# Patient Record
Sex: MALE | Race: WHITE | NOT HISPANIC OR LATINO | URBAN - METROPOLITAN AREA
[De-identification: names, ages, dates, MRNs, and addresses within clinical notes are randomized per-mention and may not be internally consistent; named-entity substitution may affect disease eponyms.]

---

## 2017-11-11 ENCOUNTER — EMERGENCY (EMERGENCY)
Facility: HOSPITAL | Age: 57
LOS: 1 days | Discharge: HOME | End: 2017-11-11
Attending: EMERGENCY MEDICINE | Admitting: EMERGENCY MEDICINE
Payer: MEDICAID

## 2017-11-11 VITALS
OXYGEN SATURATION: 99 % | WEIGHT: 250 LBS | RESPIRATION RATE: 20 BRPM | HEART RATE: 98 BPM | SYSTOLIC BLOOD PRESSURE: 214 MMHG | TEMPERATURE: 99 F | DIASTOLIC BLOOD PRESSURE: 121 MMHG

## 2017-11-11 VITALS
OXYGEN SATURATION: 98 % | SYSTOLIC BLOOD PRESSURE: 168 MMHG | DIASTOLIC BLOOD PRESSURE: 78 MMHG | HEART RATE: 88 BPM | TEMPERATURE: 98 F | RESPIRATION RATE: 16 BRPM

## 2017-11-11 DIAGNOSIS — F10.10 ALCOHOL ABUSE, UNCOMPLICATED: ICD-10-CM

## 2017-11-11 DIAGNOSIS — I11.0 HYPERTENSIVE HEART DISEASE WITH HEART FAILURE: ICD-10-CM

## 2017-11-11 DIAGNOSIS — E11.9 TYPE 2 DIABETES MELLITUS WITHOUT COMPLICATIONS: ICD-10-CM

## 2017-11-11 DIAGNOSIS — R60.9 EDEMA, UNSPECIFIED: ICD-10-CM

## 2017-11-11 DIAGNOSIS — I50.9 HEART FAILURE, UNSPECIFIED: ICD-10-CM

## 2017-11-11 LAB
ALBUMIN SERPL ELPH-MCNC: 3.1 G/DL — LOW (ref 3.3–5)
ALP SERPL-CCNC: 95 U/L — SIGNIFICANT CHANGE UP (ref 40–120)
ALT FLD-CCNC: 20 U/L — SIGNIFICANT CHANGE UP (ref 12–78)
ANION GAP SERPL CALC-SCNC: 9 MMOL/L — SIGNIFICANT CHANGE UP (ref 5–17)
APTT BLD: 40.5 SEC — HIGH (ref 27.5–37.4)
AST SERPL-CCNC: 22 U/L — SIGNIFICANT CHANGE UP (ref 15–37)
BILIRUB SERPL-MCNC: 1 MG/DL — SIGNIFICANT CHANGE UP (ref 0.2–1.2)
BUN SERPL-MCNC: 14 MG/DL — SIGNIFICANT CHANGE UP (ref 7–23)
CALCIUM SERPL-MCNC: 9 MG/DL — SIGNIFICANT CHANGE UP (ref 8.5–10.1)
CHLORIDE SERPL-SCNC: 108 MMOL/L — SIGNIFICANT CHANGE UP (ref 96–108)
CO2 SERPL-SCNC: 28 MMOL/L — SIGNIFICANT CHANGE UP (ref 22–31)
CREAT SERPL-MCNC: 1.3 MG/DL — SIGNIFICANT CHANGE UP (ref 0.5–1.3)
GLUCOSE SERPL-MCNC: 206 MG/DL — HIGH (ref 70–99)
HCT VFR BLD CALC: 44.7 % — SIGNIFICANT CHANGE UP (ref 39–50)
HGB BLD-MCNC: 13.7 G/DL — SIGNIFICANT CHANGE UP (ref 13–17)
INR BLD: 1.31 RATIO — HIGH (ref 0.88–1.16)
MCHC RBC-ENTMCNC: 29.7 PG — SIGNIFICANT CHANGE UP (ref 27–34)
MCHC RBC-ENTMCNC: 30.8 GM/DL — LOW (ref 32–36)
MCV RBC AUTO: 96.7 FL — SIGNIFICANT CHANGE UP (ref 80–100)
NT-PROBNP SERPL-SCNC: HIGH PG/ML (ref 0–125)
PLATELET # BLD AUTO: 272 K/UL — SIGNIFICANT CHANGE UP (ref 150–400)
POTASSIUM SERPL-MCNC: 3.3 MMOL/L — LOW (ref 3.5–5.3)
POTASSIUM SERPL-SCNC: 3.3 MMOL/L — LOW (ref 3.5–5.3)
PROT SERPL-MCNC: 7.5 G/DL — SIGNIFICANT CHANGE UP (ref 6–8.3)
PROTHROM AB SERPL-ACNC: 14.4 SEC — HIGH (ref 9.8–12.7)
RBC # BLD: 4.62 M/UL — SIGNIFICANT CHANGE UP (ref 4.2–5.8)
RBC # FLD: 14.8 % — HIGH (ref 10.3–14.5)
SODIUM SERPL-SCNC: 145 MMOL/L — SIGNIFICANT CHANGE UP (ref 135–145)
WBC # BLD: 7.5 K/UL — SIGNIFICANT CHANGE UP (ref 3.8–10.5)
WBC # FLD AUTO: 7.5 K/UL — SIGNIFICANT CHANGE UP (ref 3.8–10.5)

## 2017-11-11 PROCEDURE — 83880 ASSAY OF NATRIURETIC PEPTIDE: CPT

## 2017-11-11 PROCEDURE — 71010: CPT | Mod: 26

## 2017-11-11 PROCEDURE — 71045 X-RAY EXAM CHEST 1 VIEW: CPT

## 2017-11-11 PROCEDURE — 85730 THROMBOPLASTIN TIME PARTIAL: CPT

## 2017-11-11 PROCEDURE — 99284 EMERGENCY DEPT VISIT MOD MDM: CPT | Mod: 25

## 2017-11-11 PROCEDURE — 82962 GLUCOSE BLOOD TEST: CPT

## 2017-11-11 PROCEDURE — 96374 THER/PROPH/DIAG INJ IV PUSH: CPT

## 2017-11-11 PROCEDURE — 36416 COLLJ CAPILLARY BLOOD SPEC: CPT

## 2017-11-11 PROCEDURE — 99285 EMERGENCY DEPT VISIT HI MDM: CPT

## 2017-11-11 PROCEDURE — 36415 COLL VENOUS BLD VENIPUNCTURE: CPT

## 2017-11-11 PROCEDURE — 85610 PROTHROMBIN TIME: CPT

## 2017-11-11 PROCEDURE — 93005 ELECTROCARDIOGRAM TRACING: CPT

## 2017-11-11 PROCEDURE — 80053 COMPREHEN METABOLIC PANEL: CPT

## 2017-11-11 PROCEDURE — 85027 COMPLETE CBC AUTOMATED: CPT

## 2017-11-11 RX ORDER — POTASSIUM CHLORIDE 20 MEQ
40 PACKET (EA) ORAL ONCE
Qty: 0 | Refills: 0 | Status: COMPLETED | OUTPATIENT
Start: 2017-11-11 | End: 2017-11-11

## 2017-11-11 RX ORDER — POTASSIUM CHLORIDE 20 MEQ
1 PACKET (EA) ORAL
Qty: 30 | Refills: 0 | OUTPATIENT
Start: 2017-11-11 | End: 2017-12-11

## 2017-11-11 RX ORDER — FUROSEMIDE 40 MG
40 TABLET ORAL ONCE
Qty: 0 | Refills: 0 | Status: COMPLETED | OUTPATIENT
Start: 2017-11-11 | End: 2017-11-11

## 2017-11-11 RX ORDER — FUROSEMIDE 40 MG
1 TABLET ORAL
Qty: 30 | Refills: 0 | OUTPATIENT
Start: 2017-11-11 | End: 2017-12-11

## 2017-11-11 RX ADMIN — Medication 40 MILLIGRAM(S): at 19:49

## 2017-11-11 RX ADMIN — Medication 40 MILLIEQUIVALENT(S): at 19:48

## 2017-11-11 NOTE — ED ADULT NURSE NOTE - OBJECTIVE STATEMENT
Patient alert and oriented x 4 ambulatory with steady gait complaining of bilateral lower leg edema and shortness of breath started couple of weeks ago stated hasn't seen a doctor for a long time seen and evaluated by Dr. Mason.

## 2017-11-11 NOTE — ED ADULT TRIAGE NOTE - CHIEF COMPLAINT QUOTE
patient with b/l lower leg edema, dyspnea on exertion, bloating, dizziness, constipation and headache

## 2017-11-11 NOTE — ED PROVIDER NOTE - CONSTITUTIONAL, MLM
normal... Obese while pale, disheveled, well nourished, awake, alert, oriented to person, place, time/situation and in no apparent distress.

## 2017-11-11 NOTE — ED ADULT NURSE REASSESSMENT NOTE - NS ED NURSE REASSESS COMMENT FT1
Patient lying comfortably in bed informed of the plan of care with understanding. Will continue to monitor.
pt reavaluated and medicated with potassium po and  lasix ivp pt with vital signs stable d/c home to follow up

## 2017-11-11 NOTE — ED PROVIDER NOTE - OBJECTIVE STATEMENT
56 yo non compliant white male with HTN, AODM and High Cholesterol presents here for evaluation of increasing SOB, FAIR and lower extremity swelling. No chest pain and no cough, fever or chills.

## 2018-01-01 ENCOUNTER — OUTPATIENT (OUTPATIENT)
Dept: OUTPATIENT SERVICES | Facility: HOSPITAL | Age: 58
LOS: 1 days | End: 2018-01-01

## 2018-01-18 ENCOUNTER — INPATIENT (INPATIENT)
Facility: HOSPITAL | Age: 58
LOS: 14 days | Discharge: HOME | DRG: 248 | End: 2018-02-02
Attending: HOSPITALIST | Admitting: INTERNAL MEDICINE
Payer: MEDICAID

## 2018-01-18 ENCOUNTER — TRANSCRIPTION ENCOUNTER (OUTPATIENT)
Age: 58
End: 2018-01-18

## 2018-01-18 VITALS
HEART RATE: 75 BPM | DIASTOLIC BLOOD PRESSURE: 76 MMHG | OXYGEN SATURATION: 96 % | WEIGHT: 266.98 LBS | RESPIRATION RATE: 18 BRPM | SYSTOLIC BLOOD PRESSURE: 151 MMHG | HEIGHT: 72 IN | TEMPERATURE: 98 F

## 2018-01-18 DIAGNOSIS — I25.10 ATHEROSCLEROTIC HEART DISEASE OF NATIVE CORONARY ARTERY WITHOUT ANGINA PECTORIS: ICD-10-CM

## 2018-01-18 LAB
ALBUMIN SERPL ELPH-MCNC: 3.3 G/DL — SIGNIFICANT CHANGE UP (ref 3.3–5)
ALP SERPL-CCNC: 104 U/L — SIGNIFICANT CHANGE UP (ref 40–120)
ALT FLD-CCNC: 9 U/L RC — LOW (ref 10–45)
ANION GAP SERPL CALC-SCNC: 11 MMOL/L — SIGNIFICANT CHANGE UP (ref 5–17)
APTT BLD: 35 SEC — SIGNIFICANT CHANGE UP (ref 27.5–37.4)
AST SERPL-CCNC: 10 U/L — SIGNIFICANT CHANGE UP (ref 10–40)
BILIRUB SERPL-MCNC: 1.2 MG/DL — SIGNIFICANT CHANGE UP (ref 0.2–1.2)
BUN SERPL-MCNC: 24 MG/DL — HIGH (ref 7–23)
CALCIUM SERPL-MCNC: 8.9 MG/DL — SIGNIFICANT CHANGE UP (ref 8.4–10.5)
CHLORIDE SERPL-SCNC: 104 MMOL/L — SIGNIFICANT CHANGE UP (ref 96–108)
CO2 SERPL-SCNC: 29 MMOL/L — SIGNIFICANT CHANGE UP (ref 22–31)
CREAT SERPL-MCNC: 1.4 MG/DL — HIGH (ref 0.5–1.3)
GLUCOSE SERPL-MCNC: 140 MG/DL — HIGH (ref 70–99)
HBA1C BLD-MCNC: 8 % — HIGH (ref 4–5.6)
HCT VFR BLD CALC: 38.3 % — LOW (ref 39–50)
HGB BLD-MCNC: 12.1 G/DL — LOW (ref 13–17)
INR BLD: 1.36 RATIO — HIGH (ref 0.88–1.16)
MCHC RBC-ENTMCNC: 30.5 PG — SIGNIFICANT CHANGE UP (ref 27–34)
MCHC RBC-ENTMCNC: 31.5 GM/DL — LOW (ref 32–36)
MCV RBC AUTO: 96.5 FL — SIGNIFICANT CHANGE UP (ref 80–100)
PLATELET # BLD AUTO: 282 K/UL — SIGNIFICANT CHANGE UP (ref 150–400)
POTASSIUM SERPL-MCNC: 3.6 MMOL/L — SIGNIFICANT CHANGE UP (ref 3.5–5.3)
POTASSIUM SERPL-SCNC: 3.6 MMOL/L — SIGNIFICANT CHANGE UP (ref 3.5–5.3)
PROT SERPL-MCNC: 7.2 G/DL — SIGNIFICANT CHANGE UP (ref 6–8.3)
PROTHROM AB SERPL-ACNC: 14.9 SEC — HIGH (ref 9.8–12.7)
RBC # BLD: 3.97 M/UL — LOW (ref 4.2–5.8)
RBC # FLD: 12.8 % — SIGNIFICANT CHANGE UP (ref 10.3–14.5)
SODIUM SERPL-SCNC: 144 MMOL/L — SIGNIFICANT CHANGE UP (ref 135–145)
WBC # BLD: 8 K/UL — SIGNIFICANT CHANGE UP (ref 3.8–10.5)
WBC # FLD AUTO: 8 K/UL — SIGNIFICANT CHANGE UP (ref 3.8–10.5)

## 2018-01-18 PROCEDURE — 93010 ELECTROCARDIOGRAM REPORT: CPT | Mod: 76

## 2018-01-18 RX ORDER — HYDRALAZINE HCL 50 MG
100 TABLET ORAL THREE TIMES A DAY
Qty: 0 | Refills: 0 | Status: DISCONTINUED | OUTPATIENT
Start: 2018-01-18 | End: 2018-02-02

## 2018-01-18 RX ORDER — ASPIRIN/CALCIUM CARB/MAGNESIUM 324 MG
1 TABLET ORAL
Qty: 0 | Refills: 0 | COMMUNITY
Start: 2018-01-18

## 2018-01-18 RX ORDER — ATORVASTATIN CALCIUM 80 MG/1
80 TABLET, FILM COATED ORAL AT BEDTIME
Qty: 0 | Refills: 0 | Status: DISCONTINUED | OUTPATIENT
Start: 2018-01-18 | End: 2018-02-02

## 2018-01-18 RX ORDER — SODIUM CHLORIDE 9 MG/ML
1000 INJECTION, SOLUTION INTRAVENOUS
Qty: 0 | Refills: 0 | Status: DISCONTINUED | OUTPATIENT
Start: 2018-01-18 | End: 2018-02-02

## 2018-01-18 RX ORDER — FUROSEMIDE 40 MG
1 TABLET ORAL
Qty: 0 | Refills: 0 | COMMUNITY

## 2018-01-18 RX ORDER — GLUCAGON INJECTION, SOLUTION 0.5 MG/.1ML
1 INJECTION, SOLUTION SUBCUTANEOUS ONCE
Qty: 0 | Refills: 0 | Status: DISCONTINUED | OUTPATIENT
Start: 2018-01-18 | End: 2018-02-02

## 2018-01-18 RX ORDER — SIMETHICONE 80 MG/1
80 TABLET, CHEWABLE ORAL EVERY 6 HOURS
Qty: 0 | Refills: 0 | Status: DISCONTINUED | OUTPATIENT
Start: 2018-01-18 | End: 2018-01-23

## 2018-01-18 RX ORDER — INSULIN LISPRO 100/ML
VIAL (ML) SUBCUTANEOUS
Qty: 0 | Refills: 0 | Status: DISCONTINUED | OUTPATIENT
Start: 2018-01-18 | End: 2018-02-02

## 2018-01-18 RX ORDER — CARVEDILOL PHOSPHATE 80 MG/1
25 CAPSULE, EXTENDED RELEASE ORAL EVERY 12 HOURS
Qty: 0 | Refills: 0 | Status: DISCONTINUED | OUTPATIENT
Start: 2018-01-18 | End: 2018-02-02

## 2018-01-18 RX ORDER — INSULIN LISPRO 100/ML
VIAL (ML) SUBCUTANEOUS AT BEDTIME
Qty: 0 | Refills: 0 | Status: DISCONTINUED | OUTPATIENT
Start: 2018-01-18 | End: 2018-02-02

## 2018-01-18 RX ORDER — ZOLPIDEM TARTRATE 10 MG/1
5 TABLET ORAL AT BEDTIME
Qty: 0 | Refills: 0 | Status: DISCONTINUED | OUTPATIENT
Start: 2018-01-18 | End: 2018-01-25

## 2018-01-18 RX ORDER — ASPIRIN/CALCIUM CARB/MAGNESIUM 324 MG
81 TABLET ORAL DAILY
Qty: 0 | Refills: 0 | Status: DISCONTINUED | OUTPATIENT
Start: 2018-01-19 | End: 2018-02-02

## 2018-01-18 RX ORDER — DEXTROSE 50 % IN WATER 50 %
25 SYRINGE (ML) INTRAVENOUS ONCE
Qty: 0 | Refills: 0 | Status: DISCONTINUED | OUTPATIENT
Start: 2018-01-18 | End: 2018-02-02

## 2018-01-18 RX ORDER — FUROSEMIDE 40 MG
40 TABLET ORAL ONCE
Qty: 0 | Refills: 0 | Status: COMPLETED | OUTPATIENT
Start: 2018-01-18 | End: 2018-01-18

## 2018-01-18 RX ORDER — HYDRALAZINE HCL 50 MG
0 TABLET ORAL
Qty: 0 | Refills: 0 | COMMUNITY

## 2018-01-18 RX ORDER — DEXTROSE 50 % IN WATER 50 %
12.5 SYRINGE (ML) INTRAVENOUS ONCE
Qty: 0 | Refills: 0 | Status: DISCONTINUED | OUTPATIENT
Start: 2018-01-18 | End: 2018-02-02

## 2018-01-18 RX ORDER — FUROSEMIDE 40 MG
60 TABLET ORAL
Qty: 0 | Refills: 0 | Status: DISCONTINUED | OUTPATIENT
Start: 2018-01-19 | End: 2018-01-19

## 2018-01-18 RX ORDER — DEXTROSE 50 % IN WATER 50 %
1 SYRINGE (ML) INTRAVENOUS ONCE
Qty: 0 | Refills: 0 | Status: DISCONTINUED | OUTPATIENT
Start: 2018-01-18 | End: 2018-02-02

## 2018-01-18 RX ORDER — ISOSORBIDE MONONITRATE 60 MG/1
30 TABLET, EXTENDED RELEASE ORAL DAILY
Qty: 0 | Refills: 0 | Status: DISCONTINUED | OUTPATIENT
Start: 2018-01-18 | End: 2018-01-21

## 2018-01-18 RX ORDER — CLOPIDOGREL BISULFATE 75 MG/1
75 TABLET, FILM COATED ORAL DAILY
Qty: 0 | Refills: 0 | Status: DISCONTINUED | OUTPATIENT
Start: 2018-01-19 | End: 2018-02-02

## 2018-01-18 RX ORDER — ONDANSETRON 8 MG/1
4 TABLET, FILM COATED ORAL ONCE
Qty: 0 | Refills: 0 | Status: COMPLETED | OUTPATIENT
Start: 2018-01-18 | End: 2018-01-18

## 2018-01-18 RX ORDER — ATORVASTATIN CALCIUM 80 MG/1
1 TABLET, FILM COATED ORAL
Qty: 0 | Refills: 0 | COMMUNITY

## 2018-01-18 RX ADMIN — ZOLPIDEM TARTRATE 5 MILLIGRAM(S): 10 TABLET ORAL at 23:50

## 2018-01-18 RX ADMIN — ATORVASTATIN CALCIUM 80 MILLIGRAM(S): 80 TABLET, FILM COATED ORAL at 21:19

## 2018-01-18 RX ADMIN — ONDANSETRON 4 MILLIGRAM(S): 8 TABLET, FILM COATED ORAL at 22:22

## 2018-01-18 RX ADMIN — CARVEDILOL PHOSPHATE 25 MILLIGRAM(S): 80 CAPSULE, EXTENDED RELEASE ORAL at 17:02

## 2018-01-18 RX ADMIN — SIMETHICONE 80 MILLIGRAM(S): 80 TABLET, CHEWABLE ORAL at 22:22

## 2018-01-18 RX ADMIN — Medication 100 MILLIGRAM(S): at 17:07

## 2018-01-18 RX ADMIN — Medication 40 MILLIGRAM(S): at 20:23

## 2018-01-18 RX ADMIN — Medication 100 MILLIGRAM(S): at 21:19

## 2018-01-18 NOTE — DISCHARGE NOTE ADULT - PLAN OF CARE
Your blood pressure will be controlled. Continue with your blood pressure medications; eat a heart healthy diet with low salt diet; exercise regularly (consult with your physician or cardiologist first); maintain a heart healthy weight; if you smoke - quit (A resource to help you stop smoking is the [facility] – phone number 576-506-5178.); include healthy ways to manage stress. Continue to follow with your primary care physician or cardiologist. Your hemoglobin A1C will be between 7-8 Continue to follow with your primary care MD or your endocrinologist.  Follow a heart healthy diabetic diet. If you check your fingerstick glucose at home, call your MD if it is greater than 250mg/dL on 2 occasions or less than 100mg/dL on 2 occasions. Know signs of low blood sugar, such as: dizziness, shakiness, sweating, confusion, hunger, nervousness-drink 4 ounces apple juice if occurs and call your doctor. Know early signs of high blood sugar, such as: frequent urination, increased thirst, blurry vision, fatigue, headache - call your doctor if this occurs. Follow with other practitioners to care for your diabetes, such as ophthalmologist and podiatrist. Patient remains chest pain free and understands post cath discharge instructions. Do not stop you Aspirin or Plavix unless instructed to do so by your cardiologist. No heavy lifting or pushing/pulling with procedure arm for 2 weeks. No driving for 2 days. You may shower 24 hours following the procedure but avoid baths/swimming for 1 week. Check your wrist site for bleeding and/or swelling daily following procedure and call your doctor immediately if it occurs or if you experience increased pain at the site. Follow up with your cardiologist in 1-2 weeks. You may call [facility] if you have any questions/concerns regarding your procedure [phone]. Patient remains chest pain free you need to continue aspirin and plavix as dual antiplatelet therapy for at least 1 year for your bare metal stent that was placed this admission follow up with Dr Howell to coordinate radiation therapy Follow up with Dr Howell ([phone]) to coordinate lymphoscintigraphy to complete staging at [place]. follow up with Dr Howell to coordinate radiation therapy #[phone] Weigh yourself daily.  If you gain 3lbs in 3 days, or 5lbs in a week call your Health Care Provider.  Do not eat or drink foods containing more than 2000mg of salt (sodium) in your diet every day.  Call your Health Care Provider if you have any swelling or increased swelling in your feet, ankles, and/or stomach.  Take all of your medication as directed.  If you become dizzy call your Health Care Provider. Avoid taking (NSAIDs) - (ex: Ibuprofen, Advil, Celebrex, Naprosyn)  Avoid taking any nephrotoxic agents (can harm kidneys) - Intravenous contrast for diagnostic testing, combination cold medications.  Have all medications adjusted for your renal function by your Health Care Provider.  Blood pressure control is important.  Take all medication as prescribed. follow up with Dr Howell to coordinate radiation therapy #[phone]  · Malignant melanoma, unspecified site. onc fellow following patient at [facility] Dr Howell ([phone]): had been evaluated for stage 2 melanoma at minimum.  Surgery told patient would need to f/u for lymphoscintigraphy to complete staging at [place].  Skull lesion was biopsied showing meningioma (WHO grade 1) causing mass effect/hearing issues.  Patient opted for radiation, not initiated yet.  Patient needs to follow up for this testing at [place] after rehab.  Oncology had urged patient to first take care of cardiac issues.

## 2018-01-18 NOTE — DISCHARGE NOTE ADULT - CARE PROVIDER_API CALL
Dante,   [phone]  [facility] oncologist  Phone: (   )    -  Fax: (   )    -

## 2018-01-18 NOTE — CHART NOTE - NSCHARTNOTEFT_GEN_A_CORE
Patient underwent a PCI procedure and is being admitted as they are at increased risk for major adverse cardiac and vascular events if discharged due to the following high risk characteristics: unstable angina, acute on chronic systolic HF.

## 2018-01-18 NOTE — DISCHARGE NOTE ADULT - PATIENT PORTAL LINK FT
“You can access the FollowHealth Patient Portal, offered by [facility], by registering with the following website: [email or web address]”

## 2018-01-18 NOTE — H&P CARDIOLOGY - HISTORY OF PRESENT ILLNESS
Patient is 56 yo  male with PMHx HTN, HF, DMT2 currently on no medication and not followed by endocrinology or PMD, KRISTIAN non compliant with CPAP, benign brain tumor and melanoma of the skin who presented to [facility] on 1/15 c/o chest pressure and dyspnea on exertion.  Patient was admitted to telemetry, cardiology Dr Alonso following.  Patient is s/p diagnostic R/LHC via RFA access earlier today showing pLAD 90%, mRCA 70%, ASA 325mg and Plavix 600mg administered.  Patient is now transferred to [facility] for PCI. Patient is 58 yo  male with PMHx HTN, HF, DMT2 currently on no medication and not followed by endocrinology or PMD, KRISTIAN non compliant with CPAP, meningioma, and melanoma of the skin who presented to [facility] on 1/15 c/o chest pressure and dyspnea on exertion.  Patient was admitted to telemetry, cardiology Dr Alonso following.  Patient is s/p diagnostic R/LHC via RFA access earlier today showing pLAD 90%, mRCA 70%, ASA 325mg and Plavix 600mg administered.  Patient is now transferred to [facility] for PCI.  Patient is a poor historian.

## 2018-01-18 NOTE — DISCHARGE NOTE ADULT - SECONDARY DIAGNOSIS.
Hypertension, unspecified type Type 2 diabetes mellitus with complication, without long-term current use of insulin Meningioma Acute on chronic diastolic heart failure CKD (chronic kidney disease) stage 3, GFR 30-59 ml/min Melanoma

## 2018-01-18 NOTE — H&P CARDIOLOGY - PMH
Benign neoplasm of brain, unspecified brain region    Enlarged heart    Heart failure, unspecified heart failure chronicity, unspecified heart failure type    HTN (hypertension)    Malignant melanoma, unspecified site    Sleep apnea    Type 2 diabetes mellitus with complication, without long-term current use of insulin Enlarged heart    Heart failure, unspecified heart failure chronicity, unspecified heart failure type    HTN (hypertension)    Malignant melanoma, unspecified site    Meningioma    Sleep apnea    Type 2 diabetes mellitus with complication, without long-term current use of insulin

## 2018-01-18 NOTE — DISCHARGE NOTE ADULT - MEDICATION SUMMARY - MEDICATIONS TO TAKE
I will START or STAY ON the medications listed below when I get home from the hospital:    aspirin 81 mg oral delayed release tablet  -- 1 tab(s) by mouth once a day  -- Indication: For Coronary artery disease of native artery of native heart with stable angina pectoris    isosorbide mononitrate 60 mg oral tablet, extended release  -- 1 tab(s) by mouth once a day  -- Indication: For Atherosclerosis of native coronary artery without angina pectoris    sertraline 25 mg oral tablet  -- 1 tab(s) by mouth once a day  -- Indication: For depression    traZODone 50 mg oral tablet  -- 1 tab(s) by mouth once a day (at bedtime)  -- Indication: For depression     insulin glargine  -- 10 unit(s) subcutaneous once a day (at bedtime)  -- Indication: For Type 2 diabetes mellitus with complication, without long-term current use of insulin    atorvastatin 80 mg oral tablet  -- 1 tab(s) by mouth once a day (at bedtime)  -- Indication: For High cholestrol    clopidogrel 75 mg oral tablet  -- 1 tab(s) by mouth once a day  -- Indication: For Coronary artery disease of native artery of native heart with stable angina pectoris    carvedilol 25 mg oral tablet  -- 1 tab(s) by mouth every 12 hours  -- Indication: For Coronary artery disease of native artery of native heart with stable angina pectoris    petrolatum topical ointment  -- 1 application on skin 2 times a day  -- Indication: For skin     bacitracin 500 units/g topical ointment  -- 1 application on skin 2 times a day  -- Indication: For skin    furosemide 40 mg oral tablet  -- 1 tab(s) by mouth 2 times a day  -- Indication: For Acute on chronic heart failure, unspecified heart failure type    docusate sodium 100 mg oral capsule  -- 1 cap(s) by mouth 2 times a day  -- Indication: For Constipation     simethicone 80 mg oral tablet, chewable  -- 1 tab(s) by mouth 3 times a day  -- Indication: For gas    melatonin 3 mg oral tablet  -- 1 tab(s) by mouth once a day (at bedtime)  -- Indication: For Insomnia     carbamide peroxide 6.5% otic solution  -- 5 drop(s) to each affected ear 2 times a day  -- Indication: For Eye drop     pantoprazole 40 mg oral delayed release tablet  -- 1 tab(s) by mouth once a day (before a meal)  -- Indication: For GERD    hydrALAZINE 100 mg oral tablet  -- 1 tab(s) by mouth 3 times a day  home and hospital  -- Indication: For Essential hypertension

## 2018-01-18 NOTE — DISCHARGE NOTE ADULT - PROVIDER TOKENS
FREE:[LAST:[Howell],PHONE:[(   )    -],FAX:[(   )    -],ADDRESS:[[phone]  [facility] oncologist]]

## 2018-01-18 NOTE — DISCHARGE NOTE ADULT - CARE PLAN
Principal Discharge DX:	Coronary artery disease of native artery of native heart with stable angina pectoris  Goal:	Patient remains chest pain free and understands post cath discharge instructions.  Assessment and plan of treatment:	Do not stop you Aspirin or Plavix unless instructed to do so by your cardiologist. No heavy lifting or pushing/pulling with procedure arm for 2 weeks. No driving for 2 days. You may shower 24 hours following the procedure but avoid baths/swimming for 1 week. Check your wrist site for bleeding and/or swelling daily following procedure and call your doctor immediately if it occurs or if you experience increased pain at the site. Follow up with your cardiologist in 1-2 weeks. You may call [facility] if you have any questions/concerns regarding your procedure [phone].  Secondary Diagnosis:	Hypertension, unspecified type  Goal:	Your blood pressure will be controlled.  Assessment and plan of treatment:	Continue with your blood pressure medications; eat a heart healthy diet with low salt diet; exercise regularly (consult with your physician or cardiologist first); maintain a heart healthy weight; if you smoke - quit (A resource to help you stop smoking is the [facility] – phone number [phone].); include healthy ways to manage stress. Continue to follow with your primary care physician or cardiologist.  Secondary Diagnosis:	Type 2 diabetes mellitus with complication, without long-term current use of insulin  Goal:	Your hemoglobin A1C will be between 7-8  Assessment and plan of treatment:	Continue to follow with your primary care MD or your endocrinologist.  Follow a heart healthy diabetic diet. If you check your fingerstick glucose at home, call your MD if it is greater than 250mg/dL on 2 occasions or less than 100mg/dL on 2 occasions. Know signs of low blood sugar, such as: dizziness, shakiness, sweating, confusion, hunger, nervousness-drink 4 ounces apple juice if occurs and call your doctor. Know early signs of high blood sugar, such as: frequent urination, increased thirst, blurry vision, fatigue, headache - call your doctor if this occurs. Follow with other practitioners to care for your diabetes, such as ophthalmologist and podiatrist. Principal Discharge DX:	Coronary artery disease of native artery of native heart with stable angina pectoris  Goal:	Patient remains chest pain free  Assessment and plan of treatment:	you need to continue aspirin and plavix as dual antiplatelet therapy for at least 1 year for your bare metal stent that was placed this admission  Secondary Diagnosis:	Hypertension, unspecified type  Goal:	Your blood pressure will be controlled.  Assessment and plan of treatment:	Continue with your blood pressure medications; eat a heart healthy diet with low salt diet; exercise regularly (consult with your physician or cardiologist first); maintain a heart healthy weight; if you smoke - quit (A resource to help you stop smoking is the [facility] – phone number 629-643-7728.); include healthy ways to manage stress. Continue to follow with your primary care physician or cardiologist.  Secondary Diagnosis:	Type 2 diabetes mellitus with complication, without long-term current use of insulin  Goal:	Your hemoglobin A1C will be between 7-8  Assessment and plan of treatment:	Continue to follow with your primary care MD or your endocrinologist.  Follow a heart healthy diabetic diet. If you check your fingerstick glucose at home, call your MD if it is greater than 250mg/dL on 2 occasions or less than 100mg/dL on 2 occasions. Know signs of low blood sugar, such as: dizziness, shakiness, sweating, confusion, hunger, nervousness-drink 4 ounces apple juice if occurs and call your doctor. Know early signs of high blood sugar, such as: frequent urination, increased thirst, blurry vision, fatigue, headache - call your doctor if this occurs. Follow with other practitioners to care for your diabetes, such as ophthalmologist and podiatrist.  Secondary Diagnosis:	Meningioma  Assessment and plan of treatment:	follow up with Dr Howell to coordinate radiation therapy  Secondary Diagnosis:	Acute on chronic diastolic heart failure  Secondary Diagnosis:	CKD (chronic kidney disease) stage 3, GFR 30-59 ml/min  Secondary Diagnosis:	Melanoma  Assessment and plan of treatment:	Follow up with Dr Howell ([phone]) to coordinate lymphoscintigraphy to complete staging at [place]. Principal Discharge DX:	Coronary artery disease of native artery of native heart with stable angina pectoris  Goal:	Patient remains chest pain free  Assessment and plan of treatment:	you need to continue aspirin and plavix as dual antiplatelet therapy for at least 1 year for your bare metal stent that was placed this admission  Secondary Diagnosis:	Hypertension, unspecified type  Goal:	Your blood pressure will be controlled.  Assessment and plan of treatment:	Continue with your blood pressure medications; eat a heart healthy diet with low salt diet; exercise regularly (consult with your physician or cardiologist first); maintain a heart healthy weight; if you smoke - quit (A resource to help you stop smoking is the [facility] Tobacco [facility] – phone number [phone].); include healthy ways to manage stress. Continue to follow with your primary care physician or cardiologist.  Secondary Diagnosis:	Type 2 diabetes mellitus with complication, without long-term current use of insulin  Goal:	Your hemoglobin A1C will be between 7-8  Assessment and plan of treatment:	Continue to follow with your primary care MD or your endocrinologist.  Follow a heart healthy diabetic diet. If you check your fingerstick glucose at home, call your MD if it is greater than 250mg/dL on 2 occasions or less than 100mg/dL on 2 occasions. Know signs of low blood sugar, such as: dizziness, shakiness, sweating, confusion, hunger, nervousness-drink 4 ounces apple juice if occurs and call your doctor. Know early signs of high blood sugar, such as: frequent urination, increased thirst, blurry vision, fatigue, headache - call your doctor if this occurs. Follow with other practitioners to care for your diabetes, such as ophthalmologist and podiatrist.  Secondary Diagnosis:	Meningioma  Assessment and plan of treatment:	follow up with Dr Howell to coordinate radiation therapy #[phone]  Secondary Diagnosis:	Acute on chronic diastolic heart failure  Assessment and plan of treatment:	Weigh yourself daily.  If you gain 3lbs in 3 days, or 5lbs in a week call your Health Care Provider.  Do not eat or drink foods containing more than 2000mg of salt (sodium) in your diet every day.  Call your Health Care Provider if you have any swelling or increased swelling in your feet, ankles, and/or stomach.  Take all of your medication as directed.  If you become dizzy call your Health Care Provider.  Secondary Diagnosis:	CKD (chronic kidney disease) stage 3, GFR 30-59 ml/min  Assessment and plan of treatment:	Avoid taking (NSAIDs) - (ex: Ibuprofen, Advil, Celebrex, Naprosyn)  Avoid taking any nephrotoxic agents (can harm kidneys) - Intravenous contrast for diagnostic testing, combination cold medications.  Have all medications adjusted for your renal function by your Health Care Provider.  Blood pressure control is important.  Take all medication as prescribed.  Secondary Diagnosis:	Melanoma  Assessment and plan of treatment:	Follow up with Dr Howell ([phone]) to coordinate lymphoscintigraphy to complete staging at [place]. Principal Discharge DX:	Coronary artery disease of native artery of native heart with stable angina pectoris  Goal:	Patient remains chest pain free  Assessment and plan of treatment:	you need to continue aspirin and plavix as dual antiplatelet therapy for at least 1 year for your bare metal stent that was placed this admission  Secondary Diagnosis:	Hypertension, unspecified type  Goal:	Your blood pressure will be controlled.  Assessment and plan of treatment:	Continue with your blood pressure medications; eat a heart healthy diet with low salt diet; exercise regularly (consult with your physician or cardiologist first); maintain a heart healthy weight; if you smoke - quit (A resource to help you stop smoking is the [facility] – phone number [phone].); include healthy ways to manage stress. Continue to follow with your primary care physician or cardiologist.  Secondary Diagnosis:	Type 2 diabetes mellitus with complication, without long-term current use of insulin  Goal:	Your hemoglobin A1C will be between 7-8  Assessment and plan of treatment:	Continue to follow with your primary care MD or your endocrinologist.  Follow a heart healthy diabetic diet. If you check your fingerstick glucose at home, call your MD if it is greater than 250mg/dL on 2 occasions or less than 100mg/dL on 2 occasions. Know signs of low blood sugar, such as: dizziness, shakiness, sweating, confusion, hunger, nervousness-drink 4 ounces apple juice if occurs and call your doctor. Know early signs of high blood sugar, such as: frequent urination, increased thirst, blurry vision, fatigue, headache - call your doctor if this occurs. Follow with other practitioners to care for your diabetes, such as ophthalmologist and podiatrist.  Secondary Diagnosis:	Meningioma  Assessment and plan of treatment:	follow up with Dr Howell to coordinate radiation therapy #[phone]  · Malignant melanoma, unspecified site. onc fellow following patient at [facility] Dr Howell ([phone]): had been evaluated for stage 2 melanoma at minimum.  Surgery told patient would need to f/u for lymphoscintigraphy to complete staging at [place].  Skull lesion was biopsied showing meningioma (WHO grade 1) causing mass effect/hearing issues.  Patient opted for radiation, not initiated yet.  Patient needs to follow up for this testing at [place] after rehab.  Oncology had urged patient to first take care of cardiac issues.  Secondary Diagnosis:	Acute on chronic diastolic heart failure  Assessment and plan of treatment:	Weigh yourself daily.  If you gain 3lbs in 3 days, or 5lbs in a week call your Health Care Provider.  Do not eat or drink foods containing more than 2000mg of salt (sodium) in your diet every day.  Call your Health Care Provider if you have any swelling or increased swelling in your feet, ankles, and/or stomach.  Take all of your medication as directed.  If you become dizzy call your Health Care Provider.  Secondary Diagnosis:	CKD (chronic kidney disease) stage 3, GFR 30-59 ml/min  Assessment and plan of treatment:	Avoid taking (NSAIDs) - (ex: Ibuprofen, Advil, Celebrex, Naprosyn)  Avoid taking any nephrotoxic agents (can harm kidneys) - Intravenous contrast for diagnostic testing, combination cold medications.  Have all medications adjusted for your renal function by your Health Care Provider.  Blood pressure control is important.  Take all medication as prescribed.  Secondary Diagnosis:	Melanoma  Assessment and plan of treatment:	Follow up with Dr Howell ([phone]) to coordinate lymphoscintigraphy to complete staging at [place].

## 2018-01-18 NOTE — DISCHARGE NOTE ADULT - MEDICATION SUMMARY - MEDICATIONS TO CHANGE
I will SWITCH the dose or number of times a day I take the medications listed below when I get home from the hospital:    Imdur 30 mg oral tablet, extended release  -- 1 tab(s) by mouth once a day (in the morning)  home and hospital    atorvastatin 40 mg oral tablet  -- 1 tab(s) by mouth once a day  home and hospital    Lasix  -- 60 milligram(s) by mouth 2 times a day  [facility]    HumaLOG 100 units/mL subcutaneous solution  -- subcutaneous 3 times a day  [facility] I will SWITCH the dose or number of times a day I take the medications listed below when I get home from the hospital:    Imdur 30 mg oral tablet, extended release  -- 1 tab(s) by mouth once a day (in the morning)  home and hospital    atorvastatin 40 mg oral tablet  -- 1 tab(s) by mouth once a day  home and hospital    Lasix  -- 60 milligram(s) by mouth 2 times a day  [facility]    HumaLOG 100 units/mL subcutaneous solution  -- subcutaneous 3 times a day  [facility]

## 2018-01-18 NOTE — DISCHARGE NOTE ADULT - HOSPITAL COURSE
HPI:  Patient is 56 yo  male with PMHx HTN, HF, DMT2 currently on no medication and not followed by endocrinology or PMD, KRISTIAN non compliant with CPAP, meningioma, and melanoma of the skin who presented to [facility] on 1/15 c/o chest pressure and dyspnea on exertion.  Patient was admitted to telemetry, cardiology Dr Alonso following.  Patient is s/p diagnostic R/LHC via RFA access earlier today showing pLAD 90%, mRCA 70%, ASA 325mg and Plavix 600mg administered.  Patient is now transferred to [facility] for PCI.  Patient is a poor historian. (18 Jan 2018 12:30)    1/18 cardiac cath with one bare metal stent to the prox LAD. Right radial site without swelling, bleeding. 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 with SOB/cough, chest tightness, and LE edema had cardiac cath there showing pLAD 90%, mRCA 70%, ASA 325mg and Plavix 600mg administered.  Patient transferred to [facility] for PCI, now s/p BMS to LAD on 1/18/2018.  Echocardiogram demonstrate preserved LV systolic function, but severe pulmonary hypertension, signs of RV pressure and volume overload.  Exam is consistent with pulmonary hypertension. Obtained results of right and left heart catheterization from prior hospital and has elevated left heart filling pressures. Implication is pulmonary hypertension secondary to left heart failure. Patient diuresed with IV Lasix.  Patient beginning to ambulate with physical therapy. 57 year old male with recently diagnosed systolic HF (11/2017 - EF 40% -> 55-60%),  HTN, DM2,  melanoma of left upper back s/p resection, recently diagnosed meningioma of left side of head s/p partial resection, and KRISTIAN, presented to [facility] with SOB/cough, chest tightness, and LE edema had cardiac cath there showing pLAD 90%, mRCA 70%, ASA 325mg and Plavix 600mg administered.  Patient transferred to [facility] for PCI, now s/p BMS to LAD on 1/18/2018.  Echocardiogram demonstrate preserved LV systolic function, but severe pulmonary hypertension, signs of RV pressure and volume overload due to acute on chronic diastolic heart failure.  Exam is consistent with pulmonary hypertension. Obtained results of right and left heart catheterization from prior hospital and has elevated left heart filling pressures. Implication is pulmonary hypertension secondary to left heart failure. Patient diuresed with IV Lasix.  Patient beginning to ambulate with physical therapy.     For his type 2 diabetes mellitus c/b CKD3, he was started on lantus, a1c 8.    For hi obstructive sleep apnea syndrome, patient reported that he hasn't used a CPAP machine in several years. Discussed importance of weight loss with patient and outpatient follow up.       For his Malignant melanoma, he follows with [facility] onc fellow Dr Howell ([phone]): had been evaluated for stage 2 melanoma at minimum.  Surgery told him he would need to f/u for lymphoscintigraphy to complete staging at [place].  Skull lesion was biopsied showing meningioma (WHO grade 1) causing mass effect/hearing issues.  Patient opted for radiation, not initiated yet.  Patient needs to follow up for this testing at [place] after rehab.  Oncology had urged patient to first take care of cardiac issues.       His CKD (chronic kidney disease) stage 3, GFR 30-59 ml/min was stable. 57 year old male with recently diagnosed systolic HF (11/2017 - EF 40% -> 55-60%),  HTN, DM2,  melanoma of left upper back s/p resection, recently diagnosed meningioma of left side of head s/p partial resection, and KRISTIAN, presented to [facility] with SOB/cough, chest tightness, and LE edema had cardiac cath there showing pLAD 90%, mRCA 70%, ASA 325mg and Plavix 600mg administered.  Patient transferred to [facility] for PCI, now s/p BMS to LAD on 1/18/2018.  Echocardiogram demonstrate preserved LV systolic function, but severe pulmonary hypertension, signs of RV pressure and volume overload due to acute on chronic diastolic heart failure.  Exam is consistent with pulmonary hypertension. Obtained results of right and left heart catheterization from prior hospital and has elevated left heart filling pressures. Implication is pulmonary hypertension secondary to left heart failure. Patient diuresed with IV Lasix.  Patient beginning to ambulate with physical therapy.     For his type 2 diabetes mellitus c/b CKD3, he was started on lantus, a1c 8.    For hi obstructive sleep apnea syndrome, patient reported that he hasn't used a CPAP machine in several years. Discussed importance of weight loss with patient and outpatient follow up.       For his Malignant melanoma, he follows with [facility] onc fellow Dr Howell ([phone]): had been evaluated for stage 2 melanoma at minimum.  Surgery told him he would need to f/u for lymphoscintigraphy to complete staging at [place].  Skull lesion was biopsied showing meningioma (WHO grade 1) causing mass effect/hearing issues.  Patient opted for radiation, not initiated yet.  Patient needs to follow up for this testing at [place] after rehab.  Oncology had urged patient to first take care of cardiac issues.    Acute decompensated heart failure. NYHA IV, ACC/AHA C. Now euvolemic.  -Decrease PO lasix to 40 mg BID since patient still negative and Cr uptrending  -No ACE-i for now with SILKE. Continue Imdur, hydralazine, BB  -Per C with Dr Alonso, PH likely 2/2 LV overload.

## 2018-01-19 DIAGNOSIS — G47.33 OBSTRUCTIVE SLEEP APNEA (ADULT) (PEDIATRIC): ICD-10-CM

## 2018-01-19 DIAGNOSIS — F51.04 PSYCHOPHYSIOLOGIC INSOMNIA: ICD-10-CM

## 2018-01-19 DIAGNOSIS — N17.9 ACUTE KIDNEY FAILURE, UNSPECIFIED: ICD-10-CM

## 2018-01-19 DIAGNOSIS — I10 ESSENTIAL (PRIMARY) HYPERTENSION: ICD-10-CM

## 2018-01-19 DIAGNOSIS — E78.5 HYPERLIPIDEMIA, UNSPECIFIED: ICD-10-CM

## 2018-01-19 DIAGNOSIS — I50.9 HEART FAILURE, UNSPECIFIED: ICD-10-CM

## 2018-01-19 DIAGNOSIS — I25.118 ATHEROSCLEROTIC HEART DISEASE OF NATIVE CORONARY ARTERY WITH OTHER FORMS OF ANGINA PECTORIS: ICD-10-CM

## 2018-01-19 DIAGNOSIS — E11.8 TYPE 2 DIABETES MELLITUS WITH UNSPECIFIED COMPLICATIONS: ICD-10-CM

## 2018-01-19 DIAGNOSIS — C43.9 MALIGNANT MELANOMA OF SKIN, UNSPECIFIED: ICD-10-CM

## 2018-01-19 DIAGNOSIS — E87.6 HYPOKALEMIA: ICD-10-CM

## 2018-01-19 DIAGNOSIS — R11.2 NAUSEA WITH VOMITING, UNSPECIFIED: ICD-10-CM

## 2018-01-19 LAB
ANION GAP SERPL CALC-SCNC: 13 MMOL/L — SIGNIFICANT CHANGE UP (ref 5–17)
BASOPHILS # BLD AUTO: 0 K/UL — SIGNIFICANT CHANGE UP (ref 0–0.2)
BASOPHILS NFR BLD AUTO: 0.3 % — SIGNIFICANT CHANGE UP (ref 0–2)
BUN SERPL-MCNC: 24 MG/DL — HIGH (ref 7–23)
CALCIUM SERPL-MCNC: 9 MG/DL — SIGNIFICANT CHANGE UP (ref 8.4–10.5)
CHLORIDE SERPL-SCNC: 103 MMOL/L — SIGNIFICANT CHANGE UP (ref 96–108)
CO2 SERPL-SCNC: 28 MMOL/L — SIGNIFICANT CHANGE UP (ref 22–31)
CREAT SERPL-MCNC: 1.38 MG/DL — HIGH (ref 0.5–1.3)
EOSINOPHIL # BLD AUTO: 0.1 K/UL — SIGNIFICANT CHANGE UP (ref 0–0.5)
EOSINOPHIL NFR BLD AUTO: 1.2 % — SIGNIFICANT CHANGE UP (ref 0–6)
GLUCOSE SERPL-MCNC: 143 MG/DL — HIGH (ref 70–99)
HCT VFR BLD CALC: 38 % — LOW (ref 39–50)
HGB BLD-MCNC: 12.3 G/DL — LOW (ref 13–17)
LYMPHOCYTES # BLD AUTO: 0.8 K/UL — LOW (ref 1–3.3)
LYMPHOCYTES # BLD AUTO: 9 % — LOW (ref 13–44)
MCHC RBC-ENTMCNC: 31.1 PG — SIGNIFICANT CHANGE UP (ref 27–34)
MCHC RBC-ENTMCNC: 32.4 GM/DL — SIGNIFICANT CHANGE UP (ref 32–36)
MCV RBC AUTO: 96.2 FL — SIGNIFICANT CHANGE UP (ref 80–100)
MONOCYTES # BLD AUTO: 0.9 K/UL — SIGNIFICANT CHANGE UP (ref 0–0.9)
MONOCYTES NFR BLD AUTO: 10.4 % — SIGNIFICANT CHANGE UP (ref 2–14)
NEUTROPHILS # BLD AUTO: 7 K/UL — SIGNIFICANT CHANGE UP (ref 1.8–7.4)
NEUTROPHILS NFR BLD AUTO: 79.1 % — HIGH (ref 43–77)
PLATELET # BLD AUTO: 302 K/UL — SIGNIFICANT CHANGE UP (ref 150–400)
POTASSIUM SERPL-MCNC: 3.4 MMOL/L — LOW (ref 3.5–5.3)
POTASSIUM SERPL-MCNC: 3.4 MMOL/L — LOW (ref 3.5–5.3)
POTASSIUM SERPL-SCNC: 3.4 MMOL/L — LOW (ref 3.5–5.3)
POTASSIUM SERPL-SCNC: 3.4 MMOL/L — LOW (ref 3.5–5.3)
RBC # BLD: 3.95 M/UL — LOW (ref 4.2–5.8)
RBC # FLD: 12.7 % — SIGNIFICANT CHANGE UP (ref 10.3–14.5)
SODIUM SERPL-SCNC: 144 MMOL/L — SIGNIFICANT CHANGE UP (ref 135–145)
WBC # BLD: 8.8 K/UL — SIGNIFICANT CHANGE UP (ref 3.8–10.5)
WBC # FLD AUTO: 8.8 K/UL — SIGNIFICANT CHANGE UP (ref 3.8–10.5)

## 2018-01-19 PROCEDURE — 93306 TTE W/DOPPLER COMPLETE: CPT | Mod: 26

## 2018-01-19 PROCEDURE — 99223 1ST HOSP IP/OBS HIGH 75: CPT | Mod: GC

## 2018-01-19 PROCEDURE — 99222 1ST HOSP IP/OBS MODERATE 55: CPT

## 2018-01-19 PROCEDURE — 99223 1ST HOSP IP/OBS HIGH 75: CPT

## 2018-01-19 RX ORDER — FUROSEMIDE 40 MG
60 TABLET ORAL
Qty: 0 | Refills: 0 | Status: DISCONTINUED | OUTPATIENT
Start: 2018-01-19 | End: 2018-01-21

## 2018-01-19 RX ORDER — CLOPIDOGREL BISULFATE 75 MG/1
1 TABLET, FILM COATED ORAL
Qty: 90 | Refills: 3 | OUTPATIENT
Start: 2018-01-19 | End: 2019-01-13

## 2018-01-19 RX ORDER — HEPARIN SODIUM 5000 [USP'U]/ML
5000 INJECTION INTRAVENOUS; SUBCUTANEOUS EVERY 12 HOURS
Qty: 0 | Refills: 0 | Status: DISCONTINUED | OUTPATIENT
Start: 2018-01-19 | End: 2018-02-02

## 2018-01-19 RX ORDER — POTASSIUM CHLORIDE 20 MEQ
10 PACKET (EA) ORAL ONCE
Qty: 0 | Refills: 0 | Status: COMPLETED | OUTPATIENT
Start: 2018-01-19 | End: 2018-01-19

## 2018-01-19 RX ORDER — DOCUSATE SODIUM 100 MG
100 CAPSULE ORAL
Qty: 0 | Refills: 0 | Status: DISCONTINUED | OUTPATIENT
Start: 2018-01-19 | End: 2018-02-02

## 2018-01-19 RX ORDER — POTASSIUM BICARBONATE 978 MG/1
25 TABLET, EFFERVESCENT ORAL
Qty: 0 | Refills: 0 | Status: DISCONTINUED | OUTPATIENT
Start: 2018-01-19 | End: 2018-01-19

## 2018-01-19 RX ORDER — PANTOPRAZOLE SODIUM 20 MG/1
40 TABLET, DELAYED RELEASE ORAL DAILY
Qty: 0 | Refills: 0 | Status: DISCONTINUED | OUTPATIENT
Start: 2018-01-19 | End: 2018-01-23

## 2018-01-19 RX ORDER — LANOLIN ALCOHOL/MO/W.PET/CERES
3 CREAM (GRAM) TOPICAL AT BEDTIME
Qty: 0 | Refills: 0 | Status: DISCONTINUED | OUTPATIENT
Start: 2018-01-19 | End: 2018-02-02

## 2018-01-19 RX ORDER — ONDANSETRON 8 MG/1
4 TABLET, FILM COATED ORAL EVERY 6 HOURS
Qty: 0 | Refills: 0 | Status: DISCONTINUED | OUTPATIENT
Start: 2018-01-19 | End: 2018-02-02

## 2018-01-19 RX ADMIN — CARVEDILOL PHOSPHATE 25 MILLIGRAM(S): 80 CAPSULE, EXTENDED RELEASE ORAL at 17:09

## 2018-01-19 RX ADMIN — Medication 100 MILLIGRAM(S): at 17:09

## 2018-01-19 RX ADMIN — ONDANSETRON 4 MILLIGRAM(S): 8 TABLET, FILM COATED ORAL at 02:20

## 2018-01-19 RX ADMIN — CARVEDILOL PHOSPHATE 25 MILLIGRAM(S): 80 CAPSULE, EXTENDED RELEASE ORAL at 05:28

## 2018-01-19 RX ADMIN — Medication 100 MILLIGRAM(S): at 05:27

## 2018-01-19 RX ADMIN — ATORVASTATIN CALCIUM 80 MILLIGRAM(S): 80 TABLET, FILM COATED ORAL at 22:29

## 2018-01-19 RX ADMIN — CLOPIDOGREL BISULFATE 75 MILLIGRAM(S): 75 TABLET, FILM COATED ORAL at 05:28

## 2018-01-19 RX ADMIN — Medication 60 MILLIGRAM(S): at 05:33

## 2018-01-19 RX ADMIN — Medication 3 MILLIGRAM(S): at 23:03

## 2018-01-19 RX ADMIN — Medication 60 MILLIGRAM(S): at 17:14

## 2018-01-19 RX ADMIN — Medication 81 MILLIGRAM(S): at 05:28

## 2018-01-19 RX ADMIN — HEPARIN SODIUM 5000 UNIT(S): 5000 INJECTION INTRAVENOUS; SUBCUTANEOUS at 17:09

## 2018-01-19 RX ADMIN — Medication 100 MILLIEQUIVALENT(S): at 12:00

## 2018-01-19 RX ADMIN — ISOSORBIDE MONONITRATE 30 MILLIGRAM(S): 60 TABLET, EXTENDED RELEASE ORAL at 14:58

## 2018-01-19 RX ADMIN — Medication 100 MILLIGRAM(S): at 14:57

## 2018-01-19 RX ADMIN — SIMETHICONE 80 MILLIGRAM(S): 80 TABLET, CHEWABLE ORAL at 04:48

## 2018-01-19 RX ADMIN — POTASSIUM BICARBONATE 25 MILLIEQUIVALENT(S): 978 TABLET, EFFERVESCENT ORAL at 05:27

## 2018-01-19 RX ADMIN — ONDANSETRON 4 MILLIGRAM(S): 8 TABLET, FILM COATED ORAL at 07:50

## 2018-01-19 RX ADMIN — Medication 100 MILLIGRAM(S): at 22:29

## 2018-01-19 RX ADMIN — PANTOPRAZOLE SODIUM 40 MILLIGRAM(S): 20 TABLET, DELAYED RELEASE ORAL at 05:48

## 2018-01-19 RX ADMIN — Medication 100 MILLIEQUIVALENT(S): at 11:04

## 2018-01-19 NOTE — PROGRESS NOTE ADULT - SUBJECTIVE AND OBJECTIVE BOX
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 Patient had nausea/vomiting overnight and was unable to tolerate PO intake. He reports his FAIR is better now. He reports gas. He denies diarrhea or dysuria. PT evaluated the patient and recommend ADRIANNA. Patient being transferred to medical service for further management.     Review of systems:    REVIEW OF SYSTEMS:    CONSTITUTIONAL: No weakness, fevers or chills  ENMT:  No ear pain, No vertigo or throat pain  EYES: No visual changes  or photophobia  NECK: No pain or stiffness  RESPIRATORY: No cough, wheezing, hemoptysis; No shortness of breath  CARDIOVASCULAR: No chest pain or palpitations  GASTROINTESTINAL: No abdominal or epigastric pain. No nausea, vomiting, or hematemesis; No diarrhea or constipation. No melena or hematochezia.  MUSCULOSKELETAL: No joint swelling  or warmth.  GENITOURINARY: No dysuria, frequency or hematuria  NEUROLOGICAL: No numbness or weakness  PSYCHIATRIC: No depression or anhedonia.  ENDOCRINE: No sx hypoglycemic episodes.  SKIN: No itching, burning, rashes, or lesions       Medications:  MEDICATIONS  (STANDING):  aspirin enteric coated 81 milliGRAM(s) Oral daily  atorvastatin 80 milliGRAM(s) Oral at bedtime  carvedilol 25 milliGRAM(s) Oral every 12 hours  clopidogrel Tablet 75 milliGRAM(s) Oral daily  dextrose 5%. 1000 milliLiter(s) (50 mL/Hr) IV Continuous <Continuous>  dextrose 50% Injectable 12.5 Gram(s) IV Push once  dextrose 50% Injectable 25 Gram(s) IV Push once  dextrose 50% Injectable 25 Gram(s) IV Push once  docusate sodium 100 milliGRAM(s) Oral two times a day  furosemide Solution 60 milliGRAM(s) Oral two times a day  hydrALAZINE 100 milliGRAM(s) Oral three times a day  insulin lispro (HumaLOG) corrective regimen sliding scale   SubCutaneous three times a day before meals  insulin lispro (HumaLOG) corrective regimen sliding scale   SubCutaneous at bedtime  isosorbide   mononitrate ER Tablet (IMDUR) 30 milliGRAM(s) Oral daily  pantoprazole  Injectable 40 milliGRAM(s) IV Push daily    Allergies: None.    PMH:     PSH:    Social History:    Family History:      PHYSICAL EXAM:  Vital Signs Last 24 Hrs  T(C): 36.9 (01-19-18 @ 13:24)  T(F): 98.5 (01-19-18 @ 13:24), Max: 98.6 (01-19-18 @ 04:01)  HR: 68 (01-19-18 @ 13:24) (68 - 80)  BP: 148/73 (01-19-18 @ 13:24)  BP(mean): --  RR: 17 (01-19-18 @ 13:24) (16 - 18)  SpO2: 97% (01-19-18 @ 13:24) (95% - 97%)  Wt(kg): --    01-18 @ 07:01  -  01-19 @ 07:00  --------------------------------------------------------  IN: 660 mL / OUT: 1600 mL / NET: -940 mL    01-19 @ 07:01  -  01-19 @ 14:47  --------------------------------------------------------  IN: 0 mL / OUT: 0 mL / NET: 0 mL      Constitutional: NAD, awake and alert  EYES: EOMI  ENT:  Normal Hearing, no tonsillar exudates   Neck: Soft and supple, No JVD  Respiratory: Breath sounds are clear bilaterally, No wheezing, rales or rhonchi  Cardiovascular: S1 and S2, regular rate and rhythm, no Murmurs, gallops or rubs  Gastrointestinal: Bowel Sounds present, soft, nontender, nondistended, no guarding, no rebound  Extremities: No cyanosis or clubbing; warm to touch  Vascular: 2+ peripheral pulses lower ex  Neurological: A/O x 3, no focal deficits  Musculoskeletal: 5/5 strength b/l upper and lower extremities  Skin: No rashes  Psych: no depression or anhedonia  HEME: no bruises, no nose bleeds      Labs:                        12.3   8.8   )-----------( 302      ( 19 Jan 2018 00:38 )             38.0       01-19    x   |  x   |  x   ----------------------------<  x   3.4<L>   |  x   |  x     Ca    9.0      19 Jan 2018 00:38    TPro  7.2  /  Alb  3.3  /  TBili  1.2  /  DBili  x   /  AST  10  /  ALT  9<L>  /  AlkPhos  104  01-18    POCT Blood Glucose.: 133 mg/dL (19 Jan 2018 11:08)      PT/INR - ( 18 Jan 2018 13:24 )   PT: 14.9 sec;   INR: 1.36 ratio         PTT - ( 18 Jan 2018 13:24 )  PTT:35.0 sec      Cardiology:    < from: Cardiac Cath Lab - Adult (01.18.18 @ 14:30) >  A/P  -asprin 81 mg indefinitely  -plavix 75 mg for at least one month  -atorvastatin 80 mg daily  -continue with beta blocker, and HF management  -aggressive diureses with close monitoring of i/o and daily weights  -continue with work up and therapy for malignanies  -close follow up with HF clinic, oncology and cardiology clinic  -will plan for PCI of RCA if persistence of symptoms after optimal medical  therapy  INTERVENTIONAL RECOMMENDATIONS: Successful PCI of the proximal LAD with BMS  x1 (Cobra 3.0 x30 post dilated to 3.5) with proper apposition confirmed  with IVUS    < end of copied text >      Radiology: MEDICINE ACCEPTANCE NOTE:    HPI: 57 year old male with PMH of CHF, DM-2, HTN, KRISTIAN, melanoma of left upper back s/p resection, and ?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 Patient had nausea/vomiting overnight and was unable to tolerate PO intake. He reports his FAIR is better now. He reports gas. He denies diarrhea or dysuria. PT evaluated the patient and recommend ADRIANNA. Patient being transferred to medical service for further management. Patient says he had trouble sleeping last night due to anxiety.       REVIEW OF SYSTEMS:    CONSTITUTIONAL: Weakness present. No fevers or chills  ENMT:  No ear pain, No vertigo or throat pain  EYES: No visual changes or photophobia  NECK: No pain or stiffness  RESPIRATORY: Mild FAIR present. No cough, wheezing, hemoptysis.  CARDIOVASCULAR: LE edema present. No chest pain or palpitations  GASTROINTESTINAL: Nausea present. No abdominal or epigastric pain. No hematemesis; No diarrhea or constipation. No melena or hematochezia.  MUSCULOSKELETAL: No joint swelling or warmth.  GENITOURINARY: No dysuria, frequency or hematuria  NEUROLOGICAL: No numbness or weakness  PSYCHIATRIC: Insomnia reported. No depression or anhedonia.  ENDOCRINE: No sx hypoglycemic episodes.  SKIN: No itching, burning, rashes, or lesions       Medications:  MEDICATIONS  (STANDING):  aspirin enteric coated 81 milliGRAM(s) Oral daily  atorvastatin 80 milliGRAM(s) Oral at bedtime  carvedilol 25 milliGRAM(s) Oral every 12 hours  clopidogrel Tablet 75 milliGRAM(s) Oral daily  dextrose 5%. 1000 milliLiter(s) (50 mL/Hr) IV Continuous <Continuous>  dextrose 50% Injectable 12.5 Gram(s) IV Push once  dextrose 50% Injectable 25 Gram(s) IV Push once  dextrose 50% Injectable 25 Gram(s) IV Push once  docusate sodium 100 milliGRAM(s) Oral two times a day  furosemide Solution 60 milliGRAM(s) Oral two times a day  hydrALAZINE 100 milliGRAM(s) Oral three times a day  insulin lispro (HumaLOG) corrective regimen sliding scale   SubCutaneous three times a day before meals  insulin lispro (HumaLOG) corrective regimen sliding scale   SubCutaneous at bedtime  isosorbide   mononitrate ER Tablet (IMDUR) 30 milliGRAM(s) Oral daily  pantoprazole  Injectable 40 milliGRAM(s) IV Push daily    Allergies: None.    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    Social History:  -Lives with his niece. Denies smoking history. Rare alcohol consumption. Disabled. Was working as a .     Family History:  -Father  of a myocardial infarction.   -Mother  of cancer.   -Grandmother had breast cancer.     PHYSICAL EXAM:  Vital Signs Last 24 Hrs  T(C): 36.9 (18 @ 13:24)  T(F): 98.5 (18 @ 13:24), Max: 98.6 (18 @ 04:01)  HR: 68 (18 @ :24) (68 - 80)  BP: 148/73 (18 @ 13:24)  BP(mean): --  RR: 17 (18 @ 13:24) (16 - 18)  SpO2: 97% (18 @ 13:24) (95% - 97%)  Wt(kg): --     @ 07:  -   @ 07:00  --------------------------------------------------------  IN: 660 mL / OUT: 1600 mL / NET: -940 mL     @ 07:  -   @ 14:47  --------------------------------------------------------  IN: 0 mL / OUT: 0 mL / NET: 0 mL      Constitutional: NAD, awake and alert  EYES: EOMI  ENT:  Decreased Hearing in left ear, no tonsillar exudates   Neck: Soft and supple, No JVD  Respiratory: Crackles in lung bases (R>L).   Cardiovascular: S1S2 normal, regular rate and rhythm, no Murmurs, gallops or rubs  Gastrointestinal: Bowel Sounds present, soft, overweight, nontender, nondistended, no guarding, no rebound  Extremities: No cyanosis or clubbing; cool to touch; 1-2+ LE edema.   Neurological: AAO x 3, no focal deficits  Musculoskeletal: 5/5 strength b/l upper and lower extremities  Skin: Left head subcutaneous mass with healing surgical incision. Left upper back healing surgical incision.   Psych: Insomnia reported. No depression or anhedonia.  HEME: No bruises, no nose bleeds      Labs:                        12.3   8.8   )-----------( 302      ( 2018 00:38 )             38.0           x   |  x   |  x   ----------------------------<  x   3.4<L>   |  x   |  x     Ca    9.0      2018 00:38    TPro  7.2  /  Alb  3.3  /  TBili  1.2  /  DBili  x   /  AST  10  /  ALT  9<L>  /  AlkPhos  104      POCT Blood Glucose.: 133 mg/dL (2018 11:08)      PT/INR - ( 2018 13:24 )   PT: 14.9 sec;   INR: 1.36 ratio         PTT - ( 2018 13:24 )  PTT:35.0 sec      Cardiology:    < from: Cardiac Cath Lab - Adult (18 @ 14:30) >  A/P  -asprin 81 mg indefinitely  -plavix 75 mg for at least one month  -atorvastatin 80 mg daily  -continue with beta blocker, and HF management  -aggressive diureses with close monitoring of i/o and daily weights  -continue with work up and therapy for malignanies  -close follow up with HF clinic, oncology and cardiology clinic  -will plan for PCI of RCA if persistence of symptoms after optimal medical  therapy  INTERVENTIONAL RECOMMENDATIONS: Successful PCI of the proximal LAD with BMS  x1 (Cobra 3.0 x30 post dilated to 3.5) with proper apposition confirmed  with IVUS    < end of copied text >      Radiology:    N/A. 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 Patient had nausea/vomiting overnight and was unable to tolerate PO intake. He reports his FAIR is better now. He reports gas. He denies diarrhea or dysuria. PT evaluated the patient and recommend ADRIANNA. Patient being transferred to medical service for further management. Patient says he had trouble sleeping last night due to anxiety.       REVIEW OF SYSTEMS:    CONSTITUTIONAL: Weakness present. No fevers or chills  ENMT:  No ear pain, No vertigo or throat pain  EYES: No visual changes or photophobia  NECK: No pain or stiffness  RESPIRATORY: Mild FAIR present. No cough, wheezing, hemoptysis.  CARDIOVASCULAR: LE edema present. No chest pain or palpitations  GASTROINTESTINAL: Nausea present. No abdominal or epigastric pain. No hematemesis; No diarrhea or constipation. No melena or hematochezia.  MUSCULOSKELETAL: No joint swelling or warmth.  GENITOURINARY: No dysuria, frequency or hematuria  NEUROLOGICAL: No numbness or weakness  PSYCHIATRIC: Insomnia reported. No depression or anhedonia.  ENDOCRINE: No sx hypoglycemic episodes.  SKIN: No itching, burning, rashes, or lesions       Medications:  MEDICATIONS  (STANDING):  aspirin enteric coated 81 milliGRAM(s) Oral daily  atorvastatin 80 milliGRAM(s) Oral at bedtime  carvedilol 25 milliGRAM(s) Oral every 12 hours  clopidogrel Tablet 75 milliGRAM(s) Oral daily  dextrose 5%. 1000 milliLiter(s) (50 mL/Hr) IV Continuous <Continuous>  dextrose 50% Injectable 12.5 Gram(s) IV Push once  dextrose 50% Injectable 25 Gram(s) IV Push once  dextrose 50% Injectable 25 Gram(s) IV Push once  docusate sodium 100 milliGRAM(s) Oral two times a day  furosemide Solution 60 milliGRAM(s) Oral two times a day  hydrALAZINE 100 milliGRAM(s) Oral three times a day  insulin lispro (HumaLOG) corrective regimen sliding scale   SubCutaneous three times a day before meals  insulin lispro (HumaLOG) corrective regimen sliding scale   SubCutaneous at bedtime  isosorbide   mononitrate ER Tablet (IMDUR) 30 milliGRAM(s) Oral daily  pantoprazole  Injectable 40 milliGRAM(s) IV Push daily    Allergies: None.    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    Social History:  -Lives with his niece. Denies smoking history. Rare alcohol consumption. Disabled. Was working as a .     Family History:  -Father  of a myocardial infarction.   -Mother  of cancer.   -Grandmother had breast cancer.     PHYSICAL EXAM:  Vital Signs Last 24 Hrs  T(C): 36.9 (18 @ 13:24)  T(F): 98.5 (18 @ 13:24), Max: 98.6 (18 @ 04:01)  HR: 68 (18 @ :24) (68 - 80)  BP: 148/73 (18 @ 13:24)  BP(mean): --  RR: 17 (18 @ 13:24) (16 - 18)  SpO2: 97% (18 @ 13:24) (95% - 97%)  Wt(kg): --     @ 07:  -   @ 07:00  --------------------------------------------------------  IN: 660 mL / OUT: 1600 mL / NET: -940 mL     @ 07:  -   @ 14:47  --------------------------------------------------------  IN: 0 mL / OUT: 0 mL / NET: 0 mL      Constitutional: NAD, awake and alert  EYES: EOMI  ENT:  Decreased Hearing in left ear, no tonsillar exudates   Neck: Soft and supple, No JVD  Respiratory: Crackles in lung bases (R>L).   Cardiovascular: S1S2 normal, regular rate and rhythm, no Murmurs, gallops or rubs  Gastrointestinal: Bowel Sounds present, soft, overweight, nontender, nondistended, no guarding, no rebound  Extremities: No cyanosis or clubbing; cool to touch; 1-2+ LE edema.   Neurological: AAO x 3, no focal deficits  Musculoskeletal: 5/5 strength b/l upper and lower extremities  Skin: Left head subcutaneous mass with healing surgical incision. Left upper back healing surgical incision.   Psych: Insomnia reported. No depression or anhedonia.  HEME: No bruises, no nose bleeds      Labs:                        12.3   8.8   )-----------( 302      ( 2018 00:38 )             38.0           x   |  x   |  x   ----------------------------<  x   3.4<L>   |  x   |  x     Ca    9.0      2018 00:38    TPro  7.2  /  Alb  3.3  /  TBili  1.2  /  DBili  x   /  AST  10  /  ALT  9<L>  /  AlkPhos  104      POCT Blood Glucose.: 133 mg/dL (2018 11:08)      PT/INR - ( 2018 13:24 )   PT: 14.9 sec;   INR: 1.36 ratio         PTT - ( 2018 13:24 )  PTT:35.0 sec      Cardiology:    < from: Cardiac Cath Lab - Adult (18 @ 14:30) >  A/P  -aspirin 81 mg indefinitely  -plavix 75 mg for at least one month  -atorvastatin 80 mg daily  -continue with beta blocker, and HF management  -aggressive diureses with close monitoring of i/o and daily weights  -continue with work up and therapy for malignancies  -close follow up with HF clinic, oncology and cardiology clinic  -will plan for PCI of RCA if persistence of symptoms after optimal medical  therapy  INTERVENTIONAL RECOMMENDATIONS: Successful PCI of the proximal LAD with BMS  x1 (Cobra 3.0 x30 post dilated to 3.5) with proper apposition confirmed  with IVUS    < end of copied text >      Telemetry reviewed by me: HR 70's. NSR. PVC's.     Radiology:    N/A. MEDICINE ACCEPTANCE NOTE:    CC: CHF and CAD for PCI.     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 Patient had nausea/vomiting overnight and was unable to tolerate PO intake. He reports his FAIR is better now. He reports gas. He denies diarrhea or dysuria. PT evaluated the patient and recommend ADRIANNA. Patient being transferred to medical service for further management. Patient says he had trouble sleeping last night due to anxiety.       REVIEW OF SYSTEMS:    CONSTITUTIONAL: Weakness present. No fevers or chills  ENMT:  No ear pain, No vertigo or throat pain  EYES: No visual changes or photophobia  NECK: No pain or stiffness  RESPIRATORY: Mild FAIR present. No cough, wheezing, hemoptysis.  CARDIOVASCULAR: LE edema present. No chest pain or palpitations  GASTROINTESTINAL: Nausea present. No abdominal or epigastric pain. No hematemesis; No diarrhea or constipation. No melena or hematochezia.  MUSCULOSKELETAL: No joint swelling or warmth.  GENITOURINARY: No dysuria, frequency or hematuria  NEUROLOGICAL: No numbness or weakness  PSYCHIATRIC: Insomnia reported. No depression or anhedonia.  ENDOCRINE: No sx hypoglycemic episodes.  SKIN: No itching, burning, rashes, or lesions       Medications:  MEDICATIONS  (STANDING):  aspirin enteric coated 81 milliGRAM(s) Oral daily  atorvastatin 80 milliGRAM(s) Oral at bedtime  carvedilol 25 milliGRAM(s) Oral every 12 hours  clopidogrel Tablet 75 milliGRAM(s) Oral daily  dextrose 5%. 1000 milliLiter(s) (50 mL/Hr) IV Continuous <Continuous>  dextrose 50% Injectable 12.5 Gram(s) IV Push once  dextrose 50% Injectable 25 Gram(s) IV Push once  dextrose 50% Injectable 25 Gram(s) IV Push once  docusate sodium 100 milliGRAM(s) Oral two times a day  furosemide Solution 60 milliGRAM(s) Oral two times a day  hydrALAZINE 100 milliGRAM(s) Oral three times a day  insulin lispro (HumaLOG) corrective regimen sliding scale   SubCutaneous three times a day before meals  insulin lispro (HumaLOG) corrective regimen sliding scale   SubCutaneous at bedtime  isosorbide   mononitrate ER Tablet (IMDUR) 30 milliGRAM(s) Oral daily  pantoprazole  Injectable 40 milliGRAM(s) IV Push daily    Allergies: None.    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    Social History:  -Lives with his niece. Denies smoking history. Rare alcohol consumption. Disabled. Was working as a .     Family History:  -Father  of a myocardial infarction.   -Mother  of cancer.   -Grandmother had breast cancer.     PHYSICAL EXAM:  Vital Signs Last 24 Hrs  T(C): 36.9 (18 @ 13:24)  T(F): 98.5 (18 @ 13:24), Max: 98.6 (18 @ 04:01)  HR: 68 (18 @ 13:24) (68 - 80)  BP: 148/73 (18 @ 13:24)  BP(mean): --  RR: 17 (18 @ 13:24) (16 - 18)  SpO2: 97% (18 @ 13:24) (95% - 97%)  Wt(kg): --     @ 07:01  -   @ 07:00  --------------------------------------------------------  IN: 660 mL / OUT: 1600 mL / NET: -940 mL     @ 07:01  -   @ 14:47  --------------------------------------------------------  IN: 0 mL / OUT: 0 mL / NET: 0 mL      Constitutional: NAD, awake and alert  EYES: EOMI  ENT:  Decreased Hearing in left ear, no tonsillar exudates   Neck: Soft and supple, No JVD  Respiratory: Crackles in lung bases (R>L).   Cardiovascular: S1S2 normal, regular rate and rhythm, no Murmurs, gallops or rubs  Gastrointestinal: Bowel Sounds present, soft, overweight, nontender, nondistended, no guarding, no rebound  Extremities: No cyanosis or clubbing; cool to touch; 1-2+ LE edema.   Neurological: AAO x 3, no focal deficits  Musculoskeletal: 5/5 strength b/l upper and lower extremities  Skin: Left head subcutaneous mass with healing surgical incision. Left upper back healing surgical incision.   Psych: Insomnia reported. No depression or anhedonia.  HEME: No bruises, no nose bleeds      Labs:                        12.3   8.8   )-----------( 302      ( 2018 00:38 )             38.0           x   |  x   |  x   ----------------------------<  x   3.4<L>   |  x   |  x     Ca    9.0      2018 00:38    TPro  7.2  /  Alb  3.3  /  TBili  1.2  /  DBili  x   /  AST  10  /  ALT  9<L>  /  AlkPhos  104      POCT Blood Glucose.: 133 mg/dL (2018 11:08)      PT/INR - ( 2018 13:24 )   PT: 14.9 sec;   INR: 1.36 ratio         PTT - ( 2018 13:24 )  PTT:35.0 sec      Cardiology:    < from: Cardiac Cath Lab - Adult (18 @ 14:30) >  A/P  -aspirin 81 mg indefinitely  -plavix 75 mg for at least one month  -atorvastatin 80 mg daily  -continue with beta blocker, and HF management  -aggressive diureses with close monitoring of i/o and daily weights  -continue with work up and therapy for malignancies  -close follow up with HF clinic, oncology and cardiology clinic  -will plan for PCI of RCA if persistence of symptoms after optimal medical  therapy  INTERVENTIONAL RECOMMENDATIONS: Successful PCI of the proximal LAD with BMS  x1 (Cobra 3.0 x30 post dilated to 3.5) with proper apposition confirmed  with IVUS    < end of copied text >      Telemetry reviewed by me: HR 70's. NSR. PVC's.     Radiology:    N/A.

## 2018-01-19 NOTE — CONSULT NOTE ADULT - SUBJECTIVE AND OBJECTIVE BOX
Patient seen and evaluated at bedside    Chief Complaint: SOB    HPI:  Patient is 56 yo  male with recently dx'ed sHF (11/2017 - EF 40%),  HTN, DM2, recently dx'ed meningioma and melanoma of the skin who presented to [facility] on 1/15 c/o chest pressure and dyspnea on exertion. Patient was treated for HF exacerbation and then underwent LHC under Dr Alonso that showed pLAD 90%, mRCA 70%. Patient was transferred to [facility] for PCI. Cobra stent was placed 1/18 to [facility].     Patient claims that he is doing well and is breathing well. He claims to still have some edema on his legs. He denies CP, palpitations.      PMH:   Meningioma  Malignant melanoma, unspecified site  Benign neoplasm of brain, unspecified brain region  Heart failure, unspecified heart failure chronicity, unspecified heart failure type  Type 2 diabetes mellitus with complication, without long-term current use of insulin  Enlarged heart  HTN (hypertension)  DM (diabetes mellitus)  Sleep apnea      PSH:   No significant past surgical history      Medications:   aspirin enteric coated 81 milliGRAM(s) Oral daily  atorvastatin 80 milliGRAM(s) Oral at bedtime  carvedilol 25 milliGRAM(s) Oral every 12 hours  clopidogrel Tablet 75 milliGRAM(s) Oral daily  dextrose 5%. 1000 milliLiter(s) IV Continuous <Continuous>  dextrose 50% Injectable 12.5 Gram(s) IV Push once  dextrose 50% Injectable 25 Gram(s) IV Push once  dextrose 50% Injectable 25 Gram(s) IV Push once  dextrose Gel 1 Dose(s) Oral once PRN  docusate sodium 100 milliGRAM(s) Oral two times a day  furosemide Solution 60 milliGRAM(s) Oral two times a day  glucagon  Injectable 1 milliGRAM(s) IntraMuscular once PRN  hydrALAZINE 100 milliGRAM(s) Oral three times a day  insulin lispro (HumaLOG) corrective regimen sliding scale   SubCutaneous three times a day before meals  insulin lispro (HumaLOG) corrective regimen sliding scale   SubCutaneous at bedtime  isosorbide   mononitrate ER Tablet (IMDUR) 30 milliGRAM(s) Oral daily  melatonin 3 milliGRAM(s) Oral at bedtime  ondansetron Injectable 4 milliGRAM(s) IV Push every 6 hours PRN  pantoprazole  Injectable 40 milliGRAM(s) IV Push daily  simethicone 80 milliGRAM(s) Chew every 6 hours PRN  zolpidem 5 milliGRAM(s) Oral at bedtime PRN      Allergies:  No Known Allergies      FAMILY HISTORY:  No pertinent family history in first degree relatives      Social History:  Denies smoking    Review of Systems:  REVIEW OF SYSTEMS:    CONSTITUTIONAL: No weakness, fevers or chills  EYES/ENT: No visual changes;  No dysphagia  RESPIRATORY: No cough, wheezing, hemoptysis; No shortness of breath  CARDIOVASCULAR: No chest pain or palpitations; + lower extremity edema  GASTROINTESTINAL: No abdominal or epigastric pain. No nausea, vomiting, or hematemesis; No diarrhea or constipation. No melena or hematochezia.  GENITOURINARY: No dysuria, frequency or hematuria  NEUROLOGICAL: No numbness or weakness  SKIN: No itching, burning, rashes, or lesions   All other review of systems is negative unless indicated above.    Physical Exam:  T(F): 98.5 (01-19), Max: 98.6 (01-19)  HR: 68 (01-19) (68 - 80)  BP: 148/73 (01-19) (142/76 - 173/80)  RR: 17 (01-19)  SpO2: 97% (01-19)  GENERAL: No acute distress, well-developed  HEAD:  Atraumatic, Normocephalic  ENT: EOMI, JVD to base of neck with HJR, moist mucosa  CHEST/LUNG: Clear to auscultation bilaterally; No wheeze, equal breath sounds bilaterally   HEART: Regular rate and rhythm; No murmurs, rubs, or gallops  ABDOMEN: Soft, Nontender, Nondistended; Bowel sounds present  EXTREMITIES:  Trace LE edema; cath site c/d/i  PSYCH: Nl behavior, nl affect  NEUROLOGY: AAOx3, non-focal    Cardiovascular Diagnostic Testing:    ECG: Personally reviewed  SR RBBB    Cath 1/18:  VENTRICLES: No left ventriculogram was performed.  CORONARY VESSELS: The coronary circulation is right dominant.  LAD:   --  Proximal LAD: There was a 90 % stenosis.  COMPLICATIONS: There were no complications.  DIAGNOSTIC RECOMMENDATIONS: Successful PCI of the proximal LAD with BMS x1  (Cobra 3.0 x30 post dilated to 3.5) with proper apposition confirmed with  IVUS    Labs: Personally reviewed                        12.3   8.8   )-----------( 302      ( 19 Jan 2018 00:38 )             38.0     01-19    x   |  x   |  x   ----------------------------<  x   3.4<L>   |  x   |  x     Ca    9.0      19 Jan 2018 00:38    TPro  7.2  /  Alb  3.3  /  TBili  1.2  /  DBili  x   /  AST  10  /  ALT  9<L>  /  AlkPhos  104  01-18    PT/INR - ( 18 Jan 2018 13:24 )   PT: 14.9 sec;   INR: 1.36 ratio      PTT - ( 18 Jan 2018 13:24 )  PTT:35.0 sec    Hemoglobin A1C, Whole Blood: 8.0 % (01-18 @ 16:00)      A/P:  56 yo  male with recently dx'ed sHF (11/2017 - EF 40%),  HTN, DM2, recently dx'ed meningioma and melanoma of the skin who presented with HF exacerbation found to have obstructive CAD.    CAD s/p PCI to LAD. Cobra stent used in light of recent malignancy.  - Cont ASA, plavix, statin    ICM with systolic HF. EF in 11/2017 reportedly 40%.  - Cont to diurese with goal -1L/24 hr  - Cont BB, hydralazine, ISMN  - Start ACEi once Cr SILKE resolves    Jeremy Chen MD  Cardiology Fellow [id number]

## 2018-01-19 NOTE — PROGRESS NOTE ADULT - PROBLEM SELECTOR PLAN 3
-JON QAC/HS.   -Diabetic and DASH diet. -JON QAC/HS.   -Diabetic and DASH diet.  -HbA1c 8.0.   -F/u endo recs.

## 2018-01-19 NOTE — PROGRESS NOTE ADULT - ATTENDING COMMENTS
Alfonso Jones MD  Division of [facility] Medicine  Cell: [phone]  Pager: [phone]  Office: [phone]

## 2018-01-19 NOTE — PROGRESS NOTE ADULT - PROBLEM SELECTOR PLAN 2
-Echo pending.   -C/w lasix 60mg twice daily for now. May need to uptitrate further and/or change to torsemide. Patient reports poor outpatient response to lasix.   -Cardiology f/u. -Echo pending.   -C/w lasix 60mg twice daily for now. May need to uptitrate further and/or change to torsemide. Patient reports poor outpatient response to lasix.   -Cardiology f/u.  -Strict I's/O's and daily weights.

## 2018-01-19 NOTE — CONSULT NOTE ADULT - ATTENDING COMMENTS
57 year old man presented to [facility] with acute systolic heart failure, has undergone coronary intervention. Echocardiogram in progress, preliminarily good overall systolic function. Mild volume overload, continue to diurese.

## 2018-01-19 NOTE — PROGRESS NOTE ADULT - PROBLEM SELECTOR PLAN 1
-S/p BMS to LAD.   -C/w ASA and plavix.   -C/w atorvastatin.   -C/w carvedilol.   -No ACEi in view of SILKE.  -Cardiology f/u. -S/p BMS to LAD.   -C/w ASA and plavix.   -C/w atorvastatin.   -C/w carvedilol.   -No ACEi in view of SILKE.  -Cardiology f/u.  -C/w telemetry for now.

## 2018-01-19 NOTE — PROGRESS NOTE ADULT - PROBLEM SELECTOR PLAN 10
-Patient waiting for results of extent of melanoma.   -Outpatient follow up.    11. Prophylactic measure: -Heparin SQ twice daily. Ambulate as tolerated.     12. Dispo: PT leslie RODAS. Plan to DC to [facility]. -Patient waiting for results of extent of melanoma.   -Outpatient follow up.    11. Prophylactic measure: -Heparin SQ twice daily for DVT PPx. Ambulate as tolerated.     12. Dispo: -PT leslie RODAS. Plan to DC to [facility] once able.

## 2018-01-19 NOTE — PHYSICAL THERAPY INITIAL EVALUATION ADULT - ADDITIONAL COMMENTS
Pt lives w/ Niece in pvt house w/ 12 steps + 12 steps to enter. Pt w/ 1 HR (disrepair) to enter. PTA indep w/ ADLs w/ use of rolling walker for longer excursion

## 2018-01-19 NOTE — CONSULT NOTE ADULT - SUBJECTIVE AND OBJECTIVE BOX
HPI:  56 y/o M w/ hx of Type 2 DM x 30 years +nephropathy. No other known microvascular complications. +macrovascular complications with CAD. A1c 8%. Not on medications at home. Was recommended metformin in the past but self-discontinued years ago. Does not check glucose at home. No reported hypoglycemia or symptoms of hypoglycemia. Eats bread, pasta, rice. Used to drink soda, but limits soda now. Drinks unsweetened iced tea with equal or sweet and low. Minimal desserts. Father with Type 2 DM. +nausea and vomiting yesterday. +CP and SOB. +significant weight loss over the past few years close to 200 lb lost! +polyuria without polydipsia.   Also hx of HTN, HF, KRISTIAN non compliant with CPAP, meningioma, and melanoma of the skin who presented to [facility] on 1/15 c/o chest pressure and dyspnea on exertion.  Patient was admitted to telemetry, cardiology Dr Alonso following.  Patient is s/p diagnostic R/LHC via RFA access earlier today showing pLAD 90%, mRCA 70%, ASA 325mg and Plavix 600mg administered.  Patient is now transferred to [facility] for PCI.      PAST MEDICAL & SURGICAL HISTORY:  Meningioma  Malignant melanoma, unspecified site  Heart failure, unspecified heart failure chronicity, unspecified heart failure type  Type 2 diabetes mellitus with complication, without long-term current use of insulin  Enlarged heart  HTN (hypertension)  Sleep apnea  No significant past surgical history      FAMILY HISTORY:  Father- Type 2 DM    Social History: Never smoked, +social alcohol use    Outpatient Medications:  · 	furosemide 40 mg oral tablet: Last Dose Taken:  , 1 tab(s) orally 2 times a day  	  · 	carvedilol 25 mg oral tablet: Last Dose Taken:  , 1 tab(s) orally 2 times a day  	  · 	hydrALAZINE 100 mg oral tablet: Last Dose Taken:  , 1 tab(s) orally 3 times a day  	  · 	Imdur 30 mg oral tablet, extended release: Last Dose Taken:  , 1 tab(s) orally once a day (in the morning)  	  · 	atorvastatin 40 mg oral tablet: Last Dose Taken:  , 1 tab(s) orally once a day      MEDICATIONS  (STANDING):  aspirin enteric coated 81 milliGRAM(s) Oral daily  atorvastatin 80 milliGRAM(s) Oral at bedtime  carvedilol 25 milliGRAM(s) Oral every 12 hours  clopidogrel Tablet 75 milliGRAM(s) Oral daily  dextrose 5%. 1000 milliLiter(s) (50 mL/Hr) IV Continuous <Continuous>  dextrose 50% Injectable 12.5 Gram(s) IV Push once  dextrose 50% Injectable 25 Gram(s) IV Push once  dextrose 50% Injectable 25 Gram(s) IV Push once  docusate sodium 100 milliGRAM(s) Oral two times a day  furosemide    Tablet 60 milliGRAM(s) Oral two times a day  heparin  Injectable 5000 Unit(s) SubCutaneous every 12 hours  hydrALAZINE 100 milliGRAM(s) Oral three times a day  insulin lispro (HumaLOG) corrective regimen sliding scale   SubCutaneous three times a day before meals  insulin lispro (HumaLOG) corrective regimen sliding scale   SubCutaneous at bedtime  isosorbide   mononitrate ER Tablet (IMDUR) 30 milliGRAM(s) Oral daily  melatonin 3 milliGRAM(s) Oral at bedtime  pantoprazole  Injectable 40 milliGRAM(s) IV Push daily    MEDICATIONS  (PRN):  dextrose Gel 1 Dose(s) Oral once PRN Blood Glucose LESS THAN 70 milliGRAM(s)/deciliter  glucagon  Injectable 1 milliGRAM(s) IntraMuscular once PRN Glucose LESS THAN 70 milligrams/deciliter  ondansetron Injectable 4 milliGRAM(s) IV Push every 6 hours PRN Nausea and/or Vomiting  simethicone 80 milliGRAM(s) Chew every 6 hours PRN Gas  zolpidem 5 milliGRAM(s) Oral at bedtime PRN Insomnia      Allergies    No Known Allergies    Intolerances      Review of Systems:  Constitutional: No fever, +weight loss  Eyes: No blurry vision  Neuro: No headache, No paresthesias  HEENT: No throat pain  Cardiovascular: +chest pain  Respiratory: +SOB  GI: + nausea and vomiting  : + polyuria  Skin: no rash  Psych: no depression  Endocrine: No polydipsia, No heat or cold intolerance, rest as noted in HPI  Hem/lymph: +LE swelling    All other review of systems negative      PHYSICAL EXAM:  VITALS: T(C): 36.8 (01-19-18 @ 17:07)  T(F): 98.3 (01-19-18 @ 17:07), Max: 98.6 (01-19-18 @ 04:01)  HR: 74 (01-19-18 @ 17:07) (68 - 80)  BP: 134/74 (01-19-18 @ 17:07) (134/74 - 167/76)  RR:  (16 - 18)  SpO2:  (95% - 97%)  Wt(kg): --  GENERAL: NAD at this time  EYES: No proptosis, EOMI  HEENT:  Atraumatic, Normocephalic,   THYROID: Normal size, no palpable nodules  RESPIRATORY: Clear to auscultation bilaterally, full excursion, non-labored  CARDIOVASCULAR: Regular rhythm; No murmurs; + peripheral edema  GI: Softly distended, nontender, normal bowel sounds  SKIN: Dry, intact, No rashes or lesions  MUSCULOSKELETAL: normal strength  NEURO: follows commands  PSYCH: Alert and oriented x 3, normal affect, normal mood  CUSHING'S SIGNS: no striae      POCT Blood Glucose.: 133 mg/dL (01-19-18 @ 11:08)  POCT Blood Glucose.: 130 mg/dL (01-19-18 @ 06:58)  POCT Blood Glucose.: 120 mg/dL (01-18-18 @ 21:37)  POCT Blood Glucose.: 128 mg/dL (01-18-18 @ 15:58)  POCT Blood Glucose.: 132 mg/dL (01-18-18 @ 12:40)                              12.3   8.8   )-----------( 302      ( 19 Jan 2018 00:38 )             38.0       01-19    x   |  x   |  x   ----------------------------<  x   3.4<L>   |  x   |  x     EGFR if : x   EGFR if non : x     Ca    9.0      01-19    TPro  7.2  /  Alb  3.3  /  TBili  1.2  /  DBili  x   /  AST  10  /  ALT  9<L>  /  AlkPhos  104  01-18    Thyroid Function Tests:      Hemoglobin A1C, Whole Blood: 8.0 % <H> [4.0 - 5.6] (01-18-18 @ 16:00)        Radiology:

## 2018-01-19 NOTE — PHYSICAL THERAPY INITIAL EVALUATION ADULT - PERTINENT HX OF CURRENT PROBLEM, REHAB EVAL
56 yo male w/ PMHx HTN, HF, DMT2-on no medication/not followed by endocrinology, KRISTIAN non compliant w/ CPAP, meningioma, and melanoma of the skin who presented to [facility] on 1/15 c/o chest pressure and FAIR.  s/p diagnostic R/LHC via RFA showing pLAD 90%, mRCA 70%.  Transferred to [facility], s/p PCI x 1.

## 2018-01-20 DIAGNOSIS — I50.33 ACUTE ON CHRONIC DIASTOLIC (CONGESTIVE) HEART FAILURE: ICD-10-CM

## 2018-01-20 LAB
ANION GAP SERPL CALC-SCNC: 11 MMOL/L — SIGNIFICANT CHANGE UP (ref 5–17)
BUN SERPL-MCNC: 26 MG/DL — HIGH (ref 7–23)
CALCIUM SERPL-MCNC: 8.8 MG/DL — SIGNIFICANT CHANGE UP (ref 8.4–10.5)
CHLORIDE SERPL-SCNC: 103 MMOL/L — SIGNIFICANT CHANGE UP (ref 96–108)
CO2 SERPL-SCNC: 30 MMOL/L — SIGNIFICANT CHANGE UP (ref 22–31)
CREAT SERPL-MCNC: 1.61 MG/DL — HIGH (ref 0.5–1.3)
GLUCOSE SERPL-MCNC: 127 MG/DL — HIGH (ref 70–99)
HCT VFR BLD CALC: 35.1 % — LOW (ref 39–50)
HGB BLD-MCNC: 11.3 G/DL — LOW (ref 13–17)
MAGNESIUM SERPL-MCNC: 2.2 MG/DL — SIGNIFICANT CHANGE UP (ref 1.6–2.6)
MCHC RBC-ENTMCNC: 31.3 PG — SIGNIFICANT CHANGE UP (ref 27–34)
MCHC RBC-ENTMCNC: 32.3 GM/DL — SIGNIFICANT CHANGE UP (ref 32–36)
MCV RBC AUTO: 96.9 FL — SIGNIFICANT CHANGE UP (ref 80–100)
PLATELET # BLD AUTO: 285 K/UL — SIGNIFICANT CHANGE UP (ref 150–400)
POTASSIUM SERPL-MCNC: 3.2 MMOL/L — LOW (ref 3.5–5.3)
POTASSIUM SERPL-SCNC: 3.2 MMOL/L — LOW (ref 3.5–5.3)
RBC # BLD: 3.62 M/UL — LOW (ref 4.2–5.8)
RBC # FLD: 12.7 % — SIGNIFICANT CHANGE UP (ref 10.3–14.5)
SODIUM SERPL-SCNC: 144 MMOL/L — SIGNIFICANT CHANGE UP (ref 135–145)
WBC # BLD: 6.5 K/UL — SIGNIFICANT CHANGE UP (ref 3.8–10.5)
WBC # FLD AUTO: 6.5 K/UL — SIGNIFICANT CHANGE UP (ref 3.8–10.5)

## 2018-01-20 PROCEDURE — 99233 SBSQ HOSP IP/OBS HIGH 50: CPT

## 2018-01-20 PROCEDURE — 93010 ELECTROCARDIOGRAM REPORT: CPT

## 2018-01-20 RX ORDER — POTASSIUM CHLORIDE 20 MEQ
20 PACKET (EA) ORAL ONCE
Qty: 0 | Refills: 0 | Status: COMPLETED | OUTPATIENT
Start: 2018-01-20 | End: 2018-01-20

## 2018-01-20 RX ORDER — ISOSORBIDE MONONITRATE 60 MG/1
60 TABLET, EXTENDED RELEASE ORAL DAILY
Qty: 0 | Refills: 0 | Status: DISCONTINUED | OUTPATIENT
Start: 2018-01-21 | End: 2018-02-02

## 2018-01-20 RX ORDER — ISOSORBIDE MONONITRATE 60 MG/1
30 TABLET, EXTENDED RELEASE ORAL ONCE
Qty: 0 | Refills: 0 | Status: COMPLETED | OUTPATIENT
Start: 2018-01-20 | End: 2018-01-20

## 2018-01-20 RX ADMIN — CARVEDILOL PHOSPHATE 25 MILLIGRAM(S): 80 CAPSULE, EXTENDED RELEASE ORAL at 05:53

## 2018-01-20 RX ADMIN — Medication 81 MILLIGRAM(S): at 10:11

## 2018-01-20 RX ADMIN — Medication 60 MILLIGRAM(S): at 18:35

## 2018-01-20 RX ADMIN — HEPARIN SODIUM 5000 UNIT(S): 5000 INJECTION INTRAVENOUS; SUBCUTANEOUS at 05:52

## 2018-01-20 RX ADMIN — Medication 100 MILLIGRAM(S): at 05:53

## 2018-01-20 RX ADMIN — Medication 60 MILLIGRAM(S): at 05:52

## 2018-01-20 RX ADMIN — Medication 100 MILLIGRAM(S): at 13:59

## 2018-01-20 RX ADMIN — CLOPIDOGREL BISULFATE 75 MILLIGRAM(S): 75 TABLET, FILM COATED ORAL at 10:11

## 2018-01-20 RX ADMIN — ISOSORBIDE MONONITRATE 30 MILLIGRAM(S): 60 TABLET, EXTENDED RELEASE ORAL at 20:36

## 2018-01-20 RX ADMIN — ISOSORBIDE MONONITRATE 30 MILLIGRAM(S): 60 TABLET, EXTENDED RELEASE ORAL at 13:59

## 2018-01-20 RX ADMIN — Medication 1: at 18:34

## 2018-01-20 RX ADMIN — Medication 100 MILLIGRAM(S): at 21:33

## 2018-01-20 RX ADMIN — Medication 100 MILLIGRAM(S): at 05:52

## 2018-01-20 RX ADMIN — CARVEDILOL PHOSPHATE 25 MILLIGRAM(S): 80 CAPSULE, EXTENDED RELEASE ORAL at 18:35

## 2018-01-20 RX ADMIN — ATORVASTATIN CALCIUM 80 MILLIGRAM(S): 80 TABLET, FILM COATED ORAL at 21:33

## 2018-01-20 RX ADMIN — HEPARIN SODIUM 5000 UNIT(S): 5000 INJECTION INTRAVENOUS; SUBCUTANEOUS at 18:35

## 2018-01-20 RX ADMIN — Medication 20 MILLIEQUIVALENT(S): at 13:59

## 2018-01-20 RX ADMIN — Medication 3 MILLIGRAM(S): at 21:33

## 2018-01-20 RX ADMIN — PANTOPRAZOLE SODIUM 40 MILLIGRAM(S): 20 TABLET, DELAYED RELEASE ORAL at 10:13

## 2018-01-20 RX ADMIN — Medication 100 MILLIGRAM(S): at 18:35

## 2018-01-20 RX ADMIN — Medication 1: at 13:55

## 2018-01-20 NOTE — PROGRESS NOTE ADULT - PROBLEM SELECTOR PLAN 2
-Echo showed grade 2 diastolic dysfunction, EF 55%, and severe pulmonary HTN.   -C/w lasix 60mg twice daily for now.  -Cardiology f/u appreciated.   -Strict I's/O's and daily weights.

## 2018-01-20 NOTE — PROGRESS NOTE ADULT - ATTENDING COMMENTS
Alfonso Jones MD  Division of [facility] Medicine  Cell: [phone]  Pager: [phone]  Office: [phone]

## 2018-01-20 NOTE — PROGRESS NOTE ADULT - PROBLEM SELECTOR PLAN 3
-C/w JON QAC/HS. Patient doing ok with sliding scale for now.     -Diabetic and DASH diet.  -HbA1c 8.0.   -Endo recs appreciated.

## 2018-01-20 NOTE — PROGRESS NOTE ADULT - SUBJECTIVE AND OBJECTIVE BOX
Patient is a 57y old  Male who presents with a chief complaint of CAD, acute on chronic heart failure (18 Jan 2018 23:26)        SUBJECTIVE / OVERNIGHT EVENTS: Patient says FAIR is better. He thinks leg swelling is better. He walked around today with PT. He denies CP.        MEDICATIONS  (STANDING):  aspirin enteric coated 81 milliGRAM(s) Oral daily  atorvastatin 80 milliGRAM(s) Oral at bedtime  carvedilol 25 milliGRAM(s) Oral every 12 hours  clopidogrel Tablet 75 milliGRAM(s) Oral daily  dextrose 5%. 1000 milliLiter(s) (50 mL/Hr) IV Continuous <Continuous>  dextrose 50% Injectable 12.5 Gram(s) IV Push once  dextrose 50% Injectable 25 Gram(s) IV Push once  dextrose 50% Injectable 25 Gram(s) IV Push once  docusate sodium 100 milliGRAM(s) Oral two times a day  furosemide    Tablet 60 milliGRAM(s) Oral two times a day  heparin  Injectable 5000 Unit(s) SubCutaneous every 12 hours  hydrALAZINE 100 milliGRAM(s) Oral three times a day  insulin lispro (HumaLOG) corrective regimen sliding scale   SubCutaneous three times a day before meals  insulin lispro (HumaLOG) corrective regimen sliding scale   SubCutaneous at bedtime  isosorbide   mononitrate ER Tablet (IMDUR) 30 milliGRAM(s) Oral daily  isosorbide   mononitrate ER Tablet (IMDUR) 30 milliGRAM(s) Oral once  melatonin 3 milliGRAM(s) Oral at bedtime  pantoprazole  Injectable 40 milliGRAM(s) IV Push daily    MEDICATIONS  (PRN):  dextrose Gel 1 Dose(s) Oral once PRN Blood Glucose LESS THAN 70 milliGRAM(s)/deciliter  glucagon  Injectable 1 milliGRAM(s) IntraMuscular once PRN Glucose LESS THAN 70 milligrams/deciliter  ondansetron Injectable 4 milliGRAM(s) IV Push every 6 hours PRN Nausea and/or Vomiting  simethicone 80 milliGRAM(s) Chew every 6 hours PRN Gas  zolpidem 5 milliGRAM(s) Oral at bedtime PRN Insomnia      Vital Signs Last 24 Hrs  T(C): 36.7 (20 Jan 2018 12:24), Max: 36.7 (20 Jan 2018 04:36)  T(F): 98.1 (20 Jan 2018 12:24), Max: 98.1 (20 Jan 2018 04:36)  HR: 73 (20 Jan 2018 17:17) (66 - 74)  BP: 139/73 (20 Jan 2018 17:17) (130/76 - 139/73)  BP(mean): --  RR: 18 (20 Jan 2018 12:24) (18 - 18)  SpO2: 92% (20 Jan 2018 12:24) (92% - 95%)  CAPILLARY BLOOD GLUCOSE      POCT Blood Glucose.: 155 mg/dL (20 Jan 2018 18:14)  POCT Blood Glucose.: 184 mg/dL (20 Jan 2018 13:21)  POCT Blood Glucose.: 129 mg/dL (20 Jan 2018 09:06)    I&O's Summary    19 Jan 2018 07:01  -  20 Jan 2018 07:00  --------------------------------------------------------  IN: 630 mL / OUT: 700 mL / NET: -70 mL    20 Jan 2018 07:01  -  20 Jan 2018 20:06  --------------------------------------------------------  IN: 340 mL / OUT: 300 mL / NET: 40 mL          PHYSICAL EXAM  GENERAL: NAD, well-developed  HEAD: Left side of head with subcutaneous mass with healing surgical incision.   EYES: EOMI, PERRLA, conjunctiva and sclera clear  NECK: Supple, No JVD  CHEST/LUNG: Clear to auscultation bilaterally; No wheeze  HEART: Regular rate and rhythm; No murmurs, rubs, or gallops  ABDOMEN: Soft, overweight, Nontender, Nondistended; Bowel sounds present  EXTREMITIES:  2+ edema in LE's.   PSYCH/Neuro: AAOx3. Non-focal.   SKIN: Left upper back healing surgical incision.       LABS:                        11.3   6.5   )-----------( 285      ( 20 Jan 2018 06:47 )             35.1     01-20    144  |  103  |  26<H>  ----------------------------<  127<H>  3.2<L>   |  30  |  1.61<H>    Ca    8.8      20 Jan 2018 06:47  Mg     2.2     01-20        < from: Transthoracic Echocardiogram (01.19.18 @ 15:11) >  Conclusions:  1. Normal left atrium.  2. Left ventricular ejection fraction, by visual  estimation, is 55-60%.  Septal motion consistent with right  ventricular overload. No wall motion abnormality.  3. Grade II diastolic dysfunction.  LAP=23mmHG  4. Normal right atrium.  5. Normal right ventricular size and systolic function.  6. Estimated right ventricular systolic pressure equals 61  mm Hg, assuming right atrial pressure equals 8 mm Hg,  consistent with severe pulmonary hypertension.  7. Normal pericardium with no pericardial effusion.  *** No previous Echo exam.    < end of copied text >            RADIOLOGY & ADDITIONAL TESTS:    Imaging Personally Reviewed: Echo report.   Consultant(s) Notes Reviewed:  Cardiology  Care Discussed with Consultants/Other Providers:

## 2018-01-20 NOTE — PROGRESS NOTE ADULT - SUBJECTIVE AND OBJECTIVE BOX
24H hour events: No acute events.    MEDICATIONS:  aspirin enteric coated 81 milliGRAM(s) Oral daily  carvedilol 25 milliGRAM(s) Oral every 12 hours  clopidogrel Tablet 75 milliGRAM(s) Oral daily  furosemide    Tablet 60 milliGRAM(s) Oral two times a day  heparin  Injectable 5000 Unit(s) SubCutaneous every 12 hours  hydrALAZINE 100 milliGRAM(s) Oral three times a day  isosorbide   mononitrate ER Tablet (IMDUR) 30 milliGRAM(s) Oral daily    melatonin 3 milliGRAM(s) Oral at bedtime  ondansetron Injectable 4 milliGRAM(s) IV Push every 6 hours PRN  zolpidem 5 milliGRAM(s) Oral at bedtime PRN    docusate sodium 100 milliGRAM(s) Oral two times a day  pantoprazole  Injectable 40 milliGRAM(s) IV Push daily  simethicone 80 milliGRAM(s) Chew every 6 hours PRN    atorvastatin 80 milliGRAM(s) Oral at bedtime  dextrose 50% Injectable 12.5 Gram(s) IV Push once  dextrose 50% Injectable 25 Gram(s) IV Push once  dextrose 50% Injectable 25 Gram(s) IV Push once  dextrose Gel 1 Dose(s) Oral once PRN  glucagon  Injectable 1 milliGRAM(s) IntraMuscular once PRN  insulin lispro (HumaLOG) corrective regimen sliding scale   SubCutaneous three times a day before meals  insulin lispro (HumaLOG) corrective regimen sliding scale   SubCutaneous at bedtime    dextrose 5%. 1000 milliLiter(s) IV Continuous <Continuous>      REVIEW OF SYSTEMS:  Complete 10point ROS negative. denies cp, sob, palpitations, lh/dizziness. denies r hand pain. Ambulating, tolerating po.    PHYSICAL EXAM:  T(C): 36.7 (01-20-18 @ 04:36), Max: 36.9 (01-19-18 @ 13:24)  HR: 74 (01-20-18 @ 04:36) (66 - 74)  BP: 138/73 (01-20-18 @ 04:36) (130/76 - 148/73)  RR: 18 (01-20-18 @ 04:36) (17 - 18)  SpO2: 94% (01-20-18 @ 04:36) (93% - 97%)  Wt(kg): --  I&O's Summary    19 Jan 2018 07:01  -  20 Jan 2018 07:00  --------------------------------------------------------  IN: 630 mL / OUT: 700 mL / NET: -70 mL        Appearance: Normal	  HEENT:   Normal oral mucosa, PERRL, EOMI	  Lymphatic: No lymphadenopathy  Cardiovascular: Normal S1 S2, No JVD, No murmurs, No edema  RRA pulse intact b/l hand sensation and strength equal  Respiratory: Lungs clear to auscultation	  Psychiatry: A & O x 3, Mood & affect appropriate  Gastrointestinal:  Soft, Non-tender, + BS	  Skin: No rashes, No ecchymoses, No cyanosis	  Neurologic: Non-focal  Extremities: Normal range of motion, No clubbing, cyanosis or edema  Vascular: Peripheral pulses palpable 2+ bilaterally        LABS:	 	    CBC Full  -  ( 20 Jan 2018 06:47 )  WBC Count : 6.5 K/uL  Hemoglobin : 11.3 g/dL  Hematocrit : 35.1 %  Platelet Count - Automated : 285 K/uL  Mean Cell Volume : 96.9 fl  Mean Cell Hemoglobin : 31.3 pg  Mean Cell Hemoglobin Concentration : 32.3 gm/dL  Auto Neutrophil # : x  Auto Lymphocyte # : x  Auto Monocyte # : x  Auto Eosinophil # : x  Auto Basophil # : x  Auto Neutrophil % : x  Auto Lymphocyte % : x  Auto Monocyte % : x  Auto Eosinophil % : x  Auto Basophil % : x    01-20    144  |  103  |  26<H>  ----------------------------<  127<H>  3.2<L>   |  30  |  1.61<H>  01-19    x   |  x   |  x   ----------------------------<  x   3.4<L>   |  x   |  x     Ca    8.8      20 Jan 2018 06:47  Ca    9.0      19 Jan 2018 00:38  Mg     2.2     01-20    TPro  7.2  /  Alb  3.3  /  TBili  1.2  /  DBili  x   /  AST  10  /  ALT  9<L>  /  AlkPhos  104  01-18      TELEMETRY:  AF 70-80    < from: Transthoracic Echocardiogram (01.19.18 @ 15:11) >  without angina pectoris (I25.10)  ------------------------------------------------------------------------  Dimensions:    Normal Values:  LA:     4.3    2.0 - 4.0 cm  Ao:     3.9    2.0 - 3.8 cm  SEPTUM: 0.8    0.6 - 1.2 cm  PWT:    0.8    0.6 - 1.1 cm  LVIDd:  5.1    3.0 - 5.6 cm  LVIDs:  3.0    1.8 - 4.0 cm  Derived variables:  LVMI: 59 g/m2  RWT: 0.31  Fractional short: 41 %  Doppler Peak Velocity (m/sec): AoV=1.5  ------------------------------------------------------------------------    < end of copied text >  < from: Transthoracic Echocardiogram (01.19.18 @ 15:11) >  -----------------------------------------------------------------------  Conclusions:  1. Normal left atrium.  2. Left ventricular ejection fraction, by visual  estimation, is 55-60%.  Septal motion consistent with right  ventricular overload. No wall motion abnormality.  3. Grade II diastolic dysfunction.  LAP=23mmHG  4. Normal right atrium.  5. Normal right ventricular size and systolic function.  6. Estimated right ventricular systolic pressure equals 61  mm Hg, assuming right atrial pressure equals 8 mm Hg,  consistent with severe pulmonary hypertension.  7. Normal pericardium with no pericardial effusion.  *** No previous Echo exam.  ------------------------------------------------------------------------    < end of copied text >    	  ASSESSMENT/PLAN: 	    58 yo  man with recently dx'ed sHF (11/2017 - EF 40%),  HTN, DM2, recently dx'ed meningioma and melanoma of the skin who presented with HF exacerbation found to have obstructive CAD; s/p LHC w/ CECILIA LAD.    -DAPT 12 mo  -cont HF-OMT  -increase imdur to 60mg daily  -trend Cr, hold nephrotoxic meds  -when above normalized, f/u outpatient w Dr Anderson  -severe pulm htn f/u outpatient HF Dr Terrazas    [id number] 24H hour events: No acute events.    MEDICATIONS:  aspirin enteric coated 81 milliGRAM(s) Oral daily  carvedilol 25 milliGRAM(s) Oral every 12 hours  clopidogrel Tablet 75 milliGRAM(s) Oral daily  furosemide    Tablet 60 milliGRAM(s) Oral two times a day  heparin  Injectable 5000 Unit(s) SubCutaneous every 12 hours  hydrALAZINE 100 milliGRAM(s) Oral three times a day  isosorbide   mononitrate ER Tablet (IMDUR) 30 milliGRAM(s) Oral daily    melatonin 3 milliGRAM(s) Oral at bedtime  ondansetron Injectable 4 milliGRAM(s) IV Push every 6 hours PRN  zolpidem 5 milliGRAM(s) Oral at bedtime PRN    docusate sodium 100 milliGRAM(s) Oral two times a day  pantoprazole  Injectable 40 milliGRAM(s) IV Push daily  simethicone 80 milliGRAM(s) Chew every 6 hours PRN    atorvastatin 80 milliGRAM(s) Oral at bedtime  dextrose 50% Injectable 12.5 Gram(s) IV Push once  dextrose 50% Injectable 25 Gram(s) IV Push once  dextrose 50% Injectable 25 Gram(s) IV Push once  dextrose Gel 1 Dose(s) Oral once PRN  glucagon  Injectable 1 milliGRAM(s) IntraMuscular once PRN  insulin lispro (HumaLOG) corrective regimen sliding scale   SubCutaneous three times a day before meals  insulin lispro (HumaLOG) corrective regimen sliding scale   SubCutaneous at bedtime    dextrose 5%. 1000 milliLiter(s) IV Continuous <Continuous>      REVIEW OF SYSTEMS:  Complete 10point ROS negative. denies cp, sob, palpitations, lh/dizziness. denies r hand pain. Ambulating, tolerating po.    PHYSICAL EXAM:  T(C): 36.7 (01-20-18 @ 04:36), Max: 36.9 (01-19-18 @ 13:24)  HR: 74 (01-20-18 @ 04:36) (66 - 74)  BP: 138/73 (01-20-18 @ 04:36) (130/76 - 148/73)  RR: 18 (01-20-18 @ 04:36) (17 - 18)  SpO2: 94% (01-20-18 @ 04:36) (93% - 97%)  Wt(kg): --  I&O's Summary    19 Jan 2018 07:01  -  20 Jan 2018 07:00  --------------------------------------------------------  IN: 630 mL / OUT: 700 mL / NET: -70 mL        Appearance: Normal	  HEENT:   Normal oral mucosa, PERRL, EOMI	  Lymphatic: No lymphadenopathy  Cardiovascular: Normal S1 S2, No JVD, No murmurs, No edema  RRA pulse intact b/l hand sensation and strength equal  Respiratory: Lungs clear to auscultation	  Psychiatry: A & O x 3, Mood & affect appropriate  Gastrointestinal:  Soft, Non-tender, + BS	  Skin: No rashes, No ecchymoses, No cyanosis	  Neurologic: Non-focal  Extremities: Normal range of motion, No clubbing, cyanosis or edema  Vascular: Peripheral pulses palpable 2+ bilaterally        LABS:	 	    CBC Full  -  ( 20 Jan 2018 06:47 )  WBC Count : 6.5 K/uL  Hemoglobin : 11.3 g/dL  Hematocrit : 35.1 %  Platelet Count - Automated : 285 K/uL  Mean Cell Volume : 96.9 fl  Mean Cell Hemoglobin : 31.3 pg  Mean Cell Hemoglobin Concentration : 32.3 gm/dL  Auto Neutrophil # : x  Auto Lymphocyte # : x  Auto Monocyte # : x  Auto Eosinophil # : x  Auto Basophil # : x  Auto Neutrophil % : x  Auto Lymphocyte % : x  Auto Monocyte % : x  Auto Eosinophil % : x  Auto Basophil % : x    01-20    144  |  103  |  26<H>  ----------------------------<  127<H>  3.2<L>   |  30  |  1.61<H>  01-19    x   |  x   |  x   ----------------------------<  x   3.4<L>   |  x   |  x     Ca    8.8      20 Jan 2018 06:47  Ca    9.0      19 Jan 2018 00:38  Mg     2.2     01-20    TPro  7.2  /  Alb  3.3  /  TBili  1.2  /  DBili  x   /  AST  10  /  ALT  9<L>  /  AlkPhos  104  01-18      TELEMETRY:  sinus 70-80    < from: Transthoracic Echocardiogram (01.19.18 @ 15:11) >  without angina pectoris (I25.10)  ------------------------------------------------------------------------  Dimensions:    Normal Values:  LA:     4.3    2.0 - 4.0 cm  Ao:     3.9    2.0 - 3.8 cm  SEPTUM: 0.8    0.6 - 1.2 cm  PWT:    0.8    0.6 - 1.1 cm  LVIDd:  5.1    3.0 - 5.6 cm  LVIDs:  3.0    1.8 - 4.0 cm  Derived variables:  LVMI: 59 g/m2  RWT: 0.31  Fractional short: 41 %  Doppler Peak Velocity (m/sec): AoV=1.5  ------------------------------------------------------------------------    < end of copied text >  < from: Transthoracic Echocardiogram (01.19.18 @ 15:11) >  -----------------------------------------------------------------------  Conclusions:  1. Normal left atrium.  2. Left ventricular ejection fraction, by visual  estimation, is 55-60%.  Septal motion consistent with right  ventricular overload. No wall motion abnormality.  3. Grade II diastolic dysfunction.  LAP=23mmHG  4. Normal right atrium.  5. Normal right ventricular size and systolic function.  6. Estimated right ventricular systolic pressure equals 61  mm Hg, assuming right atrial pressure equals 8 mm Hg,  consistent with severe pulmonary hypertension.  7. Normal pericardium with no pericardial effusion.  *** No previous Echo exam.  ------------------------------------------------------------------------    < end of copied text >    	  ASSESSMENT/PLAN: 	    56 yo  man with recently dx'ed sHF (11/2017 - EF 40%),  HTN, DM2, recently dx'ed meningioma and melanoma of the skin who presented with HF exacerbation found to have obstructive CAD; s/p LHC w/ CECILIA LAD.    -DAPT 12 mo  -cont HF-OMT  -increase imdur to 60mg daily  -trend Cr, hold nephrotoxic meds  -when above normalized, f/u outpatient w Dr Anderson  -severe pulm htn f/u outpatient HF Dr Terrazas    [id number] 24H hour events: No acute events.    MEDICATIONS:  aspirin enteric coated 81 milliGRAM(s) Oral daily  carvedilol 25 milliGRAM(s) Oral every 12 hours  clopidogrel Tablet 75 milliGRAM(s) Oral daily  furosemide    Tablet 60 milliGRAM(s) Oral two times a day  heparin  Injectable 5000 Unit(s) SubCutaneous every 12 hours  hydrALAZINE 100 milliGRAM(s) Oral three times a day  isosorbide   mononitrate ER Tablet (IMDUR) 30 milliGRAM(s) Oral daily    melatonin 3 milliGRAM(s) Oral at bedtime  ondansetron Injectable 4 milliGRAM(s) IV Push every 6 hours PRN  zolpidem 5 milliGRAM(s) Oral at bedtime PRN    docusate sodium 100 milliGRAM(s) Oral two times a day  pantoprazole  Injectable 40 milliGRAM(s) IV Push daily  simethicone 80 milliGRAM(s) Chew every 6 hours PRN    atorvastatin 80 milliGRAM(s) Oral at bedtime  dextrose 50% Injectable 12.5 Gram(s) IV Push once  dextrose 50% Injectable 25 Gram(s) IV Push once  dextrose 50% Injectable 25 Gram(s) IV Push once  dextrose Gel 1 Dose(s) Oral once PRN  glucagon  Injectable 1 milliGRAM(s) IntraMuscular once PRN  insulin lispro (HumaLOG) corrective regimen sliding scale   SubCutaneous three times a day before meals  insulin lispro (HumaLOG) corrective regimen sliding scale   SubCutaneous at bedtime    dextrose 5%. 1000 milliLiter(s) IV Continuous <Continuous>      REVIEW OF SYSTEMS:  Complete 10point ROS negative. denies cp, sob, palpitations, lh/dizziness. denies r hand pain. Ambulating, tolerating po.    PHYSICAL EXAM:  T(C): 36.7 (01-20-18 @ 04:36), Max: 36.9 (01-19-18 @ 13:24)  HR: 74 (01-20-18 @ 04:36) (66 - 74)  BP: 138/73 (01-20-18 @ 04:36) (130/76 - 148/73)  RR: 18 (01-20-18 @ 04:36) (17 - 18)  SpO2: 94% (01-20-18 @ 04:36) (93% - 97%)  Wt(kg): --  I&O's Summary    19 Jan 2018 07:01  -  20 Jan 2018 07:00  --------------------------------------------------------  IN: 630 mL / OUT: 700 mL / NET: -70 mL        Appearance: Normal	  HEENT:   Normal oral mucosa, PERRL, EOMI	  Lymphatic: No lymphadenopathy  Cardiovascular: Normal S1 S2, No JVD, No murmurs, No edema  RRA pulse intact b/l hand sensation and strength equal  Respiratory: Lungs clear to auscultation	  Psychiatry: A & O x 3, Mood & affect appropriate  Gastrointestinal:  Soft, Non-tender, + BS	  Skin: No rashes, No ecchymoses, No cyanosis	  Neurologic: Non-focal  Extremities: Normal range of motion, No clubbing, cyanosis or edema  Vascular: Peripheral pulses palpable 2+ bilaterally        LABS:	 	    CBC Full  -  ( 20 Jan 2018 06:47 )  WBC Count : 6.5 K/uL  Hemoglobin : 11.3 g/dL  Hematocrit : 35.1 %  Platelet Count - Automated : 285 K/uL  Mean Cell Volume : 96.9 fl  Mean Cell Hemoglobin : 31.3 pg  Mean Cell Hemoglobin Concentration : 32.3 gm/dL  Auto Neutrophil # : x  Auto Lymphocyte # : x  Auto Monocyte # : x  Auto Eosinophil # : x  Auto Basophil # : x  Auto Neutrophil % : x  Auto Lymphocyte % : x  Auto Monocyte % : x  Auto Eosinophil % : x  Auto Basophil % : x    01-20    144  |  103  |  26<H>  ----------------------------<  127<H>  3.2<L>   |  30  |  1.61<H>  01-19    x   |  x   |  x   ----------------------------<  x   3.4<L>   |  x   |  x     Ca    8.8      20 Jan 2018 06:47  Ca    9.0      19 Jan 2018 00:38  Mg     2.2     01-20    TPro  7.2  /  Alb  3.3  /  TBili  1.2  /  DBili  x   /  AST  10  /  ALT  9<L>  /  AlkPhos  104  01-18      TELEMETRY:  sinus 70-80    < from: Transthoracic Echocardiogram (01.19.18 @ 15:11) >  without angina pectoris (I25.10)  ------------------------------------------------------------------------  Dimensions:    Normal Values:  LA:     4.3    2.0 - 4.0 cm  Ao:     3.9    2.0 - 3.8 cm  SEPTUM: 0.8    0.6 - 1.2 cm  PWT:    0.8    0.6 - 1.1 cm  LVIDd:  5.1    3.0 - 5.6 cm  LVIDs:  3.0    1.8 - 4.0 cm  Derived variables:  LVMI: 59 g/m2  RWT: 0.31  Fractional short: 41 %  Doppler Peak Velocity (m/sec): AoV=1.5  ------------------------------------------------------------------------    < end of copied text >  < from: Transthoracic Echocardiogram (01.19.18 @ 15:11) >  -----------------------------------------------------------------------  Conclusions:  1. Normal left atrium.  2. Left ventricular ejection fraction, by visual  estimation, is 55-60%.  Septal motion consistent with right  ventricular overload. No wall motion abnormality.  3. Grade II diastolic dysfunction.  LAP=23mmHG  4. Normal right atrium.  5. Normal right ventricular size and systolic function.  6. Estimated right ventricular systolic pressure equals 61  mm Hg, assuming right atrial pressure equals 8 mm Hg,  consistent with severe pulmonary hypertension.  7. Normal pericardium with no pericardial effusion.  *** No previous Echo exam.  ------------------------------------------------------------------------    < end of copied text >    	  ASSESSMENT/PLAN: 	    58 yo  man with recently dx'ed sHF (11/2017 - EF 40% -> 55-60%),  HTN, DM2, recently dx'ed meningioma and melanoma of the skin who presented with HF exacerbation found to have obstructive CAD; s/p LHC w/ CECILIA LAD.    -DAPT 12 mo  -cont HF-OMT  -increase imdur to 60mg daily  -trend Cr, hold nephrotoxic meds  -when above normalized, f/u outpatient w Dr Anderson  -severe pulm htn - obtain RHC from OSH, f/u outpatient w/ Dr Anderson, possibly who II however will need wedge and PA pressures to confirm    [id number] 24H hour events: No acute events.  No CP or SOB.  Feels better.     MEDICATIONS:  aspirin enteric coated 81 milliGRAM(s) Oral daily  carvedilol 25 milliGRAM(s) Oral every 12 hours  clopidogrel Tablet 75 milliGRAM(s) Oral daily  furosemide    Tablet 60 milliGRAM(s) Oral two times a day  heparin  Injectable 5000 Unit(s) SubCutaneous every 12 hours  hydrALAZINE 100 milliGRAM(s) Oral three times a day  isosorbide   mononitrate ER Tablet (IMDUR) 30 milliGRAM(s) Oral daily    melatonin 3 milliGRAM(s) Oral at bedtime  ondansetron Injectable 4 milliGRAM(s) IV Push every 6 hours PRN  zolpidem 5 milliGRAM(s) Oral at bedtime PRN    docusate sodium 100 milliGRAM(s) Oral two times a day  pantoprazole  Injectable 40 milliGRAM(s) IV Push daily  simethicone 80 milliGRAM(s) Chew every 6 hours PRN    atorvastatin 80 milliGRAM(s) Oral at bedtime  dextrose 50% Injectable 12.5 Gram(s) IV Push once  dextrose 50% Injectable 25 Gram(s) IV Push once  dextrose 50% Injectable 25 Gram(s) IV Push once  dextrose Gel 1 Dose(s) Oral once PRN  glucagon  Injectable 1 milliGRAM(s) IntraMuscular once PRN  insulin lispro (HumaLOG) corrective regimen sliding scale   SubCutaneous three times a day before meals  insulin lispro (HumaLOG) corrective regimen sliding scale   SubCutaneous at bedtime    dextrose 5%. 1000 milliLiter(s) IV Continuous <Continuous>      REVIEW OF SYSTEMS:  Complete 10point ROS negative. denies cp, sob, palpitations, lh/dizziness. denies r hand pain. Ambulating, tolerating po.    PHYSICAL EXAM:  T(C): 36.7 (01-20-18 @ 04:36), Max: 36.9 (01-19-18 @ 13:24)  HR: 74 (01-20-18 @ 04:36) (66 - 74)  BP: 138/73 (01-20-18 @ 04:36) (130/76 - 148/73)  RR: 18 (01-20-18 @ 04:36) (17 - 18)  SpO2: 94% (01-20-18 @ 04:36) (93% - 97%)  Wt(kg): --  I&O's Summary    19 Jan 2018 07:01  -  20 Jan 2018 07:00  --------------------------------------------------------  IN: 630 mL / OUT: 700 mL / NET: -70 mL        Appearance: Normal	  HEENT:   Normal oral mucosa, PERRL, EOMI	  Lymphatic: No lymphadenopathy  Cardiovascular: Normal S1 S2, No JVD, No murmurs, No edema  RRA pulse intact b/l hand sensation and strength equal  Respiratory: Lungs clear to auscultation	  Psychiatry: A & O x 3, Mood & affect appropriate  Gastrointestinal:  Soft, Non-tender, + BS	  Skin: No rashes, No ecchymoses, No cyanosis	  Neurologic: Non-focal  Extremities: Normal range of motion, No clubbing, cyanosis or edema  Vascular: Peripheral pulses palpable 2+ bilaterally        LABS:	 	    CBC Full  -  ( 20 Jan 2018 06:47 )  WBC Count : 6.5 K/uL  Hemoglobin : 11.3 g/dL  Hematocrit : 35.1 %  Platelet Count - Automated : 285 K/uL  Mean Cell Volume : 96.9 fl  Mean Cell Hemoglobin : 31.3 pg  Mean Cell Hemoglobin Concentration : 32.3 gm/dL  Auto Neutrophil # : x  Auto Lymphocyte # : x  Auto Monocyte # : x  Auto Eosinophil # : x  Auto Basophil # : x  Auto Neutrophil % : x  Auto Lymphocyte % : x  Auto Monocyte % : x  Auto Eosinophil % : x  Auto Basophil % : x    01-20    144  |  103  |  26<H>  ----------------------------<  127<H>  3.2<L>   |  30  |  1.61<H>  01-19    x   |  x   |  x   ----------------------------<  x   3.4<L>   |  x   |  x     Ca    8.8      20 Jan 2018 06:47  Ca    9.0      19 Jan 2018 00:38  Mg     2.2     01-20    TPro  7.2  /  Alb  3.3  /  TBili  1.2  /  DBili  x   /  AST  10  /  ALT  9<L>  /  AlkPhos  104  01-18      TELEMETRY:  sinus 70-80    < from: Transthoracic Echocardiogram (01.19.18 @ 15:11) >  without angina pectoris (I25.10)  ------------------------------------------------------------------------  Dimensions:    Normal Values:  LA:     4.3    2.0 - 4.0 cm  Ao:     3.9    2.0 - 3.8 cm  SEPTUM: 0.8    0.6 - 1.2 cm  PWT:    0.8    0.6 - 1.1 cm  LVIDd:  5.1    3.0 - 5.6 cm  LVIDs:  3.0    1.8 - 4.0 cm  Derived variables:  LVMI: 59 g/m2  RWT: 0.31  Fractional short: 41 %  Doppler Peak Velocity (m/sec): AoV=1.5  ------------------------------------------------------------------------    < end of copied text >  < from: Transthoracic Echocardiogram (01.19.18 @ 15:11) >  -----------------------------------------------------------------------  Conclusions:  1. Normal left atrium.  2. Left ventricular ejection fraction, by visual  estimation, is 55-60%.  Septal motion consistent with right  ventricular overload. No wall motion abnormality.  3. Grade II diastolic dysfunction.  LAP=23mmHG  4. Normal right atrium.  5. Normal right ventricular size and systolic function.  6. Estimated right ventricular systolic pressure equals 61  mm Hg, assuming right atrial pressure equals 8 mm Hg,  consistent with severe pulmonary hypertension.  7. Normal pericardium with no pericardial effusion.  *** No previous Echo exam.  ------------------------------------------------------------------------    < end of copied text >    	  ASSESSMENT/PLAN: 	    58 yo  man with recently dx'ed sHF (11/2017 - EF 40% -> 55-60%),  HTN, DM2, recently dx'ed meningioma and melanoma of the skin who presented with HF exacerbation found to have obstructive CAD; s/p LHC w/ CECILIA LAD.    -DAPT 12 mo  -cont HF-OMT  -increase imdur to 60mg daily  -trend Cr, hold nephrotoxic meds  -when above normalized, f/u outpatient w Dr Anderson  -severe pulm htn - obtain RHC from OSH, f/u outpatient w/ Dr Anderson, possibly who II however will need wedge and PA pressures to confirm    [id number]

## 2018-01-20 NOTE — PROGRESS NOTE ADULT - PROBLEM SELECTOR PLAN 1
-S/p BMS to LAD.   -C/w ASA and plavix.   -C/w atorvastatin.   -C/w carvedilol.   -No ACEi in view of SILKE.  -Cardiology f/u appreciated.   -C/w telemetry for now.

## 2018-01-20 NOTE — PROGRESS NOTE ADULT - ATTENDING COMMENTS
Continue with Lasix diuresis.   Continue DAPT  Agree with increasing Imdur as above      Thanks    Didier Dillard  [phone]

## 2018-01-20 NOTE — PROGRESS NOTE ADULT - PROBLEM SELECTOR PLAN 10
-Patient waiting for results of extent of melanoma.   -Outpatient follow up.    11. Prophylactic measure: -Heparin SQ twice daily for DVT PPx. Ambulate as tolerated.     12. Dispo: -PT leslie RODAS. Plan to DC to [facility] once able.

## 2018-01-20 NOTE — PROGRESS NOTE ADULT - PROBLEM SELECTOR PLAN 4
-Possibly SILKE partly due to contrast received in cath's and diuresis.   -Creatinine worsened slightly today.   -Monitor BMP daily.

## 2018-01-21 LAB
ANION GAP SERPL CALC-SCNC: 12 MMOL/L — SIGNIFICANT CHANGE UP (ref 5–17)
BUN SERPL-MCNC: 29 MG/DL — HIGH (ref 7–23)
CALCIUM SERPL-MCNC: 8.7 MG/DL — SIGNIFICANT CHANGE UP (ref 8.4–10.5)
CHLORIDE SERPL-SCNC: 101 MMOL/L — SIGNIFICANT CHANGE UP (ref 96–108)
CO2 SERPL-SCNC: 29 MMOL/L — SIGNIFICANT CHANGE UP (ref 22–31)
CREAT SERPL-MCNC: 1.76 MG/DL — HIGH (ref 0.5–1.3)
GLUCOSE SERPL-MCNC: 150 MG/DL — HIGH (ref 70–99)
MAGNESIUM SERPL-MCNC: 2.1 MG/DL — SIGNIFICANT CHANGE UP (ref 1.6–2.6)
POTASSIUM SERPL-MCNC: 3.3 MMOL/L — LOW (ref 3.5–5.3)
POTASSIUM SERPL-SCNC: 3.3 MMOL/L — LOW (ref 3.5–5.3)
SODIUM SERPL-SCNC: 142 MMOL/L — SIGNIFICANT CHANGE UP (ref 135–145)

## 2018-01-21 PROCEDURE — 99232 SBSQ HOSP IP/OBS MODERATE 35: CPT

## 2018-01-21 PROCEDURE — 99233 SBSQ HOSP IP/OBS HIGH 50: CPT

## 2018-01-21 RX ORDER — FUROSEMIDE 40 MG
60 TABLET ORAL EVERY 12 HOURS
Qty: 0 | Refills: 0 | Status: DISCONTINUED | OUTPATIENT
Start: 2018-01-21 | End: 2018-01-31

## 2018-01-21 RX ORDER — POTASSIUM CHLORIDE 20 MEQ
20 PACKET (EA) ORAL ONCE
Qty: 0 | Refills: 0 | Status: DISCONTINUED | OUTPATIENT
Start: 2018-01-21 | End: 2018-01-21

## 2018-01-21 RX ORDER — POTASSIUM CHLORIDE 20 MEQ
20 PACKET (EA) ORAL
Qty: 0 | Refills: 0 | Status: COMPLETED | OUTPATIENT
Start: 2018-01-21 | End: 2018-01-21

## 2018-01-21 RX ADMIN — HEPARIN SODIUM 5000 UNIT(S): 5000 INJECTION INTRAVENOUS; SUBCUTANEOUS at 06:07

## 2018-01-21 RX ADMIN — Medication 100 MILLIGRAM(S): at 06:08

## 2018-01-21 RX ADMIN — PANTOPRAZOLE SODIUM 40 MILLIGRAM(S): 20 TABLET, DELAYED RELEASE ORAL at 13:32

## 2018-01-21 RX ADMIN — HEPARIN SODIUM 5000 UNIT(S): 5000 INJECTION INTRAVENOUS; SUBCUTANEOUS at 17:40

## 2018-01-21 RX ADMIN — Medication 100 MILLIGRAM(S): at 06:06

## 2018-01-21 RX ADMIN — Medication 20 MILLIEQUIVALENT(S): at 13:32

## 2018-01-21 RX ADMIN — ATORVASTATIN CALCIUM 80 MILLIGRAM(S): 80 TABLET, FILM COATED ORAL at 21:58

## 2018-01-21 RX ADMIN — Medication 100 MILLIGRAM(S): at 13:31

## 2018-01-21 RX ADMIN — Medication 3 MILLIGRAM(S): at 21:58

## 2018-01-21 RX ADMIN — CARVEDILOL PHOSPHATE 25 MILLIGRAM(S): 80 CAPSULE, EXTENDED RELEASE ORAL at 17:39

## 2018-01-21 RX ADMIN — Medication 60 MILLIGRAM(S): at 17:39

## 2018-01-21 RX ADMIN — Medication 1: at 17:54

## 2018-01-21 RX ADMIN — Medication 1: at 09:07

## 2018-01-21 RX ADMIN — ISOSORBIDE MONONITRATE 60 MILLIGRAM(S): 60 TABLET, EXTENDED RELEASE ORAL at 13:31

## 2018-01-21 RX ADMIN — Medication 81 MILLIGRAM(S): at 13:31

## 2018-01-21 RX ADMIN — CLOPIDOGREL BISULFATE 75 MILLIGRAM(S): 75 TABLET, FILM COATED ORAL at 13:31

## 2018-01-21 RX ADMIN — Medication 60 MILLIGRAM(S): at 06:06

## 2018-01-21 RX ADMIN — CARVEDILOL PHOSPHATE 25 MILLIGRAM(S): 80 CAPSULE, EXTENDED RELEASE ORAL at 06:07

## 2018-01-21 RX ADMIN — Medication 100 MILLIGRAM(S): at 17:40

## 2018-01-21 RX ADMIN — Medication 1: at 13:33

## 2018-01-21 RX ADMIN — Medication 100 MILLIGRAM(S): at 21:58

## 2018-01-21 RX ADMIN — Medication 20 MILLIEQUIVALENT(S): at 09:31

## 2018-01-21 NOTE — PROGRESS NOTE ADULT - SUBJECTIVE AND OBJECTIVE BOX
Patient is a 57y old  Male who presents with a chief complaint of CAD, acute on chronic heart failure (18 Jan 2018 23:26)        SUBJECTIVE / OVERNIGHT EVENTS: Patient says he walked with PT again today and was a little SOB. He denies CP or vomiting.       MEDICATIONS  (STANDING):  aspirin enteric coated 81 milliGRAM(s) Oral daily  atorvastatin 80 milliGRAM(s) Oral at bedtime  carvedilol 25 milliGRAM(s) Oral every 12 hours  clopidogrel Tablet 75 milliGRAM(s) Oral daily  dextrose 5%. 1000 milliLiter(s) (50 mL/Hr) IV Continuous <Continuous>  dextrose 50% Injectable 12.5 Gram(s) IV Push once  dextrose 50% Injectable 25 Gram(s) IV Push once  dextrose 50% Injectable 25 Gram(s) IV Push once  docusate sodium 100 milliGRAM(s) Oral two times a day  furosemide   Injectable 60 milliGRAM(s) IV Push every 12 hours  heparin  Injectable 5000 Unit(s) SubCutaneous every 12 hours  hydrALAZINE 100 milliGRAM(s) Oral three times a day  insulin lispro (HumaLOG) corrective regimen sliding scale   SubCutaneous three times a day before meals  insulin lispro (HumaLOG) corrective regimen sliding scale   SubCutaneous at bedtime  isosorbide   mononitrate ER Tablet (IMDUR) 60 milliGRAM(s) Oral daily  melatonin 3 milliGRAM(s) Oral at bedtime  pantoprazole  Injectable 40 milliGRAM(s) IV Push daily    MEDICATIONS  (PRN):  dextrose Gel 1 Dose(s) Oral once PRN Blood Glucose LESS THAN 70 milliGRAM(s)/deciliter  glucagon  Injectable 1 milliGRAM(s) IntraMuscular once PRN Glucose LESS THAN 70 milligrams/deciliter  ondansetron Injectable 4 milliGRAM(s) IV Push every 6 hours PRN Nausea and/or Vomiting  simethicone 80 milliGRAM(s) Chew every 6 hours PRN Gas  zolpidem 5 milliGRAM(s) Oral at bedtime PRN Insomnia      Vital Signs Last 24 Hrs  T(C): 36.7 (22 Jan 2018 04:44), Max: 37.2 (21 Jan 2018 12:07)  T(F): 98 (22 Jan 2018 04:44), Max: 98.9 (21 Jan 2018 12:07)  HR: 66 (22 Jan 2018 04:44) (65 - 68)  BP: 149/78 (22 Jan 2018 04:44) (149/78 - 154/84)  BP(mean): --  RR: 18 (22 Jan 2018 04:44) (18 - 18)  SpO2: 93% (22 Jan 2018 04:44) (93% - 95%)  CAPILLARY BLOOD GLUCOSE      POCT Blood Glucose.: 167 mg/dL (21 Jan 2018 21:47)  POCT Blood Glucose.: 154 mg/dL (21 Jan 2018 17:53)  POCT Blood Glucose.: 174 mg/dL (21 Jan 2018 12:35)  POCT Blood Glucose.: 156 mg/dL (21 Jan 2018 08:34)    I&O's Summary    20 Jan 2018 07:01  -  21 Jan 2018 07:00  --------------------------------------------------------  IN: 700 mL / OUT: 300 mL / NET: 400 mL    21 Jan 2018 07:01  -  22 Jan 2018 06:28  --------------------------------------------------------  IN: 1320 mL / OUT: 700 mL / NET: 620 mL          PHYSICAL EXAM  GENERAL: NAD, well-developed  HEAD:  Left head SQ mass with healing surgical incision.   EYES: EOMI, PERRLA, conjunctiva and sclera clear  NECK: Supple, No JVD  CHEST/LUNG: Clear to auscultation bilaterally; No wheeze  HEART: Regular rate and rhythm; No murmurs, rubs, or gallops  ABDOMEN: Soft, overweight, Nontender, Nondistended; Bowel sounds present  EXTREMITIES: 2-3+ edema in LE's.   PSYCH/Neuro: AAOx3. Non-focal.   SKIN: Left upper back healing surgical incision.       LABS:                        11.3   6.5   )-----------( 285      ( 20 Jan 2018 06:47 )             35.1     01-21    142  |  101  |  29<H>  ----------------------------<  150<H>  3.3<L>   |  29  |  1.76<H>    Ca    8.7      21 Jan 2018 06:40  Mg     2.1     01-21          RADIOLOGY & ADDITIONAL TESTS:    Imaging Personally Reviewed:  Consultant(s) Notes Reviewed:  Cardiology notes.   Care Discussed with Consultants/Other Providers:

## 2018-01-21 NOTE — PROGRESS NOTE ADULT - ATTENDING COMMENTS
Alfonso Jones MD  Division of [facility] Medicine  Cell: [phone]  Pager: [phone]  Office: [phone] Alfonso Jones MD  Division of Hospital Medicine  Cell: [phone]  Pager: [phone]  Office: [phone]    -Patient seen and examined on 1/21/18.

## 2018-01-21 NOTE — PROGRESS NOTE ADULT - PROBLEM SELECTOR PLAN 2
-Echo showed grade 2 diastolic dysfunction, EF 55%, and severe pulmonary HTN.   -Will change lasix to 60mg IV twice daily.   -Cardiology f/u appreciated.   -Strict I's/O's and daily weights.

## 2018-01-21 NOTE — PROGRESS NOTE ADULT - PROBLEM SELECTOR PLAN 10
-Patient waiting for results of extent of melanoma on left upper back.   -Outpatient follow up.  -Patient was supposed to get a radiation oncologist for the tumor on the left side of his head. He would like to be seen by them here if possible. Can call for eval.     11. Prophylactic measure: -Heparin SQ twice daily for DVT PPx. Ambulate as tolerated.     12. Dispo: -PT eval recs ADRIANNA. Plan to DC to [facility] once able.

## 2018-01-21 NOTE — PROGRESS NOTE ADULT - PROBLEM SELECTOR PLAN 4
-Possibly SILKE partly due to contrast received in cath's and diuresis.   -Creatinine worsened today. May need to consult renal if worsens further/persists.   -Monitor BMP daily.

## 2018-01-21 NOTE — PROGRESS NOTE ADULT - SUBJECTIVE AND OBJECTIVE BOX
24H hour events:   States that edema has improved but continues to complain of orthopnea/PND and dyspnea with mild to moderate exertion. No chest pain  MEDICATIONS:  aspirin enteric coated 81 milliGRAM(s) Oral daily  carvedilol 25 milliGRAM(s) Oral every 12 hours  clopidogrel Tablet 75 milliGRAM(s) Oral daily  furosemide    Tablet 60 milliGRAM(s) Oral two times a day  heparin  Injectable 5000 Unit(s) SubCutaneous every 12 hours  hydrALAZINE 100 milliGRAM(s) Oral three times a day  isosorbide   mononitrate ER Tablet (IMDUR) 30 milliGRAM(s) Oral daily  isosorbide   mononitrate ER Tablet (IMDUR) 60 milliGRAM(s) Oral daily        melatonin 3 milliGRAM(s) Oral at bedtime  ondansetron Injectable 4 milliGRAM(s) IV Push every 6 hours PRN  zolpidem 5 milliGRAM(s) Oral at bedtime PRN    docusate sodium 100 milliGRAM(s) Oral two times a day  pantoprazole  Injectable 40 milliGRAM(s) IV Push daily  simethicone 80 milliGRAM(s) Chew every 6 hours PRN    atorvastatin 80 milliGRAM(s) Oral at bedtime  dextrose 50% Injectable 12.5 Gram(s) IV Push once  dextrose 50% Injectable 25 Gram(s) IV Push once  dextrose 50% Injectable 25 Gram(s) IV Push once  dextrose Gel 1 Dose(s) Oral once PRN  glucagon  Injectable 1 milliGRAM(s) IntraMuscular once PRN  insulin lispro (HumaLOG) corrective regimen sliding scale   SubCutaneous three times a day before meals  insulin lispro (HumaLOG) corrective regimen sliding scale   SubCutaneous at bedtime    dextrose 5%. 1000 milliLiter(s) IV Continuous <Continuous>      PHYSICAL EXAM:  T(C): 37.1 (01-21-18 @ 04:32), Max: 37.1 (01-21-18 @ 04:32)  HR: 67 (01-21-18 @ 04:32) (67 - 74)  BP: 135/72 (01-21-18 @ 04:32) (135/68 - 156/86)  RR: 18 (01-21-18 @ 04:32) (18 - 18)  SpO2: 93% (01-21-18 @ 04:32) (92% - 93%)  Wt(kg): --  I&O's Summary    20 Jan 2018 07:01  -  21 Jan 2018 07:00  --------------------------------------------------------  IN: 700 mL / OUT: 300 mL / NET: 400 mL        Appearance: Normal	  HEENT:   Normal oral mucosa, PERRL, EOMI	  Lymphatic: No lymphadenopathy  Cardiovascular: Normal S1 S2, + JVD (JVP = 12cm H2O), No murmurs, 1+ b/l le edema  Respiratory: Lungs clear to auscultation	  Psychiatry: A & O x 3, Mood & affect appropriate  Gastrointestinal:  Soft, Non-tender, + BS	  Skin: No rashes, No ecchymoses, No cyanosis	  Neurologic: Non-focal  Extremities: Normal range of motion, No clubbing, cyanosis  Vascular: Peripheral pulses palpable 2+ bilaterally        LABS:	 	    CBC Full  -  ( 20 Jan 2018 06:47 )  WBC Count : 6.5 K/uL  Hemoglobin : 11.3 g/dL  Hematocrit : 35.1 %  Platelet Count - Automated : 285 K/uL  Mean Cell Volume : 96.9 fl  Mean Cell Hemoglobin : 31.3 pg  Mean Cell Hemoglobin Concentration : 32.3 gm/dL  Auto Neutrophil # : x  Auto Lymphocyte # : x  Auto Monocyte # : x  Auto Eosinophil # : x  Auto Basophil # : x  Auto Neutrophil % : x  Auto Lymphocyte % : x  Auto Monocyte % : x  Auto Eosinophil % : x  Auto Basophil % : x    01-20    144  |  103  |  26<H>  ----------------------------<  127<H>  3.2<L>   |  30  |  1.61<H>  01-19    x   |  x   |  x   ----------------------------<  x   3.4<L>   |  x   |  x     Ca    8.8      20 Jan 2018 06:47  Mg     2.2     01-20    TELEMETRY: 	  NSR 50-70  	    PREVIOUS DIAGNOSTIC TESTING:    [ ] Echocardiogram:  < from: Transthoracic Echocardiogram (01.19.18 @ 15:11) >  Conclusions:  1. Normal left atrium.  2. Left ventricular ejection fraction, by visual  estimation, is 55-60%.  Septal motion consistent with right  ventricular overload. No wall motion abnormality.  3. Grade II diastolic dysfunction.  LAP=23mmHG  4. Normal right atrium.  5. Normal right ventricular size and systolic function.  6. Estimated right ventricular systolic pressure equals 61  mm Hg, assuming right atrial pressure equals 8 mm Hg,  consistent with severe pulmonary hypertension.  7. Normal pericardium with no pericardial effusion.  *** No previous Echo exam.    < end of copied text >    [ ]  Catheterization:  < from: Cardiac Cath Lab - Adult (01.18.18 @ 14:30) >  VENTRICLES: No left ventriculogram was performed.  CORONARY VESSELS: The coronary circulation is right dominant.  LAD:   --  Proximal LAD: There was a 90 % stenosis.  COMPLICATIONS: There were no complications.  DIAGNOSTIC RECOMMENDATIONS: Successful PCI of the proximal LAD with BMS x1  (Cobra 3.0 x30 post dilated to 3.5) with proper apposition confirmed with  IVUS    < end of copied text >      ASSESSMENT/PLAN: 	  58 yo  man with recently dx'ed sHF (11/2017 - EF 40% -> 55-60%),  HTN, DM2, recently dx'ed meningioma and melanoma of the skin who presented with HF exacerbation found to have obstructive CAD; s/p LHC w/ CECILIA LAD.    1. CAD  - cont asa, plavix, lipitor, coreg    2. Acute decompensated heart failure  -NYHA IV, ACC/AHA C- overloaded  -Not diuresing with current regiment. Would transition to Lasix 60mg IV BID for goal of -500-1L. Of note, daily weights are inaccurate.  -No aceI 2/2 SILKE. cont imdur, hydralazine  -trend Cr, hold nephrotoxic meds  -when above normalized, f/u outpatient w Dr Anderson  -severe pulm htn - obtain RHC from OSH, f/u outpatient w/ Dr Anderson, possibly who II however will need wedge and PA pressures to confirm    30877 24H hour events:   States that edema has improved but continues to complain of orthopnea/PND and dyspnea with mild to moderate exertion. No chest pain  MEDICATIONS:  aspirin enteric coated 81 milliGRAM(s) Oral daily  carvedilol 25 milliGRAM(s) Oral every 12 hours  clopidogrel Tablet 75 milliGRAM(s) Oral daily  furosemide    Tablet 60 milliGRAM(s) Oral two times a day  heparin  Injectable 5000 Unit(s) SubCutaneous every 12 hours  hydrALAZINE 100 milliGRAM(s) Oral three times a day  isosorbide   mononitrate ER Tablet (IMDUR) 30 milliGRAM(s) Oral daily  isosorbide   mononitrate ER Tablet (IMDUR) 60 milliGRAM(s) Oral daily        melatonin 3 milliGRAM(s) Oral at bedtime  ondansetron Injectable 4 milliGRAM(s) IV Push every 6 hours PRN  zolpidem 5 milliGRAM(s) Oral at bedtime PRN    docusate sodium 100 milliGRAM(s) Oral two times a day  pantoprazole  Injectable 40 milliGRAM(s) IV Push daily  simethicone 80 milliGRAM(s) Chew every 6 hours PRN    atorvastatin 80 milliGRAM(s) Oral at bedtime  dextrose 50% Injectable 12.5 Gram(s) IV Push once  dextrose 50% Injectable 25 Gram(s) IV Push once  dextrose 50% Injectable 25 Gram(s) IV Push once  dextrose Gel 1 Dose(s) Oral once PRN  glucagon  Injectable 1 milliGRAM(s) IntraMuscular once PRN  insulin lispro (HumaLOG) corrective regimen sliding scale   SubCutaneous three times a day before meals  insulin lispro (HumaLOG) corrective regimen sliding scale   SubCutaneous at bedtime    dextrose 5%. 1000 milliLiter(s) IV Continuous <Continuous>      PHYSICAL EXAM:  T(C): 37.1 (01-21-18 @ 04:32), Max: 37.1 (01-21-18 @ 04:32)  HR: 67 (01-21-18 @ 04:32) (67 - 74)  BP: 135/72 (01-21-18 @ 04:32) (135/68 - 156/86)  RR: 18 (01-21-18 @ 04:32) (18 - 18)  SpO2: 93% (01-21-18 @ 04:32) (92% - 93%)  Wt(kg): --  I&O's Summary    20 Jan 2018 07:01  -  21 Jan 2018 07:00  --------------------------------------------------------  IN: 700 mL / OUT: 300 mL / NET: 400 mL        Appearance: Normal	  HEENT:   Normal oral mucosa, PERRL, EOMI	  Lymphatic: No lymphadenopathy  Cardiovascular: Normal S1 S2, + JVD (JVP = 12cm H2O), No murmurs, 1+ b/l le edema  Respiratory: Lungs clear to auscultation	  Psychiatry: A & O x 3, Mood & affect appropriate  Gastrointestinal:  Soft, Non-tender, + BS	  Skin: No rashes, No ecchymoses, No cyanosis	  Neurologic: Non-focal  Extremities: 1+ b/l le edema  Vascular: Peripheral pulses palpable 2+ bilaterally        LABS:	 	    CBC Full  -  ( 20 Jan 2018 06:47 )  WBC Count : 6.5 K/uL  Hemoglobin : 11.3 g/dL  Hematocrit : 35.1 %  Platelet Count - Automated : 285 K/uL  Mean Cell Volume : 96.9 fl  Mean Cell Hemoglobin : 31.3 pg  Mean Cell Hemoglobin Concentration : 32.3 gm/dL  Auto Neutrophil # : x  Auto Lymphocyte # : x  Auto Monocyte # : x  Auto Eosinophil # : x  Auto Basophil # : x  Auto Neutrophil % : x  Auto Lymphocyte % : x  Auto Monocyte % : x  Auto Eosinophil % : x  Auto Basophil % : x    01-20    144  |  103  |  26<H>  ----------------------------<  127<H>  3.2<L>   |  30  |  1.61<H>  01-19    x   |  x   |  x   ----------------------------<  x   3.4<L>   |  x   |  x     Ca    8.8      20 Jan 2018 06:47  Mg     2.2     01-20    TELEMETRY: 	  NSR 50-70  	    PREVIOUS DIAGNOSTIC TESTING:    [ ] Echocardiogram:  < from: Transthoracic Echocardiogram (01.19.18 @ 15:11) >  Conclusions:  1. Normal left atrium.  2. Left ventricular ejection fraction, by visual  estimation, is 55-60%.  Septal motion consistent with right  ventricular overload. No wall motion abnormality.  3. Grade II diastolic dysfunction.  LAP=23mmHG  4. Normal right atrium.  5. Normal right ventricular size and systolic function.  6. Estimated right ventricular systolic pressure equals 61  mm Hg, assuming right atrial pressure equals 8 mm Hg,  consistent with severe pulmonary hypertension.  7. Normal pericardium with no pericardial effusion.  *** No previous Echo exam.    < end of copied text >    [ ]  Catheterization:  < from: Cardiac Cath Lab - Adult (01.18.18 @ 14:30) >  VENTRICLES: No left ventriculogram was performed.  CORONARY VESSELS: The coronary circulation is right dominant.  LAD:   --  Proximal LAD: There was a 90 % stenosis.  COMPLICATIONS: There were no complications.  DIAGNOSTIC RECOMMENDATIONS: Successful PCI of the proximal LAD with BMS x1  (Cobra 3.0 x30 post dilated to 3.5) with proper apposition confirmed with  IVUS    < end of copied text >      ASSESSMENT/PLAN: 	  58 yo  man with recently dx'ed sHF (11/2017 - EF 40% -> 55-60%),  HTN, DM2, recently dx'ed meningioma and melanoma of the skin who presented with HF exacerbation found to have obstructive CAD; s/p LHC w/ CECILIA LAD.    1. CAD  - cont asa, plavix, lipitor, coreg    2. Acute decompensated heart failure  -NYHA IV, ACC/AHA C- overloaded  -Not diuresing with current regiment. Would transition to Lasix 60mg IV BID for goal of -500-1L. Of note, daily weights are inaccurate.  -No aceI 2/2 SILKE. cont imdur, hydralazine  -trend Cr, hold nephrotoxic meds  -when above normalized, f/u outpatient w Dr Anderson  -severe pulm htn - obtain RHC from OSH, f/u outpatient w/ Dr Anderson, possibly who II however will need wedge and PA pressures to confirm    [id number]

## 2018-01-22 LAB
ANION GAP SERPL CALC-SCNC: 14 MMOL/L — SIGNIFICANT CHANGE UP (ref 5–17)
BUN SERPL-MCNC: 29 MG/DL — HIGH (ref 7–23)
CALCIUM SERPL-MCNC: 8.4 MG/DL — SIGNIFICANT CHANGE UP (ref 8.4–10.5)
CHLORIDE SERPL-SCNC: 100 MMOL/L — SIGNIFICANT CHANGE UP (ref 96–108)
CO2 SERPL-SCNC: 24 MMOL/L — SIGNIFICANT CHANGE UP (ref 22–31)
CREAT SERPL-MCNC: 1.67 MG/DL — HIGH (ref 0.5–1.3)
GLUCOSE SERPL-MCNC: 154 MG/DL — HIGH (ref 70–99)
HCT VFR BLD CALC: 30.6 % — LOW (ref 39–50)
HGB BLD-MCNC: 10.4 G/DL — LOW (ref 13–17)
MCHC RBC-ENTMCNC: 32.3 PG — SIGNIFICANT CHANGE UP (ref 27–34)
MCHC RBC-ENTMCNC: 34 GM/DL — SIGNIFICANT CHANGE UP (ref 32–36)
MCV RBC AUTO: 95 FL — SIGNIFICANT CHANGE UP (ref 80–100)
PLATELET # BLD AUTO: 270 K/UL — SIGNIFICANT CHANGE UP (ref 150–400)
POTASSIUM SERPL-MCNC: 3.4 MMOL/L — LOW (ref 3.5–5.3)
POTASSIUM SERPL-SCNC: 3.4 MMOL/L — LOW (ref 3.5–5.3)
RBC # BLD: 3.22 M/UL — LOW (ref 4.2–5.8)
RBC # FLD: 12.7 % — SIGNIFICANT CHANGE UP (ref 10.3–14.5)
SODIUM SERPL-SCNC: 138 MMOL/L — SIGNIFICANT CHANGE UP (ref 135–145)
WBC # BLD: 6.9 K/UL — SIGNIFICANT CHANGE UP (ref 3.8–10.5)
WBC # FLD AUTO: 6.9 K/UL — SIGNIFICANT CHANGE UP (ref 3.8–10.5)

## 2018-01-22 PROCEDURE — 99233 SBSQ HOSP IP/OBS HIGH 50: CPT | Mod: GC

## 2018-01-22 PROCEDURE — 99232 SBSQ HOSP IP/OBS MODERATE 35: CPT

## 2018-01-22 RX ORDER — POTASSIUM CHLORIDE 20 MEQ
40 PACKET (EA) ORAL ONCE
Qty: 0 | Refills: 0 | Status: COMPLETED | OUTPATIENT
Start: 2018-01-22 | End: 2018-01-22

## 2018-01-22 RX ADMIN — Medication 40 MILLIEQUIVALENT(S): at 14:45

## 2018-01-22 RX ADMIN — Medication: at 18:15

## 2018-01-22 RX ADMIN — Medication 3 MILLIGRAM(S): at 21:39

## 2018-01-22 RX ADMIN — Medication 60 MILLIGRAM(S): at 05:22

## 2018-01-22 RX ADMIN — SIMETHICONE 80 MILLIGRAM(S): 80 TABLET, CHEWABLE ORAL at 18:03

## 2018-01-22 RX ADMIN — PANTOPRAZOLE SODIUM 40 MILLIGRAM(S): 20 TABLET, DELAYED RELEASE ORAL at 09:33

## 2018-01-22 RX ADMIN — Medication: at 13:20

## 2018-01-22 RX ADMIN — ISOSORBIDE MONONITRATE 60 MILLIGRAM(S): 60 TABLET, EXTENDED RELEASE ORAL at 09:33

## 2018-01-22 RX ADMIN — Medication 100 MILLIGRAM(S): at 05:22

## 2018-01-22 RX ADMIN — ATORVASTATIN CALCIUM 80 MILLIGRAM(S): 80 TABLET, FILM COATED ORAL at 21:40

## 2018-01-22 RX ADMIN — CARVEDILOL PHOSPHATE 25 MILLIGRAM(S): 80 CAPSULE, EXTENDED RELEASE ORAL at 05:22

## 2018-01-22 RX ADMIN — Medication 100 MILLIGRAM(S): at 21:40

## 2018-01-22 RX ADMIN — CARVEDILOL PHOSPHATE 25 MILLIGRAM(S): 80 CAPSULE, EXTENDED RELEASE ORAL at 18:03

## 2018-01-22 RX ADMIN — HEPARIN SODIUM 5000 UNIT(S): 5000 INJECTION INTRAVENOUS; SUBCUTANEOUS at 18:15

## 2018-01-22 RX ADMIN — Medication 81 MILLIGRAM(S): at 09:34

## 2018-01-22 RX ADMIN — CLOPIDOGREL BISULFATE 75 MILLIGRAM(S): 75 TABLET, FILM COATED ORAL at 09:33

## 2018-01-22 RX ADMIN — Medication 60 MILLIGRAM(S): at 18:04

## 2018-01-22 RX ADMIN — Medication 100 MILLIGRAM(S): at 14:44

## 2018-01-22 RX ADMIN — Medication 1: at 09:34

## 2018-01-22 RX ADMIN — Medication 100 MILLIGRAM(S): at 18:03

## 2018-01-22 RX ADMIN — HEPARIN SODIUM 5000 UNIT(S): 5000 INJECTION INTRAVENOUS; SUBCUTANEOUS at 05:23

## 2018-01-22 NOTE — PROGRESS NOTE ADULT - PROBLEM SELECTOR PLAN 1
- continue small correctional scale insulin, but can do it as well as bs bid as his bs are always at goal.  -would defer metfromin in the setting of diuresis, but it can be considered once medically stable if CrCl is 45 or better.  -will sign off at this time. Call with questions.  Caitlin Baltazar [phone]

## 2018-01-22 NOTE — PROGRESS NOTE ADULT - SUBJECTIVE AND OBJECTIVE BOX
HPI:  Denies chest pain, dyspnea or palpitations. Breathing is greatly improved. Slept poorly, but a chronic issue and seems not due to orthopnea.     Medications:  aspirin enteric coated 81 milliGRAM(s) Oral daily  atorvastatin 80 milliGRAM(s) Oral at bedtime  carvedilol 25 milliGRAM(s) Oral every 12 hours  clopidogrel Tablet 75 milliGRAM(s) Oral daily  dextrose 5%. 1000 milliLiter(s) IV Continuous <Continuous>  dextrose 50% Injectable 12.5 Gram(s) IV Push once  dextrose 50% Injectable 25 Gram(s) IV Push once  dextrose 50% Injectable 25 Gram(s) IV Push once  dextrose Gel 1 Dose(s) Oral once PRN  docusate sodium 100 milliGRAM(s) Oral two times a day  furosemide   Injectable 60 milliGRAM(s) IV Push every 12 hours  glucagon  Injectable 1 milliGRAM(s) IntraMuscular once PRN  heparin  Injectable 5000 Unit(s) SubCutaneous every 12 hours  hydrALAZINE 100 milliGRAM(s) Oral three times a day  insulin lispro (HumaLOG) corrective regimen sliding scale   SubCutaneous three times a day before meals  insulin lispro (HumaLOG) corrective regimen sliding scale   SubCutaneous at bedtime  isosorbide   mononitrate ER Tablet (IMDUR) 60 milliGRAM(s) Oral daily  melatonin 3 milliGRAM(s) Oral at bedtime  ondansetron Injectable 4 milliGRAM(s) IV Push every 6 hours PRN  pantoprazole  Injectable 40 milliGRAM(s) IV Push daily  potassium chloride    Tablet ER 40 milliEquivalent(s) Oral once  simethicone 80 milliGRAM(s) Chew every 6 hours PRN  zolpidem 5 milliGRAM(s) Oral at bedtime PRN    PMH/PSH/FH/SH:  Unchanged    ROS:  CONSTITUTIONAL: No weakness, fevers or chills  EYES/ENT: No visual changes;  No dysphagia  RESPIRATORY: No cough, wheezing, hemoptysis;   CARDIOVASCULAR: No chest pain or palpitations; + lower extremity edema  GASTROINTESTINAL: No abdominal or epigastric pain. No nausea, vomiting, or hematemesis; No diarrhea or constipation. No melena or hematochezia.  GENITOURINARY: No dysuria, frequency or hematuria  NEUROLOGICAL: No numbness or weakness  SKIN: No itching, burning, rashes, or lesions   All other review of systems is negative unless indicated above.    Vitals:  T(C): 36.7 (18 @ 12:17), Max: 37.2 (18 @ 21:02)  HR: 67 (18 @ 12:17) (66 - 68)  BP: 156/79 (18 @ 12:17) (149/78 - 156/79)  BP(mean): --  RR: 19 (18 @ 12:17) (18 - 19)  SpO2: 94% (18 @ 12:17) (93% - 95%)  Wt(kg): --  Daily     Daily Weight in k.8 (2018 04:44)    Physical exam:  GENERAL: No acute distress, well-developed  HEAD:  Atraumatic, Normocephalic  ENT: EOMI, JVD to base of neck with HJR, moist mucosa  CHEST/LUNG: Clear to auscultation bilaterally; No wheeze, equal breath sounds bilaterally   HEART: Regular rate and rhythm; No murmurs, rubs, or gallops  ABDOMEN: Soft, Nontender, Nondistended; Bowel sounds present  EXTREMITIES:  Trace LE edema; cardiac cath vascular access site clear  PSYCH: normal behavior and affect  NEUROLOGY: AAOx3, non-focal    I&O's Summary    2018 07:  -  2018 07:00  --------------------------------------------------------  IN: 1440 mL / OUT: 700 mL / NET: 740 mL    2018 07:  -  2018 14:37  --------------------------------------------------------  IN: 540 mL / OUT: 1290 mL / NET: -750 mL                              10.4   6.9   )-----------( 270      ( 2018 07:06 )             30.6     -    138  |  100  |  29<H>  ----------------------------<  154<H>  3.4<L>   |  24  |  1.67<H>    Ca    8.4      2018 07:06  Mg     2.1           Echo: < from: Transthoracic Echocardiogram (18 @ 15:11) >  Conclusions:  1. Normal left atrium.  2. Left ventricular ejection fraction, by visual  estimation, is 55-60%.  Septal motion consistent with right  ventricular overload. No wall motion abnormality.  3. Grade II diastolic dysfunction.  LAP=23mmHG  4. Normal right atrium.  5. Normal right ventricular size and systolic function.  6. Estimated right ventricular systolic pressure equals 61  mm Hg, assuming right atrial pressure equals 8 mm Hg,  consistent with severe pulmonary hypertension.  7. Normal pericardium with no pericardial effusion.    < end of copied text >    Cath:      Interpretation of Telemetry: sinus rhythm

## 2018-01-22 NOTE — PROGRESS NOTE ADULT - PROBLEM SELECTOR PLAN 1
-S/p BMS to LAD.   -C/w ASA and plavix.   -C/w atorvastatin.   -C/w carvedilol.   -No ACEi in view of SILKE.  -Cardiology f/u appreciated.   -C/w telemetry for now. -S/p BMS to LAD.   -C/w ASA and plavix.   -C/w atorvastatin.   -C/w carvedilol.   -No ACEi in view of SILKE.  -C/w telemetry for now

## 2018-01-22 NOTE — PROGRESS NOTE ADULT - ATTENDING COMMENTS
57 year old man presented to [facility] with acute systolic heart failure, has undergone coronary intervention. Echocardiogram demonstrate preserved LV systolic pressure, but pulmonary hypertension, signs of RV pressure and volume overload. Continuing efforts to diurese volume overload.

## 2018-01-22 NOTE — PROGRESS NOTE ADULT - SUBJECTIVE AND OBJECTIVE BOX
Patient is a 57y old  Male who presents with a chief complaint of CAD, acute on chronic heart failure (18 Jan 2018 23:26)      SUBJECTIVE / OVERNIGHT EVENTS: patient had chronic back pain overnight, has insomnia which has also been a chronic issue at home. Currently denies any pain. Breathing is greatly improved.     MEDICATIONS  (STANDING):  aspirin enteric coated 81 milliGRAM(s) Oral daily  atorvastatin 80 milliGRAM(s) Oral at bedtime  carvedilol 25 milliGRAM(s) Oral every 12 hours  clopidogrel Tablet 75 milliGRAM(s) Oral daily  dextrose 5%. 1000 milliLiter(s) (50 mL/Hr) IV Continuous <Continuous>  dextrose 50% Injectable 12.5 Gram(s) IV Push once  dextrose 50% Injectable 25 Gram(s) IV Push once  dextrose 50% Injectable 25 Gram(s) IV Push once  docusate sodium 100 milliGRAM(s) Oral two times a day  furosemide   Injectable 60 milliGRAM(s) IV Push every 12 hours  heparin  Injectable 5000 Unit(s) SubCutaneous every 12 hours  hydrALAZINE 100 milliGRAM(s) Oral three times a day  insulin lispro (HumaLOG) corrective regimen sliding scale   SubCutaneous three times a day before meals  insulin lispro (HumaLOG) corrective regimen sliding scale   SubCutaneous at bedtime  isosorbide   mononitrate ER Tablet (IMDUR) 60 milliGRAM(s) Oral daily  melatonin 3 milliGRAM(s) Oral at bedtime  pantoprazole  Injectable 40 milliGRAM(s) IV Push daily    MEDICATIONS  (PRN):  dextrose Gel 1 Dose(s) Oral once PRN Blood Glucose LESS THAN 70 milliGRAM(s)/deciliter  glucagon  Injectable 1 milliGRAM(s) IntraMuscular once PRN Glucose LESS THAN 70 milligrams/deciliter  ondansetron Injectable 4 milliGRAM(s) IV Push every 6 hours PRN Nausea and/or Vomiting  simethicone 80 milliGRAM(s) Chew every 6 hours PRN Gas  zolpidem 5 milliGRAM(s) Oral at bedtime PRN Insomnia      Vital Signs Last 24 Hrs  T(C): 36.7 (22 Jan 2018 04:44), Max: 37.2 (21 Jan 2018 12:07)  T(F): 98 (22 Jan 2018 04:44), Max: 98.9 (21 Jan 2018 12:07)  HR: 66 (22 Jan 2018 04:44) (65 - 68)  BP: 149/78 (22 Jan 2018 04:44) (149/78 - 154/84)  BP(mean): --  RR: 18 (22 Jan 2018 04:44) (18 - 18)  SpO2: 93% (22 Jan 2018 04:44) (93% - 95%)  CAPILLARY BLOOD GLUCOSE      POCT Blood Glucose.: 168 mg/dL (22 Jan 2018 09:00)  POCT Blood Glucose.: 167 mg/dL (21 Jan 2018 21:47)  POCT Blood Glucose.: 154 mg/dL (21 Jan 2018 17:53)  POCT Blood Glucose.: 174 mg/dL (21 Jan 2018 12:35)    I&O's Summary    21 Jan 2018 07:01  -  22 Jan 2018 07:00  --------------------------------------------------------  IN: 1440 mL / OUT: 700 mL / NET: 740 mL    22 Jan 2018 07:01  -  22 Jan 2018 11:27  --------------------------------------------------------  IN: 0 mL / OUT: 740 mL / NET: -740 mL        PHYSICAL EXAM:  GENERAL: NAD, well-developed  HEAD:  left head SQ mass with healing surgical incision, appears intact   EYES: EOMI, PERRLA, conjunctiva and sclera clear  NECK: Supple, No JVD  CHEST/LUNG: Clear to auscultation bilaterally; No wheeze  HEART: Regular rate and rhythm; No murmurs, rubs, or gallops  ABDOMEN: Soft, Nontender, Nondistended; Bowel sounds present  EXTREMITIES:  1+ edema in the LE b/l   PSYCH: AAOx3  NEUROLOGY: non-focal  SKIN: surgical lesion noted in the left upper back     LABS:                        10.4   6.9   )-----------( 270      ( 22 Jan 2018 07:06 )             30.6     01-22    138  |  100  |  29<H>  ----------------------------<  154<H>  3.4<L>   |  24  |  1.67<H>    Ca    8.4      22 Jan 2018 07:06  Mg     2.1     01-21

## 2018-01-22 NOTE — PROGRESS NOTE ADULT - SUBJECTIVE AND OBJECTIVE BOX
Chief Complaint/Follow-up on:     Subjective:    MEDICATIONS  (STANDING):  aspirin enteric coated 81 milliGRAM(s) Oral daily  atorvastatin 80 milliGRAM(s) Oral at bedtime  carvedilol 25 milliGRAM(s) Oral every 12 hours  clopidogrel Tablet 75 milliGRAM(s) Oral daily  dextrose 5%. 1000 milliLiter(s) (50 mL/Hr) IV Continuous <Continuous>  dextrose 50% Injectable 12.5 Gram(s) IV Push once  dextrose 50% Injectable 25 Gram(s) IV Push once  dextrose 50% Injectable 25 Gram(s) IV Push once  docusate sodium 100 milliGRAM(s) Oral two times a day  furosemide   Injectable 60 milliGRAM(s) IV Push every 12 hours  heparin  Injectable 5000 Unit(s) SubCutaneous every 12 hours  hydrALAZINE 100 milliGRAM(s) Oral three times a day  insulin lispro (HumaLOG) corrective regimen sliding scale   SubCutaneous three times a day before meals  insulin lispro (HumaLOG) corrective regimen sliding scale   SubCutaneous at bedtime  isosorbide   mononitrate ER Tablet (IMDUR) 60 milliGRAM(s) Oral daily  melatonin 3 milliGRAM(s) Oral at bedtime  pantoprazole  Injectable 40 milliGRAM(s) IV Push daily    MEDICATIONS  (PRN):  dextrose Gel 1 Dose(s) Oral once PRN Blood Glucose LESS THAN 70 milliGRAM(s)/deciliter  glucagon  Injectable 1 milliGRAM(s) IntraMuscular once PRN Glucose LESS THAN 70 milligrams/deciliter  ondansetron Injectable 4 milliGRAM(s) IV Push every 6 hours PRN Nausea and/or Vomiting  simethicone 80 milliGRAM(s) Chew every 6 hours PRN Gas  zolpidem 5 milliGRAM(s) Oral at bedtime PRN Insomnia      PHYSICAL EXAM:  VITALS: T(C): 36.7 (01-22-18 @ 12:17)  T(F): 98.1 (01-22-18 @ 12:17), Max: 98.9 (01-21-18 @ 21:02)  HR: 67 (01-22-18 @ 12:17) (66 - 68)  BP: 156/79 (01-22-18 @ 12:17) (149/78 - 156/79)  RR:  (18 - 19)  SpO2:  (93% - 95%)  Wt(kg): --  GENERAL: NAD, well-groomed, well-developed  EYES: No proptosis, no injection  HEENT:  Atraumatic, Normocephalic, moist mucous membranes  THYROID: Normal size, no palpable nodules  RESPIRATORY: Clear to auscultation bilaterally; No rales, rhonchi, wheezing, or rubs  CARDIOVASCULAR: Regular rate and rhythm; No murmurs; no peripheral edema  GI: Soft, nontender, non distended, normal bowel sounds  CUSHING'S SIGNS: no striae    POCT Blood Glucose.: 167 mg/dL (01-22-18 @ 17:56)  POCT Blood Glucose.: 199 mg/dL (01-22-18 @ 13:13)  POCT Blood Glucose.: 168 mg/dL (01-22-18 @ 09:00)  POCT Blood Glucose.: 167 mg/dL (01-21-18 @ 21:47)  POCT Blood Glucose.: 154 mg/dL (01-21-18 @ 17:53)  POCT Blood Glucose.: 174 mg/dL (01-21-18 @ 12:35)  POCT Blood Glucose.: 156 mg/dL (01-21-18 @ 08:34)  POCT Blood Glucose.: 150 mg/dL (01-20-18 @ 21:19)  POCT Blood Glucose.: 155 mg/dL (01-20-18 @ 18:14)  POCT Blood Glucose.: 184 mg/dL (01-20-18 @ 13:21)  POCT Blood Glucose.: 129 mg/dL (01-20-18 @ 09:06)    01-22    138  |  100  |  29<H>  ----------------------------<  154<H>  3.4<L>   |  24  |  1.67<H>    EGFR if : 53<L>  EGFR if non : 45<L>    Ca    8.4      01-22  Mg     2.1     01-21    Hemoglobin A1C, Whole Blood: 8.0 % <H> [4.0 - 5.6] (01-18-18 @ 16:00) Chief Complaint/Follow-up on: T2DM    Subjective: Pt is overwhelmed as he has many medical conditions to deal with at once - melanoma and CHF. He also notes that he has neglected his DM over the years. He notes constipation issues.     MEDICATIONS  (STANDING):  aspirin enteric coated 81 milliGRAM(s) Oral daily  atorvastatin 80 milliGRAM(s) Oral at bedtime  carvedilol 25 milliGRAM(s) Oral every 12 hours  clopidogrel Tablet 75 milliGRAM(s) Oral daily  dextrose 5%. 1000 milliLiter(s) (50 mL/Hr) IV Continuous <Continuous>  dextrose 50% Injectable 12.5 Gram(s) IV Push once  dextrose 50% Injectable 25 Gram(s) IV Push once  dextrose 50% Injectable 25 Gram(s) IV Push once  docusate sodium 100 milliGRAM(s) Oral two times a day  furosemide   Injectable 60 milliGRAM(s) IV Push every 12 hours  heparin  Injectable 5000 Unit(s) SubCutaneous every 12 hours  hydrALAZINE 100 milliGRAM(s) Oral three times a day  insulin lispro (HumaLOG) corrective regimen sliding scale   SubCutaneous three times a day before meals  insulin lispro (HumaLOG) corrective regimen sliding scale   SubCutaneous at bedtime  isosorbide   mononitrate ER Tablet (IMDUR) 60 milliGRAM(s) Oral daily  melatonin 3 milliGRAM(s) Oral at bedtime  pantoprazole  Injectable 40 milliGRAM(s) IV Push daily    MEDICATIONS  (PRN):  dextrose Gel 1 Dose(s) Oral once PRN Blood Glucose LESS THAN 70 milliGRAM(s)/deciliter  glucagon  Injectable 1 milliGRAM(s) IntraMuscular once PRN Glucose LESS THAN 70 milligrams/deciliter  ondansetron Injectable 4 milliGRAM(s) IV Push every 6 hours PRN Nausea and/or Vomiting  simethicone 80 milliGRAM(s) Chew every 6 hours PRN Gas  zolpidem 5 milliGRAM(s) Oral at bedtime PRN Insomnia      PHYSICAL EXAM:  VITALS: T(C): 36.7 (01-22-18 @ 12:17)  T(F): 98.1 (01-22-18 @ 12:17), Max: 98.9 (01-21-18 @ 21:02)  HR: 67 (01-22-18 @ 12:17) (66 - 68)  BP: 156/79 (01-22-18 @ 12:17) (149/78 - 156/79)  RR:  (18 - 19)  SpO2:  (93% - 95%)  Wt(kg): --  GENERAL: NAD, well-groomed, well-developed  EYES: No proptosis, no injection  HEENT:  Atraumatic, Normocephalic, moist mucous membranes  RESPIRATORY: Clear to auscultation bilaterally; No rales, rhonchi, wheezing, or rubs  CARDIOVASCULAR: Regular rate and rhythm; No murmurs  GI: Soft, nontender, non distended, normal bowel sounds      POCT Blood Glucose.: 167 mg/dL (01-22-18 @ 17:56)  POCT Blood Glucose.: 199 mg/dL (01-22-18 @ 13:13)  POCT Blood Glucose.: 168 mg/dL (01-22-18 @ 09:00)  POCT Blood Glucose.: 167 mg/dL (01-21-18 @ 21:47)  POCT Blood Glucose.: 154 mg/dL (01-21-18 @ 17:53)  POCT Blood Glucose.: 174 mg/dL (01-21-18 @ 12:35)  POCT Blood Glucose.: 156 mg/dL (01-21-18 @ 08:34)  POCT Blood Glucose.: 150 mg/dL (01-20-18 @ 21:19)  POCT Blood Glucose.: 155 mg/dL (01-20-18 @ 18:14)  POCT Blood Glucose.: 184 mg/dL (01-20-18 @ 13:21)  POCT Blood Glucose.: 129 mg/dL (01-20-18 @ 09:06)    01-22    138  |  100  |  29<H>  ----------------------------<  154<H>  3.4<L>   |  24  |  1.67<H>    EGFR if : 53<L>  EGFR if non : 45<L>    Ca    8.4      01-22  Mg     2.1     01-21    Hemoglobin A1C, Whole Blood: 8.0 % <H> [4.0 - 5.6] (01-18-18 @ 16:00)

## 2018-01-22 NOTE — PROGRESS NOTE ADULT - PROBLEM SELECTOR PLAN 4
-Possibly SILKE partly due to contrast received in cath's and diuresis.   -Creatinine worsened today. May need to consult renal if worsens further/persists.   -Monitor BMP daily. -Creatinine improving  -will continue with diuresis  -avoid nephrotoxic meds  -monitor Cr

## 2018-01-22 NOTE — PROGRESS NOTE ADULT - PROBLEM SELECTOR PLAN 10
-Patient waiting for results of extent of melanoma on left upper back.   -Outpatient follow up.  -Patient was supposed to get a radiation oncologist for the tumor on the left side of his head. He would like to be seen by them here if possible. Can call for eval.     11. Prophylactic measure: -Heparin SQ twice daily for DVT PPx. Ambulate as tolerated.     12. Dispo: -PT eval recs ADRIANNA. Plan to DC to [facility] once able. -Patient waiting for results of extent of melanoma on left upper back.   -Outpatient follow up.  -Outpatient f/u with rad onc for head tumor     11. Prophylactic measure: -Heparin SQ twice daily for DVT PPx. Ambulate as tolerated.     12. Dispo: -PT leslie RODAS. Plan to DC to [facility] once able.

## 2018-01-22 NOTE — PROGRESS NOTE ADULT - PROBLEM SELECTOR PLAN 2
-Echo showed grade 2 diastolic dysfunction, EF 55%, and severe pulmonary HTN.   -Will change lasix to 60mg IV twice daily.   -Cardiology f/u appreciated.   -Strict I's/O's and daily weights. -Continue with diuresis, lasix 60mg IV BID   -Strict I's/O's and daily weights

## 2018-01-23 LAB
ANION GAP SERPL CALC-SCNC: 11 MMOL/L — SIGNIFICANT CHANGE UP (ref 5–17)
BUN SERPL-MCNC: 30 MG/DL — HIGH (ref 7–23)
CALCIUM SERPL-MCNC: 8.7 MG/DL — SIGNIFICANT CHANGE UP (ref 8.4–10.5)
CHLORIDE SERPL-SCNC: 100 MMOL/L — SIGNIFICANT CHANGE UP (ref 96–108)
CO2 SERPL-SCNC: 28 MMOL/L — SIGNIFICANT CHANGE UP (ref 22–31)
CREAT SERPL-MCNC: 1.59 MG/DL — HIGH (ref 0.5–1.3)
GLUCOSE SERPL-MCNC: 164 MG/DL — HIGH (ref 70–99)
HCT VFR BLD CALC: 31.6 % — LOW (ref 39–50)
HGB BLD-MCNC: 10.6 G/DL — LOW (ref 13–17)
MCHC RBC-ENTMCNC: 31.9 PG — SIGNIFICANT CHANGE UP (ref 27–34)
MCHC RBC-ENTMCNC: 33.6 GM/DL — SIGNIFICANT CHANGE UP (ref 32–36)
MCV RBC AUTO: 94.9 FL — SIGNIFICANT CHANGE UP (ref 80–100)
PLATELET # BLD AUTO: 292 K/UL — SIGNIFICANT CHANGE UP (ref 150–400)
POTASSIUM SERPL-MCNC: 3.6 MMOL/L — SIGNIFICANT CHANGE UP (ref 3.5–5.3)
POTASSIUM SERPL-SCNC: 3.6 MMOL/L — SIGNIFICANT CHANGE UP (ref 3.5–5.3)
RBC # BLD: 3.33 M/UL — LOW (ref 4.2–5.8)
RBC # FLD: 12.8 % — SIGNIFICANT CHANGE UP (ref 10.3–14.5)
SODIUM SERPL-SCNC: 139 MMOL/L — SIGNIFICANT CHANGE UP (ref 135–145)
WBC # BLD: 7.3 K/UL — SIGNIFICANT CHANGE UP (ref 3.8–10.5)
WBC # FLD AUTO: 7.3 K/UL — SIGNIFICANT CHANGE UP (ref 3.8–10.5)

## 2018-01-23 PROCEDURE — 99233 SBSQ HOSP IP/OBS HIGH 50: CPT | Mod: GC

## 2018-01-23 PROCEDURE — 99233 SBSQ HOSP IP/OBS HIGH 50: CPT

## 2018-01-23 RX ORDER — SIMETHICONE 80 MG/1
80 TABLET, CHEWABLE ORAL THREE TIMES A DAY
Qty: 0 | Refills: 0 | Status: DISCONTINUED | OUTPATIENT
Start: 2018-01-23 | End: 2018-02-02

## 2018-01-23 RX ORDER — PANTOPRAZOLE SODIUM 20 MG/1
40 TABLET, DELAYED RELEASE ORAL
Qty: 0 | Refills: 0 | Status: DISCONTINUED | OUTPATIENT
Start: 2018-01-23 | End: 2018-02-02

## 2018-01-23 RX ADMIN — Medication: at 18:56

## 2018-01-23 RX ADMIN — HEPARIN SODIUM 5000 UNIT(S): 5000 INJECTION INTRAVENOUS; SUBCUTANEOUS at 17:11

## 2018-01-23 RX ADMIN — SIMETHICONE 80 MILLIGRAM(S): 80 TABLET, CHEWABLE ORAL at 21:11

## 2018-01-23 RX ADMIN — Medication 100 MILLIGRAM(S): at 15:24

## 2018-01-23 RX ADMIN — Medication: at 09:51

## 2018-01-23 RX ADMIN — Medication 100 MILLIGRAM(S): at 21:11

## 2018-01-23 RX ADMIN — Medication 100 MILLIGRAM(S): at 05:49

## 2018-01-23 RX ADMIN — ATORVASTATIN CALCIUM 80 MILLIGRAM(S): 80 TABLET, FILM COATED ORAL at 21:11

## 2018-01-23 RX ADMIN — Medication 60 MILLIGRAM(S): at 17:11

## 2018-01-23 RX ADMIN — CARVEDILOL PHOSPHATE 25 MILLIGRAM(S): 80 CAPSULE, EXTENDED RELEASE ORAL at 05:49

## 2018-01-23 RX ADMIN — Medication: at 13:30

## 2018-01-23 RX ADMIN — Medication 81 MILLIGRAM(S): at 09:51

## 2018-01-23 RX ADMIN — Medication 100 MILLIGRAM(S): at 17:12

## 2018-01-23 RX ADMIN — CARVEDILOL PHOSPHATE 25 MILLIGRAM(S): 80 CAPSULE, EXTENDED RELEASE ORAL at 17:11

## 2018-01-23 RX ADMIN — Medication 60 MILLIGRAM(S): at 05:49

## 2018-01-23 RX ADMIN — SIMETHICONE 80 MILLIGRAM(S): 80 TABLET, CHEWABLE ORAL at 15:24

## 2018-01-23 RX ADMIN — ISOSORBIDE MONONITRATE 60 MILLIGRAM(S): 60 TABLET, EXTENDED RELEASE ORAL at 09:51

## 2018-01-23 RX ADMIN — Medication 3 MILLIGRAM(S): at 21:11

## 2018-01-23 RX ADMIN — CLOPIDOGREL BISULFATE 75 MILLIGRAM(S): 75 TABLET, FILM COATED ORAL at 09:50

## 2018-01-23 RX ADMIN — PANTOPRAZOLE SODIUM 40 MILLIGRAM(S): 20 TABLET, DELAYED RELEASE ORAL at 09:24

## 2018-01-23 RX ADMIN — HEPARIN SODIUM 5000 UNIT(S): 5000 INJECTION INTRAVENOUS; SUBCUTANEOUS at 05:49

## 2018-01-23 NOTE — PROGRESS NOTE ADULT - PROBLEM SELECTOR PLAN 1
-S/p BMS to LAD.   -C/w ASA and plavix.   -C/w atorvastatin.   -C/w carvedilol.   -No ACEi in view of SILKE.  -C/w telemetry for now

## 2018-01-23 NOTE — PROGRESS NOTE ADULT - PROBLEM SELECTOR PLAN 2
-improving with diuresis, will continue lasix 60mg IV BID   -121kg on admission, down to 118 kg   -strict ins and outs, measure daily weights

## 2018-01-23 NOTE — PROGRESS NOTE ADULT - SUBJECTIVE AND OBJECTIVE BOX
Patient is a 57y old  Male who presents with a chief complaint of CAD, acute on chronic heart failure (18 Jan 2018 23:26)      SUBJECTIVE / OVERNIGHT EVENTS: no acute events overnight. Patient continues to have increased urine output. Breathing has improved.     MEDICATIONS  (STANDING):  aspirin enteric coated 81 milliGRAM(s) Oral daily  atorvastatin 80 milliGRAM(s) Oral at bedtime  carvedilol 25 milliGRAM(s) Oral every 12 hours  clopidogrel Tablet 75 milliGRAM(s) Oral daily  dextrose 5%. 1000 milliLiter(s) (50 mL/Hr) IV Continuous <Continuous>  dextrose 50% Injectable 12.5 Gram(s) IV Push once  dextrose 50% Injectable 25 Gram(s) IV Push once  dextrose 50% Injectable 25 Gram(s) IV Push once  docusate sodium 100 milliGRAM(s) Oral two times a day  furosemide   Injectable 60 milliGRAM(s) IV Push every 12 hours  heparin  Injectable 5000 Unit(s) SubCutaneous every 12 hours  hydrALAZINE 100 milliGRAM(s) Oral three times a day  insulin lispro (HumaLOG) corrective regimen sliding scale   SubCutaneous three times a day before meals  insulin lispro (HumaLOG) corrective regimen sliding scale   SubCutaneous at bedtime  isosorbide   mononitrate ER Tablet (IMDUR) 60 milliGRAM(s) Oral daily  melatonin 3 milliGRAM(s) Oral at bedtime  pantoprazole    Tablet 40 milliGRAM(s) Oral before breakfast    MEDICATIONS  (PRN):  dextrose Gel 1 Dose(s) Oral once PRN Blood Glucose LESS THAN 70 milliGRAM(s)/deciliter  glucagon  Injectable 1 milliGRAM(s) IntraMuscular once PRN Glucose LESS THAN 70 milligrams/deciliter  ondansetron Injectable 4 milliGRAM(s) IV Push every 6 hours PRN Nausea and/or Vomiting  simethicone 80 milliGRAM(s) Chew every 6 hours PRN Gas  zolpidem 5 milliGRAM(s) Oral at bedtime PRN Insomnia      Vital Signs Last 24 Hrs  T(C): 36.8 (23 Jan 2018 13:22), Max: 36.8 (23 Jan 2018 13:22)  T(F): 98.3 (23 Jan 2018 13:22), Max: 98.3 (23 Jan 2018 13:22)  HR: 61 (23 Jan 2018 13:22) (61 - 69)  BP: 133/67 (23 Jan 2018 13:22) (131/69 - 147/72)  BP(mean): --  RR: 18 (23 Jan 2018 13:22) (18 - 19)  SpO2: 93% (23 Jan 2018 13:22) (93% - 94%)  CAPILLARY BLOOD GLUCOSE      POCT Blood Glucose.: 182 mg/dL (23 Jan 2018 13:03)  POCT Blood Glucose.: 161 mg/dL (23 Jan 2018 08:53)  POCT Blood Glucose.: 183 mg/dL (22 Jan 2018 21:05)  POCT Blood Glucose.: 167 mg/dL (22 Jan 2018 17:56)    I&O's Summary    22 Jan 2018 07:01  -  23 Jan 2018 07:00  --------------------------------------------------------  IN: 1220 mL / OUT: 2640 mL / NET: -1420 mL    23 Jan 2018 07:01  -  23 Jan 2018 14:10  --------------------------------------------------------  IN: 540 mL / OUT: 900 mL / NET: -360 mL        PHYSICAL EXAM:  GENERAL: NAD,resting comfortably   HEAD:  left head SQ mass with healing surgical incision, appears intact   EYES: EOMI, PERRLA, conjunctiva and sclera clear  NECK: Supple, No JVD  CHEST/LUNG: Clear to auscultation bilaterally; No wheeze  HEART: Regular rate and rhythm; No murmurs, rubs, or gallops  ABDOMEN: Soft, Nontender, Nondistended; Bowel sounds present  EXTREMITIES:  1+ edema in the LE b/l   PSYCH: AAOx3  NEUROLOGY: non-focal  SKIN: surgical lesion noted in the left upper back         LABS:                        10.6   7.3   )-----------( 292      ( 23 Jan 2018 06:26 )             31.6     01-23    139  |  100  |  30<H>  ----------------------------<  164<H>  3.6   |  28  |  1.59<H>    Ca    8.7      23 Jan 2018 06:26        Consultant(s) Notes Reviewed: Cardiology

## 2018-01-23 NOTE — PROGRESS NOTE ADULT - ATTENDING COMMENTS
57 year old man presented to [facility] with acute systolic heart failure. Transferred for percutaneous coronary intervention on LAD which was accomplished. Echocardiogram demonstrate preserved LV systolic pressure, but severe pulmonary hypertension, signs of RV pressure and volume overload. Continuing efforts to diurese volume overload. Exam is consistent with pulmonary hypertension. Thus, unclear that coronary intervention resolves all issue and need information regarding prior evaluation including right and left heart catheterization and any pulmonary evaluation.

## 2018-01-23 NOTE — PROGRESS NOTE ADULT - PROBLEM SELECTOR PLAN 4
-Creatinine improving with diuresis, component of cardio-renal   -will continue with diuresis  -avoid nephrotoxic meds  -monitor Cr

## 2018-01-23 NOTE — PROGRESS NOTE ADULT - SUBJECTIVE AND OBJECTIVE BOX
Patient seen and examined at bedside.    Overnight Events: KHRIS. Patient feels like LE edema improving. -1.6L overnight.    Review Of Systems:   CONSTITUTIONAL: No weakness, fevers or chills  EYES/ENT: No visual changes;  No dysphagia  RESPIRATORY: No cough, wheezing, hemoptysis;   CARDIOVASCULAR: No chest pain or palpitations; + lower extremity edema  GASTROINTESTINAL: No abdominal or epigastric pain. No nausea, vomiting, or hematemesis; No diarrhea or constipation. No melena or hematochezia.  GENITOURINARY: No dysuria, frequency or hematuria  NEUROLOGICAL: No numbness or weakness  SKIN: No itching, burning, rashes, or lesions           Medications:  aspirin enteric coated 81 milliGRAM(s) Oral daily  atorvastatin 80 milliGRAM(s) Oral at bedtime  carvedilol 25 milliGRAM(s) Oral every 12 hours  clopidogrel Tablet 75 milliGRAM(s) Oral daily  dextrose 5%. 1000 milliLiter(s) IV Continuous <Continuous>  dextrose 50% Injectable 12.5 Gram(s) IV Push once  dextrose 50% Injectable 25 Gram(s) IV Push once  dextrose 50% Injectable 25 Gram(s) IV Push once  dextrose Gel 1 Dose(s) Oral once PRN  docusate sodium 100 milliGRAM(s) Oral two times a day  furosemide   Injectable 60 milliGRAM(s) IV Push every 12 hours  glucagon  Injectable 1 milliGRAM(s) IntraMuscular once PRN  heparin  Injectable 5000 Unit(s) SubCutaneous every 12 hours  hydrALAZINE 100 milliGRAM(s) Oral three times a day  insulin lispro (HumaLOG) corrective regimen sliding scale   SubCutaneous three times a day before meals  insulin lispro (HumaLOG) corrective regimen sliding scale   SubCutaneous at bedtime  isosorbide   mononitrate ER Tablet (IMDUR) 60 milliGRAM(s) Oral daily  melatonin 3 milliGRAM(s) Oral at bedtime  ondansetron Injectable 4 milliGRAM(s) IV Push every 6 hours PRN  pantoprazole  Injectable 40 milliGRAM(s) IV Push daily  simethicone 80 milliGRAM(s) Chew every 6 hours PRN  zolpidem 5 milliGRAM(s) Oral at bedtime PRN      PAST MEDICAL & SURGICAL HISTORY:  Meningioma  Malignant melanoma, unspecified site  Heart failure, unspecified heart failure chronicity, unspecified heart failure type  Type 2 diabetes mellitus with complication, without long-term current use of insulin  Enlarged heart  HTN (hypertension)  Sleep apnea  No significant past surgical history      Vitals:  T(F): 98 (01-23), Max: 98.1 (01-22)  HR: 69 (01-23) (65 - 69)  BP: 147/72 (01-23) (131/69 - 156/79)  RR: 18 (01-23)  SpO2: 94% (01-23)  I&O's Summary    22 Jan 2018 07:01  -  23 Jan 2018 07:00  --------------------------------------------------------  IN: 1220 mL / OUT: 2640 mL / NET: -1420 mL        Physical Exam:  GENERAL: No acute distress, well-developed  HEAD:  Atraumatic, Normocephalic  ENT: EOMI, JVD to base of neck with HJR, moist mucosa  CHEST/LUNG: Clear to auscultation bilaterally; No wheeze, equal breath sounds bilaterally   HEART: Regular rate and rhythm; No murmurs, rubs, or gallops  ABDOMEN: Soft, Nontender, Nondistended; Bowel sounds present  EXTREMITIES:  Trace LE edema  PSYCH: normal behavior and affect  NEUROLOGY: AAOx3, non-focal                          10.6   7.3   )-----------( 292      ( 23 Jan 2018 06:26 )             31.6     01-23    139  |  100  |  30<H>  ----------------------------<  164<H>  3.6   |  28  |  1.59<H>    Ca    8.7      23 Jan 2018 06:26    Interpretation of Telemetry:  SR 60-70s      A/P:  57 year old  man with recently diagnosed systolic HF (11/2017 - EF 40% -> 55-60%),  HTN, DM2. Also recently diagnosed meningioma as well as melanoma of the skin. Presented with HF exacerbation found to have obstructive CAD; s/p LHC CECILIA LAD.    1. CAD  -Cont asa, plavix, lipitor,     2. Acute decompensated heart failure. NYHA IV, ACC/AHA C. Continues to be overloaded.  -Lasix 60mg IV BID for goal of -1L.  -No ACE-i for now with SILKE. Continue Imdur, hydralazine  -Ultimately outpatient follow up with Dr Justin Chen MD  Cardiology Fellow [id number] Patient seen and examined at bedside.    Overnight Events: KHRIS. Patient feels like LE edema improving. -1.6L overnight.    Review Of Systems:   CONSTITUTIONAL: No weakness, fevers or chills  EYES/ENT: No visual changes;  No dysphagia  RESPIRATORY: No cough, wheezing, hemoptysis;   CARDIOVASCULAR: No chest pain or palpitations; + lower extremity edema  GASTROINTESTINAL: No abdominal or epigastric pain. No nausea, vomiting, or hematemesis; No diarrhea or constipation. No melena or hematochezia.  GENITOURINARY: No dysuria, frequency or hematuria  NEUROLOGICAL: No numbness or weakness  SKIN: No itching, burning, rashes, or lesions           Medications:  aspirin enteric coated 81 milliGRAM(s) Oral daily  atorvastatin 80 milliGRAM(s) Oral at bedtime  carvedilol 25 milliGRAM(s) Oral every 12 hours  clopidogrel Tablet 75 milliGRAM(s) Oral daily  dextrose 5%. 1000 milliLiter(s) IV Continuous <Continuous>  dextrose 50% Injectable 12.5 Gram(s) IV Push once  dextrose 50% Injectable 25 Gram(s) IV Push once  dextrose 50% Injectable 25 Gram(s) IV Push once  dextrose Gel 1 Dose(s) Oral once PRN  docusate sodium 100 milliGRAM(s) Oral two times a day  furosemide   Injectable 60 milliGRAM(s) IV Push every 12 hours  glucagon  Injectable 1 milliGRAM(s) IntraMuscular once PRN  heparin  Injectable 5000 Unit(s) SubCutaneous every 12 hours  hydrALAZINE 100 milliGRAM(s) Oral three times a day  insulin lispro (HumaLOG) corrective regimen sliding scale   SubCutaneous three times a day before meals  insulin lispro (HumaLOG) corrective regimen sliding scale   SubCutaneous at bedtime  isosorbide   mononitrate ER Tablet (IMDUR) 60 milliGRAM(s) Oral daily  melatonin 3 milliGRAM(s) Oral at bedtime  ondansetron Injectable 4 milliGRAM(s) IV Push every 6 hours PRN  pantoprazole  Injectable 40 milliGRAM(s) IV Push daily  simethicone 80 milliGRAM(s) Chew every 6 hours PRN  zolpidem 5 milliGRAM(s) Oral at bedtime PRN      PAST MEDICAL & SURGICAL HISTORY:  Meningioma  Malignant melanoma, unspecified site  Heart failure, unspecified heart failure chronicity, unspecified heart failure type  Type 2 diabetes mellitus with complication, without long-term current use of insulin  Enlarged heart  HTN (hypertension)  Sleep apnea  No significant past surgical history      Vitals:  T(F): 98 (01-23), Max: 98.1 (01-22)  HR: 69 (01-23) (65 - 69)  BP: 147/72 (01-23) (131/69 - 156/79)  RR: 18 (01-23)  SpO2: 94% (01-23)  I&O's Summary    22 Jan 2018 07:01  -  23 Jan 2018 07:00  --------------------------------------------------------  IN: 1220 mL / OUT: 2640 mL / NET: -1420 mL        Physical Exam:  GENERAL: No acute distress, well-developed  HEAD:  Atraumatic, Normocephalic  ENT: EOMI, JVD to base of neck with HJR, moist mucosa  CHEST/LUNG: Clear to auscultation bilaterally; No wheeze, equal breath sounds bilaterally   HEART: Regular rate and rhythm; S1 normal S2 increased split with accentuated P2, no murmurs, rubs, or gallops  ABDOMEN: Soft, Nontender, Nondistended; Bowel sounds present  EXTREMITIES:  Trace LE edema  PSYCH: normal behavior and affect  NEUROLOGY: AAOx3, non-focal                          10.6   7.3   )-----------( 292      ( 23 Jan 2018 06:26 )             31.6     01-23    139  |  100  |  30<H>  ----------------------------<  164<H>  3.6   |  28  |  1.59<H>    Ca    8.7      23 Jan 2018 06:26    Interpretation of Telemetry:  SR 60-70s      A/P:  57 year old  man with recently diagnosed systolic HF (11/2017 - EF 40% -> 55-60%),  HTN, DM2. Also recently diagnosed meningioma as well as melanoma of the skin. Presented with HF exacerbation found to have obstructive CAD; s/p LHC CECILIA LAD.    1. CAD  -Cont asa, plavix, lipitor,     2. Acute decompensated heart failure. NYHA IV, ACC/AHA C. Continues to be overloaded.  -Lasix 60mg IV BID for goal of -1L.  -No ACE-i for now with SILKE. Continue Imdur, hydralazine  -Ultimately outpatient follow up with Dr Justin Chen MD  Cardiology Fellow [id number]

## 2018-01-24 DIAGNOSIS — R69 ILLNESS, UNSPECIFIED: ICD-10-CM

## 2018-01-24 LAB
ANION GAP SERPL CALC-SCNC: 11 MMOL/L — SIGNIFICANT CHANGE UP (ref 5–17)
BUN SERPL-MCNC: 31 MG/DL — HIGH (ref 7–23)
CALCIUM SERPL-MCNC: 8.6 MG/DL — SIGNIFICANT CHANGE UP (ref 8.4–10.5)
CHLORIDE SERPL-SCNC: 99 MMOL/L — SIGNIFICANT CHANGE UP (ref 96–108)
CO2 SERPL-SCNC: 29 MMOL/L — SIGNIFICANT CHANGE UP (ref 22–31)
CREAT SERPL-MCNC: 1.6 MG/DL — HIGH (ref 0.5–1.3)
GLUCOSE SERPL-MCNC: 185 MG/DL — HIGH (ref 70–99)
HCT VFR BLD CALC: 30 % — LOW (ref 39–50)
HGB BLD-MCNC: 10.4 G/DL — LOW (ref 13–17)
MCHC RBC-ENTMCNC: 32.9 PG — SIGNIFICANT CHANGE UP (ref 27–34)
MCHC RBC-ENTMCNC: 34.8 GM/DL — SIGNIFICANT CHANGE UP (ref 32–36)
MCV RBC AUTO: 94.5 FL — SIGNIFICANT CHANGE UP (ref 80–100)
PLATELET # BLD AUTO: 295 K/UL — SIGNIFICANT CHANGE UP (ref 150–400)
POTASSIUM SERPL-MCNC: 3.6 MMOL/L — SIGNIFICANT CHANGE UP (ref 3.5–5.3)
POTASSIUM SERPL-SCNC: 3.6 MMOL/L — SIGNIFICANT CHANGE UP (ref 3.5–5.3)
RBC # BLD: 3.18 M/UL — LOW (ref 4.2–5.8)
RBC # FLD: 12.6 % — SIGNIFICANT CHANGE UP (ref 10.3–14.5)
SODIUM SERPL-SCNC: 139 MMOL/L — SIGNIFICANT CHANGE UP (ref 135–145)
WBC # BLD: 7.7 K/UL — SIGNIFICANT CHANGE UP (ref 3.8–10.5)
WBC # FLD AUTO: 7.7 K/UL — SIGNIFICANT CHANGE UP (ref 3.8–10.5)

## 2018-01-24 PROCEDURE — 99233 SBSQ HOSP IP/OBS HIGH 50: CPT

## 2018-01-24 PROCEDURE — 99233 SBSQ HOSP IP/OBS HIGH 50: CPT | Mod: GC

## 2018-01-24 RX ORDER — ACETAMINOPHEN 500 MG
650 TABLET ORAL ONCE
Qty: 0 | Refills: 0 | Status: COMPLETED | OUTPATIENT
Start: 2018-01-24 | End: 2018-01-24

## 2018-01-24 RX ADMIN — Medication 100 MILLIGRAM(S): at 05:21

## 2018-01-24 RX ADMIN — Medication 100 MILLIGRAM(S): at 21:25

## 2018-01-24 RX ADMIN — PANTOPRAZOLE SODIUM 40 MILLIGRAM(S): 20 TABLET, DELAYED RELEASE ORAL at 05:21

## 2018-01-24 RX ADMIN — Medication 100 MILLIGRAM(S): at 17:10

## 2018-01-24 RX ADMIN — Medication 650 MILLIGRAM(S): at 06:54

## 2018-01-24 RX ADMIN — CARVEDILOL PHOSPHATE 25 MILLIGRAM(S): 80 CAPSULE, EXTENDED RELEASE ORAL at 05:22

## 2018-01-24 RX ADMIN — Medication 100 MILLIGRAM(S): at 13:24

## 2018-01-24 RX ADMIN — HEPARIN SODIUM 5000 UNIT(S): 5000 INJECTION INTRAVENOUS; SUBCUTANEOUS at 05:22

## 2018-01-24 RX ADMIN — Medication 60 MILLIGRAM(S): at 05:22

## 2018-01-24 RX ADMIN — Medication 650 MILLIGRAM(S): at 07:42

## 2018-01-24 RX ADMIN — Medication 3 MILLIGRAM(S): at 22:37

## 2018-01-24 RX ADMIN — ISOSORBIDE MONONITRATE 60 MILLIGRAM(S): 60 TABLET, EXTENDED RELEASE ORAL at 11:18

## 2018-01-24 RX ADMIN — HEPARIN SODIUM 5000 UNIT(S): 5000 INJECTION INTRAVENOUS; SUBCUTANEOUS at 17:10

## 2018-01-24 RX ADMIN — SIMETHICONE 80 MILLIGRAM(S): 80 TABLET, CHEWABLE ORAL at 21:26

## 2018-01-24 RX ADMIN — SIMETHICONE 80 MILLIGRAM(S): 80 TABLET, CHEWABLE ORAL at 13:25

## 2018-01-24 RX ADMIN — Medication 3: at 09:02

## 2018-01-24 RX ADMIN — Medication 81 MILLIGRAM(S): at 11:18

## 2018-01-24 RX ADMIN — SIMETHICONE 80 MILLIGRAM(S): 80 TABLET, CHEWABLE ORAL at 05:21

## 2018-01-24 RX ADMIN — ATORVASTATIN CALCIUM 80 MILLIGRAM(S): 80 TABLET, FILM COATED ORAL at 21:26

## 2018-01-24 RX ADMIN — CARVEDILOL PHOSPHATE 25 MILLIGRAM(S): 80 CAPSULE, EXTENDED RELEASE ORAL at 17:08

## 2018-01-24 RX ADMIN — Medication 100 MILLIGRAM(S): at 05:20

## 2018-01-24 RX ADMIN — CLOPIDOGREL BISULFATE 75 MILLIGRAM(S): 75 TABLET, FILM COATED ORAL at 11:18

## 2018-01-24 RX ADMIN — Medication 1: at 13:24

## 2018-01-24 RX ADMIN — Medication 1: at 18:22

## 2018-01-24 RX ADMIN — Medication 60 MILLIGRAM(S): at 17:08

## 2018-01-24 NOTE — PROGRESS NOTE ADULT - SUBJECTIVE AND OBJECTIVE BOX
Patient is a 57y old  Male who presents with a chief complaint of CAD, acute on chronic heart failure (18 Jan 2018 23:26)      SUBJECTIVE / OVERNIGHT EVENTS: no acute events overnight. Patient would like to walk around the unit today. Was up in chair for some time this morning.     MEDICATIONS  (STANDING):  aspirin enteric coated 81 milliGRAM(s) Oral daily  atorvastatin 80 milliGRAM(s) Oral at bedtime  carvedilol 25 milliGRAM(s) Oral every 12 hours  clopidogrel Tablet 75 milliGRAM(s) Oral daily  dextrose 5%. 1000 milliLiter(s) (50 mL/Hr) IV Continuous <Continuous>  dextrose 50% Injectable 12.5 Gram(s) IV Push once  dextrose 50% Injectable 25 Gram(s) IV Push once  dextrose 50% Injectable 25 Gram(s) IV Push once  docusate sodium 100 milliGRAM(s) Oral two times a day  furosemide   Injectable 60 milliGRAM(s) IV Push every 12 hours  heparin  Injectable 5000 Unit(s) SubCutaneous every 12 hours  hydrALAZINE 100 milliGRAM(s) Oral three times a day  insulin lispro (HumaLOG) corrective regimen sliding scale   SubCutaneous three times a day before meals  insulin lispro (HumaLOG) corrective regimen sliding scale   SubCutaneous at bedtime  isosorbide   mononitrate ER Tablet (IMDUR) 60 milliGRAM(s) Oral daily  melatonin 3 milliGRAM(s) Oral at bedtime  pantoprazole    Tablet 40 milliGRAM(s) Oral before breakfast  simethicone 80 milliGRAM(s) Chew three times a day    MEDICATIONS  (PRN):  dextrose Gel 1 Dose(s) Oral once PRN Blood Glucose LESS THAN 70 milliGRAM(s)/deciliter  glucagon  Injectable 1 milliGRAM(s) IntraMuscular once PRN Glucose LESS THAN 70 milligrams/deciliter  ondansetron Injectable 4 milliGRAM(s) IV Push every 6 hours PRN Nausea and/or Vomiting  zolpidem 5 milliGRAM(s) Oral at bedtime PRN Insomnia      Vital Signs Last 24 Hrs  T(C): 37.4 (24 Jan 2018 04:29), Max: 37.4 (24 Jan 2018 04:29)  T(F): 99.4 (24 Jan 2018 04:29), Max: 99.4 (24 Jan 2018 04:29)  HR: 69 (24 Jan 2018 04:29) (61 - 70)  BP: 122/66 (24 Jan 2018 04:29) (122/66 - 135/61)  BP(mean): --  RR: 18 (24 Jan 2018 04:29) (18 - 18)  SpO2: 93% (24 Jan 2018 04:29) (93% - 93%)  CAPILLARY BLOOD GLUCOSE      POCT Blood Glucose.: 255 mg/dL (24 Jan 2018 08:39)  POCT Blood Glucose.: 166 mg/dL (23 Jan 2018 21:23)  POCT Blood Glucose.: 186 mg/dL (23 Jan 2018 17:50)  POCT Blood Glucose.: 182 mg/dL (23 Jan 2018 13:03)    I&O's Summary    23 Jan 2018 07:01  -  24 Jan 2018 07:00  --------------------------------------------------------  IN: 960 mL / OUT: 3175 mL / NET: -2215 mL        PHYSICAL EXAM:  GENERAL: NAD, resting in bed   EYES: EOMI, PERRLA, conjunctiva and sclera clear  NECK: Supple, No JVD  CHEST/LUNG: Clear to auscultation bilaterally; No wheeze  HEART: Regular rate and rhythm; No murmurs, rubs, or gallops  ABDOMEN: Soft, Nontender, Nondistended; Bowel sounds present  EXTREMITIES:  1+ edema in the LE b/l   PSYCH: AAOx3  NEUROLOGY: non-focal  SKIN: surgical lesion noted in the left upper back         LABS:                        10.4   7.7   )-----------( 295      ( 24 Jan 2018 06:48 )             30.0     01-24    139  |  99  |  31<H>  ----------------------------<  185<H>  3.6   |  29  |  1.60<H>    Ca    8.6      24 Jan 2018 06:48              Consultant(s) Notes Reviewed:  Cardiology     Care Discussed with Consultants/Other Providers: Cardio fellow regarding diuresis

## 2018-01-24 NOTE — PROGRESS NOTE ADULT - PROBLEM SELECTOR PLAN 4
-Creatinine improving with diuresis, component of cardio-renal   -will continue with diuresis  -avoid nephrotoxic meds  -monitor Cr, has plateaued today

## 2018-01-24 NOTE — PROGRESS NOTE ADULT - PROBLEM SELECTOR PLAN 2
-continue IV diuresis, lasix 60mg IV BID   -121kg on admission, down to 117 kg today, continues to downtrend   -strict ins and outs, measure daily weights  -spoke with fellow regarding RHC results, was done at [facility]. Per fellow, findings consistent with LV failure and not pulmonary HTN.

## 2018-01-24 NOTE — PROGRESS NOTE ADULT - SUBJECTIVE AND OBJECTIVE BOX
Patient seen and examined at bedside.    Overnight Events: KHRIS. SOB continues to improve.    Review Of Systems:   CONSTITUTIONAL: No weakness, fevers or chills  EYES/ENT: No visual changes;  No dysphagia  RESPIRATORY: No cough, wheezing, hemoptysis  CARDIOVASCULAR: No chest pain or palpitations; + lower extremity edema  GASTROINTESTINAL: No abdominal or epigastric pain. No nausea, vomiting, or hematemesis; No diarrhea or constipation. No melena or hematochezia.  GENITOURINARY: No dysuria, frequency or hematuria  NEUROLOGICAL: No numbness or weakness  SKIN: No itching, burning, rashes, or lesions                 Medications:  aspirin enteric coated 81 milliGRAM(s) Oral daily  atorvastatin 80 milliGRAM(s) Oral at bedtime  carvedilol 25 milliGRAM(s) Oral every 12 hours  clopidogrel Tablet 75 milliGRAM(s) Oral daily  dextrose 5%. 1000 milliLiter(s) IV Continuous <Continuous>  dextrose 50% Injectable 12.5 Gram(s) IV Push once  dextrose 50% Injectable 25 Gram(s) IV Push once  dextrose 50% Injectable 25 Gram(s) IV Push once  dextrose Gel 1 Dose(s) Oral once PRN  docusate sodium 100 milliGRAM(s) Oral two times a day  furosemide   Injectable 60 milliGRAM(s) IV Push every 12 hours  glucagon  Injectable 1 milliGRAM(s) IntraMuscular once PRN  heparin  Injectable 5000 Unit(s) SubCutaneous every 12 hours  hydrALAZINE 100 milliGRAM(s) Oral three times a day  insulin lispro (HumaLOG) corrective regimen sliding scale   SubCutaneous three times a day before meals  insulin lispro (HumaLOG) corrective regimen sliding scale   SubCutaneous at bedtime  isosorbide   mononitrate ER Tablet (IMDUR) 60 milliGRAM(s) Oral daily  melatonin 3 milliGRAM(s) Oral at bedtime  ondansetron Injectable 4 milliGRAM(s) IV Push every 6 hours PRN  pantoprazole    Tablet 40 milliGRAM(s) Oral before breakfast  simethicone 80 milliGRAM(s) Chew three times a day  zolpidem 5 milliGRAM(s) Oral at bedtime PRN      PAST MEDICAL & SURGICAL HISTORY:  Meningioma  Malignant melanoma, unspecified site  Heart failure, unspecified heart failure chronicity, unspecified heart failure type  Type 2 diabetes mellitus with complication, without long-term current use of insulin  Enlarged heart  HTN (hypertension)  Sleep apnea  No significant past surgical history      Vitals:  T(F): 99.4 (01-24), Max: 99.4 (01-24)  HR: 69 (01-24) (61 - 70)  BP: 122/66 (01-24) (122/66 - 135/61)  RR: 18 (01-24)  SpO2: 93% (01-24)  I&O's Summary    23 Jan 2018 07:01  -  24 Jan 2018 07:00  --------------------------------------------------------  IN: 960 mL / OUT: 3175 mL / NET: -2215 mL        Physical Exam:  GENERAL: No acute distress, well-developed  HEAD:  Atraumatic, Normocephalic  ENT: EOMI, JVD to base of neck with HJR, moist mucosa  CHEST/LUNG: Clear to auscultation bilaterally; No wheeze, equal breath sounds bilaterally   HEART: Regular rate and rhythm; S1 normal S2 increased split with accentuated P2, no murmurs, rubs, or gallops  ABDOMEN: Soft, Nontender, Nondistended; Bowel sounds present  EXTREMITIES:  Trace LE edema  PSYCH: normal behavior and affect  NEUROLOGY: AAOx3, non-focal                          10.4   7.7   )-----------( 295      ( 24 Jan 2018 06:48 )             30.0     01-24    139  |  99  |  31<H>  ----------------------------<  185<H>  3.6   |  29  |  1.60<H>    Ca    8.6      24 Jan 2018 06:48    Interpretation of Telemetry:  SR 60-70s      A/P:  57 year old  man with recently diagnosed systolic HF (11/2017 - EF 40% -> 55-60%),  HTN, DM2. Also recently diagnosed meningioma as well as melanoma of the skin. Presented with HF exacerbation found to have obstructive CAD; s/p LHC CECILIA LAD.    1. CAD  -Cont asa, plavix, lipitor,     2. Acute decompensated heart failure. NYHA IV, ACC/AHA C. Continues to be overloaded.  -Cont lasix 60mg IV BID for goal of -1L.  -No ACE-i for now with SILKE. Continue Imdur, hydralazine  -Will get in touch with Dr Gavin motta prior work up    Jeremy Chen MD  Cardiology Fellow [id number]

## 2018-01-24 NOTE — PROGRESS NOTE ADULT - ATTENDING COMMENTS
57 year old man presented to [facility] with acute systolic heart failure. Transferred for percutaneous coronary intervention on LAD which was accomplished. Echocardiogram demonstrate preserved LV systolic function, but severe pulmonary hypertension, signs of RV pressure and volume overload. Continuing efforts to diurese volume overload. Exam is consistent with pulmonary hypertension. Thus, unclear that coronary intervention resolves all issues and need information regarding prior evaluation including right and left heart catheterization and any pulmonary evaluation.

## 2018-01-24 NOTE — PROGRESS NOTE ADULT - PROBLEM SELECTOR PLAN 1
-S/p BMS to LAD.   -C/w ASA and plavix.   -C/w atorvastatin.   -C/w carvedilol.   -No ACEi in view of SILKE.  -continue tele monitoring

## 2018-01-24 NOTE — PROGRESS NOTE ADULT - PROBLEM SELECTOR PLAN 10
-Patient waiting for results of extent of melanoma on left upper back.   -Outpatient follow up.  -Outpatient f/u with rad onc for head tumor     11. Prophylactic measure: -Heparin SQ twice daily for DVT PPx. Ambulate as tolerated.     12. Dispo: -PT leslie RODAS. Plan to DC to [facility] once able.

## 2018-01-25 LAB
ANION GAP SERPL CALC-SCNC: 12 MMOL/L — SIGNIFICANT CHANGE UP (ref 5–17)
BUN SERPL-MCNC: 32 MG/DL — HIGH (ref 7–23)
CALCIUM SERPL-MCNC: 8.9 MG/DL — SIGNIFICANT CHANGE UP (ref 8.4–10.5)
CHLORIDE SERPL-SCNC: 98 MMOL/L — SIGNIFICANT CHANGE UP (ref 96–108)
CO2 SERPL-SCNC: 27 MMOL/L — SIGNIFICANT CHANGE UP (ref 22–31)
CREAT SERPL-MCNC: 1.53 MG/DL — HIGH (ref 0.5–1.3)
GLUCOSE SERPL-MCNC: 156 MG/DL — HIGH (ref 70–99)
HCT VFR BLD CALC: 32.6 % — LOW (ref 39–50)
HGB BLD-MCNC: 11.2 G/DL — LOW (ref 13–17)
MCHC RBC-ENTMCNC: 32.8 PG — SIGNIFICANT CHANGE UP (ref 27–34)
MCHC RBC-ENTMCNC: 34.5 GM/DL — SIGNIFICANT CHANGE UP (ref 32–36)
MCV RBC AUTO: 95.1 FL — SIGNIFICANT CHANGE UP (ref 80–100)
PLATELET # BLD AUTO: 324 K/UL — SIGNIFICANT CHANGE UP (ref 150–400)
POTASSIUM SERPL-MCNC: 3.4 MMOL/L — LOW (ref 3.5–5.3)
POTASSIUM SERPL-SCNC: 3.4 MMOL/L — LOW (ref 3.5–5.3)
RBC # BLD: 3.42 M/UL — LOW (ref 4.2–5.8)
RBC # FLD: 12.7 % — SIGNIFICANT CHANGE UP (ref 10.3–14.5)
SODIUM SERPL-SCNC: 137 MMOL/L — SIGNIFICANT CHANGE UP (ref 135–145)
WBC # BLD: 7.6 K/UL — SIGNIFICANT CHANGE UP (ref 3.8–10.5)
WBC # FLD AUTO: 7.6 K/UL — SIGNIFICANT CHANGE UP (ref 3.8–10.5)

## 2018-01-25 PROCEDURE — 99233 SBSQ HOSP IP/OBS HIGH 50: CPT | Mod: GC

## 2018-01-25 PROCEDURE — 99233 SBSQ HOSP IP/OBS HIGH 50: CPT

## 2018-01-25 RX ADMIN — Medication 3 MILLIGRAM(S): at 21:46

## 2018-01-25 RX ADMIN — HEPARIN SODIUM 5000 UNIT(S): 5000 INJECTION INTRAVENOUS; SUBCUTANEOUS at 06:13

## 2018-01-25 RX ADMIN — CARVEDILOL PHOSPHATE 25 MILLIGRAM(S): 80 CAPSULE, EXTENDED RELEASE ORAL at 06:12

## 2018-01-25 RX ADMIN — SIMETHICONE 80 MILLIGRAM(S): 80 TABLET, CHEWABLE ORAL at 13:07

## 2018-01-25 RX ADMIN — HEPARIN SODIUM 5000 UNIT(S): 5000 INJECTION INTRAVENOUS; SUBCUTANEOUS at 17:01

## 2018-01-25 RX ADMIN — Medication 1: at 18:23

## 2018-01-25 RX ADMIN — Medication 100 MILLIGRAM(S): at 06:12

## 2018-01-25 RX ADMIN — CLOPIDOGREL BISULFATE 75 MILLIGRAM(S): 75 TABLET, FILM COATED ORAL at 11:41

## 2018-01-25 RX ADMIN — Medication 100 MILLIGRAM(S): at 21:46

## 2018-01-25 RX ADMIN — ATORVASTATIN CALCIUM 80 MILLIGRAM(S): 80 TABLET, FILM COATED ORAL at 21:46

## 2018-01-25 RX ADMIN — SIMETHICONE 80 MILLIGRAM(S): 80 TABLET, CHEWABLE ORAL at 06:12

## 2018-01-25 RX ADMIN — CARVEDILOL PHOSPHATE 25 MILLIGRAM(S): 80 CAPSULE, EXTENDED RELEASE ORAL at 17:01

## 2018-01-25 RX ADMIN — Medication 100 MILLIGRAM(S): at 13:07

## 2018-01-25 RX ADMIN — Medication 2: at 13:08

## 2018-01-25 RX ADMIN — Medication 81 MILLIGRAM(S): at 11:41

## 2018-01-25 RX ADMIN — PANTOPRAZOLE SODIUM 40 MILLIGRAM(S): 20 TABLET, DELAYED RELEASE ORAL at 06:12

## 2018-01-25 RX ADMIN — Medication 1: at 09:31

## 2018-01-25 RX ADMIN — Medication 60 MILLIGRAM(S): at 06:13

## 2018-01-25 RX ADMIN — Medication 100 MILLIGRAM(S): at 17:01

## 2018-01-25 RX ADMIN — SIMETHICONE 80 MILLIGRAM(S): 80 TABLET, CHEWABLE ORAL at 21:46

## 2018-01-25 RX ADMIN — Medication 60 MILLIGRAM(S): at 17:01

## 2018-01-25 RX ADMIN — ISOSORBIDE MONONITRATE 60 MILLIGRAM(S): 60 TABLET, EXTENDED RELEASE ORAL at 11:41

## 2018-01-25 NOTE — PROGRESS NOTE ADULT - ATTENDING COMMENTS
Attempted to remove sutures at biopsy site, sutures appear deep and able to remove only one. Will need to follow up with surgeon once d/srini for complete removal.

## 2018-01-25 NOTE — PROGRESS NOTE ADULT - PROBLEM SELECTOR PLAN 4
-Appears resolved.   -Zofran as needed.   -Simethicone BID for gas  -encouraged ambulation as tolerated   -C/w PPI daily while on DAPT.

## 2018-01-25 NOTE — PROGRESS NOTE ADULT - ATTENDING COMMENTS
57 year old man presented to [facility] with acute systolic heart failure. Transferred for percutaneous coronary intervention on LAD which was accomplished. Echocardiogram demonstrate preserved LV systolic function, but severe pulmonary hypertension, signs of RV pressure and volume overload. Continuing efforts to diurese volume overload. Exam is consistent with pulmonary hypertension. Obtained results of right and left heart catheterization from prior hospital and has elevated left heart filling pressures. Implication is pulmonary hypertension secondary to left heart failure. Thus optimize volume status and management of left ventricular dysfunction. Patient beginning to ambulate with physical therapy. Discharge planning may require subacute Rehab.

## 2018-01-25 NOTE — PROGRESS NOTE ADULT - SUBJECTIVE AND OBJECTIVE BOX
Patient is a 57y old  Male who presents with a chief complaint of CAD, acute on chronic heart failure (18 Jan 2018 23:26)      SUBJECTIVE / OVERNIGHT EVENTS: no acute events overnight. Patient reports itching on his right upper back where his sutures are from the melanoma removal.     MEDICATIONS  (STANDING):  aspirin enteric coated 81 milliGRAM(s) Oral daily  atorvastatin 80 milliGRAM(s) Oral at bedtime  carvedilol 25 milliGRAM(s) Oral every 12 hours  clopidogrel Tablet 75 milliGRAM(s) Oral daily  dextrose 5%. 1000 milliLiter(s) (50 mL/Hr) IV Continuous <Continuous>  dextrose 50% Injectable 12.5 Gram(s) IV Push once  dextrose 50% Injectable 25 Gram(s) IV Push once  dextrose 50% Injectable 25 Gram(s) IV Push once  docusate sodium 100 milliGRAM(s) Oral two times a day  furosemide   Injectable 60 milliGRAM(s) IV Push every 12 hours  heparin  Injectable 5000 Unit(s) SubCutaneous every 12 hours  hydrALAZINE 100 milliGRAM(s) Oral three times a day  insulin lispro (HumaLOG) corrective regimen sliding scale   SubCutaneous three times a day before meals  insulin lispro (HumaLOG) corrective regimen sliding scale   SubCutaneous at bedtime  isosorbide   mononitrate ER Tablet (IMDUR) 60 milliGRAM(s) Oral daily  melatonin 3 milliGRAM(s) Oral at bedtime  pantoprazole    Tablet 40 milliGRAM(s) Oral before breakfast  simethicone 80 milliGRAM(s) Chew three times a day    MEDICATIONS  (PRN):  dextrose Gel 1 Dose(s) Oral once PRN Blood Glucose LESS THAN 70 milliGRAM(s)/deciliter  glucagon  Injectable 1 milliGRAM(s) IntraMuscular once PRN Glucose LESS THAN 70 milligrams/deciliter  ondansetron Injectable 4 milliGRAM(s) IV Push every 6 hours PRN Nausea and/or Vomiting  zolpidem 5 milliGRAM(s) Oral at bedtime PRN Insomnia      Vital Signs Last 24 Hrs  T(C): 36.9 (25 Jan 2018 04:20), Max: 37.4 (24 Jan 2018 20:58)  T(F): 98.4 (25 Jan 2018 04:20), Max: 99.3 (24 Jan 2018 20:58)  HR: 67 (25 Jan 2018 11:39) (66 - 75)  BP: 130/72 (25 Jan 2018 11:39) (127/61 - 158/79)  BP(mean): --  RR: 18 (25 Jan 2018 04:20) (17 - 18)  SpO2: 93% (25 Jan 2018 04:20) (93% - 95%)  CAPILLARY BLOOD GLUCOSE      POCT Blood Glucose.: 183 mg/dL (25 Jan 2018 08:43)  POCT Blood Glucose.: 160 mg/dL (24 Jan 2018 21:13)  POCT Blood Glucose.: 152 mg/dL (24 Jan 2018 17:54)  POCT Blood Glucose.: 191 mg/dL (24 Jan 2018 12:58)    I&O's Summary    24 Jan 2018 07:01  -  25 Jan 2018 07:00  --------------------------------------------------------  IN: 1230 mL / OUT: 2100 mL / NET: -870 mL    25 Jan 2018 07:01  -  25 Jan 2018 12:49  --------------------------------------------------------  IN: 360 mL / OUT: 900 mL / NET: -540 mL        PHYSICAL EXAM:  GENERAL: NAD, resting comfortably   HEAD:  Atraumatic, Normocephalic  EYES: EOMI, PERRLA, conjunctiva and sclera clear  NECK: Supple, No JVD  CHEST/LUNG: Clear to auscultation bilaterally; No wheeze  HEART: Regular rate and rhythm; No murmurs  ABDOMEN: Soft, Nontender, Nondistended; Bowel sounds present  EXTREMITIES:  +1 edema in LE b/l  PSYCH: AAOx3  NEUROLOGY: non-focal  SKIN: sutures in place in left upper back    LABS:                        11.2   7.6   )-----------( 324      ( 25 Jan 2018 07:05 )             32.6     01-25    137  |  98  |  32<H>  ----------------------------<  156<H>  3.4<L>   |  27  |  1.53<H>    Ca    8.9      25 Jan 2018 07:05            Consultant(s) Notes Reviewed:  Cardiology

## 2018-01-25 NOTE — PROGRESS NOTE ADULT - SUBJECTIVE AND OBJECTIVE BOX
Patient seen and examined at bedside.    Overnight Events: Patient breathing well on RA. No CP.    Review Of Systems:   CONSTITUTIONAL: No weakness, fevers or chills  EYES/ENT: No visual changes;  No dysphagia  RESPIRATORY: No cough, wheezing, hemoptysis  CARDIOVASCULAR: No chest pain or palpitations; + lower extremity edema  GASTROINTESTINAL: No abdominal or epigastric pain. No nausea, vomiting, or hematemesis; No diarrhea or constipation. No melena or hematochezia.  GENITOURINARY: No dysuria, frequency or hematuria  NEUROLOGICAL: No numbness or weakness  SKIN: No itching, burning, rashes, or lesions                Medications:  aspirin enteric coated 81 milliGRAM(s) Oral daily  atorvastatin 80 milliGRAM(s) Oral at bedtime  carvedilol 25 milliGRAM(s) Oral every 12 hours  clopidogrel Tablet 75 milliGRAM(s) Oral daily  dextrose 5%. 1000 milliLiter(s) IV Continuous <Continuous>  dextrose 50% Injectable 12.5 Gram(s) IV Push once  dextrose 50% Injectable 25 Gram(s) IV Push once  dextrose 50% Injectable 25 Gram(s) IV Push once  dextrose Gel 1 Dose(s) Oral once PRN  docusate sodium 100 milliGRAM(s) Oral two times a day  furosemide   Injectable 60 milliGRAM(s) IV Push every 12 hours  glucagon  Injectable 1 milliGRAM(s) IntraMuscular once PRN  heparin  Injectable 5000 Unit(s) SubCutaneous every 12 hours  hydrALAZINE 100 milliGRAM(s) Oral three times a day  insulin lispro (HumaLOG) corrective regimen sliding scale   SubCutaneous three times a day before meals  insulin lispro (HumaLOG) corrective regimen sliding scale   SubCutaneous at bedtime  isosorbide   mononitrate ER Tablet (IMDUR) 60 milliGRAM(s) Oral daily  melatonin 3 milliGRAM(s) Oral at bedtime  ondansetron Injectable 4 milliGRAM(s) IV Push every 6 hours PRN  pantoprazole    Tablet 40 milliGRAM(s) Oral before breakfast  simethicone 80 milliGRAM(s) Chew three times a day  zolpidem 5 milliGRAM(s) Oral at bedtime PRN      PAST MEDICAL & SURGICAL HISTORY:  Meningioma  Malignant melanoma, unspecified site  Heart failure, unspecified heart failure chronicity, unspecified heart failure type  Type 2 diabetes mellitus with complication, without long-term current use of insulin  Enlarged heart  HTN (hypertension)  Sleep apnea  No significant past surgical history      Vitals:  T(F): 98.4 (01-25), Max: 99.3 (01-24)  HR: 71 (01-25) (66 - 75)  BP: 142/77 (01-25) (127/61 - 158/79)  RR: 18 (01-25)  SpO2: 93% (01-25)  I&O's Summary    24 Jan 2018 07:01  -  25 Jan 2018 07:00  --------------------------------------------------------  IN: 1230 mL / OUT: 2100 mL / NET: -870 mL        Physical Exam:  GENERAL: No acute distress, well-developed  HEAD:  Atraumatic, Normocephalic  ENT: EOMI, no JVD, moist mucosa  CHEST/LUNG: Clear to auscultation bilaterally; No wheeze, equal breath sounds bilaterally   HEART: Regular rate and rhythm; S1 normal S2 increased split with accentuated P2, no murmurs, rubs, or gallops  ABDOMEN: Soft, Nontender, Nondistended; Bowel sounds present  EXTREMITIES:  No LE edema  PSYCH: normal behavior and affect  NEUROLOGY: AAOx3, non-focal                          10.4   7.7   )-----------( 295      ( 24 Jan 2018 06:48 )             30.0     01-24    139  |  99  |  31<H>  ----------------------------<  185<H>  3.6   |  29  |  1.60<H>    Ca    8.6      24 Jan 2018 06:48    Interpretation of Telemetry:  SR 70-80s      A/P:  57 year old  man with recently diagnosed systolic HF (11/2017 - EF 40% -> 55-60%),  HTN, DM2. Also recently diagnosed meningioma as well as melanoma of the skin. Presented with HF exacerbation found to have obstructive CAD; s/p LHC CECILIA LAD.    1. CAD  -Cont asa, plavix, lipitor,     2. Acute decompensated heart failure. NYHA IV, ACC/AHA C. Continues to be overloaded.  -Transition to PO lasix 80 mg BID for goal net even.  -No ACE-i for now with SILKE. Continue Imdur, hydralazine, BB  -Per RHC with Dr Alonso, PH likely 2/2 LV overload.    Jeremy Chen MD  Cardiology Fellow [id number] Patient seen and examined at bedside.    Overnight Events: Patient breathing well on RA. No CP.    Review Of Systems:   CONSTITUTIONAL: No weakness, fevers or chills  EYES/ENT: No visual changes;  No dysphagia  RESPIRATORY: No cough, wheezing, hemoptysis  CARDIOVASCULAR: No chest pain or palpitations; + lower extremity edema  GASTROINTESTINAL: No abdominal or epigastric pain. No nausea, vomiting, or hematemesis; No diarrhea or constipation. No melena or hematochezia.  GENITOURINARY: No dysuria, frequency or hematuria  NEUROLOGICAL: No numbness or weakness  SKIN: No itching, burning, rashes, or lesions                Medications:  aspirin enteric coated 81 milliGRAM(s) Oral daily  atorvastatin 80 milliGRAM(s) Oral at bedtime  carvedilol 25 milliGRAM(s) Oral every 12 hours  clopidogrel Tablet 75 milliGRAM(s) Oral daily  dextrose 5%. 1000 milliLiter(s) IV Continuous <Continuous>  dextrose 50% Injectable 12.5 Gram(s) IV Push once  dextrose 50% Injectable 25 Gram(s) IV Push once  dextrose 50% Injectable 25 Gram(s) IV Push once  dextrose Gel 1 Dose(s) Oral once PRN  docusate sodium 100 milliGRAM(s) Oral two times a day  furosemide   Injectable 60 milliGRAM(s) IV Push every 12 hours  glucagon  Injectable 1 milliGRAM(s) IntraMuscular once PRN  heparin  Injectable 5000 Unit(s) SubCutaneous every 12 hours  hydrALAZINE 100 milliGRAM(s) Oral three times a day  insulin lispro (HumaLOG) corrective regimen sliding scale   SubCutaneous three times a day before meals  insulin lispro (HumaLOG) corrective regimen sliding scale   SubCutaneous at bedtime  isosorbide   mononitrate ER Tablet (IMDUR) 60 milliGRAM(s) Oral daily  melatonin 3 milliGRAM(s) Oral at bedtime  ondansetron Injectable 4 milliGRAM(s) IV Push every 6 hours PRN  pantoprazole    Tablet 40 milliGRAM(s) Oral before breakfast  simethicone 80 milliGRAM(s) Chew three times a day  zolpidem 5 milliGRAM(s) Oral at bedtime PRN      PAST MEDICAL & SURGICAL HISTORY:  Meningioma  Malignant melanoma, unspecified site  Heart failure, unspecified heart failure chronicity, unspecified heart failure type  Type 2 diabetes mellitus with complication, without long-term current use of insulin  Enlarged heart  HTN (hypertension)  Sleep apnea  No significant past surgical history      Vitals:  T(F): 98.4 (01-25), Max: 99.3 (01-24)  HR: 71 (01-25) (66 - 75)  BP: 142/77 (01-25) (127/61 - 158/79)  RR: 18 (01-25)  SpO2: 93% (01-25)  I&O's Summary    24 Jan 2018 07:01  -  25 Jan 2018 07:00  --------------------------------------------------------  IN: 1230 mL / OUT: 2100 mL / NET: -870 mL        Physical Exam:  GENERAL: No acute distress, well-developed  HEAD:  Atraumatic, Normocephalic  ENT: EOMI, JVD to mid neck, moist mucosa  CHEST/LUNG: Clear to auscultation bilaterally; No wheeze, equal breath sounds bilaterally   HEART: Regular rate and rhythm; S1 normal S2 increased split with accentuated P2, no murmurs, rubs, or gallops  ABDOMEN: Soft, Distended, Nontender; Bowel sounds present  EXTREMITIES:  Trace LE edema  PSYCH: normal behavior and affect  NEUROLOGY: AAOx3, non-focal                          10.4   7.7   )-----------( 295      ( 24 Jan 2018 06:48 )             30.0     01-24    139  |  99  |  31<H>  ----------------------------<  185<H>  3.6   |  29  |  1.60<H>    Ca    8.6      24 Jan 2018 06:48    Interpretation of Telemetry:  SR 70-80s      A/P:  57 year old  man with recently diagnosed systolic HF (11/2017 - EF 40% -> 55-60%),  HTN, DM2. Also recently diagnosed meningioma as well as melanoma of the skin. Presented with HF exacerbation found to have obstructive CAD; s/p LHC CECILIA LAD.    1. CAD  -Cont asa, plavix, lipitor,     2. Acute decompensated heart failure. NYHA IV, ACC/AHA C. Continues to be overloaded.  -Cont IV lasix 60 mg BID for goal net neg 1-2L/24 hours as patient is still volume overloaded.  -No ACE-i for now with SILKE. Continue Imdur, hydralazine, BB  -Per RHC with Dr Alonso, PH likely 2/2 LV overload.    Jeremy Chen MD  Cardiology Fellow [id number]

## 2018-01-25 NOTE — PROGRESS NOTE ADULT - PROBLEM SELECTOR PLAN 1
-patient is diuresing well, will continue with lasix 60mg IV BID  -hold ACEI due to elevated creatinine  -monitor ins and outs and daily weights

## 2018-01-26 DIAGNOSIS — H61.22 IMPACTED CERUMEN, LEFT EAR: ICD-10-CM

## 2018-01-26 LAB
ANION GAP SERPL CALC-SCNC: 12 MMOL/L — SIGNIFICANT CHANGE UP (ref 5–17)
BUN SERPL-MCNC: 32 MG/DL — HIGH (ref 7–23)
CALCIUM SERPL-MCNC: 8.8 MG/DL — SIGNIFICANT CHANGE UP (ref 8.4–10.5)
CHLORIDE SERPL-SCNC: 100 MMOL/L — SIGNIFICANT CHANGE UP (ref 96–108)
CO2 SERPL-SCNC: 28 MMOL/L — SIGNIFICANT CHANGE UP (ref 22–31)
CREAT SERPL-MCNC: 1.48 MG/DL — HIGH (ref 0.5–1.3)
GLUCOSE SERPL-MCNC: 156 MG/DL — HIGH (ref 70–99)
HCT VFR BLD CALC: 32.8 % — LOW (ref 39–50)
HGB BLD-MCNC: 11.2 G/DL — LOW (ref 13–17)
MCHC RBC-ENTMCNC: 32.4 PG — SIGNIFICANT CHANGE UP (ref 27–34)
MCHC RBC-ENTMCNC: 34.1 GM/DL — SIGNIFICANT CHANGE UP (ref 32–36)
MCV RBC AUTO: 94.9 FL — SIGNIFICANT CHANGE UP (ref 80–100)
PLATELET # BLD AUTO: 326 K/UL — SIGNIFICANT CHANGE UP (ref 150–400)
POTASSIUM SERPL-MCNC: 3.4 MMOL/L — LOW (ref 3.5–5.3)
POTASSIUM SERPL-SCNC: 3.4 MMOL/L — LOW (ref 3.5–5.3)
RBC # BLD: 3.46 M/UL — LOW (ref 4.2–5.8)
RBC # FLD: 12.7 % — SIGNIFICANT CHANGE UP (ref 10.3–14.5)
SODIUM SERPL-SCNC: 140 MMOL/L — SIGNIFICANT CHANGE UP (ref 135–145)
WBC # BLD: 8.1 K/UL — SIGNIFICANT CHANGE UP (ref 3.8–10.5)
WBC # FLD AUTO: 8.1 K/UL — SIGNIFICANT CHANGE UP (ref 3.8–10.5)

## 2018-01-26 PROCEDURE — 99222 1ST HOSP IP/OBS MODERATE 55: CPT | Mod: 25

## 2018-01-26 PROCEDURE — 99233 SBSQ HOSP IP/OBS HIGH 50: CPT

## 2018-01-26 PROCEDURE — 69210 REMOVE IMPACTED EAR WAX UNI: CPT

## 2018-01-26 PROCEDURE — 99233 SBSQ HOSP IP/OBS HIGH 50: CPT | Mod: GC

## 2018-01-26 RX ORDER — POTASSIUM CHLORIDE 20 MEQ
20 PACKET (EA) ORAL ONCE
Qty: 0 | Refills: 0 | Status: COMPLETED | OUTPATIENT
Start: 2018-01-26 | End: 2018-01-26

## 2018-01-26 RX ORDER — ACETAMINOPHEN 500 MG
650 TABLET ORAL EVERY 6 HOURS
Qty: 0 | Refills: 0 | Status: DISCONTINUED | OUTPATIENT
Start: 2018-01-26 | End: 2018-02-02

## 2018-01-26 RX ORDER — CARBAMIDE PEROXIDE 81.86 MG/ML
5 SOLUTION/ DROPS AURICULAR (OTIC)
Qty: 0 | Refills: 0 | Status: DISCONTINUED | OUTPATIENT
Start: 2018-01-26 | End: 2018-02-02

## 2018-01-26 RX ORDER — POTASSIUM CHLORIDE 20 MEQ
10 PACKET (EA) ORAL ONCE
Qty: 0 | Refills: 0 | Status: DISCONTINUED | OUTPATIENT
Start: 2018-01-26 | End: 2018-01-26

## 2018-01-26 RX ADMIN — PANTOPRAZOLE SODIUM 40 MILLIGRAM(S): 20 TABLET, DELAYED RELEASE ORAL at 06:09

## 2018-01-26 RX ADMIN — Medication 100 MILLIGRAM(S): at 21:34

## 2018-01-26 RX ADMIN — CARVEDILOL PHOSPHATE 25 MILLIGRAM(S): 80 CAPSULE, EXTENDED RELEASE ORAL at 17:44

## 2018-01-26 RX ADMIN — HEPARIN SODIUM 5000 UNIT(S): 5000 INJECTION INTRAVENOUS; SUBCUTANEOUS at 05:44

## 2018-01-26 RX ADMIN — HEPARIN SODIUM 5000 UNIT(S): 5000 INJECTION INTRAVENOUS; SUBCUTANEOUS at 17:44

## 2018-01-26 RX ADMIN — SIMETHICONE 80 MILLIGRAM(S): 80 TABLET, CHEWABLE ORAL at 05:44

## 2018-01-26 RX ADMIN — ATORVASTATIN CALCIUM 80 MILLIGRAM(S): 80 TABLET, FILM COATED ORAL at 21:34

## 2018-01-26 RX ADMIN — ZOLPIDEM TARTRATE 5 MILLIGRAM(S): 10 TABLET ORAL at 00:07

## 2018-01-26 RX ADMIN — Medication 100 MILLIGRAM(S): at 05:44

## 2018-01-26 RX ADMIN — ISOSORBIDE MONONITRATE 60 MILLIGRAM(S): 60 TABLET, EXTENDED RELEASE ORAL at 09:41

## 2018-01-26 RX ADMIN — Medication 20 MILLIEQUIVALENT(S): at 17:44

## 2018-01-26 RX ADMIN — Medication 3 MILLIGRAM(S): at 21:49

## 2018-01-26 RX ADMIN — Medication 100 MILLIGRAM(S): at 17:44

## 2018-01-26 RX ADMIN — SIMETHICONE 80 MILLIGRAM(S): 80 TABLET, CHEWABLE ORAL at 21:34

## 2018-01-26 RX ADMIN — SIMETHICONE 80 MILLIGRAM(S): 80 TABLET, CHEWABLE ORAL at 13:38

## 2018-01-26 RX ADMIN — Medication 60 MILLIGRAM(S): at 05:44

## 2018-01-26 RX ADMIN — CLOPIDOGREL BISULFATE 75 MILLIGRAM(S): 75 TABLET, FILM COATED ORAL at 09:41

## 2018-01-26 RX ADMIN — Medication 81 MILLIGRAM(S): at 09:41

## 2018-01-26 RX ADMIN — CARBAMIDE PEROXIDE 5 DROP(S): 81.86 SOLUTION/ DROPS AURICULAR (OTIC) at 21:35

## 2018-01-26 RX ADMIN — Medication 650 MILLIGRAM(S): at 06:30

## 2018-01-26 RX ADMIN — Medication 2: at 13:37

## 2018-01-26 RX ADMIN — Medication 650 MILLIGRAM(S): at 06:08

## 2018-01-26 RX ADMIN — Medication 100 MILLIGRAM(S): at 13:38

## 2018-01-26 RX ADMIN — Medication 1: at 09:41

## 2018-01-26 RX ADMIN — Medication 60 MILLIGRAM(S): at 17:44

## 2018-01-26 RX ADMIN — Medication 1: at 18:25

## 2018-01-26 RX ADMIN — CARVEDILOL PHOSPHATE 25 MILLIGRAM(S): 80 CAPSULE, EXTENDED RELEASE ORAL at 05:44

## 2018-01-26 NOTE — PROGRESS NOTE ADULT - ATTENDING COMMENTS
#Left ear fullness: same side as ?sarcoma. Reports fullness and decreased hearing. Will consult ENT. No drainage or signs of otitis.

## 2018-01-26 NOTE — CONSULT NOTE ADULT - ASSESSMENT
57y with PMHx of left sided brain tumor with collapsed left ear canal and cerumen impaction, 70% removed. 57y with PMHx of left sided hearing loss and brain tumor with collapsed left ear canal and cerumen impaction, 70% removed. pt felt some mild improvement, will see if can get back all of hearing with compete removal

## 2018-01-26 NOTE — PROGRESS NOTE ADULT - PROBLEM SELECTOR PLAN 4
-Zofran as needed.   -Simethicone BID for gas  -encouraged ambulation as tolerated   -C/w PPI daily while on DAPT.

## 2018-01-26 NOTE — PROGRESS NOTE ADULT - SUBJECTIVE AND OBJECTIVE BOX
Patient is a 57y old  Male who presents with a chief complaint of CAD, acute on chronic heart failure (18 Jan 2018 23:26)      SUBJECTIVE / OVERNIGHT EVENTS: no acute events overnight. Patient complains today of fullness in his left ear. Denies any pain, drainage, tenderness. Otherwise feeling well today.     MEDICATIONS  (STANDING):  aspirin enteric coated 81 milliGRAM(s) Oral daily  atorvastatin 80 milliGRAM(s) Oral at bedtime  carvedilol 25 milliGRAM(s) Oral every 12 hours  clopidogrel Tablet 75 milliGRAM(s) Oral daily  dextrose 5%. 1000 milliLiter(s) (50 mL/Hr) IV Continuous <Continuous>  dextrose 50% Injectable 12.5 Gram(s) IV Push once  dextrose 50% Injectable 25 Gram(s) IV Push once  dextrose 50% Injectable 25 Gram(s) IV Push once  docusate sodium 100 milliGRAM(s) Oral two times a day  furosemide   Injectable 60 milliGRAM(s) IV Push every 12 hours  heparin  Injectable 5000 Unit(s) SubCutaneous every 12 hours  hydrALAZINE 100 milliGRAM(s) Oral three times a day  insulin lispro (HumaLOG) corrective regimen sliding scale   SubCutaneous three times a day before meals  insulin lispro (HumaLOG) corrective regimen sliding scale   SubCutaneous at bedtime  isosorbide   mononitrate ER Tablet (IMDUR) 60 milliGRAM(s) Oral daily  melatonin 3 milliGRAM(s) Oral at bedtime  pantoprazole    Tablet 40 milliGRAM(s) Oral before breakfast  potassium chloride    Tablet ER 10 milliEquivalent(s) Oral once  simethicone 80 milliGRAM(s) Chew three times a day    MEDICATIONS  (PRN):  acetaminophen   Tablet. 650 milliGRAM(s) Oral every 6 hours PRN Moderate Pain (4 - 6)  dextrose Gel 1 Dose(s) Oral once PRN Blood Glucose LESS THAN 70 milliGRAM(s)/deciliter  glucagon  Injectable 1 milliGRAM(s) IntraMuscular once PRN Glucose LESS THAN 70 milligrams/deciliter  ondansetron Injectable 4 milliGRAM(s) IV Push every 6 hours PRN Nausea and/or Vomiting      Vital Signs Last 24 Hrs  T(C): 37.1 (26 Jan 2018 12:13), Max: 37.3 (25 Jan 2018 20:52)  T(F): 98.8 (26 Jan 2018 12:13), Max: 99.1 (25 Jan 2018 20:52)  HR: 70 (26 Jan 2018 12:13) (63 - 70)  BP: 137/68 (26 Jan 2018 12:13) (119/61 - 144/69)  BP(mean): --  RR: 16 (26 Jan 2018 12:13) (16 - 18)  SpO2: 96% (26 Jan 2018 12:13) (93% - 96%)  CAPILLARY BLOOD GLUCOSE      POCT Blood Glucose.: 167 mg/dL (26 Jan 2018 08:52)  POCT Blood Glucose.: 168 mg/dL (25 Jan 2018 21:17)  POCT Blood Glucose.: 176 mg/dL (25 Jan 2018 18:02)    I&O's Summary    25 Jan 2018 07:01  -  26 Jan 2018 07:00  --------------------------------------------------------  IN: 1430 mL / OUT: 2100 mL / NET: -670 mL    26 Jan 2018 07:01  -  26 Jan 2018 13:08  --------------------------------------------------------  IN: 300 mL / OUT: 900 mL / NET: -600 mL        PHYSICAL EXAM:  GENERAL: NAD, resting comfortably   HEAD:  Atraumatic, Normocephalic, soft tissue mass on left scalp  EYES: EOMI, PERRLA, conjunctiva and sclera clear  NECK: Supple, No JVD  CHEST/LUNG: Clear to auscultation bilaterally; No wheeze  HEART: Regular rate and rhythm; No murmurs, rubs, or gallops  ABDOMEN: Soft, Nontender, Nondistended; Bowel sounds present  EXTREMITIES:  2+ Peripheral Pulses, No clubbing, cyanosis, or edema  PSYCH: AAOx3  NEUROLOGY: non-focal  SKIN: No rashes or lesions    LABS:                        11.2   8.1   )-----------( 326      ( 26 Jan 2018 06:52 )             32.8     01-26    140  |  100  |  32<H>  ----------------------------<  156<H>  3.4<L>   |  28  |  1.48<H>    Ca    8.8      26 Jan 2018 06:52              Consultant(s) Notes Reviewed:  Cardiology

## 2018-01-26 NOTE — PROGRESS NOTE ADULT - ATTENDING COMMENTS
57 year old man presented to [facility] with acute systolic heart failure. Transferred for percutaneous coronary intervention on LAD which was accomplished. Echocardiogram demonstrate preserved LV systolic function, but severe pulmonary hypertension, signs of RV pressure and volume overload. Continuing efforts to diurese volume overload with gradual improvement. Exam is consistent with pulmonary hypertension. Renal function parameters gradually improving as continue aggressive diuresis. Obtained results of right and left heart catheterization from prior hospital and has elevated left heart filling pressures. Implication is pulmonary hypertension secondary to left heart failure. Thus optimize volume status and management of left ventricular dysfunction. Patient beginning to ambulate with physical therapy. Discharge planning may require subacute Rehab.

## 2018-01-26 NOTE — CHART NOTE - NSCHARTNOTEFT_GEN_A_CORE
Called by RN for patient with 9 beats of WCT on tele monitor.  Patient asymptotic - denies CP and palpitations.  Serum potassium 3.4 (Creatinine 1.48).  Will give 20mEq oral potassium, repeat lytes in the morning, and continue to monitor on tele.

## 2018-01-26 NOTE — CONSULT NOTE ADULT - PROBLEM SELECTOR RECOMMENDATION 9
Diabetes Education and Nutrition Eval  Goal glucose 100-180 in-patient  Currently at goal only on correction scale  Continue with correction scale for now  Add basal bolus if glucose persistently > 180  Outpt. endo follow-up  Outpt. optho, podiatry, nephrology  Plan to d/c on metformin depending on creatinine and EF on Echo. Can do extended release metformin for improved adherence.
- debrox 5 drops in left ear bid   - will follow up to remove.

## 2018-01-26 NOTE — PROGRESS NOTE ADULT - SUBJECTIVE AND OBJECTIVE BOX
Patient seen and examined at bedside.    Overnight Events: KHRIS. Continues to endorse abdominal fullness.    Review Of Systems:   CONSTITUTIONAL: No weakness, fevers or chills  EYES/ENT: No visual changes;  No dysphagia  RESPIRATORY: No cough, wheezing, hemoptysis  CARDIOVASCULAR: No chest pain or palpitations; + lower extremity edema  GASTROINTESTINAL: + abdominal fullness. No nausea, vomiting, or hematemesis; No diarrhea or constipation. No melena or hematochezia.  GENITOURINARY: No dysuria, frequency or hematuria  NEUROLOGICAL: No numbness or weakness  SKIN: No itching, burning, rashes, or lesions                    Medications:  acetaminophen   Tablet. 650 milliGRAM(s) Oral every 6 hours PRN  aspirin enteric coated 81 milliGRAM(s) Oral daily  atorvastatin 80 milliGRAM(s) Oral at bedtime  carvedilol 25 milliGRAM(s) Oral every 12 hours  clopidogrel Tablet 75 milliGRAM(s) Oral daily  dextrose 5%. 1000 milliLiter(s) IV Continuous <Continuous>  dextrose 50% Injectable 12.5 Gram(s) IV Push once  dextrose 50% Injectable 25 Gram(s) IV Push once  dextrose 50% Injectable 25 Gram(s) IV Push once  dextrose Gel 1 Dose(s) Oral once PRN  docusate sodium 100 milliGRAM(s) Oral two times a day  furosemide   Injectable 60 milliGRAM(s) IV Push every 12 hours  glucagon  Injectable 1 milliGRAM(s) IntraMuscular once PRN  heparin  Injectable 5000 Unit(s) SubCutaneous every 12 hours  hydrALAZINE 100 milliGRAM(s) Oral three times a day  insulin lispro (HumaLOG) corrective regimen sliding scale   SubCutaneous three times a day before meals  insulin lispro (HumaLOG) corrective regimen sliding scale   SubCutaneous at bedtime  isosorbide   mononitrate ER Tablet (IMDUR) 60 milliGRAM(s) Oral daily  melatonin 3 milliGRAM(s) Oral at bedtime  ondansetron Injectable 4 milliGRAM(s) IV Push every 6 hours PRN  pantoprazole    Tablet 40 milliGRAM(s) Oral before breakfast  simethicone 80 milliGRAM(s) Chew three times a day      PAST MEDICAL & SURGICAL HISTORY:  Meningioma  Malignant melanoma, unspecified site  Heart failure, unspecified heart failure chronicity, unspecified heart failure type  Type 2 diabetes mellitus with complication, without long-term current use of insulin  Enlarged heart  HTN (hypertension)  Sleep apnea  No significant past surgical history      Vitals:  T(F): 99.1 (01-26), Max: 99.1 (01-25)  HR: 67 (01-26) (67 - 70)  BP: 144/69 (01-26) (119/61 - 144/69)  RR: 16 (01-26)  SpO2: 94% (01-26)  I&O's Summary    24 Jan 2018 07:01  -  25 Jan 2018 07:00  --------------------------------------------------------  IN: 1230 mL / OUT: 2100 mL / NET: -870 mL    25 Jan 2018 07:01  -  26 Jan 2018 06:30  --------------------------------------------------------  IN: 1310 mL / OUT: 2100 mL / NET: -790 mL        Physical Exam:  GENERAL: No acute distress, well-developed  HEAD:  Atraumatic, Normocephalic  ENT: EOMI, JVD to mid neck, moist mucosa  CHEST/LUNG: Clear to auscultation bilaterally; No wheeze, equal breath sounds bilaterally   HEART: Regular rate and rhythm; S1 normal S2 increased split with accentuated P2, no murmurs, rubs, or gallops  ABDOMEN: Soft, Distended, Nontender; Bowel sounds present  EXTREMITIES:  Trace LE edema  PSYCH: normal behavior and affect  NEUROLOGY: AAOx3, non-focal                          11.2   7.6   )-----------( 324      ( 25 Jan 2018 07:05 )             32.6     01-25    137  |  98  |  32<H>  ----------------------------<  156<H>  3.4<L>   |  27  |  1.53<H>    Ca    8.9      25 Jan 2018 07:05    Interpretation of Telemetry:  SR 60-80s      A/P:  57 year old  man with recently diagnosed systolic HF (11/2017 - EF 40% -> 55-60%),  HTN, DM2. Also recently diagnosed meningioma as well as melanoma of the skin. Presented with HF exacerbation found to have obstructive CAD; s/p LHC CECILIA LAD.    1. CAD  -Cont asa, plavix, lipitor,     2. Acute decompensated heart failure. NYHA IV, ACC/AHA C. Continues to be overloaded.  -Cont IV lasix 60 mg BID for goal net neg 1-2L/24 hours as patient is still volume overloaded.  -No ACE-i for now with SILKE. Continue Imdur, hydralazine, BB  -Per [facility] with Dr Alonso, PH likely 2/2 LV overload.    Jeremy Chen MD  Cardiology Fellow [id number] Patient seen and examined at bedside.    Overnight Events: KHRIS. Continues to endorse abdominal fullness. Ambulated short distance in room without dyspnea. Slept well overnight without orthopnea or PND.    Review Of Systems:   CONSTITUTIONAL: No weakness, fevers or chills  EYES/ENT: No visual changes;  No dysphagia  RESPIRATORY: No cough, wheezing, hemoptysis  CARDIOVASCULAR: No chest pain or palpitations; + lower extremity edema  GASTROINTESTINAL: + abdominal fullness. No nausea, vomiting, or hematemesis; No diarrhea or constipation. No melena or hematochezia.  GENITOURINARY: No dysuria, frequency or hematuria  NEUROLOGICAL: No numbness or weakness  SKIN: No itching, burning, rashes, or lesions                    Medications:  acetaminophen   Tablet. 650 milliGRAM(s) Oral every 6 hours PRN  aspirin enteric coated 81 milliGRAM(s) Oral daily  atorvastatin 80 milliGRAM(s) Oral at bedtime  carvedilol 25 milliGRAM(s) Oral every 12 hours  clopidogrel Tablet 75 milliGRAM(s) Oral daily  dextrose 5%. 1000 milliLiter(s) IV Continuous <Continuous>  dextrose 50% Injectable 12.5 Gram(s) IV Push once  dextrose 50% Injectable 25 Gram(s) IV Push once  dextrose 50% Injectable 25 Gram(s) IV Push once  dextrose Gel 1 Dose(s) Oral once PRN  docusate sodium 100 milliGRAM(s) Oral two times a day  furosemide   Injectable 60 milliGRAM(s) IV Push every 12 hours  glucagon  Injectable 1 milliGRAM(s) IntraMuscular once PRN  heparin  Injectable 5000 Unit(s) SubCutaneous every 12 hours  hydrALAZINE 100 milliGRAM(s) Oral three times a day  insulin lispro (HumaLOG) corrective regimen sliding scale   SubCutaneous three times a day before meals  insulin lispro (HumaLOG) corrective regimen sliding scale   SubCutaneous at bedtime  isosorbide   mononitrate ER Tablet (IMDUR) 60 milliGRAM(s) Oral daily  melatonin 3 milliGRAM(s) Oral at bedtime  ondansetron Injectable 4 milliGRAM(s) IV Push every 6 hours PRN  pantoprazole    Tablet 40 milliGRAM(s) Oral before breakfast  simethicone 80 milliGRAM(s) Chew three times a day      PAST MEDICAL & SURGICAL HISTORY:  Meningioma  Malignant melanoma, unspecified site  Heart failure, unspecified heart failure chronicity, unspecified heart failure type  Type 2 diabetes mellitus with complication, without long-term current use of insulin  Enlarged heart  HTN (hypertension)  Sleep apnea  No significant past surgical history      Vitals:  T(F): 99.1 (01-26), Max: 99.1 (01-25)  HR: 67 (01-26) (67 - 70)  BP: 144/69 (01-26) (119/61 - 144/69)  RR: 16 (01-26)  SpO2: 94% (01-26)  I&O's Summary    24 Jan 2018 07:01  -  25 Jan 2018 07:00  --------------------------------------------------------  IN: 1230 mL / OUT: 2100 mL / NET: -870 mL    25 Jan 2018 07:01  -  26 Jan 2018 06:30  --------------------------------------------------------  IN: 1310 mL / OUT: 2100 mL / NET: -790 mL        Physical Exam:  GENERAL: No acute distress, well-developed  HEAD:  Atraumatic, Normocephalic  ENT: EOMI, JVD to mid neck, moist mucosa  CHEST/LUNG: Clear to auscultation bilaterally; No wheeze, equal breath sounds bilaterally   HEART: Regular rate and rhythm; S1 normal S2 increased split with accentuated P2, no murmurs, rubs, or gallops  ABDOMEN: Soft, Distended, Nontender; Bowel sounds present  EXTREMITIES:  Trace LE edema  PSYCH: normal behavior and affect  NEUROLOGY: AAOx3, non-focal                          11.2   7.6   )-----------( 324      ( 25 Jan 2018 07:05 )             32.6     01-25    137  |  98  |  32<H>  ----------------------------<  156<H>  3.4<L>   |  27  |  1.53<H>    Ca    8.9      25 Jan 2018 07:05    Interpretation of Telemetry:  SR 60-80s      A/P:  57 year old  man with recently diagnosed systolic HF (11/2017 - EF 40% -> 55-60%),  HTN, DM2. Also recently diagnosed meningioma as well as melanoma of the skin. Presented with HF exacerbation found to have obstructive CAD; s/p LHC CECILIA LAD.    1. CAD  -Cont asa, plavix, lipitor,     2. Acute decompensated heart failure. NYHA IV, ACC/AHA C. Continues to be overloaded.  -Cont IV lasix 60 mg BID for goal net neg 1-2L/24 hours as patient is still volume overloaded.  -No ACE-i for now with SILKE. Continue Imdur, hydralazine, BB  -Per [facility] with Dr Alonso, PH likely 2/2 LV overload.    Jeremy Chen MD  Cardiology Fellow [id number]

## 2018-01-26 NOTE — PROGRESS NOTE ADULT - PROBLEM SELECTOR PLAN 1
-creatinine continues to improve, continue with lasix IV 60mg BID   -continue with diuresis   -weight continues to downtrend   -monitor ins and outs and daily weights

## 2018-01-26 NOTE — CONSULT NOTE ADULT - PROBLEM SELECTOR PROBLEM 1
Type 2 diabetes mellitus with complication, without long-term current use of insulin
Impacted cerumen of left ear

## 2018-01-27 LAB
ALBUMIN SERPL ELPH-MCNC: 2.9 G/DL — LOW (ref 3.3–5)
ALP SERPL-CCNC: 96 U/L — SIGNIFICANT CHANGE UP (ref 40–120)
ALT FLD-CCNC: 9 U/L RC — LOW (ref 10–45)
ANION GAP SERPL CALC-SCNC: 12 MMOL/L — SIGNIFICANT CHANGE UP (ref 5–17)
AST SERPL-CCNC: 12 U/L — SIGNIFICANT CHANGE UP (ref 10–40)
BILIRUB SERPL-MCNC: 0.7 MG/DL — SIGNIFICANT CHANGE UP (ref 0.2–1.2)
BUN SERPL-MCNC: 32 MG/DL — HIGH (ref 7–23)
CALCIUM SERPL-MCNC: 8.8 MG/DL — SIGNIFICANT CHANGE UP (ref 8.4–10.5)
CHLORIDE SERPL-SCNC: 98 MMOL/L — SIGNIFICANT CHANGE UP (ref 96–108)
CO2 SERPL-SCNC: 28 MMOL/L — SIGNIFICANT CHANGE UP (ref 22–31)
CREAT SERPL-MCNC: 1.47 MG/DL — HIGH (ref 0.5–1.3)
GLUCOSE SERPL-MCNC: 153 MG/DL — HIGH (ref 70–99)
HCT VFR BLD CALC: 32.2 % — LOW (ref 39–50)
HGB BLD-MCNC: 10.8 G/DL — LOW (ref 13–17)
MCHC RBC-ENTMCNC: 31.7 PG — SIGNIFICANT CHANGE UP (ref 27–34)
MCHC RBC-ENTMCNC: 33.6 GM/DL — SIGNIFICANT CHANGE UP (ref 32–36)
MCV RBC AUTO: 94.4 FL — SIGNIFICANT CHANGE UP (ref 80–100)
PLATELET # BLD AUTO: 316 K/UL — SIGNIFICANT CHANGE UP (ref 150–400)
POTASSIUM SERPL-MCNC: 3.3 MMOL/L — LOW (ref 3.5–5.3)
POTASSIUM SERPL-SCNC: 3.3 MMOL/L — LOW (ref 3.5–5.3)
PROT SERPL-MCNC: 6.8 G/DL — SIGNIFICANT CHANGE UP (ref 6–8.3)
RBC # BLD: 3.41 M/UL — LOW (ref 4.2–5.8)
RBC # FLD: 12.6 % — SIGNIFICANT CHANGE UP (ref 10.3–14.5)
SODIUM SERPL-SCNC: 138 MMOL/L — SIGNIFICANT CHANGE UP (ref 135–145)
WBC # BLD: 7.3 K/UL — SIGNIFICANT CHANGE UP (ref 3.8–10.5)
WBC # FLD AUTO: 7.3 K/UL — SIGNIFICANT CHANGE UP (ref 3.8–10.5)

## 2018-01-27 PROCEDURE — 99233 SBSQ HOSP IP/OBS HIGH 50: CPT

## 2018-01-27 RX ORDER — POTASSIUM CHLORIDE 20 MEQ
40 PACKET (EA) ORAL ONCE
Qty: 0 | Refills: 0 | Status: COMPLETED | OUTPATIENT
Start: 2018-01-27 | End: 2018-01-27

## 2018-01-27 RX ADMIN — Medication 100 MILLIGRAM(S): at 05:19

## 2018-01-27 RX ADMIN — CARVEDILOL PHOSPHATE 25 MILLIGRAM(S): 80 CAPSULE, EXTENDED RELEASE ORAL at 05:19

## 2018-01-27 RX ADMIN — Medication 60 MILLIGRAM(S): at 05:19

## 2018-01-27 RX ADMIN — Medication 81 MILLIGRAM(S): at 09:39

## 2018-01-27 RX ADMIN — SIMETHICONE 80 MILLIGRAM(S): 80 TABLET, CHEWABLE ORAL at 13:29

## 2018-01-27 RX ADMIN — Medication 100 MILLIGRAM(S): at 13:29

## 2018-01-27 RX ADMIN — HEPARIN SODIUM 5000 UNIT(S): 5000 INJECTION INTRAVENOUS; SUBCUTANEOUS at 17:04

## 2018-01-27 RX ADMIN — Medication 100 MILLIGRAM(S): at 17:06

## 2018-01-27 RX ADMIN — ATORVASTATIN CALCIUM 80 MILLIGRAM(S): 80 TABLET, FILM COATED ORAL at 21:38

## 2018-01-27 RX ADMIN — Medication 650 MILLIGRAM(S): at 21:38

## 2018-01-27 RX ADMIN — Medication 2: at 18:27

## 2018-01-27 RX ADMIN — CARBAMIDE PEROXIDE 5 DROP(S): 81.86 SOLUTION/ DROPS AURICULAR (OTIC) at 05:20

## 2018-01-27 RX ADMIN — HEPARIN SODIUM 5000 UNIT(S): 5000 INJECTION INTRAVENOUS; SUBCUTANEOUS at 05:19

## 2018-01-27 RX ADMIN — Medication 60 MILLIGRAM(S): at 17:01

## 2018-01-27 RX ADMIN — Medication 650 MILLIGRAM(S): at 22:15

## 2018-01-27 RX ADMIN — Medication 100 MILLIGRAM(S): at 21:38

## 2018-01-27 RX ADMIN — Medication 3 MILLIGRAM(S): at 21:37

## 2018-01-27 RX ADMIN — CARBAMIDE PEROXIDE 5 DROP(S): 81.86 SOLUTION/ DROPS AURICULAR (OTIC) at 17:01

## 2018-01-27 RX ADMIN — ISOSORBIDE MONONITRATE 60 MILLIGRAM(S): 60 TABLET, EXTENDED RELEASE ORAL at 13:29

## 2018-01-27 RX ADMIN — PANTOPRAZOLE SODIUM 40 MILLIGRAM(S): 20 TABLET, DELAYED RELEASE ORAL at 06:22

## 2018-01-27 RX ADMIN — Medication 2: at 13:28

## 2018-01-27 RX ADMIN — CLOPIDOGREL BISULFATE 75 MILLIGRAM(S): 75 TABLET, FILM COATED ORAL at 09:39

## 2018-01-27 RX ADMIN — Medication 40 MILLIEQUIVALENT(S): at 13:50

## 2018-01-27 RX ADMIN — CARVEDILOL PHOSPHATE 25 MILLIGRAM(S): 80 CAPSULE, EXTENDED RELEASE ORAL at 17:06

## 2018-01-27 RX ADMIN — SIMETHICONE 80 MILLIGRAM(S): 80 TABLET, CHEWABLE ORAL at 21:38

## 2018-01-27 RX ADMIN — SIMETHICONE 80 MILLIGRAM(S): 80 TABLET, CHEWABLE ORAL at 05:19

## 2018-01-27 NOTE — PROGRESS NOTE ADULT - PROBLEM SELECTOR PLAN 1
-continue diuresis with lasix 60mg IV BID   -monitor ins and outs and daily weights  -creatinine improving

## 2018-01-27 NOTE — PROGRESS NOTE ADULT - SUBJECTIVE AND OBJECTIVE BOX
Patient is a 57y old  Male who presents with a chief complaint of CAD, acute on chronic heart failure (18 Jan 2018 23:26)    SUBJECTIVE / OVERNIGHT EVENTS: no acute events overnight. Patient feels well today. Ear feels better after cerumen was removed.     MEDICATIONS  (STANDING):  aspirin enteric coated 81 milliGRAM(s) Oral daily  atorvastatin 80 milliGRAM(s) Oral at bedtime  carbamide peroxide Otic Solution 5 Drop(s) Left Ear two times a day  carvedilol 25 milliGRAM(s) Oral every 12 hours  clopidogrel Tablet 75 milliGRAM(s) Oral daily  dextrose 5%. 1000 milliLiter(s) (50 mL/Hr) IV Continuous <Continuous>  dextrose 50% Injectable 12.5 Gram(s) IV Push once  dextrose 50% Injectable 25 Gram(s) IV Push once  dextrose 50% Injectable 25 Gram(s) IV Push once  docusate sodium 100 milliGRAM(s) Oral two times a day  furosemide   Injectable 60 milliGRAM(s) IV Push every 12 hours  heparin  Injectable 5000 Unit(s) SubCutaneous every 12 hours  hydrALAZINE 100 milliGRAM(s) Oral three times a day  insulin lispro (HumaLOG) corrective regimen sliding scale   SubCutaneous three times a day before meals  insulin lispro (HumaLOG) corrective regimen sliding scale   SubCutaneous at bedtime  isosorbide   mononitrate ER Tablet (IMDUR) 60 milliGRAM(s) Oral daily  melatonin 3 milliGRAM(s) Oral at bedtime  pantoprazole    Tablet 40 milliGRAM(s) Oral before breakfast  simethicone 80 milliGRAM(s) Chew three times a day    MEDICATIONS  (PRN):  acetaminophen   Tablet. 650 milliGRAM(s) Oral every 6 hours PRN Moderate Pain (4 - 6)  dextrose Gel 1 Dose(s) Oral once PRN Blood Glucose LESS THAN 70 milliGRAM(s)/deciliter  glucagon  Injectable 1 milliGRAM(s) IntraMuscular once PRN Glucose LESS THAN 70 milligrams/deciliter  ondansetron Injectable 4 milliGRAM(s) IV Push every 6 hours PRN Nausea and/or Vomiting      Vital Signs Last 24 Hrs  T(C): 36.7 (27 Jan 2018 13:11), Max: 36.8 (26 Jan 2018 20:55)  T(F): 98 (27 Jan 2018 13:11), Max: 98.3 (26 Jan 2018 20:55)  HR: 69 (27 Jan 2018 13:11) (67 - 76)  BP: 153/77 (27 Jan 2018 13:11) (147/77 - 153/77)  BP(mean): --  RR: 18 (27 Jan 2018 13:11) (18 - 18)  SpO2: 97% (27 Jan 2018 13:11) (95% - 97%)  CAPILLARY BLOOD GLUCOSE      POCT Blood Glucose.: 202 mg/dL (27 Jan 2018 13:21)  POCT Blood Glucose.: 147 mg/dL (27 Jan 2018 09:15)  POCT Blood Glucose.: 168 mg/dL (26 Jan 2018 21:53)  POCT Blood Glucose.: 194 mg/dL (26 Jan 2018 17:58)    I&O's Summary    26 Jan 2018 07:01  -  27 Jan 2018 07:00  --------------------------------------------------------  IN: 1010 mL / OUT: 2725 mL / NET: -1715 mL    27 Jan 2018 07:01  -  27 Jan 2018 13:26  --------------------------------------------------------  IN: 540 mL / OUT: 900 mL / NET: -360 mL        PHYSICAL EXAM:  GENERAL: NAD  HEAD:  Atraumatic, Normocephalic, soft tissue mass on left scalp  EYES: EOMI, PERRLA, conjunctiva and sclera clear  NECK: Supple, No JVD  CHEST/LUNG: Clear to auscultation bilaterally; No wheeze  HEART: Regular rate and rhythm; No murmurs, rubs, or gallops  ABDOMEN: Soft, Nontender, Nondistended; Bowel sounds present  EXTREMITIES:  2+ Peripheral Pulses, Trace edema in the LE b/l   PSYCH: AAOx3  NEUROLOGY: non-focal  SKIN: No rashes or lesions        LABS:                        10.8   7.3   )-----------( 316      ( 27 Jan 2018 06:40 )             32.2     01-27    138  |  98  |  32<H>  ----------------------------<  153<H>  3.3<L>   |  28  |  1.47<H>    Ca    8.8      27 Jan 2018 06:40    TPro  6.8  /  Alb  2.9<L>  /  TBili  0.7  /  DBili  x   /  AST  12  /  ALT  9<L>  /  AlkPhos  96  01-27

## 2018-01-28 LAB
ANION GAP SERPL CALC-SCNC: 10 MMOL/L — SIGNIFICANT CHANGE UP (ref 5–17)
BUN SERPL-MCNC: 34 MG/DL — HIGH (ref 7–23)
CALCIUM SERPL-MCNC: 8.9 MG/DL — SIGNIFICANT CHANGE UP (ref 8.4–10.5)
CHLORIDE SERPL-SCNC: 101 MMOL/L — SIGNIFICANT CHANGE UP (ref 96–108)
CO2 SERPL-SCNC: 29 MMOL/L — SIGNIFICANT CHANGE UP (ref 22–31)
CREAT SERPL-MCNC: 1.49 MG/DL — HIGH (ref 0.5–1.3)
GLUCOSE SERPL-MCNC: 172 MG/DL — HIGH (ref 70–99)
MAGNESIUM SERPL-MCNC: 2.1 MG/DL — SIGNIFICANT CHANGE UP (ref 1.6–2.6)
POTASSIUM SERPL-MCNC: 3.5 MMOL/L — SIGNIFICANT CHANGE UP (ref 3.5–5.3)
POTASSIUM SERPL-SCNC: 3.5 MMOL/L — SIGNIFICANT CHANGE UP (ref 3.5–5.3)
SODIUM SERPL-SCNC: 140 MMOL/L — SIGNIFICANT CHANGE UP (ref 135–145)

## 2018-01-28 PROCEDURE — 99233 SBSQ HOSP IP/OBS HIGH 50: CPT

## 2018-01-28 RX ADMIN — HEPARIN SODIUM 5000 UNIT(S): 5000 INJECTION INTRAVENOUS; SUBCUTANEOUS at 05:26

## 2018-01-28 RX ADMIN — Medication 100 MILLIGRAM(S): at 12:07

## 2018-01-28 RX ADMIN — Medication 100 MILLIGRAM(S): at 05:26

## 2018-01-28 RX ADMIN — Medication 100 MILLIGRAM(S): at 05:27

## 2018-01-28 RX ADMIN — Medication 650 MILLIGRAM(S): at 22:05

## 2018-01-28 RX ADMIN — Medication 650 MILLIGRAM(S): at 22:36

## 2018-01-28 RX ADMIN — CARVEDILOL PHOSPHATE 25 MILLIGRAM(S): 80 CAPSULE, EXTENDED RELEASE ORAL at 05:26

## 2018-01-28 RX ADMIN — Medication 60 MILLIGRAM(S): at 17:02

## 2018-01-28 RX ADMIN — Medication 60 MILLIGRAM(S): at 05:25

## 2018-01-28 RX ADMIN — HEPARIN SODIUM 5000 UNIT(S): 5000 INJECTION INTRAVENOUS; SUBCUTANEOUS at 17:02

## 2018-01-28 RX ADMIN — Medication 1: at 09:20

## 2018-01-28 RX ADMIN — Medication 3 MILLIGRAM(S): at 22:04

## 2018-01-28 RX ADMIN — ATORVASTATIN CALCIUM 80 MILLIGRAM(S): 80 TABLET, FILM COATED ORAL at 22:04

## 2018-01-28 RX ADMIN — Medication 2: at 18:29

## 2018-01-28 RX ADMIN — SIMETHICONE 80 MILLIGRAM(S): 80 TABLET, CHEWABLE ORAL at 05:27

## 2018-01-28 RX ADMIN — Medication 100 MILLIGRAM(S): at 22:04

## 2018-01-28 RX ADMIN — PANTOPRAZOLE SODIUM 40 MILLIGRAM(S): 20 TABLET, DELAYED RELEASE ORAL at 05:26

## 2018-01-28 RX ADMIN — SIMETHICONE 80 MILLIGRAM(S): 80 TABLET, CHEWABLE ORAL at 22:04

## 2018-01-28 RX ADMIN — SIMETHICONE 80 MILLIGRAM(S): 80 TABLET, CHEWABLE ORAL at 12:07

## 2018-01-28 RX ADMIN — Medication 2: at 13:14

## 2018-01-28 RX ADMIN — Medication 100 MILLIGRAM(S): at 17:02

## 2018-01-28 RX ADMIN — CARVEDILOL PHOSPHATE 25 MILLIGRAM(S): 80 CAPSULE, EXTENDED RELEASE ORAL at 17:02

## 2018-01-28 RX ADMIN — Medication 650 MILLIGRAM(S): at 05:27

## 2018-01-28 RX ADMIN — CLOPIDOGREL BISULFATE 75 MILLIGRAM(S): 75 TABLET, FILM COATED ORAL at 12:07

## 2018-01-28 RX ADMIN — ISOSORBIDE MONONITRATE 60 MILLIGRAM(S): 60 TABLET, EXTENDED RELEASE ORAL at 12:07

## 2018-01-28 RX ADMIN — Medication 81 MILLIGRAM(S): at 12:07

## 2018-01-28 RX ADMIN — Medication 650 MILLIGRAM(S): at 06:15

## 2018-01-28 RX ADMIN — CARBAMIDE PEROXIDE 5 DROP(S): 81.86 SOLUTION/ DROPS AURICULAR (OTIC) at 17:03

## 2018-01-28 RX ADMIN — CARBAMIDE PEROXIDE 5 DROP(S): 81.86 SOLUTION/ DROPS AURICULAR (OTIC) at 05:28

## 2018-01-28 NOTE — PROGRESS NOTE ADULT - ATTENDING COMMENTS
Patient seen and examined; agree with Cardiology Fellow assessment and plan.  --Patient remains volume overloaded; agree with IV lasix to keep O>I.  --Strict Is and Os and daily weights.  --Holding ACE-I until Cr stabilizes; on isosorbide and hydralazine for now for afterload reduction.  --Will follow.

## 2018-01-28 NOTE — PROGRESS NOTE ADULT - SUBJECTIVE AND OBJECTIVE BOX
Patient is a 57y old  Male who presents with a chief complaint of CAD, acute on chronic heart failure (18 Jan 2018 23:26)      SUBJECTIVE / OVERNIGHT EVENTS: no acute events overnight. Patient feels well this morning.     MEDICATIONS  (STANDING):  aspirin enteric coated 81 milliGRAM(s) Oral daily  atorvastatin 80 milliGRAM(s) Oral at bedtime  carbamide peroxide Otic Solution 5 Drop(s) Left Ear two times a day  carvedilol 25 milliGRAM(s) Oral every 12 hours  clopidogrel Tablet 75 milliGRAM(s) Oral daily  dextrose 5%. 1000 milliLiter(s) (50 mL/Hr) IV Continuous <Continuous>  dextrose 50% Injectable 12.5 Gram(s) IV Push once  dextrose 50% Injectable 25 Gram(s) IV Push once  dextrose 50% Injectable 25 Gram(s) IV Push once  docusate sodium 100 milliGRAM(s) Oral two times a day  furosemide   Injectable 60 milliGRAM(s) IV Push every 12 hours  heparin  Injectable 5000 Unit(s) SubCutaneous every 12 hours  hydrALAZINE 100 milliGRAM(s) Oral three times a day  insulin lispro (HumaLOG) corrective regimen sliding scale   SubCutaneous three times a day before meals  insulin lispro (HumaLOG) corrective regimen sliding scale   SubCutaneous at bedtime  isosorbide   mononitrate ER Tablet (IMDUR) 60 milliGRAM(s) Oral daily  melatonin 3 milliGRAM(s) Oral at bedtime  pantoprazole    Tablet 40 milliGRAM(s) Oral before breakfast  simethicone 80 milliGRAM(s) Chew three times a day    MEDICATIONS  (PRN):  acetaminophen   Tablet. 650 milliGRAM(s) Oral every 6 hours PRN Moderate Pain (4 - 6)  dextrose Gel 1 Dose(s) Oral once PRN Blood Glucose LESS THAN 70 milliGRAM(s)/deciliter  glucagon  Injectable 1 milliGRAM(s) IntraMuscular once PRN Glucose LESS THAN 70 milligrams/deciliter  ondansetron Injectable 4 milliGRAM(s) IV Push every 6 hours PRN Nausea and/or Vomiting      Vital Signs Last 24 Hrs  T(C): 36.7 (28 Jan 2018 12:04), Max: 37 (28 Jan 2018 05:10)  T(F): 98.1 (28 Jan 2018 12:04), Max: 98.6 (28 Jan 2018 05:10)  HR: 68 (28 Jan 2018 12:04) (64 - 72)  BP: 145/73 (28 Jan 2018 12:04) (129/67 - 150/72)  BP(mean): --  RR: 18 (28 Jan 2018 12:04) (16 - 18)  SpO2: 96% (28 Jan 2018 12:04) (94% - 96%)  CAPILLARY BLOOD GLUCOSE      POCT Blood Glucose.: 225 mg/dL (28 Jan 2018 13:11)  POCT Blood Glucose.: 158 mg/dL (28 Jan 2018 08:52)  POCT Blood Glucose.: 179 mg/dL (27 Jan 2018 21:09)  POCT Blood Glucose.: 208 mg/dL (27 Jan 2018 18:09)    I&O's Summary    27 Jan 2018 07:01  -  28 Jan 2018 07:00  --------------------------------------------------------  IN: 1620 mL / OUT: 2200 mL / NET: -580 mL    28 Jan 2018 07:01  -  28 Jan 2018 13:49  --------------------------------------------------------  IN: 480 mL / OUT: 1375 mL / NET: -895 mL        PHYSICAL EXAM:  GENERAL: NAD  HEAD:  Atraumatic, Normocephalic  EYES: EOMI, PERRLA, conjunctiva and sclera clear  NECK: Supple, No JVD  CHEST/LUNG: Clear to auscultation bilaterally; No wheeze  HEART: Regular rate and rhythm; No murmurs  ABDOMEN: Soft, Nontender, Nondistended; Bowel sounds present  EXTREMITIES:  2+ Peripheral Pulses, +1 pitting edema in the LE b/l   PSYCH: AAOx3  NEUROLOGY: non-focal      LABS:                        10.8   7.3   )-----------( 316      ( 27 Jan 2018 06:40 )             32.2     01-28    140  |  101  |  34<H>  ----------------------------<  172<H>  3.5   |  29  |  1.49<H>    Ca    8.9      28 Jan 2018 06:48  Mg     2.1     01-28    TPro  6.8  /  Alb  2.9<L>  /  TBili  0.7  /  DBili  x   /  AST  12  /  ALT  9<L>  /  AlkPhos  96  01-27          Consultant(s) Notes Reviewed:  Cardiology

## 2018-01-28 NOTE — PROGRESS NOTE ADULT - SUBJECTIVE AND OBJECTIVE BOX
Interval Events:    Feels a little better, but still feels distended and thinks he still has volume on board.    ROS: Denies HA/dizziness/CP/SOB/PND/orthopnea/LE edema/abd pain    MEDICATIONS:  aspirin enteric coated 81 milliGRAM(s) Oral daily  carvedilol 25 milliGRAM(s) Oral every 12 hours  clopidogrel Tablet 75 milliGRAM(s) Oral daily  furosemide   Injectable 60 milliGRAM(s) IV Push every 12 hours  heparin  Injectable 5000 Unit(s) SubCutaneous every 12 hours  hydrALAZINE 100 milliGRAM(s) Oral three times a day  isosorbide   mononitrate ER Tablet (IMDUR) 60 milliGRAM(s) Oral daily  acetaminophen   Tablet. 650 milliGRAM(s) Oral every 6 hours PRN  melatonin 3 milliGRAM(s) Oral at bedtime  ondansetron Injectable 4 milliGRAM(s) IV Push every 6 hours PRN  docusate sodium 100 milliGRAM(s) Oral two times a day  pantoprazole    Tablet 40 milliGRAM(s) Oral before breakfast  simethicone 80 milliGRAM(s) Chew three times a day    atorvastatin 80 milliGRAM(s) Oral at bedtime  dextrose 50% Injectable 12.5 Gram(s) IV Push once  dextrose 50% Injectable 25 Gram(s) IV Push once  dextrose 50% Injectable 25 Gram(s) IV Push once  dextrose Gel 1 Dose(s) Oral once PRN  glucagon  Injectable 1 milliGRAM(s) IntraMuscular once PRN  insulin lispro (HumaLOG) corrective regimen sliding scale   SubCutaneous three times a day before meals  insulin lispro (HumaLOG) corrective regimen sliding scale   SubCutaneous at bedtime    carbamide peroxide Otic Solution 5 Drop(s) Left Ear two times a day  dextrose 5%. 1000 milliLiter(s) IV Continuous <Continuous>      PHYSICAL EXAM:  T(C): 37 (01-28-18 @ 05:10), Max: 37 (01-28-18 @ 05:10)  HR: 64 (01-28-18 @ 05:10) (64 - 72)  BP: 129/67 (01-28-18 @ 05:10) (129/67 - 153/77)  RR: 18 (01-28-18 @ 05:10) (16 - 18)  SpO2: 94% (01-28-18 @ 05:10) (94% - 97%)  Wt(kg): --  I&O's Summary    27 Jan 2018 07:01  -  28 Jan 2018 07:00  --------------------------------------------------------  IN: 1620 mL / OUT: 2200 mL / NET: -580 mL    28 Jan 2018 07:01  -  28 Jan 2018 10:32  --------------------------------------------------------  IN: 0 mL / OUT: 550 mL / NET: -550 mL    Appearance: No Acute Distress  HEENT: Elevated JVP  Cardiovascular: Normal S1 S2, RRR  Respiratory: Clear to auscultation anteriorly  Gastrointestinal: Soft, Non-tender, morbidly obese	  Skin: No cyanosis	  Neurologic: Non-focal  Extremities: No clubbing, cyanosis  Psychiatry: A & O x 3, Mood & affect appropriate    LABS:	 	    CBC Full  -  ( 27 Jan 2018 06:40 )  WBC Count : 7.3 K/uL  Hemoglobin : 10.8 g/dL  Hematocrit : 32.2 %  Platelet Count - Automated : 316 K/uL  Mean Cell Volume : 94.4 fl  Mean Cell Hemoglobin : 31.7 pg  Mean Cell Hemoglobin Concentration : 33.6 gm/dL      01-28    140  |  101  |  34<H>  ----------------------------<  172<H>  3.5   |  29  |  1.49<H>  01-27    138  |  98  |  32<H>  ----------------------------<  153<H>  3.3<L>   |  28  |  1.47<H>    Ca    8.9      28 Jan 2018 06:48  Ca    8.8      27 Jan 2018 06:40  Mg     2.1     01-28    TPro  6.8  /  Alb  2.9<L>  /  TBili  0.7  /  DBili  x   /  AST  12  /  ALT  9<L>  /  AlkPhos  96  01-27    TELEMETRY: NSR  ECG:      DIAGNOSTIC TESTING:    [ ] Echocardiogram:  [ ] Catheterization:

## 2018-01-28 NOTE — PROGRESS NOTE ADULT - PROBLEM SELECTOR PLAN 1
-continue with diuresis per cardiology   -monitor ins and outs and daily weights  -weight has decreased to 116.4 kgs from 121 kg

## 2018-01-29 DIAGNOSIS — N18.3 CHRONIC KIDNEY DISEASE, STAGE 3 (MODERATE): ICD-10-CM

## 2018-01-29 LAB
ANION GAP SERPL CALC-SCNC: 11 MMOL/L — SIGNIFICANT CHANGE UP (ref 5–17)
BUN SERPL-MCNC: 36 MG/DL — HIGH (ref 7–23)
CALCIUM SERPL-MCNC: 8.7 MG/DL — SIGNIFICANT CHANGE UP (ref 8.4–10.5)
CHLORIDE SERPL-SCNC: 100 MMOL/L — SIGNIFICANT CHANGE UP (ref 96–108)
CO2 SERPL-SCNC: 28 MMOL/L — SIGNIFICANT CHANGE UP (ref 22–31)
CREAT SERPL-MCNC: 1.44 MG/DL — HIGH (ref 0.5–1.3)
GLUCOSE BLDC GLUCOMTR-MCNC: 192 MG/DL — HIGH (ref 70–99)
GLUCOSE BLDC GLUCOMTR-MCNC: 210 MG/DL — HIGH (ref 70–99)
GLUCOSE BLDC GLUCOMTR-MCNC: 230 MG/DL — HIGH (ref 70–99)
GLUCOSE SERPL-MCNC: 160 MG/DL — HIGH (ref 70–99)
HCT VFR BLD CALC: 30.1 % — LOW (ref 39–50)
HGB BLD-MCNC: 10.3 G/DL — LOW (ref 13–17)
MCHC RBC-ENTMCNC: 32.2 PG — SIGNIFICANT CHANGE UP (ref 27–34)
MCHC RBC-ENTMCNC: 34.1 GM/DL — SIGNIFICANT CHANGE UP (ref 32–36)
MCV RBC AUTO: 94.5 FL — SIGNIFICANT CHANGE UP (ref 80–100)
PLATELET # BLD AUTO: 320 K/UL — SIGNIFICANT CHANGE UP (ref 150–400)
POTASSIUM SERPL-MCNC: 3.5 MMOL/L — SIGNIFICANT CHANGE UP (ref 3.5–5.3)
POTASSIUM SERPL-SCNC: 3.5 MMOL/L — SIGNIFICANT CHANGE UP (ref 3.5–5.3)
RBC # BLD: 3.19 M/UL — LOW (ref 4.2–5.8)
RBC # FLD: 12.7 % — SIGNIFICANT CHANGE UP (ref 10.3–14.5)
SODIUM SERPL-SCNC: 139 MMOL/L — SIGNIFICANT CHANGE UP (ref 135–145)
WBC # BLD: 7 K/UL — SIGNIFICANT CHANGE UP (ref 3.8–10.5)
WBC # FLD AUTO: 7 K/UL — SIGNIFICANT CHANGE UP (ref 3.8–10.5)

## 2018-01-29 PROCEDURE — 99232 SBSQ HOSP IP/OBS MODERATE 35: CPT | Mod: 25

## 2018-01-29 PROCEDURE — 99233 SBSQ HOSP IP/OBS HIGH 50: CPT | Mod: GC

## 2018-01-29 PROCEDURE — 99233 SBSQ HOSP IP/OBS HIGH 50: CPT

## 2018-01-29 PROCEDURE — 69210 REMOVE IMPACTED EAR WAX UNI: CPT

## 2018-01-29 RX ORDER — BACITRACIN ZINC 500 UNIT/G
1 OINTMENT IN PACKET (EA) TOPICAL
Qty: 0 | Refills: 0 | Status: DISCONTINUED | OUTPATIENT
Start: 2018-01-29 | End: 2018-02-02

## 2018-01-29 RX ORDER — INSULIN GLARGINE 100 [IU]/ML
6 INJECTION, SOLUTION SUBCUTANEOUS AT BEDTIME
Qty: 0 | Refills: 0 | Status: DISCONTINUED | OUTPATIENT
Start: 2018-01-29 | End: 2018-01-31

## 2018-01-29 RX ADMIN — Medication 2: at 13:18

## 2018-01-29 RX ADMIN — HEPARIN SODIUM 5000 UNIT(S): 5000 INJECTION INTRAVENOUS; SUBCUTANEOUS at 17:04

## 2018-01-29 RX ADMIN — Medication 100 MILLIGRAM(S): at 21:54

## 2018-01-29 RX ADMIN — Medication 81 MILLIGRAM(S): at 09:15

## 2018-01-29 RX ADMIN — Medication 100 MILLIGRAM(S): at 05:47

## 2018-01-29 RX ADMIN — ISOSORBIDE MONONITRATE 60 MILLIGRAM(S): 60 TABLET, EXTENDED RELEASE ORAL at 13:18

## 2018-01-29 RX ADMIN — Medication 3 MILLIGRAM(S): at 21:54

## 2018-01-29 RX ADMIN — INSULIN GLARGINE 6 UNIT(S): 100 INJECTION, SOLUTION SUBCUTANEOUS at 21:54

## 2018-01-29 RX ADMIN — CARVEDILOL PHOSPHATE 25 MILLIGRAM(S): 80 CAPSULE, EXTENDED RELEASE ORAL at 05:47

## 2018-01-29 RX ADMIN — Medication 2: at 19:02

## 2018-01-29 RX ADMIN — Medication 100 MILLIGRAM(S): at 13:18

## 2018-01-29 RX ADMIN — SIMETHICONE 80 MILLIGRAM(S): 80 TABLET, CHEWABLE ORAL at 13:18

## 2018-01-29 RX ADMIN — SIMETHICONE 80 MILLIGRAM(S): 80 TABLET, CHEWABLE ORAL at 21:54

## 2018-01-29 RX ADMIN — Medication 100 MILLIGRAM(S): at 17:05

## 2018-01-29 RX ADMIN — Medication 1 APPLICATION(S): at 21:54

## 2018-01-29 RX ADMIN — Medication 60 MILLIGRAM(S): at 17:02

## 2018-01-29 RX ADMIN — CARBAMIDE PEROXIDE 5 DROP(S): 81.86 SOLUTION/ DROPS AURICULAR (OTIC) at 05:49

## 2018-01-29 RX ADMIN — CLOPIDOGREL BISULFATE 75 MILLIGRAM(S): 75 TABLET, FILM COATED ORAL at 09:15

## 2018-01-29 RX ADMIN — HEPARIN SODIUM 5000 UNIT(S): 5000 INJECTION INTRAVENOUS; SUBCUTANEOUS at 05:47

## 2018-01-29 RX ADMIN — CARBAMIDE PEROXIDE 5 DROP(S): 81.86 SOLUTION/ DROPS AURICULAR (OTIC) at 17:06

## 2018-01-29 RX ADMIN — PANTOPRAZOLE SODIUM 40 MILLIGRAM(S): 20 TABLET, DELAYED RELEASE ORAL at 05:47

## 2018-01-29 RX ADMIN — Medication 60 MILLIGRAM(S): at 05:47

## 2018-01-29 RX ADMIN — Medication 100 MILLIGRAM(S): at 05:48

## 2018-01-29 RX ADMIN — CARVEDILOL PHOSPHATE 25 MILLIGRAM(S): 80 CAPSULE, EXTENDED RELEASE ORAL at 17:06

## 2018-01-29 RX ADMIN — SIMETHICONE 80 MILLIGRAM(S): 80 TABLET, CHEWABLE ORAL at 05:47

## 2018-01-29 RX ADMIN — Medication 1: at 09:15

## 2018-01-29 RX ADMIN — ATORVASTATIN CALCIUM 80 MILLIGRAM(S): 80 TABLET, FILM COATED ORAL at 21:53

## 2018-01-29 NOTE — PROGRESS NOTE ADULT - SUBJECTIVE AND OBJECTIVE BOX
Patient seen and examined at bedside.    Overnight Events: Patient says that his breathing has improved. Still feels abdominal distention.    Review Of Systems: No chest pain, shortness of breath, or palpitations            Medications:  acetaminophen   Tablet. 650 milliGRAM(s) Oral every 6 hours PRN  aspirin enteric coated 81 milliGRAM(s) Oral daily  atorvastatin 80 milliGRAM(s) Oral at bedtime  carbamide peroxide Otic Solution 5 Drop(s) Left Ear two times a day  carvedilol 25 milliGRAM(s) Oral every 12 hours  clopidogrel Tablet 75 milliGRAM(s) Oral daily  dextrose 5%. 1000 milliLiter(s) IV Continuous <Continuous>  dextrose 50% Injectable 12.5 Gram(s) IV Push once  dextrose 50% Injectable 25 Gram(s) IV Push once  dextrose 50% Injectable 25 Gram(s) IV Push once  dextrose Gel 1 Dose(s) Oral once PRN  docusate sodium 100 milliGRAM(s) Oral two times a day  furosemide   Injectable 60 milliGRAM(s) IV Push every 12 hours  glucagon  Injectable 1 milliGRAM(s) IntraMuscular once PRN  heparin  Injectable 5000 Unit(s) SubCutaneous every 12 hours  hydrALAZINE 100 milliGRAM(s) Oral three times a day  insulin lispro (HumaLOG) corrective regimen sliding scale   SubCutaneous three times a day before meals  insulin lispro (HumaLOG) corrective regimen sliding scale   SubCutaneous at bedtime  isosorbide   mononitrate ER Tablet (IMDUR) 60 milliGRAM(s) Oral daily  melatonin 3 milliGRAM(s) Oral at bedtime  ondansetron Injectable 4 milliGRAM(s) IV Push every 6 hours PRN  pantoprazole    Tablet 40 milliGRAM(s) Oral before breakfast  simethicone 80 milliGRAM(s) Chew three times a day      PAST MEDICAL & SURGICAL HISTORY:  Meningioma  Malignant melanoma, unspecified site  Heart failure, unspecified heart failure chronicity, unspecified heart failure type  Type 2 diabetes mellitus with complication, without long-term current use of insulin  Enlarged heart  HTN (hypertension)  Sleep apnea  No significant past surgical history      Vitals:  T(F): 98 (01-29), Max: 98.4 (01-28)  HR: 68 (01-29) (68 - 73)  BP: 146/72 (01-29) (116/65 - 146/72)  RR: 18 (01-29)  SpO2: 95% (01-29)  I&O's Summary    28 Jan 2018 07:01  -  29 Jan 2018 07:00  --------------------------------------------------------  IN: 1320 mL / OUT: 3675 mL / NET: -2355 mL    29 Jan 2018 07:01  -  29 Jan 2018 09:20  --------------------------------------------------------  IN: 0 mL / OUT: 500 mL / NET: -500 mL        Physical Exam:  Appearance: No Acute Distress  HEENT: Elevated JVP  Cardiovascular: Normal S1 S2, RRR  Respiratory: Clear to auscultation b/l  Gastrointestinal: Soft, Non-tender, morbidly obese	  Skin: No cyanosis	  Neurologic: Non-focal  Extremities: No LE edema, cyanosis  Psychiatry: A & O x 3, Mood & affect appropriate                          10.3   7.0   )-----------( 320      ( 29 Jan 2018 06:28 )             30.1     01-29    139  |  100  |  36<H>  ----------------------------<  160<H>  3.5   |  28  |  1.44<H>    Ca    8.7      29 Jan 2018 06:28  Mg     2.1     01-28    Interpretation of Telemetry:  SR 60-70s    A/P:  57 year old  man with recently diagnosed systolic HF (11/2017 - EF 40% -> 55-60%),  HTN, DM2. Also recently diagnosed meningioma as well as melanoma of the skin. Presented with HF exacerbation found to have obstructive CAD; s/p C CECILIA LAD.    1. CAD  -Cont asa, plavix, lipitor,     2. Acute decompensated heart failure. NYHA IV, ACC/AHA C. Continues to be overloaded.  -Cont IV lasix 60 mg BID for goal net neg 1-2L/24 hours as patient is still volume overloaded.  -No ACE-i for now with SILKE. Continue Imdur, hydralazine, BB  -Per [facility] with Dr Alonso, PH likely 2/2 LV overload.    Jeremy Chen MD  Cardiology Fellow [id number] Patient seen and examined at bedside.    Overnight Events: Patient says that his breathing has improved. Still feels abdominal distention.    Review Of Systems: No chest pain, shortness of breath, or palpitations            Medications:  acetaminophen   Tablet. 650 milliGRAM(s) Oral every 6 hours PRN  aspirin enteric coated 81 milliGRAM(s) Oral daily  atorvastatin 80 milliGRAM(s) Oral at bedtime  carbamide peroxide Otic Solution 5 Drop(s) Left Ear two times a day  carvedilol 25 milliGRAM(s) Oral every 12 hours  clopidogrel Tablet 75 milliGRAM(s) Oral daily  dextrose 5%. 1000 milliLiter(s) IV Continuous <Continuous>  dextrose 50% Injectable 12.5 Gram(s) IV Push once  dextrose 50% Injectable 25 Gram(s) IV Push once  dextrose 50% Injectable 25 Gram(s) IV Push once  dextrose Gel 1 Dose(s) Oral once PRN  docusate sodium 100 milliGRAM(s) Oral two times a day  furosemide   Injectable 60 milliGRAM(s) IV Push every 12 hours  glucagon  Injectable 1 milliGRAM(s) IntraMuscular once PRN  heparin  Injectable 5000 Unit(s) SubCutaneous every 12 hours  hydrALAZINE 100 milliGRAM(s) Oral three times a day  insulin lispro (HumaLOG) corrective regimen sliding scale   SubCutaneous three times a day before meals  insulin lispro (HumaLOG) corrective regimen sliding scale   SubCutaneous at bedtime  isosorbide   mononitrate ER Tablet (IMDUR) 60 milliGRAM(s) Oral daily  melatonin 3 milliGRAM(s) Oral at bedtime  ondansetron Injectable 4 milliGRAM(s) IV Push every 6 hours PRN  pantoprazole    Tablet 40 milliGRAM(s) Oral before breakfast  simethicone 80 milliGRAM(s) Chew three times a day    PAST MEDICAL & SURGICAL HISTORY:  Meningioma  Malignant melanoma, unspecified site  Heart failure, unspecified heart failure chronicity, unspecified heart failure type  Type 2 diabetes mellitus with complication, without long-term current use of insulin  Enlarged heart  HTN (hypertension)  Sleep apnea  No significant past surgical history    Vitals:  T(F): 98 (01-29), Max: 98.4 (01-28)  HR: 68 (01-29) (68 - 73)  BP: 146/72 (01-29) (116/65 - 146/72)  RR: 18 (01-29)  SpO2: 95% (01-29)    Physical Exam:  Appearance: No Acute Distress  HEENT: Elevated JVP  Cardiovascular: Normal S1 S2, RRR  Respiratory: Clear to auscultation b/l  Gastrointestinal: Soft, Non-tender, morbidly obese	  Skin: No cyanosis	  Neurologic: Non-focal  Extremities: No LE edema, cyanosis  Psychiatry: A & O x 3, Mood & affect appropriate    Interpretation of Telemetry:  SR 60-70s    I&O's Summary    28 Jan 2018 07:01  -  29 Jan 2018 07:00  --------------------------------------------------------  IN: 1320 mL / OUT: 3675 mL / NET: -2355 mL    29 Jan 2018 07:01  -  29 Jan 2018 09:20  --------------------------------------------------------  IN: 0 mL / OUT: 500 mL / NET: -500 mL    LABS:                        10.3   7.0   )-----------( 320      ( 29 Jan 2018 06:28 )             30.1     01-29    139  |  100  |  36<H>  ----------------------------<  160<H>  3.5   |  28  |  1.44<H>    Ca    8.7      29 Jan 2018 06:28  Mg     2.1     01-28      A/P:  57 year old  man with recently diagnosed systolic HF (11/2017 - EF 40% -> 55-60%),  HTN, DM2. Also, recently diagnosed meningioma as well as melanoma of the skin.   Presented with HF exacerbation found to have obstructive CAD; s/p LHC with CECILIA to LAD.    1. CAD, s/p PCI/CECILIA  -Cont asa, Plavix, Lipitor,     2. Acute decompensated heart failure. NYHA IV, ACC/AHA C. Continues to be overloaded.  -Cont IV Lasix 60 mg BID for goal net neg 1-2L/24 hours, as patient is still volume overloaded.  -No ACE-i for now with SILKE. Continue Imdur, hydralazine, BB  -Per [facility] with Dr Alonso, PH likely 2/2 LV overload.    3.  HTN - reasonable control on current meds.    Jeremy Chen MD  Cardiology Fellow [id number]

## 2018-01-29 NOTE — PROGRESS NOTE ADULT - SUBJECTIVE AND OBJECTIVE BOX
CC: left ear fullness and decrease in hearing    HPI: Patient is a 57y old  Male who we are asked to evaluate the patient for left ear fullness and decrease in hearing. PT has history of brain tumor that was bx in mid december (awaiting results), and noticed a progressive hearing loss for a month. Pt states decrease in hearing loss could be due to brain tumor compression on ear canal. Pt denies any tinnitus, vertigo, d/c, pain, or congestion.  70% of the wax removed and pt has been using debrox drops as directed.       Vital Signs Last 24 Hrs  T(C): 36.5 (29 Jan 2018 12:03), Max: 36.9 (28 Jan 2018 21:40)  T(F): 97.7 (29 Jan 2018 12:03), Max: 98.4 (28 Jan 2018 21:40)  HR: 67 (29 Jan 2018 12:03) (67 - 73)  BP: 142/82 (29 Jan 2018 12:03) (116/65 - 146/72)  BP(mean): --  RR: 18 (29 Jan 2018 12:03) (18 - 18)  SpO2: 96% (29 Jan 2018 12:03) (95% - 96%)    LABS:                        10.3   7.0   )-----------( 320      ( 29 Jan 2018 06:28 )             30.1       PHYSICAL EXAM:  Gen: NAD, well-developed  Head: Normocephalic, Atraumatic  Face: no edema/erythema/fluctuance  Eyes: no scleral injection  Ears: Right - ear canal clear, TM intact without effusion / erythema.  No evidence of any fluid drainage.  Mastoid tenderness / erythema/ ear bulging            Left - ear canal very narrow likely due to canal compression with cerumen impaction, removed another 10% with curette will continue with debrox to help remove the rest without pain. No Mastoid tenderness / erythema/ ear bulging  Nose: Nares bilaterally patent, no discharge  Mouth: No Stridor / Drooling / Trismus.  Mucosa moist, tongue/uvula midline, oropharynx clear  Neck: Flat, supple, no lymphadenopathy, trachea midline, no masses  Resp: breathing easily, no stridor  CV: no peripheral edema/cyanosis

## 2018-01-29 NOTE — CHART NOTE - NSCHARTNOTEFT_GEN_A_CORE
Successfully removed two sutures from the area of L scapula.  One suture partially remove - knot cut but unable to retrieve.  Two sutures already appear imbedded with knots below the skin.  Will need follow-up tomorrow.  Bacitracin ordered. Successfully removed two sutures from the area of L scapula - tolerated well.  Two sutures already appear imbedded with knots below the skin, presumably from prior suture removal attempt.  Will need follow-up tomorrow.  Bacitracin ordered.

## 2018-01-29 NOTE — PROGRESS NOTE ADULT - SUBJECTIVE AND OBJECTIVE BOX
Patient is a 57y old  Male who presents with a chief complaint of CAD, acute on chronic heart failure (18 Jan 2018 23:26)      SUBJECTIVE / OVERNIGHT EVENTS:  feels ok. No cp/sob/n/v.  Some low back pain.    MEDICATIONS  (STANDING):  aspirin enteric coated 81 milliGRAM(s) Oral daily  atorvastatin 80 milliGRAM(s) Oral at bedtime  carbamide peroxide Otic Solution 5 Drop(s) Left Ear two times a day  carvedilol 25 milliGRAM(s) Oral every 12 hours  clopidogrel Tablet 75 milliGRAM(s) Oral daily  dextrose 5%. 1000 milliLiter(s) (50 mL/Hr) IV Continuous <Continuous>  dextrose 50% Injectable 12.5 Gram(s) IV Push once  dextrose 50% Injectable 25 Gram(s) IV Push once  dextrose 50% Injectable 25 Gram(s) IV Push once  docusate sodium 100 milliGRAM(s) Oral two times a day  furosemide   Injectable 60 milliGRAM(s) IV Push every 12 hours  heparin  Injectable 5000 Unit(s) SubCutaneous every 12 hours  hydrALAZINE 100 milliGRAM(s) Oral three times a day  insulin lispro (HumaLOG) corrective regimen sliding scale   SubCutaneous three times a day before meals  insulin lispro (HumaLOG) corrective regimen sliding scale   SubCutaneous at bedtime  isosorbide   mononitrate ER Tablet (IMDUR) 60 milliGRAM(s) Oral daily  melatonin 3 milliGRAM(s) Oral at bedtime  pantoprazole    Tablet 40 milliGRAM(s) Oral before breakfast  simethicone 80 milliGRAM(s) Chew three times a day    MEDICATIONS  (PRN):  acetaminophen   Tablet. 650 milliGRAM(s) Oral every 6 hours PRN Moderate Pain (4 - 6)  dextrose Gel 1 Dose(s) Oral once PRN Blood Glucose LESS THAN 70 milliGRAM(s)/deciliter  glucagon  Injectable 1 milliGRAM(s) IntraMuscular once PRN Glucose LESS THAN 70 milligrams/deciliter  ondansetron Injectable 4 milliGRAM(s) IV Push every 6 hours PRN Nausea and/or Vomiting      Vital Signs Last 24 Hrs  T(C): 36.5 (29 Jan 2018 12:03), Max: 36.9 (28 Jan 2018 21:40)  T(F): 97.7 (29 Jan 2018 12:03), Max: 98.4 (28 Jan 2018 21:40)  HR: 67 (29 Jan 2018 12:03) (67 - 73)  BP: 142/82 (29 Jan 2018 12:03) (116/65 - 146/72)  BP(mean): --  RR: 18 (29 Jan 2018 12:03) (18 - 18)  SpO2: 96% (29 Jan 2018 12:03) (95% - 96%)  CAPILLARY BLOOD GLUCOSE      POCT Blood Glucose.: 230 mg/dL (29 Jan 2018 13:01)  POCT Blood Glucose.: 163 mg/dL (29 Jan 2018 08:37)  POCT Blood Glucose.: 219 mg/dL (28 Jan 2018 22:16)  POCT Blood Glucose.: 236 mg/dL (28 Jan 2018 17:54)  POCT Blood Glucose.: 225 mg/dL (28 Jan 2018 13:11)    I&O's Summary    28 Jan 2018 07:01  -  29 Jan 2018 07:00  --------------------------------------------------------  IN: 1320 mL / OUT: 3675 mL / NET: -2355 mL    29 Jan 2018 07:01  -  29 Jan 2018 13:08  --------------------------------------------------------  IN: 240 mL / OUT: 500 mL / NET: -260 mL        PHYSICAL EXAM:  GENERAL: NAD  HEAD:  Atraumatic, Normocephalic  EYES: EOMI, PERRLA, conjunctiva and sclera clear  NECK: Supple, No JVD  CHEST/LUNG: Clear to auscultation bilaterally; No wheeze  HEART: Regular rate and rhythm; No murmurs  ABDOMEN: Soft, Nontender, Nondistended; Bowel sounds present  EXTREMITIES:  2+ Peripheral Pulses, +1 pitting edema in the LE b/l . Left leg>right leg  PSYCH: AAOx3  NEUROLOGY: non-focal    LABS:                        10.3   7.0   )-----------( 320      ( 29 Jan 2018 06:28 )             30.1     01-29    139  |  100  |  36<H>  ----------------------------<  160<H>  3.5   |  28  |  1.44<H>    Ca    8.7      29 Jan 2018 06:28  Mg     2.1     01-28                RADIOLOGY & ADDITIONAL TESTS:    Imaging Personally Reviewed:    Consultant(s) Notes Reviewed:      Care Discussed with Consultants/Other Providers:

## 2018-01-29 NOTE — PROGRESS NOTE ADULT - PROBLEM SELECTOR PLAN 2
-S/p BMS to LAD.   -C/w ASA and plavix.   -C/w atorvastatin.   -C/w carvedilol.   -No ACEi in view of SILKE.  -continue tele monitoring: sinus 60's

## 2018-01-30 LAB
ANION GAP SERPL CALC-SCNC: 9 MMOL/L — SIGNIFICANT CHANGE UP (ref 5–17)
BUN SERPL-MCNC: 34 MG/DL — HIGH (ref 7–23)
CALCIUM SERPL-MCNC: 8.7 MG/DL — SIGNIFICANT CHANGE UP (ref 8.4–10.5)
CHLORIDE SERPL-SCNC: 100 MMOL/L — SIGNIFICANT CHANGE UP (ref 96–108)
CO2 SERPL-SCNC: 30 MMOL/L — SIGNIFICANT CHANGE UP (ref 22–31)
CREAT SERPL-MCNC: 1.51 MG/DL — HIGH (ref 0.5–1.3)
GLUCOSE BLDC GLUCOMTR-MCNC: 143 MG/DL — HIGH (ref 70–99)
GLUCOSE BLDC GLUCOMTR-MCNC: 206 MG/DL — HIGH (ref 70–99)
GLUCOSE BLDC GLUCOMTR-MCNC: 208 MG/DL — HIGH (ref 70–99)
GLUCOSE BLDC GLUCOMTR-MCNC: 215 MG/DL — HIGH (ref 70–99)
GLUCOSE SERPL-MCNC: 144 MG/DL — HIGH (ref 70–99)
POTASSIUM SERPL-MCNC: 3.7 MMOL/L — SIGNIFICANT CHANGE UP (ref 3.5–5.3)
POTASSIUM SERPL-SCNC: 3.7 MMOL/L — SIGNIFICANT CHANGE UP (ref 3.5–5.3)
SODIUM SERPL-SCNC: 139 MMOL/L — SIGNIFICANT CHANGE UP (ref 135–145)

## 2018-01-30 PROCEDURE — 99232 SBSQ HOSP IP/OBS MODERATE 35: CPT | Mod: GC

## 2018-01-30 PROCEDURE — 99233 SBSQ HOSP IP/OBS HIGH 50: CPT

## 2018-01-30 RX ORDER — POTASSIUM CHLORIDE 20 MEQ
40 PACKET (EA) ORAL
Qty: 0 | Refills: 0 | Status: COMPLETED | OUTPATIENT
Start: 2018-01-30 | End: 2018-01-30

## 2018-01-30 RX ORDER — PETROLATUM,WHITE
1 JELLY (GRAM) TOPICAL
Qty: 0 | Refills: 0 | Status: DISCONTINUED | OUTPATIENT
Start: 2018-01-30 | End: 2018-02-02

## 2018-01-30 RX ADMIN — SIMETHICONE 80 MILLIGRAM(S): 80 TABLET, CHEWABLE ORAL at 21:03

## 2018-01-30 RX ADMIN — CLOPIDOGREL BISULFATE 75 MILLIGRAM(S): 75 TABLET, FILM COATED ORAL at 13:16

## 2018-01-30 RX ADMIN — Medication 40 MILLIEQUIVALENT(S): at 13:16

## 2018-01-30 RX ADMIN — Medication 100 MILLIGRAM(S): at 18:28

## 2018-01-30 RX ADMIN — Medication 100 MILLIGRAM(S): at 21:03

## 2018-01-30 RX ADMIN — PANTOPRAZOLE SODIUM 40 MILLIGRAM(S): 20 TABLET, DELAYED RELEASE ORAL at 05:46

## 2018-01-30 RX ADMIN — Medication 81 MILLIGRAM(S): at 13:16

## 2018-01-30 RX ADMIN — HEPARIN SODIUM 5000 UNIT(S): 5000 INJECTION INTRAVENOUS; SUBCUTANEOUS at 18:28

## 2018-01-30 RX ADMIN — Medication 650 MILLIGRAM(S): at 03:03

## 2018-01-30 RX ADMIN — SIMETHICONE 80 MILLIGRAM(S): 80 TABLET, CHEWABLE ORAL at 13:16

## 2018-01-30 RX ADMIN — Medication 40 MILLIEQUIVALENT(S): at 10:54

## 2018-01-30 RX ADMIN — Medication 100 MILLIGRAM(S): at 05:46

## 2018-01-30 RX ADMIN — Medication 1 APPLICATION(S): at 22:15

## 2018-01-30 RX ADMIN — CARBAMIDE PEROXIDE 5 DROP(S): 81.86 SOLUTION/ DROPS AURICULAR (OTIC) at 18:32

## 2018-01-30 RX ADMIN — INSULIN GLARGINE 6 UNIT(S): 100 INJECTION, SOLUTION SUBCUTANEOUS at 22:14

## 2018-01-30 RX ADMIN — CARVEDILOL PHOSPHATE 25 MILLIGRAM(S): 80 CAPSULE, EXTENDED RELEASE ORAL at 05:46

## 2018-01-30 RX ADMIN — Medication 60 MILLIGRAM(S): at 18:28

## 2018-01-30 RX ADMIN — Medication 3 MILLIGRAM(S): at 21:03

## 2018-01-30 RX ADMIN — CARVEDILOL PHOSPHATE 25 MILLIGRAM(S): 80 CAPSULE, EXTENDED RELEASE ORAL at 18:28

## 2018-01-30 RX ADMIN — ISOSORBIDE MONONITRATE 60 MILLIGRAM(S): 60 TABLET, EXTENDED RELEASE ORAL at 13:16

## 2018-01-30 RX ADMIN — Medication 2: at 18:28

## 2018-01-30 RX ADMIN — Medication 650 MILLIGRAM(S): at 03:40

## 2018-01-30 RX ADMIN — Medication 60 MILLIGRAM(S): at 05:46

## 2018-01-30 RX ADMIN — HEPARIN SODIUM 5000 UNIT(S): 5000 INJECTION INTRAVENOUS; SUBCUTANEOUS at 05:46

## 2018-01-30 RX ADMIN — Medication 2: at 13:16

## 2018-01-30 RX ADMIN — SIMETHICONE 80 MILLIGRAM(S): 80 TABLET, CHEWABLE ORAL at 05:46

## 2018-01-30 RX ADMIN — Medication 1 APPLICATION(S): at 18:28

## 2018-01-30 RX ADMIN — ATORVASTATIN CALCIUM 80 MILLIGRAM(S): 80 TABLET, FILM COATED ORAL at 21:03

## 2018-01-30 RX ADMIN — Medication 1 APPLICATION(S): at 05:45

## 2018-01-30 RX ADMIN — Medication 100 MILLIGRAM(S): at 13:16

## 2018-01-30 RX ADMIN — CARBAMIDE PEROXIDE 5 DROP(S): 81.86 SOLUTION/ DROPS AURICULAR (OTIC) at 06:01

## 2018-01-30 NOTE — PROGRESS NOTE ADULT - SUBJECTIVE AND OBJECTIVE BOX
Patient is a 57y old  Male who presents with a chief complaint of CAD, acute on chronic heart failure (18 Jan 2018 23:26)      SUBJECTIVE / OVERNIGHT EVENTS:  feels tired  no cp/sob/n/v    tele reviewed: sinus 60-80's with pat    MEDICATIONS  (STANDING):  aspirin enteric coated 81 milliGRAM(s) Oral daily  atorvastatin 80 milliGRAM(s) Oral at bedtime  BACItracin   Ointment 1 Application(s) Topical two times a day  carbamide peroxide Otic Solution 5 Drop(s) Left Ear two times a day  carvedilol 25 milliGRAM(s) Oral every 12 hours  clopidogrel Tablet 75 milliGRAM(s) Oral daily  dextrose 5%. 1000 milliLiter(s) (50 mL/Hr) IV Continuous <Continuous>  dextrose 50% Injectable 12.5 Gram(s) IV Push once  dextrose 50% Injectable 25 Gram(s) IV Push once  dextrose 50% Injectable 25 Gram(s) IV Push once  docusate sodium 100 milliGRAM(s) Oral two times a day  furosemide   Injectable 60 milliGRAM(s) IV Push every 12 hours  heparin  Injectable 5000 Unit(s) SubCutaneous every 12 hours  hydrALAZINE 100 milliGRAM(s) Oral three times a day  insulin glargine Injectable (LANTUS) 6 Unit(s) SubCutaneous at bedtime  insulin lispro (HumaLOG) corrective regimen sliding scale   SubCutaneous three times a day before meals  insulin lispro (HumaLOG) corrective regimen sliding scale   SubCutaneous at bedtime  isosorbide   mononitrate ER Tablet (IMDUR) 60 milliGRAM(s) Oral daily  melatonin 3 milliGRAM(s) Oral at bedtime  pantoprazole    Tablet 40 milliGRAM(s) Oral before breakfast  potassium chloride    Tablet ER 40 milliEquivalent(s) Oral every 2 hours  simethicone 80 milliGRAM(s) Chew three times a day    MEDICATIONS  (PRN):  acetaminophen   Tablet. 650 milliGRAM(s) Oral every 6 hours PRN Moderate Pain (4 - 6)  dextrose Gel 1 Dose(s) Oral once PRN Blood Glucose LESS THAN 70 milliGRAM(s)/deciliter  glucagon  Injectable 1 milliGRAM(s) IntraMuscular once PRN Glucose LESS THAN 70 milligrams/deciliter  ondansetron Injectable 4 milliGRAM(s) IV Push every 6 hours PRN Nausea and/or Vomiting      Vital Signs Last 24 Hrs  T(C): 36.6 (30 Jan 2018 12:04), Max: 36.9 (29 Jan 2018 21:00)  T(F): 97.8 (30 Jan 2018 12:04), Max: 98.4 (29 Jan 2018 21:00)  HR: 69 (30 Jan 2018 12:04) (63 - 73)  BP: 152/79 (30 Jan 2018 12:04) (117/60 - 156/76)  BP(mean): --  RR: 18 (30 Jan 2018 12:04) (18 - 18)  SpO2: 96% (30 Jan 2018 12:04) (93% - 96%)  CAPILLARY BLOOD GLUCOSE      POCT Blood Glucose.: 143 mg/dL (30 Jan 2018 09:11)  POCT Blood Glucose.: 192 mg/dL (29 Jan 2018 21:21)  POCT Blood Glucose.: 210 mg/dL (29 Jan 2018 18:06)    I&O's Summary    29 Jan 2018 07:01  -  30 Jan 2018 07:00  --------------------------------------------------------  IN: 480 mL / OUT: 2050 mL / NET: -1570 mL    30 Jan 2018 07:01  -  30 Jan 2018 13:09  --------------------------------------------------------  IN: 300 mL / OUT: 825 mL / NET: -525 mL        PHYSICAL EXAM:  GENERAL: NAD  HEAD:  Atraumatic, Normocephalic  EYES: EOMI, PERRLA, conjunctiva and sclera clear  NECK: Supple, No JVD  CHEST/LUNG: Clear to auscultation bilaterally; No wheeze  HEART: Regular rate and rhythm; No murmurs  ABDOMEN: Soft, Nontender, Nondistended; Bowel sounds present  EXTREMITIES:  2+ Peripheral Pulses, +1 pitting edema in the LE b/l . Left leg>right leg  PSYCH: AAOx3  NEUROLOGY: non-focal    LABS:                        10.3   7.0   )-----------( 320      ( 29 Jan 2018 06:28 )             30.1     01-30    139  |  100  |  34<H>  ----------------------------<  144<H>  3.7   |  30  |  1.51<H>    Ca    8.7      30 Jan 2018 07:23                RADIOLOGY & ADDITIONAL TESTS:    Imaging Personally Reviewed:    Consultant(s) Notes Reviewed:      Care Discussed with Consultants/Other Providers:

## 2018-01-30 NOTE — PROGRESS NOTE ADULT - PROBLEM SELECTOR PLAN 2
-S/p BMS to LAD.   -C/w ASA and plavix.   -C/w atorvastatin.   -C/w carvedilol.   -No ACEi in view of SILKE on ckd3.  -continue tele monitoring

## 2018-01-30 NOTE — PROGRESS NOTE ADULT - PROBLEM SELECTOR PLAN 3
-C/w JON QAC/HS.     -Diabetic and DASH diet.  -initiated on lantus 6u qhs 1/29.  No changes today  -a1c 8

## 2018-01-30 NOTE — PROGRESS NOTE ADULT - SUBJECTIVE AND OBJECTIVE BOX
Patient seen and examined at bedside.    Overnight Events: KHRIS. Continues to feel abdominal distention.    Review Of Systems: No chest pain, shortness of breath, or palpitations            Medications:  acetaminophen   Tablet. 650 milliGRAM(s) Oral every 6 hours PRN  aspirin enteric coated 81 milliGRAM(s) Oral daily  atorvastatin 80 milliGRAM(s) Oral at bedtime  BACItracin   Ointment 1 Application(s) Topical two times a day  carbamide peroxide Otic Solution 5 Drop(s) Left Ear two times a day  carvedilol 25 milliGRAM(s) Oral every 12 hours  clopidogrel Tablet 75 milliGRAM(s) Oral daily  dextrose 5%. 1000 milliLiter(s) IV Continuous <Continuous>  dextrose 50% Injectable 12.5 Gram(s) IV Push once  dextrose 50% Injectable 25 Gram(s) IV Push once  dextrose 50% Injectable 25 Gram(s) IV Push once  dextrose Gel 1 Dose(s) Oral once PRN  docusate sodium 100 milliGRAM(s) Oral two times a day  furosemide   Injectable 60 milliGRAM(s) IV Push every 12 hours  glucagon  Injectable 1 milliGRAM(s) IntraMuscular once PRN  heparin  Injectable 5000 Unit(s) SubCutaneous every 12 hours  hydrALAZINE 100 milliGRAM(s) Oral three times a day  insulin glargine Injectable (LANTUS) 6 Unit(s) SubCutaneous at bedtime  insulin lispro (HumaLOG) corrective regimen sliding scale   SubCutaneous three times a day before meals  insulin lispro (HumaLOG) corrective regimen sliding scale   SubCutaneous at bedtime  isosorbide   mononitrate ER Tablet (IMDUR) 60 milliGRAM(s) Oral daily  melatonin 3 milliGRAM(s) Oral at bedtime  ondansetron Injectable 4 milliGRAM(s) IV Push every 6 hours PRN  pantoprazole    Tablet 40 milliGRAM(s) Oral before breakfast  simethicone 80 milliGRAM(s) Chew three times a day      PAST MEDICAL & SURGICAL HISTORY:  Meningioma  Malignant melanoma, unspecified site  Heart failure, unspecified heart failure chronicity, unspecified heart failure type  Type 2 diabetes mellitus with complication, without long-term current use of insulin  Enlarged heart  HTN (hypertension)  Sleep apnea  No significant past surgical history      Vitals:  T(F): 98 (01-30), Max: 98.4 (01-29)  HR: 72 (01-30) (63 - 73)  BP: 148/72 (01-30) (117/60 - 156/76)  RR: 18 (01-30)  SpO2: 93% (01-30)  I&O's Summary    29 Jan 2018 07:01  -  30 Jan 2018 07:00  --------------------------------------------------------  IN: 480 mL / OUT: 2050 mL / NET: -1570 mL        Physical Exam:  Appearance: No Acute Distress  HEENT: Elevated JVP  Cardiovascular: Normal S1 S2, RRR  Respiratory: Clear to auscultation b/l  Gastrointestinal: Soft, Non-tender, morbidly obese	  Skin: No cyanosis	  Neurologic: Non-focal  Extremities: No LE edema, cyanosis  Psychiatry: A & O x 3, Mood & affect appropriate                          10.3   7.0   )-----------( 320      ( 29 Jan 2018 06:28 )             30.1     01-30    139  |  100  |  34<H>  ----------------------------<  144<H>  3.7   |  30  |  1.51<H>    Ca    8.7      30 Jan 2018 07:23    Interpretation of Telemetry:  SR 60-80      A/P:  57 year old  man with recently diagnosed systolic HF (11/2017 - EF 40% -> 55-60%),  HTN, DM2. Also, recently diagnosed meningioma as well as melanoma of the skin.   Presented with HF exacerbation found to have obstructive CAD; s/p LHC with CECILIA to LAD.    1. CAD, s/p PCI/CECILIA  -Cont asa, Plavix, Lipitor,     2. Acute decompensated heart failure. NYHA IV, ACC/AHA C. Continues to be overloaded.  -Cont IV Lasix 60 mg BID for goal net neg 1-2L/24 hours, as patient is still volume overloaded.  -No ACE-i for now with SILKE. Continue Imdur, hydralazine, BB  -Per R[facility] with Dr Alonso, PH likely 2/2 LV overload.    3.  HTN - reasonable control on current meds.    Jeremy Chen MD  Cardiology Fellow [id number] Patient seen and examined at bedside.    Overnight Events: KHRIS. Continues to feel abdominal distention.    Review Of Systems: No chest pain, shortness of breath, or palpitations            Medications:  acetaminophen   Tablet. 650 milliGRAM(s) Oral every 6 hours PRN  aspirin enteric coated 81 milliGRAM(s) Oral daily  atorvastatin 80 milliGRAM(s) Oral at bedtime  BACItracin   Ointment 1 Application(s) Topical two times a day  carbamide peroxide Otic Solution 5 Drop(s) Left Ear two times a day  carvedilol 25 milliGRAM(s) Oral every 12 hours  clopidogrel Tablet 75 milliGRAM(s) Oral daily  dextrose 5%. 1000 milliLiter(s) IV Continuous <Continuous>  dextrose 50% Injectable 12.5 Gram(s) IV Push once  dextrose 50% Injectable 25 Gram(s) IV Push once  dextrose 50% Injectable 25 Gram(s) IV Push once  dextrose Gel 1 Dose(s) Oral once PRN  docusate sodium 100 milliGRAM(s) Oral two times a day  furosemide   Injectable 60 milliGRAM(s) IV Push every 12 hours  glucagon  Injectable 1 milliGRAM(s) IntraMuscular once PRN  heparin  Injectable 5000 Unit(s) SubCutaneous every 12 hours  hydrALAZINE 100 milliGRAM(s) Oral three times a day  insulin glargine Injectable (LANTUS) 6 Unit(s) SubCutaneous at bedtime  insulin lispro (HumaLOG) corrective regimen sliding scale   SubCutaneous three times a day before meals  insulin lispro (HumaLOG) corrective regimen sliding scale   SubCutaneous at bedtime  isosorbide   mononitrate ER Tablet (IMDUR) 60 milliGRAM(s) Oral daily  melatonin 3 milliGRAM(s) Oral at bedtime  ondansetron Injectable 4 milliGRAM(s) IV Push every 6 hours PRN  pantoprazole    Tablet 40 milliGRAM(s) Oral before breakfast  simethicone 80 milliGRAM(s) Chew three times a day      PAST MEDICAL & SURGICAL HISTORY:  Meningioma  Malignant melanoma, unspecified site  Heart failure, unspecified heart failure chronicity, unspecified heart failure type  Type 2 diabetes mellitus with complication, without long-term current use of insulin  Enlarged heart  HTN (hypertension)  Sleep apnea  No significant past surgical history      Vitals:  T(F): 98 (01-30), Max: 98.4 (01-29)  HR: 72 (01-30) (63 - 73)  BP: 148/72 (01-30) (117/60 - 156/76)  RR: 18 (01-30)  SpO2: 93% (01-30)  I&O's Summary    29 Jan 2018 07:01  -  30 Jan 2018 07:00  --------------------------------------------------------  IN: 480 mL / OUT: 2050 mL / NET: -1570 mL        Physical Exam:  Appearance: No Acute Distress  HEENT: Elevated JVP  Cardiovascular: Normal S1 S2, RRR  Respiratory: Clear to auscultation b/l  Gastrointestinal: Soft, Non-tender, morbidly obese	  Skin: No cyanosis	  Neurologic: Non-focal  Extremities: No LE edema, cyanosis  Psychiatry: A & O x 3, Mood & affect appropriate                          10.3   7.0   )-----------( 320      ( 29 Jan 2018 06:28 )             30.1     01-30    139  |  100  |  34<H>  ----------------------------<  144<H>  3.7   |  30  |  1.51<H>    Ca    8.7      30 Jan 2018 07:23    Interpretation of Telemetry:  SR 60-80      A/P:  57 year old  man with recently diagnosed systolic HF (11/2017 - EF 40% -> 55-60%),  HTN, DM2. Also, recently diagnosed meningioma as well as melanoma of the skin.   Presented with HF exacerbation found to have obstructive CAD; s/p LHC with CECILIA to LAD.    1. CAD, s/p PCI/CECILIA  -Cont asa, Plavix, Lipitor    2. Acute decompensated heart failure. NYHA IV, ACC/AHA C. Continues to be overloaded.  -Cont IV Lasix 60 mg BID for goal net neg 1-2L/24 hours, as patient is still volume overloaded.  -No ACE-i for now with SILKE. Continue Imdur, hydralazine, BB  -Per [facility] with Dr Alonso, PH likely 2/2 LV overload.    3.  HTN - reasonable control on current meds.    Jeremy Chen MD  Cardiology Fellow [id number] Patient seen and examined at bedside.    Overnight Events: KHRIS. Continues to feel abdominal distention.    Review Of Systems: No chest pain, shortness of breath, or palpitations            Medications:  acetaminophen   Tablet. 650 milliGRAM(s) Oral every 6 hours PRN  aspirin enteric coated 81 milliGRAM(s) Oral daily  atorvastatin 80 milliGRAM(s) Oral at bedtime  BACItracin   Ointment 1 Application(s) Topical two times a day  carbamide peroxide Otic Solution 5 Drop(s) Left Ear two times a day  carvedilol 25 milliGRAM(s) Oral every 12 hours  clopidogrel Tablet 75 milliGRAM(s) Oral daily  dextrose 5%. 1000 milliLiter(s) IV Continuous <Continuous>  dextrose 50% Injectable 12.5 Gram(s) IV Push once  dextrose 50% Injectable 25 Gram(s) IV Push once  dextrose 50% Injectable 25 Gram(s) IV Push once  dextrose Gel 1 Dose(s) Oral once PRN  docusate sodium 100 milliGRAM(s) Oral two times a day  furosemide   Injectable 60 milliGRAM(s) IV Push every 12 hours  glucagon  Injectable 1 milliGRAM(s) IntraMuscular once PRN  heparin  Injectable 5000 Unit(s) SubCutaneous every 12 hours  hydrALAZINE 100 milliGRAM(s) Oral three times a day  insulin glargine Injectable (LANTUS) 6 Unit(s) SubCutaneous at bedtime  insulin lispro (HumaLOG) corrective regimen sliding scale   SubCutaneous three times a day before meals  insulin lispro (HumaLOG) corrective regimen sliding scale   SubCutaneous at bedtime  isosorbide   mononitrate ER Tablet (IMDUR) 60 milliGRAM(s) Oral daily  melatonin 3 milliGRAM(s) Oral at bedtime  ondansetron Injectable 4 milliGRAM(s) IV Push every 6 hours PRN  pantoprazole    Tablet 40 milliGRAM(s) Oral before breakfast  simethicone 80 milliGRAM(s) Chew three times a day    PAST MEDICAL & SURGICAL HISTORY:  Meningioma  Malignant melanoma, unspecified site  Heart failure, unspecified heart failure chronicity, unspecified heart failure type  Type 2 diabetes mellitus with complication, without long-term current use of insulin  Enlarged heart  HTN (hypertension)  Sleep apnea  No significant past surgical history    Vitals:  T(F): 98 (01-30), Max: 98.4 (01-29)  HR: 72 (01-30) (63 - 73)  BP: 148/72 (01-30) (117/60 - 156/76)  RR: 18 (01-30)  SpO2: 93% (01-30)    Physical Exam:  Appearance: No Acute Distress  HEENT: Elevated JVP  Cardiovascular: Normal S1 S2, RRR  Respiratory: Clear to auscultation b/l  Gastrointestinal: Soft, Non-tender, morbidly obese	  Skin: No cyanosis	  Neurologic: Non-focal  Extremities: No LE edema, cyanosis  Psychiatry: A & O x 3, Mood & affect appropriate    Interpretation of Telemetry:  SR 60-80    I&O's Summary    29 Jan 2018 07:01  -  30 Jan 2018 07:00  --------------------------------------------------------  IN: 480 mL / OUT: 2050 mL / NET: -1570 mL    LABS:                      10.3   7.0   )-----------( 320      ( 29 Jan 2018 06:28 )             30.1     01-30    139  |  100  |  34<H>  ----------------------------<  144<H>  3.7   |  30  |  1.51<H>    Ca    8.7      30 Jan 2018 07:23      A/P:  57 year old  man with recently diagnosed systolic HF (11/2017 - EF 40% -> 55-60%),  HTN, DM2. Also, recently diagnosed meningioma as well as melanoma of the skin.   Presented with HF exacerbation found to have obstructive CAD; s/p LHC with CECILIA to LAD.    1. CAD, s/p PCI/CECILIA  -Cont asa, Plavix, Lipitor    2. Acute decompensated heart failure. NYHA IV, ACC/AHA C. Continues to be overloaded.  -Cont IV Lasix 60 mg BID for goal net neg 1-2L/24 hours, as patient is still volume overloaded.  -No ACE-i for now with SILKE. Continue Imdur, hydralazine, BB  -Per [facility] with Dr Alonso, PH likely 2/2 LV overload.    3.  HTN - reasonable control on current meds.    Jeremy Chen MD  Cardiology Fellow [id number]

## 2018-01-31 LAB
BUN SERPL-MCNC: 37 MG/DL — HIGH (ref 7–23)
CALCIUM SERPL-MCNC: 9 MG/DL — SIGNIFICANT CHANGE UP (ref 8.4–10.5)
CHLORIDE SERPL-SCNC: 100 MMOL/L — SIGNIFICANT CHANGE UP (ref 96–108)
CO2 SERPL-SCNC: 28 MMOL/L — SIGNIFICANT CHANGE UP (ref 22–31)
CREAT SERPL-MCNC: 1.59 MG/DL — HIGH (ref 0.5–1.3)
GLUCOSE BLDC GLUCOMTR-MCNC: 143 MG/DL — HIGH (ref 70–99)
GLUCOSE BLDC GLUCOMTR-MCNC: 161 MG/DL — HIGH (ref 70–99)
GLUCOSE BLDC GLUCOMTR-MCNC: 186 MG/DL — HIGH (ref 70–99)
GLUCOSE BLDC GLUCOMTR-MCNC: 206 MG/DL — HIGH (ref 70–99)
GLUCOSE SERPL-MCNC: 160 MG/DL — HIGH (ref 70–99)
HCT VFR BLD CALC: 31.1 % — LOW (ref 39–50)
HGB BLD-MCNC: 10.6 G/DL — LOW (ref 13–17)
MAGNESIUM SERPL-MCNC: 2 MG/DL — SIGNIFICANT CHANGE UP (ref 1.6–2.6)
MCHC RBC-ENTMCNC: 32.4 PG — SIGNIFICANT CHANGE UP (ref 27–34)
MCHC RBC-ENTMCNC: 34.1 GM/DL — SIGNIFICANT CHANGE UP (ref 32–36)
MCV RBC AUTO: 95.1 FL — SIGNIFICANT CHANGE UP (ref 80–100)
PHOSPHATE SERPL-MCNC: 3.2 MG/DL — SIGNIFICANT CHANGE UP (ref 2.5–4.5)
PLATELET # BLD AUTO: 347 K/UL — SIGNIFICANT CHANGE UP (ref 150–400)
POTASSIUM SERPL-MCNC: 3.8 MMOL/L — SIGNIFICANT CHANGE UP (ref 3.5–5.3)
POTASSIUM SERPL-SCNC: 3.8 MMOL/L — SIGNIFICANT CHANGE UP (ref 3.5–5.3)
RBC # BLD: 3.27 M/UL — LOW (ref 4.2–5.8)
RBC # FLD: 12.8 % — SIGNIFICANT CHANGE UP (ref 10.3–14.5)
SODIUM SERPL-SCNC: 138 MMOL/L — SIGNIFICANT CHANGE UP (ref 135–145)
WBC # BLD: 7.2 K/UL — SIGNIFICANT CHANGE UP (ref 3.8–10.5)
WBC # FLD AUTO: 7.2 K/UL — SIGNIFICANT CHANGE UP (ref 3.8–10.5)

## 2018-01-31 PROCEDURE — 99233 SBSQ HOSP IP/OBS HIGH 50: CPT

## 2018-01-31 PROCEDURE — 93970 EXTREMITY STUDY: CPT | Mod: 26

## 2018-01-31 PROCEDURE — 99232 SBSQ HOSP IP/OBS MODERATE 35: CPT | Mod: GC

## 2018-01-31 RX ORDER — INSULIN GLARGINE 100 [IU]/ML
8 INJECTION, SOLUTION SUBCUTANEOUS AT BEDTIME
Qty: 0 | Refills: 0 | Status: DISCONTINUED | OUTPATIENT
Start: 2018-01-31 | End: 2018-02-01

## 2018-01-31 RX ORDER — FUROSEMIDE 40 MG
80 TABLET ORAL
Qty: 0 | Refills: 0 | Status: DISCONTINUED | OUTPATIENT
Start: 2018-01-31 | End: 2018-02-02

## 2018-01-31 RX ADMIN — PANTOPRAZOLE SODIUM 40 MILLIGRAM(S): 20 TABLET, DELAYED RELEASE ORAL at 05:57

## 2018-01-31 RX ADMIN — Medication 1 APPLICATION(S): at 17:36

## 2018-01-31 RX ADMIN — Medication 100 MILLIGRAM(S): at 05:54

## 2018-01-31 RX ADMIN — Medication 650 MILLIGRAM(S): at 21:55

## 2018-01-31 RX ADMIN — ATORVASTATIN CALCIUM 80 MILLIGRAM(S): 80 TABLET, FILM COATED ORAL at 21:56

## 2018-01-31 RX ADMIN — Medication 100 MILLIGRAM(S): at 21:56

## 2018-01-31 RX ADMIN — CARVEDILOL PHOSPHATE 25 MILLIGRAM(S): 80 CAPSULE, EXTENDED RELEASE ORAL at 17:36

## 2018-01-31 RX ADMIN — Medication 60 MILLIGRAM(S): at 05:56

## 2018-01-31 RX ADMIN — HEPARIN SODIUM 5000 UNIT(S): 5000 INJECTION INTRAVENOUS; SUBCUTANEOUS at 05:56

## 2018-01-31 RX ADMIN — CARVEDILOL PHOSPHATE 25 MILLIGRAM(S): 80 CAPSULE, EXTENDED RELEASE ORAL at 05:54

## 2018-01-31 RX ADMIN — Medication 3 MILLIGRAM(S): at 21:56

## 2018-01-31 RX ADMIN — Medication 1 APPLICATION(S): at 06:03

## 2018-01-31 RX ADMIN — Medication 100 MILLIGRAM(S): at 17:36

## 2018-01-31 RX ADMIN — Medication 81 MILLIGRAM(S): at 13:19

## 2018-01-31 RX ADMIN — Medication 1 APPLICATION(S): at 17:37

## 2018-01-31 RX ADMIN — SIMETHICONE 80 MILLIGRAM(S): 80 TABLET, CHEWABLE ORAL at 05:54

## 2018-01-31 RX ADMIN — CARBAMIDE PEROXIDE 5 DROP(S): 81.86 SOLUTION/ DROPS AURICULAR (OTIC) at 06:02

## 2018-01-31 RX ADMIN — Medication 650 MILLIGRAM(S): at 22:30

## 2018-01-31 RX ADMIN — INSULIN GLARGINE 8 UNIT(S): 100 INJECTION, SOLUTION SUBCUTANEOUS at 21:55

## 2018-01-31 RX ADMIN — Medication 100 MILLIGRAM(S): at 13:19

## 2018-01-31 RX ADMIN — SIMETHICONE 80 MILLIGRAM(S): 80 TABLET, CHEWABLE ORAL at 21:56

## 2018-01-31 RX ADMIN — HEPARIN SODIUM 5000 UNIT(S): 5000 INJECTION INTRAVENOUS; SUBCUTANEOUS at 17:36

## 2018-01-31 RX ADMIN — CLOPIDOGREL BISULFATE 75 MILLIGRAM(S): 75 TABLET, FILM COATED ORAL at 13:19

## 2018-01-31 RX ADMIN — Medication 80 MILLIGRAM(S): at 17:36

## 2018-01-31 RX ADMIN — Medication 1: at 13:29

## 2018-01-31 RX ADMIN — SIMETHICONE 80 MILLIGRAM(S): 80 TABLET, CHEWABLE ORAL at 13:19

## 2018-01-31 RX ADMIN — ISOSORBIDE MONONITRATE 60 MILLIGRAM(S): 60 TABLET, EXTENDED RELEASE ORAL at 13:19

## 2018-01-31 RX ADMIN — Medication 2: at 18:26

## 2018-01-31 NOTE — PROGRESS NOTE ADULT - SUBJECTIVE AND OBJECTIVE BOX
Patient seen and examined at bedside.    Overnight Events: KHRIS. Feels better today.    Review Of Systems: No chest pain, shortness of breath, or palpitations            Medications:  acetaminophen   Tablet. 650 milliGRAM(s) Oral every 6 hours PRN  AQUAPHOR (petrolatum Ointment) 1 Application(s) Topical two times a day  aspirin enteric coated 81 milliGRAM(s) Oral daily  atorvastatin 80 milliGRAM(s) Oral at bedtime  BACItracin   Ointment 1 Application(s) Topical two times a day  carbamide peroxide Otic Solution 5 Drop(s) Left Ear two times a day  carvedilol 25 milliGRAM(s) Oral every 12 hours  clopidogrel Tablet 75 milliGRAM(s) Oral daily  dextrose 5%. 1000 milliLiter(s) IV Continuous <Continuous>  dextrose 50% Injectable 12.5 Gram(s) IV Push once  dextrose 50% Injectable 25 Gram(s) IV Push once  dextrose 50% Injectable 25 Gram(s) IV Push once  dextrose Gel 1 Dose(s) Oral once PRN  docusate sodium 100 milliGRAM(s) Oral two times a day  furosemide   Injectable 60 milliGRAM(s) IV Push every 12 hours  glucagon  Injectable 1 milliGRAM(s) IntraMuscular once PRN  heparin  Injectable 5000 Unit(s) SubCutaneous every 12 hours  hydrALAZINE 100 milliGRAM(s) Oral three times a day  insulin glargine Injectable (LANTUS) 6 Unit(s) SubCutaneous at bedtime  insulin lispro (HumaLOG) corrective regimen sliding scale   SubCutaneous three times a day before meals  insulin lispro (HumaLOG) corrective regimen sliding scale   SubCutaneous at bedtime  isosorbide   mononitrate ER Tablet (IMDUR) 60 milliGRAM(s) Oral daily  melatonin 3 milliGRAM(s) Oral at bedtime  ondansetron Injectable 4 milliGRAM(s) IV Push every 6 hours PRN  pantoprazole    Tablet 40 milliGRAM(s) Oral before breakfast  simethicone 80 milliGRAM(s) Chew three times a day      PAST MEDICAL & SURGICAL HISTORY:  Meningioma  Malignant melanoma, unspecified site  Heart failure, unspecified heart failure chronicity, unspecified heart failure type  Type 2 diabetes mellitus with complication, without long-term current use of insulin  Enlarged heart  HTN (hypertension)  Sleep apnea  No significant past surgical history      Vitals:  T(F): 98.6 (01-31), Max: 99.4 (01-30)  HR: 67 (01-31) (67 - 71)  BP: 129/72 (01-31) (129/72 - 152/79)  RR: 18 (01-31)  SpO2: 93% (01-31)  I&O's Summary    30 Jan 2018 07:01  -  31 Jan 2018 07:00  --------------------------------------------------------  IN: 1080 mL / OUT: 2450 mL / NET: -1370 mL        Physical Exam:  Appearance: No Acute Distress  HEENT: MMM, No JVD  Cardiovascular: Normal S1 S2, RRR  Respiratory: Clear to auscultation b/l  Gastrointestinal: Soft, Non-tender, morbidly obese	  Skin: No cyanosis	  Neurologic: Non-focal  Extremities: No LE edema, cyanosis  Psychiatry: A & O x 3, Mood & affect appropriate                          10.6   7.2   )-----------( 347      ( 31 Jan 2018 07:03 )             31.1     01-31    138  |  100  |  37<H>  ----------------------------<  160<H>  3.8   |  28  |  1.59<H>    Ca    9.0      31 Jan 2018 07:03  Phos  3.2     01-31  Mg     2.0     01-31    Interpretation of Telemetry:  SR 60-80      A/P:  57 year old  man with recently diagnosed systolic HF (11/2017 - EF 40% -> 55-60%),  HTN, DM2. Also, recently diagnosed meningioma as well as melanoma of the skin.   Presented with HF exacerbation found to have obstructive CAD; s/p LHC with CECILIA to LAD.    1. CAD, s/p PCI/CECILIA  -Cont ASA, Plavix, Lipitor    2. Acute decompensated heart failure. NYHA IV, ACC/AHA C. Now euvloemic.  -Switch IV lasix to PO 80 mg BID for goal net even.  -No ACE-i for now with SILKE. Continue Imdur, hydralazine, BB  -Per [facility] with Dr Alonso, PH likely 2/2 LV overload.    3.  HTN - reasonable control on current meds.    Jeremy Chen MD  Cardiology Fellow [id number] Patient seen and examined at bedside.    Overnight Events: KHRIS. Feels better today.    Review Of Systems: No chest pain, shortness of breath, or palpitations            Medications:  acetaminophen   Tablet. 650 milliGRAM(s) Oral every 6 hours PRN  AQUAPHOR (petrolatum Ointment) 1 Application(s) Topical two times a day  aspirin enteric coated 81 milliGRAM(s) Oral daily  atorvastatin 80 milliGRAM(s) Oral at bedtime  BACItracin   Ointment 1 Application(s) Topical two times a day  carbamide peroxide Otic Solution 5 Drop(s) Left Ear two times a day  carvedilol 25 milliGRAM(s) Oral every 12 hours  clopidogrel Tablet 75 milliGRAM(s) Oral daily  dextrose 5%. 1000 milliLiter(s) IV Continuous <Continuous>  dextrose 50% Injectable 12.5 Gram(s) IV Push once  dextrose 50% Injectable 25 Gram(s) IV Push once  dextrose 50% Injectable 25 Gram(s) IV Push once  dextrose Gel 1 Dose(s) Oral once PRN  docusate sodium 100 milliGRAM(s) Oral two times a day  furosemide   Injectable 60 milliGRAM(s) IV Push every 12 hours  glucagon  Injectable 1 milliGRAM(s) IntraMuscular once PRN  heparin  Injectable 5000 Unit(s) SubCutaneous every 12 hours  hydrALAZINE 100 milliGRAM(s) Oral three times a day  insulin glargine Injectable (LANTUS) 6 Unit(s) SubCutaneous at bedtime  insulin lispro (HumaLOG) corrective regimen sliding scale   SubCutaneous three times a day before meals  insulin lispro (HumaLOG) corrective regimen sliding scale   SubCutaneous at bedtime  isosorbide   mononitrate ER Tablet (IMDUR) 60 milliGRAM(s) Oral daily  melatonin 3 milliGRAM(s) Oral at bedtime  ondansetron Injectable 4 milliGRAM(s) IV Push every 6 hours PRN  pantoprazole    Tablet 40 milliGRAM(s) Oral before breakfast  simethicone 80 milliGRAM(s) Chew three times a day      PAST MEDICAL & SURGICAL HISTORY:  Meningioma  Malignant melanoma, unspecified site  Heart failure, unspecified heart failure chronicity, unspecified heart failure type  Type 2 diabetes mellitus with complication, without long-term current use of insulin  Enlarged heart  HTN (hypertension)  Sleep apnea  No significant past surgical history      Vitals:  T(F): 98.6 (01-31), Max: 99.4 (01-30)  HR: 67 (01-31) (67 - 71)  BP: 129/72 (01-31) (129/72 - 152/79)  RR: 18 (01-31)  SpO2: 93% (01-31)  I&O's Summary    30 Jan 2018 07:01  -  31 Jan 2018 07:00  --------------------------------------------------------  IN: 1080 mL / OUT: 2450 mL / NET: -1370 mL        Physical Exam:  Appearance: No Acute Distress  HEENT: MMM, No JVD  Cardiovascular: Normal S1 S2, RRR  Respiratory: Clear to auscultation b/l  Gastrointestinal: Soft, Non-tender, morbidly obese	  Skin: No cyanosis	  Neurologic: Non-focal  Extremities: No LE edema, cyanosis  Psychiatry: A & O x 3, Mood & affect appropriate                          10.6   7.2   )-----------( 347      ( 31 Jan 2018 07:03 )             31.1     01-31    138  |  100  |  37<H>  ----------------------------<  160<H>  3.8   |  28  |  1.59<H>    Ca    9.0      31 Jan 2018 07:03  Phos  3.2     01-31  Mg     2.0     01-31    Interpretation of Telemetry:  SR 60-80      A/P:  57 year old  man with recently diagnosed systolic HF (11/2017 - EF 40% -> 55-60%),  HTN, DM2. Also, recently diagnosed meningioma as well as melanoma of the skin.   Presented with HF exacerbation found to have obstructive CAD; s/p LHC with CECILIA to LAD.    1. CAD, s/p PCI/CECILIA  -Cont ASA, Plavix, Lipitor    2. Acute decompensated heart failure. NYHA IV, ACC/AHA C. Now euvolemic.  -Switch IV lasix to PO 80 mg BID for goal net even.  -No ACE-i for now with SILKE. Continue Imdur, hydralazine, BB  -Per [facility] with Dr Alonso, PH likely 2/2 LV overload.    3.  HTN - reasonable control on current meds.    Jeremy Chen MD  Cardiology Fellow [id number] Patient seen and examined at bedside.    Overnight Events: KHRIS. Feels better today.    Review Of Systems: No chest pain, shortness of breath, or palpitations            Medications:  acetaminophen   Tablet. 650 milliGRAM(s) Oral every 6 hours PRN  AQUAPHOR (petrolatum Ointment) 1 Application(s) Topical two times a day  aspirin enteric coated 81 milliGRAM(s) Oral daily  atorvastatin 80 milliGRAM(s) Oral at bedtime  BACItracin   Ointment 1 Application(s) Topical two times a day  carbamide peroxide Otic Solution 5 Drop(s) Left Ear two times a day  carvedilol 25 milliGRAM(s) Oral every 12 hours  clopidogrel Tablet 75 milliGRAM(s) Oral daily  dextrose 5%. 1000 milliLiter(s) IV Continuous <Continuous>  dextrose 50% Injectable 12.5 Gram(s) IV Push once  dextrose 50% Injectable 25 Gram(s) IV Push once  dextrose 50% Injectable 25 Gram(s) IV Push once  dextrose Gel 1 Dose(s) Oral once PRN  docusate sodium 100 milliGRAM(s) Oral two times a day  furosemide   Injectable 60 milliGRAM(s) IV Push every 12 hours  glucagon  Injectable 1 milliGRAM(s) IntraMuscular once PRN  heparin  Injectable 5000 Unit(s) SubCutaneous every 12 hours  hydrALAZINE 100 milliGRAM(s) Oral three times a day  insulin glargine Injectable (LANTUS) 6 Unit(s) SubCutaneous at bedtime  insulin lispro (HumaLOG) corrective regimen sliding scale   SubCutaneous three times a day before meals  insulin lispro (HumaLOG) corrective regimen sliding scale   SubCutaneous at bedtime  isosorbide   mononitrate ER Tablet (IMDUR) 60 milliGRAM(s) Oral daily  melatonin 3 milliGRAM(s) Oral at bedtime  ondansetron Injectable 4 milliGRAM(s) IV Push every 6 hours PRN  pantoprazole    Tablet 40 milliGRAM(s) Oral before breakfast  simethicone 80 milliGRAM(s) Chew three times a day    PAST MEDICAL & SURGICAL HISTORY:  Meningioma  Malignant melanoma, unspecified site  Heart failure, unspecified heart failure chronicity, unspecified heart failure type  Type 2 diabetes mellitus with complication, without long-term current use of insulin  Enlarged heart  HTN (hypertension)  Sleep apnea  No significant past surgical history    Vitals:  T(F): 98.6 (01-31), Max: 99.4 (01-30)  HR: 67 (01-31) (67 - 71)  BP: 129/72 (01-31) (129/72 - 152/79)  RR: 18 (01-31)  SpO2: 93% (01-31)    Physical Exam:  Appearance: No Acute Distress  HEENT: MMM, No JVD  Cardiovascular: Normal S1 S2, RRR  Respiratory: Clear to auscultation b/l  Gastrointestinal: Soft, Non-tender, morbidly obese	  Skin: No cyanosis	  Neurologic: Non-focal  Extremities: No LE edema, cyanosis  Psychiatry: A & O x 3, Mood & affect appropriate    Interpretation of Telemetry:  SR 60-80    I&O's Summary    30 Jan 2018 07:01  -  31 Jan 2018 07:00  --------------------------------------------------------  IN: 1080 mL / OUT: 2450 mL / NET: -1370 mL    LABS:                        10.6   7.2   )-----------( 347      ( 31 Jan 2018 07:03 )             31.1     01-31    138  |  100  |  37<H>  ----------------------------<  160<H>  3.8   |  28  |  1.59<H>    Ca    9.0      31 Jan 2018 07:03  Phos  3.2     01-31  Mg     2.0     01-31      A/P:  57 year old  man with recently diagnosed systolic HF (11/2017 - EF 40% -> 55-60%),  HTN, DM2. Also, recently diagnosed meningioma as well as melanoma of the skin.   Presented with HF exacerbation found to have obstructive CAD; s/p LHC with CECILIA to LAD.    1. CAD, s/p PCI/CECILIA  -Cont ASA, Plavix, Lipitor    2. Acute decompensated heart failure. NYHA IV, ACC/AHA C. Now euvolemic.  -Switch IV lasix to PO 80 mg BID for goal net even.  -No ACE-i for now with SILKE. Continue Imdur, hydralazine, BB  -Per [facility] with Dr Alonso, PH likely 2/2 LV overload.    3.  HTN - reasonable control on current meds.    Jeremy Chen MD  Cardiology Fellow [id number]

## 2018-01-31 NOTE — PROGRESS NOTE ADULT - SUBJECTIVE AND OBJECTIVE BOX
Patient is a 57y old  Male who presents with a chief complaint of CAD, acute on chronic heart failure (18 Jan 2018 23:26)      SUBJECTIVE / OVERNIGHT EVENTS:  c/o poor sleep  no cp/n/v/sob    MEDICATIONS  (STANDING):  AQUAPHOR (petrolatum Ointment) 1 Application(s) Topical two times a day  aspirin enteric coated 81 milliGRAM(s) Oral daily  atorvastatin 80 milliGRAM(s) Oral at bedtime  BACItracin   Ointment 1 Application(s) Topical two times a day  carbamide peroxide Otic Solution 5 Drop(s) Left Ear two times a day  carvedilol 25 milliGRAM(s) Oral every 12 hours  clopidogrel Tablet 75 milliGRAM(s) Oral daily  dextrose 5%. 1000 milliLiter(s) (50 mL/Hr) IV Continuous <Continuous>  dextrose 50% Injectable 12.5 Gram(s) IV Push once  dextrose 50% Injectable 25 Gram(s) IV Push once  dextrose 50% Injectable 25 Gram(s) IV Push once  docusate sodium 100 milliGRAM(s) Oral two times a day  furosemide    Tablet 80 milliGRAM(s) Oral two times a day  heparin  Injectable 5000 Unit(s) SubCutaneous every 12 hours  hydrALAZINE 100 milliGRAM(s) Oral three times a day  insulin glargine Injectable (LANTUS) 6 Unit(s) SubCutaneous at bedtime  insulin lispro (HumaLOG) corrective regimen sliding scale   SubCutaneous three times a day before meals  insulin lispro (HumaLOG) corrective regimen sliding scale   SubCutaneous at bedtime  isosorbide   mononitrate ER Tablet (IMDUR) 60 milliGRAM(s) Oral daily  melatonin 3 milliGRAM(s) Oral at bedtime  pantoprazole    Tablet 40 milliGRAM(s) Oral before breakfast  simethicone 80 milliGRAM(s) Chew three times a day    MEDICATIONS  (PRN):  acetaminophen   Tablet. 650 milliGRAM(s) Oral every 6 hours PRN Moderate Pain (4 - 6)  dextrose Gel 1 Dose(s) Oral once PRN Blood Glucose LESS THAN 70 milliGRAM(s)/deciliter  glucagon  Injectable 1 milliGRAM(s) IntraMuscular once PRN Glucose LESS THAN 70 milligrams/deciliter  ondansetron Injectable 4 milliGRAM(s) IV Push every 6 hours PRN Nausea and/or Vomiting      Vital Signs Last 24 Hrs  T(C): 36.6 (31 Jan 2018 12:04), Max: 37.4 (30 Jan 2018 20:55)  T(F): 97.8 (31 Jan 2018 12:04), Max: 99.4 (30 Jan 2018 20:55)  HR: 105 (31 Jan 2018 12:04) (67 - 105)  BP: 110/61 (31 Jan 2018 12:04) (110/61 - 144/71)  BP(mean): --  RR: 18 (31 Jan 2018 12:04) (18 - 18)  SpO2: 94% (31 Jan 2018 12:04) (93% - 94%)  CAPILLARY BLOOD GLUCOSE      POCT Blood Glucose.: 143 mg/dL (31 Jan 2018 08:53)  POCT Blood Glucose.: 206 mg/dL (30 Jan 2018 21:57)  POCT Blood Glucose.: 215 mg/dL (30 Jan 2018 18:07)  POCT Blood Glucose.: 208 mg/dL (30 Jan 2018 13:09)    I&O's Summary    30 Jan 2018 07:01  -  31 Jan 2018 07:00  --------------------------------------------------------  IN: 1080 mL / OUT: 2450 mL / NET: -1370 mL    31 Jan 2018 07:01  -  31 Jan 2018 12:40  --------------------------------------------------------  IN: 400 mL / OUT: 0 mL / NET: 400 mL        PHYSICAL EXAM:  HEAD:  Atraumatic, Normocephalic  EYES: EOMI, PERRLA, conjunctiva and sclera clear  NECK: Supple, No JVD  CHEST/LUNG: Clear to auscultation bilaterally; No wheeze  HEART: Regular rate and rhythm; No murmurs  ABDOMEN: Soft, Nontender, Nondistended; Bowel sounds present  EXTREMITIES:  2+ Peripheral Pulses, +1 pitting edema in the LE b/l . Left leg>right leg  PSYCH: AAOx3  NEUROLOGY: non-focal      LABS:                        10.6   7.2   )-----------( 347      ( 31 Jan 2018 07:03 )             31.1     01-31    138  |  100  |  37<H>  ----------------------------<  160<H>  3.8   |  28  |  1.59<H>    Ca    9.0      31 Jan 2018 07:03  Phos  3.2     01-31  Mg     2.0     01-31                RADIOLOGY & ADDITIONAL TESTS:    Imaging Personally Reviewed: oncology records faxed    Consultant(s) Notes Reviewed:  cardiology    Care Discussed with Consultants/Other Providers:

## 2018-02-01 LAB
BUN SERPL-MCNC: 37 MG/DL — HIGH (ref 7–23)
CALCIUM SERPL-MCNC: 8.7 MG/DL — SIGNIFICANT CHANGE UP (ref 8.4–10.5)
CHLORIDE SERPL-SCNC: 102 MMOL/L — SIGNIFICANT CHANGE UP (ref 96–108)
CO2 SERPL-SCNC: 27 MMOL/L — SIGNIFICANT CHANGE UP (ref 22–31)
CREAT SERPL-MCNC: 1.53 MG/DL — HIGH (ref 0.5–1.3)
GLUCOSE BLDC GLUCOMTR-MCNC: 151 MG/DL — HIGH (ref 70–99)
GLUCOSE BLDC GLUCOMTR-MCNC: 158 MG/DL — HIGH (ref 70–99)
GLUCOSE BLDC GLUCOMTR-MCNC: 168 MG/DL — HIGH (ref 70–99)
GLUCOSE BLDC GLUCOMTR-MCNC: 219 MG/DL — HIGH (ref 70–99)
GLUCOSE SERPL-MCNC: 155 MG/DL — HIGH (ref 70–99)
MAGNESIUM SERPL-MCNC: 2 MG/DL — SIGNIFICANT CHANGE UP (ref 1.6–2.6)
POTASSIUM SERPL-MCNC: 3.6 MMOL/L — SIGNIFICANT CHANGE UP (ref 3.5–5.3)
POTASSIUM SERPL-SCNC: 3.6 MMOL/L — SIGNIFICANT CHANGE UP (ref 3.5–5.3)
SODIUM SERPL-SCNC: 140 MMOL/L — SIGNIFICANT CHANGE UP (ref 135–145)

## 2018-02-01 PROCEDURE — 99232 SBSQ HOSP IP/OBS MODERATE 35: CPT | Mod: GC

## 2018-02-01 PROCEDURE — 99233 SBSQ HOSP IP/OBS HIGH 50: CPT

## 2018-02-01 PROCEDURE — 71045 X-RAY EXAM CHEST 1 VIEW: CPT | Mod: 26

## 2018-02-01 RX ORDER — INSULIN GLARGINE 100 [IU]/ML
10 INJECTION, SOLUTION SUBCUTANEOUS AT BEDTIME
Qty: 0 | Refills: 0 | Status: DISCONTINUED | OUTPATIENT
Start: 2018-02-01 | End: 2018-02-02

## 2018-02-01 RX ORDER — INFLUENZA VIRUS VACCINE 15; 15; 15; 15 UG/.5ML; UG/.5ML; UG/.5ML; UG/.5ML
0.5 SUSPENSION INTRAMUSCULAR ONCE
Qty: 0 | Refills: 0 | Status: DISCONTINUED | OUTPATIENT
Start: 2018-02-01 | End: 2018-02-02

## 2018-02-01 RX ORDER — POTASSIUM CHLORIDE 20 MEQ
20 PACKET (EA) ORAL ONCE
Qty: 0 | Refills: 0 | Status: COMPLETED | OUTPATIENT
Start: 2018-02-01 | End: 2018-02-01

## 2018-02-01 RX ADMIN — CARBAMIDE PEROXIDE 5 DROP(S): 81.86 SOLUTION/ DROPS AURICULAR (OTIC) at 05:46

## 2018-02-01 RX ADMIN — HEPARIN SODIUM 5000 UNIT(S): 5000 INJECTION INTRAVENOUS; SUBCUTANEOUS at 17:25

## 2018-02-01 RX ADMIN — SIMETHICONE 80 MILLIGRAM(S): 80 TABLET, CHEWABLE ORAL at 05:44

## 2018-02-01 RX ADMIN — Medication 1 APPLICATION(S): at 05:44

## 2018-02-01 RX ADMIN — CARVEDILOL PHOSPHATE 25 MILLIGRAM(S): 80 CAPSULE, EXTENDED RELEASE ORAL at 17:25

## 2018-02-01 RX ADMIN — Medication 100 MILLIGRAM(S): at 05:44

## 2018-02-01 RX ADMIN — Medication 81 MILLIGRAM(S): at 13:17

## 2018-02-01 RX ADMIN — INSULIN GLARGINE 10 UNIT(S): 100 INJECTION, SOLUTION SUBCUTANEOUS at 21:56

## 2018-02-01 RX ADMIN — ISOSORBIDE MONONITRATE 60 MILLIGRAM(S): 60 TABLET, EXTENDED RELEASE ORAL at 13:17

## 2018-02-01 RX ADMIN — Medication 2: at 13:17

## 2018-02-01 RX ADMIN — ATORVASTATIN CALCIUM 80 MILLIGRAM(S): 80 TABLET, FILM COATED ORAL at 21:58

## 2018-02-01 RX ADMIN — Medication 650 MILLIGRAM(S): at 21:55

## 2018-02-01 RX ADMIN — Medication 80 MILLIGRAM(S): at 05:52

## 2018-02-01 RX ADMIN — CARBAMIDE PEROXIDE 5 DROP(S): 81.86 SOLUTION/ DROPS AURICULAR (OTIC) at 18:13

## 2018-02-01 RX ADMIN — Medication 1 APPLICATION(S): at 17:25

## 2018-02-01 RX ADMIN — SIMETHICONE 80 MILLIGRAM(S): 80 TABLET, CHEWABLE ORAL at 13:17

## 2018-02-01 RX ADMIN — Medication 650 MILLIGRAM(S): at 22:30

## 2018-02-01 RX ADMIN — Medication 100 MILLIGRAM(S): at 21:55

## 2018-02-01 RX ADMIN — Medication 1 APPLICATION(S): at 17:24

## 2018-02-01 RX ADMIN — Medication 100 MILLIGRAM(S): at 17:25

## 2018-02-01 RX ADMIN — Medication 20 MILLIEQUIVALENT(S): at 18:28

## 2018-02-01 RX ADMIN — PANTOPRAZOLE SODIUM 40 MILLIGRAM(S): 20 TABLET, DELAYED RELEASE ORAL at 05:44

## 2018-02-01 RX ADMIN — Medication 1: at 18:28

## 2018-02-01 RX ADMIN — Medication 1: at 09:31

## 2018-02-01 RX ADMIN — Medication 1 APPLICATION(S): at 05:46

## 2018-02-01 RX ADMIN — Medication 80 MILLIGRAM(S): at 17:25

## 2018-02-01 RX ADMIN — CARVEDILOL PHOSPHATE 25 MILLIGRAM(S): 80 CAPSULE, EXTENDED RELEASE ORAL at 05:44

## 2018-02-01 RX ADMIN — HEPARIN SODIUM 5000 UNIT(S): 5000 INJECTION INTRAVENOUS; SUBCUTANEOUS at 05:44

## 2018-02-01 RX ADMIN — CLOPIDOGREL BISULFATE 75 MILLIGRAM(S): 75 TABLET, FILM COATED ORAL at 13:17

## 2018-02-01 RX ADMIN — Medication 3 MILLIGRAM(S): at 21:56

## 2018-02-01 RX ADMIN — SIMETHICONE 80 MILLIGRAM(S): 80 TABLET, CHEWABLE ORAL at 21:56

## 2018-02-01 RX ADMIN — Medication 100 MILLIGRAM(S): at 13:17

## 2018-02-01 NOTE — DIETITIAN INITIAL EVALUATION ADULT. - OTHER INFO
Nutrition consult received for assessment and education. Reports fair PO intake during this admission. Per chart, % PO intake x2 meals noted. Reports NKFA. Denied micronutrient supplementation PTA. Denies chewing/swallowing difficulty. Denies nausea/emesis. Last BM 1/31 per chart.

## 2018-02-01 NOTE — PROVIDER CONTACT NOTE (OTHER) - ASSESSMENT
Patient is A&OX3. Patient is asymptomatic. VS T-98.3 P-75 R-17 Bp-160/22N1-84% on room air Patient is A&OX3. Patient is asymptomatic. VS T-98.3 P-75 R-17 Bp-160/75 O2-95% on room air

## 2018-02-01 NOTE — DIETITIAN INITIAL EVALUATION ADULT. - NS AS NUTRI INTERV ED CONTENT
Provided brief diet education regarding T2DM including sources of CHOs, importance of pairing protein/fiber with CHOs to assist with BG control, trying to include a snack/meal during the day, and making better choices when eating out. Additionally reviewed heart healthy eating diet education including importance of limited sodium/fat in diet and how to apply guidelines to current lifestyle. Patient accepted handouts but reported that he is unlikely to follow guidelines after discharge

## 2018-02-01 NOTE — PROGRESS NOTE ADULT - SUBJECTIVE AND OBJECTIVE BOX
Patient seen and examined at bedside.    Overnight Events: Breathing has improved. Minimal FAIR. Still has some abdominal fullness.    Review Of Systems: No chest pain, shortness of breath, or palpitations            Medications:  acetaminophen   Tablet. 650 milliGRAM(s) Oral every 6 hours PRN  AQUAPHOR (petrolatum Ointment) 1 Application(s) Topical two times a day  aspirin enteric coated 81 milliGRAM(s) Oral daily  atorvastatin 80 milliGRAM(s) Oral at bedtime  BACItracin   Ointment 1 Application(s) Topical two times a day  carbamide peroxide Otic Solution 5 Drop(s) Left Ear two times a day  carvedilol 25 milliGRAM(s) Oral every 12 hours  clopidogrel Tablet 75 milliGRAM(s) Oral daily  dextrose 5%. 1000 milliLiter(s) IV Continuous <Continuous>  dextrose 50% Injectable 12.5 Gram(s) IV Push once  dextrose 50% Injectable 25 Gram(s) IV Push once  dextrose 50% Injectable 25 Gram(s) IV Push once  dextrose Gel 1 Dose(s) Oral once PRN  docusate sodium 100 milliGRAM(s) Oral two times a day  furosemide    Tablet 80 milliGRAM(s) Oral two times a day  glucagon  Injectable 1 milliGRAM(s) IntraMuscular once PRN  heparin  Injectable 5000 Unit(s) SubCutaneous every 12 hours  hydrALAZINE 100 milliGRAM(s) Oral three times a day  influenza   Vaccine 0.5 milliLiter(s) IntraMuscular once  insulin glargine Injectable (LANTUS) 8 Unit(s) SubCutaneous at bedtime  insulin lispro (HumaLOG) corrective regimen sliding scale   SubCutaneous three times a day before meals  insulin lispro (HumaLOG) corrective regimen sliding scale   SubCutaneous at bedtime  isosorbide   mononitrate ER Tablet (IMDUR) 60 milliGRAM(s) Oral daily  melatonin 3 milliGRAM(s) Oral at bedtime  ondansetron Injectable 4 milliGRAM(s) IV Push every 6 hours PRN  pantoprazole    Tablet 40 milliGRAM(s) Oral before breakfast  simethicone 80 milliGRAM(s) Chew three times a day      PAST MEDICAL & SURGICAL HISTORY:  Meningioma  Malignant melanoma, unspecified site  Heart failure, unspecified heart failure chronicity, unspecified heart failure type  Type 2 diabetes mellitus with complication, without long-term current use of insulin  Enlarged heart  HTN (hypertension)  Sleep apnea  No significant past surgical history      Vitals:  T(F): 98.1 (02-01), Max: 98.9 (01-31)  HR: 66 (02-01) (66 - 71)  BP: 152/79 (02-01) (135/70 - 159/77)  RR: 18 (02-01)  SpO2: 99% (02-01)  I&O's Summary    31 Jan 2018 07:01  -  01 Feb 2018 07:00  --------------------------------------------------------  IN: 1180 mL / OUT: 1900 mL / NET: -720 mL        Physical Exam:  Appearance: No Acute Distress  HEENT: MMM, No JVD  Cardiovascular: Normal S1 S2, RRR  Respiratory: Clear to auscultation b/l  Gastrointestinal: Soft, Non-tender, morbidly obese	  Skin: No cyanosis	  Neurologic: Non-focal  Extremities: No LE edema, cyanosis  Psychiatry: A & O x 3, Mood & affect appropriate    Interpretation of Telemetry:  SR 60-80                        10.6   7.2   )-----------( 347      ( 31 Jan 2018 07:03 )             31.1     02-01    140  |  102  |  37<H>  ----------------------------<  155<H>  3.6   |  27  |  1.53<H>    Ca    8.7      01 Feb 2018 06:19  Phos  3.2     01-31  Mg     2.0     02-01      A/P:  57 year old  man with recently diagnosed systolic HF (11/2017 - EF 40% -> 55-60%),  HTN, DM2. Also, recently diagnosed meningioma as well as melanoma of the skin.   Presented with HF exacerbation found to have obstructive CAD; s/p LHC with CECILIA to LAD.    1. CAD, s/p PCI/CECILIA  -Cont ASA, Plavix, Lipitor    2. Acute decompensated heart failure. NYHA IV, ACC/AHA C. Now euvolemic.  -Would get CXR and if no pulmonary edema, can send home on PO lasix 80 mg BID  -No ACE-i for now with SILKE. Continue Imdur, hydralazine, BB  -Per [facility] with Dr Alonso, PH likely 2/2 LV overload.    3.  HTN - reasonable control on current meds.    Jeremy Chen MD  Cardiology Fellow [id number] Patient seen and examined at bedside.    Overnight Events: Breathing has improved. Minimal FAIR. Still has some abdominal fullness.    Review Of Systems: No chest pain, shortness of breath, or palpitations            Medications:  acetaminophen   Tablet. 650 milliGRAM(s) Oral every 6 hours PRN  AQUAPHOR (petrolatum Ointment) 1 Application(s) Topical two times a day  aspirin enteric coated 81 milliGRAM(s) Oral daily  atorvastatin 80 milliGRAM(s) Oral at bedtime  BACItracin   Ointment 1 Application(s) Topical two times a day  carbamide peroxide Otic Solution 5 Drop(s) Left Ear two times a day  carvedilol 25 milliGRAM(s) Oral every 12 hours  clopidogrel Tablet 75 milliGRAM(s) Oral daily  dextrose 5%. 1000 milliLiter(s) IV Continuous <Continuous>  dextrose 50% Injectable 12.5 Gram(s) IV Push once  dextrose 50% Injectable 25 Gram(s) IV Push once  dextrose 50% Injectable 25 Gram(s) IV Push once  dextrose Gel 1 Dose(s) Oral once PRN  docusate sodium 100 milliGRAM(s) Oral two times a day  furosemide    Tablet 80 milliGRAM(s) Oral two times a day  glucagon  Injectable 1 milliGRAM(s) IntraMuscular once PRN  heparin  Injectable 5000 Unit(s) SubCutaneous every 12 hours  hydrALAZINE 100 milliGRAM(s) Oral three times a day  influenza   Vaccine 0.5 milliLiter(s) IntraMuscular once  insulin glargine Injectable (LANTUS) 8 Unit(s) SubCutaneous at bedtime  insulin lispro (HumaLOG) corrective regimen sliding scale   SubCutaneous three times a day before meals  insulin lispro (HumaLOG) corrective regimen sliding scale   SubCutaneous at bedtime  isosorbide   mononitrate ER Tablet (IMDUR) 60 milliGRAM(s) Oral daily  melatonin 3 milliGRAM(s) Oral at bedtime  ondansetron Injectable 4 milliGRAM(s) IV Push every 6 hours PRN  pantoprazole    Tablet 40 milliGRAM(s) Oral before breakfast  simethicone 80 milliGRAM(s) Chew three times a day      PAST MEDICAL & SURGICAL HISTORY:  Meningioma  Malignant melanoma, unspecified site  Heart failure, unspecified heart failure chronicity, unspecified heart failure type  Type 2 diabetes mellitus with complication, without long-term current use of insulin  Enlarged heart  HTN (hypertension)  Sleep apnea  No significant past surgical history      Vitals:  T(F): 98.1 (02-01), Max: 98.9 (01-31)  HR: 66 (02-01) (66 - 71)  BP: 152/79 (02-01) (135/70 - 159/77)  RR: 18 (02-01)    Physical Exam:  Appearance: No Acute Distress  HEENT: MMM, No JVD  Cardiovascular: Normal S1 S2, RRR  Respiratory: Clear to auscultation b/l  Gastrointestinal: Soft, Non-tender, morbidly obese	  Skin: No cyanosis	  Neurologic: Non-focal  Extremities: No LE edema, cyanosis  Psychiatry: A & O x 3, Mood & affect appropriate    Interpretation of Telemetry:  SR 60-80    SpO2: 99% (02-01)    I&O's Summary    31 Jan 2018 07:01  -  01 Feb 2018 07:00  --------------------------------------------------------  IN: 1180 mL / OUT: 1900 mL / NET: -720 mL               LABS:             10.6   7.2   )-----------( 347      ( 31 Jan 2018 07:03 )             31.1     02-01    140  |  102  |  37<H>  ----------------------------<  155<H>  3.6   |  27  |  1.53<H>    Ca    8.7      01 Feb 2018 06:19  Phos  3.2     01-31  Mg     2.0     02-01      A/P:  57 year old  man with recently diagnosed systolic HF (11/2017 - EF 40% -> 55-60%),  HTN, DM2. Also, recently diagnosed meningioma as well as melanoma of the skin.   Presented with HF exacerbation found to have obstructive CAD; s/p LHC with CECILIA to LAD.    1. CAD, s/p PCI/CECILIA  -Cont ASA, Plavix, Lipitor    2. Acute decompensated heart failure. NYHA IV, ACC/AHA C. Now euvolemic.  -Would get CXR and if no pulmonary edema, can send home on PO lasix 80 mg BID  -No ACE-i for now with SILKE. Continue Imdur, hydralazine, BB  -Per [facility] with Dr Alonso, PH likely 2/2 LV overload.    3.  HTN - reasonable control on current meds.    Jeremy Chen MD  Cardiology Fellow [id number]

## 2018-02-01 NOTE — DIETITIAN INITIAL EVALUATION ADULT. - ADHERENCE
Patient with history of T2DM- states he has not seen a MD for DM management in 15-20 years and was not taking any insulin or checking fingersticks PTA. States he does not plan to take insulin or check fingersticks following discharge as he does not want "to stick himself." States he has little desire to change his habits or lifestyle at this point but willing to receive brief diet education and handouts./poor

## 2018-02-01 NOTE — PROGRESS NOTE ADULT - PROBLEM SELECTOR PLAN 2
-S/p BMS to LAD.   -C/w ASA and plavix x 1 year  -C/w atorvastatin.   -C/w carvedilol.   -No ACEi in view of SILKE on ckd3.  -continue tele monitoring

## 2018-02-01 NOTE — PROVIDER CONTACT NOTE (OTHER) - ACTION/TREATMENT ORDERED:
PA aware. will continue to monitor
ASMITA Hayes aware. No interventions at this time. Will continue to monitor.
NP notified and aware. Will continue to monitor
cont to monitor
will continue to monitor

## 2018-02-01 NOTE — DIETITIAN INITIAL EVALUATION ADULT. - PERTINENT MEDS FT
lantus 8units @ bed, protonix, mylicon, lasix, colace, zofran, lipitor, coreg, Humalog Corrective Scale

## 2018-02-01 NOTE — DIETITIAN INITIAL EVALUATION ADULT. - NS FNS REASON FOR WEIGHT CHANG
fluid retention/Patient reports UBW of 225 pounds and states he gained ~25 pounds of fluid over  the past few weeks to current weight of ~250 pounds. Noted standing weight 1/31 of 246.9 pounds.

## 2018-02-01 NOTE — DIETITIAN INITIAL EVALUATION ADULT. - NS AS NUTRI INTERV MEALS SNACK
Recommend continue DASH, consistent CHO+snack diet. Provide food preferences within dietary restrictions when feasible

## 2018-02-01 NOTE — DIETITIAN INITIAL EVALUATION ADULT. - ENERGY NEEDS
Ht 72 inches Wt 246.9 pounds BMI 33.5 Kg/m^2   pounds +/- 10%; 139% IBW  Edema: nonpitting left leg edema Skin: no pressure injuries  Other pertinent information: 58 yo male with PMH of HTN, HF, T2DM, KRISTIAN, meningioma, melanoma admitted with chest pressure, dyspnea on exertion. Found with acute on chronic diastolic HF exacerbation and S/P cardiac cath revealing occlusion to pLAD 90%, mRCA 70% with CECILIA to LAD.

## 2018-02-01 NOTE — PROGRESS NOTE ADULT - SUBJECTIVE AND OBJECTIVE BOX
Patient is a 57y old  Male who presents with a chief complaint of CAD, acute on chronic heart failure (18 Jan 2018 23:26)      SUBJECTIVE / OVERNIGHT EVENTS:  no cp/sob/n/v    MEDICATIONS  (STANDING):  AQUAPHOR (petrolatum Ointment) 1 Application(s) Topical two times a day  aspirin enteric coated 81 milliGRAM(s) Oral daily  atorvastatin 80 milliGRAM(s) Oral at bedtime  BACItracin   Ointment 1 Application(s) Topical two times a day  carbamide peroxide Otic Solution 5 Drop(s) Left Ear two times a day  carvedilol 25 milliGRAM(s) Oral every 12 hours  clopidogrel Tablet 75 milliGRAM(s) Oral daily  dextrose 5%. 1000 milliLiter(s) (50 mL/Hr) IV Continuous <Continuous>  dextrose 50% Injectable 12.5 Gram(s) IV Push once  dextrose 50% Injectable 25 Gram(s) IV Push once  dextrose 50% Injectable 25 Gram(s) IV Push once  docusate sodium 100 milliGRAM(s) Oral two times a day  furosemide    Tablet 80 milliGRAM(s) Oral two times a day  heparin  Injectable 5000 Unit(s) SubCutaneous every 12 hours  hydrALAZINE 100 milliGRAM(s) Oral three times a day  influenza   Vaccine 0.5 milliLiter(s) IntraMuscular once  insulin glargine Injectable (LANTUS) 8 Unit(s) SubCutaneous at bedtime  insulin lispro (HumaLOG) corrective regimen sliding scale   SubCutaneous three times a day before meals  insulin lispro (HumaLOG) corrective regimen sliding scale   SubCutaneous at bedtime  isosorbide   mononitrate ER Tablet (IMDUR) 60 milliGRAM(s) Oral daily  melatonin 3 milliGRAM(s) Oral at bedtime  pantoprazole    Tablet 40 milliGRAM(s) Oral before breakfast  simethicone 80 milliGRAM(s) Chew three times a day    MEDICATIONS  (PRN):  acetaminophen   Tablet. 650 milliGRAM(s) Oral every 6 hours PRN Moderate Pain (4 - 6)  dextrose Gel 1 Dose(s) Oral once PRN Blood Glucose LESS THAN 70 milliGRAM(s)/deciliter  glucagon  Injectable 1 milliGRAM(s) IntraMuscular once PRN Glucose LESS THAN 70 milligrams/deciliter  ondansetron Injectable 4 milliGRAM(s) IV Push every 6 hours PRN Nausea and/or Vomiting      Vital Signs Last 24 Hrs  T(C): 37.1 (01 Feb 2018 12:17), Max: 37.2 (31 Jan 2018 20:50)  T(F): 98.8 (01 Feb 2018 12:17), Max: 98.9 (31 Jan 2018 20:50)  HR: 66 (01 Feb 2018 12:17) (66 - 71)  BP: 128/57 (01 Feb 2018 12:17) (128/57 - 152/79)  BP(mean): --  RR: 18 (01 Feb 2018 12:17) (18 - 18)  SpO2: 96% (01 Feb 2018 12:17) (93% - 99%)  CAPILLARY BLOOD GLUCOSE      POCT Blood Glucose.: 219 mg/dL (01 Feb 2018 13:06)  POCT Blood Glucose.: 151 mg/dL (01 Feb 2018 09:02)  POCT Blood Glucose.: 161 mg/dL (31 Jan 2018 21:26)  POCT Blood Glucose.: 206 mg/dL (31 Jan 2018 17:58)    I&O's Summary    31 Jan 2018 07:01  -  01 Feb 2018 07:00  --------------------------------------------------------  IN: 1180 mL / OUT: 1900 mL / NET: -720 mL    01 Feb 2018 07:01  -  01 Feb 2018 14:18  --------------------------------------------------------  IN: 580 mL / OUT: 525 mL / NET: 55 mL        PHYSICAL EXAM:  HEAD:  Atraumatic, Normocephalic  EYES: EOMI, PERRLA, conjunctiva and sclera clear  NECK: Supple, No JVD  CHEST/LUNG: Clear to auscultation bilaterally; No wheeze  HEART: Regular rate and rhythm; No murmurs  ABDOMEN: Soft, Nontender, Nondistended; Bowel sounds present  EXTREMITIES:  2+ Peripheral Pulses, +1 pitting edema in the LE b/l . Left leg>right leg  PSYCH: AAOx3  NEUROLOGY: non-focal      LABS:                        10.6   7.2   )-----------( 347      ( 31 Jan 2018 07:03 )             31.1     02-01    140  |  102  |  37<H>  ----------------------------<  155<H>  3.6   |  27  |  1.53<H>    Ca    8.7      01 Feb 2018 06:19  Phos  3.2     01-31  Mg     2.0     02-01                RADIOLOGY & ADDITIONAL TESTS:    Imaging Personally Reviewed:  < from: [facility] Dup[facility] Lower Ext Vein Scan, [place] (01.31.18 @ 11:19) >  IMPRESSION:     No evidence of bilateral lower extremity deep venous thrombosis.    < end of copied text >    cxr personally reviewed: no pulmonary edema noted    Consultant(s) Notes Reviewed:  cardiology    Care Discussed with Consultants/Other Providers:

## 2018-02-01 NOTE — DIETITIAN INITIAL EVALUATION ADULT. - ORAL INTAKE PTA
fair/Patient endorsed a fair appetite & PO intake PTA. Usual Diet Recall: Breakfast & Lunch- skips Dinner- fast food (burgers, fries, pizza, other take-out). Patient states that with his job he did not have time to eat during the day and does not go food shopping so all sources of meals are from fast-food.

## 2018-02-02 VITALS
HEART RATE: 69 BPM | SYSTOLIC BLOOD PRESSURE: 138 MMHG | DIASTOLIC BLOOD PRESSURE: 66 MMHG | RESPIRATION RATE: 18 BRPM | OXYGEN SATURATION: 96 % | TEMPERATURE: 98 F

## 2018-02-02 DIAGNOSIS — F32.9 MAJOR DEPRESSIVE DISORDER, SINGLE EPISODE, UNSPECIFIED: ICD-10-CM

## 2018-02-02 LAB
ANION GAP SERPL CALC-SCNC: 10 MMOL/L — SIGNIFICANT CHANGE UP (ref 5–17)
BUN SERPL-MCNC: 39 MG/DL — HIGH (ref 7–23)
CALCIUM SERPL-MCNC: 9.1 MG/DL — SIGNIFICANT CHANGE UP (ref 8.4–10.5)
CHLORIDE SERPL-SCNC: 100 MMOL/L — SIGNIFICANT CHANGE UP (ref 96–108)
CO2 SERPL-SCNC: 29 MMOL/L — SIGNIFICANT CHANGE UP (ref 22–31)
CREAT SERPL-MCNC: 1.64 MG/DL — HIGH (ref 0.5–1.3)
GLUCOSE BLDC GLUCOMTR-MCNC: 191 MG/DL — HIGH (ref 70–99)
GLUCOSE BLDC GLUCOMTR-MCNC: 224 MG/DL — HIGH (ref 70–99)
GLUCOSE SERPL-MCNC: 160 MG/DL — HIGH (ref 70–99)
MAGNESIUM SERPL-MCNC: 2.2 MG/DL — SIGNIFICANT CHANGE UP (ref 1.6–2.6)
POTASSIUM SERPL-MCNC: 3.7 MMOL/L — SIGNIFICANT CHANGE UP (ref 3.5–5.3)
POTASSIUM SERPL-SCNC: 3.7 MMOL/L — SIGNIFICANT CHANGE UP (ref 3.5–5.3)
SODIUM SERPL-SCNC: 139 MMOL/L — SIGNIFICANT CHANGE UP (ref 135–145)

## 2018-02-02 PROCEDURE — 93970 EXTREMITY STUDY: CPT

## 2018-02-02 PROCEDURE — 71045 X-RAY EXAM CHEST 1 VIEW: CPT

## 2018-02-02 PROCEDURE — 85610 PROTHROMBIN TIME: CPT

## 2018-02-02 PROCEDURE — 99239 HOSP IP/OBS DSCHRG MGMT >30: CPT

## 2018-02-02 PROCEDURE — 97116 GAIT TRAINING THERAPY: CPT

## 2018-02-02 PROCEDURE — 93306 TTE W/DOPPLER COMPLETE: CPT

## 2018-02-02 PROCEDURE — 93005 ELECTROCARDIOGRAM TRACING: CPT

## 2018-02-02 PROCEDURE — 84100 ASSAY OF PHOSPHORUS: CPT

## 2018-02-02 PROCEDURE — C1753: CPT

## 2018-02-02 PROCEDURE — C1769: CPT

## 2018-02-02 PROCEDURE — 80053 COMPREHEN METABOLIC PANEL: CPT

## 2018-02-02 PROCEDURE — 84132 ASSAY OF SERUM POTASSIUM: CPT

## 2018-02-02 PROCEDURE — 97530 THERAPEUTIC ACTIVITIES: CPT

## 2018-02-02 PROCEDURE — 97162 PT EVAL MOD COMPLEX 30 MIN: CPT

## 2018-02-02 PROCEDURE — 82962 GLUCOSE BLOOD TEST: CPT

## 2018-02-02 PROCEDURE — C1894: CPT

## 2018-02-02 PROCEDURE — C1874: CPT

## 2018-02-02 PROCEDURE — C1725: CPT

## 2018-02-02 PROCEDURE — 99222 1ST HOSP IP/OBS MODERATE 55: CPT | Mod: GC

## 2018-02-02 PROCEDURE — C1887: CPT

## 2018-02-02 PROCEDURE — 83036 HEMOGLOBIN GLYCOSYLATED A1C: CPT

## 2018-02-02 PROCEDURE — 85027 COMPLETE CBC AUTOMATED: CPT

## 2018-02-02 PROCEDURE — 99232 SBSQ HOSP IP/OBS MODERATE 35: CPT | Mod: GC

## 2018-02-02 PROCEDURE — 80048 BASIC METABOLIC PNL TOTAL CA: CPT

## 2018-02-02 PROCEDURE — 83735 ASSAY OF MAGNESIUM: CPT

## 2018-02-02 PROCEDURE — 85730 THROMBOPLASTIN TIME PARTIAL: CPT

## 2018-02-02 RX ORDER — INSULIN GLARGINE 100 [IU]/ML
10 INJECTION, SOLUTION SUBCUTANEOUS
Qty: 0 | Refills: 0 | COMMUNITY
Start: 2018-02-02

## 2018-02-02 RX ORDER — CARBAMIDE PEROXIDE 81.86 MG/ML
5 SOLUTION/ DROPS AURICULAR (OTIC)
Qty: 0 | Refills: 0 | COMMUNITY
Start: 2018-02-02

## 2018-02-02 RX ORDER — PETROLATUM,WHITE
1 JELLY (GRAM) TOPICAL
Qty: 0 | Refills: 0 | COMMUNITY
Start: 2018-02-02

## 2018-02-02 RX ORDER — POTASSIUM CHLORIDE 20 MEQ
40 PACKET (EA) ORAL ONCE
Qty: 0 | Refills: 0 | Status: COMPLETED | OUTPATIENT
Start: 2018-02-02 | End: 2018-02-02

## 2018-02-02 RX ORDER — CARVEDILOL PHOSPHATE 80 MG/1
1 CAPSULE, EXTENDED RELEASE ORAL
Qty: 0 | Refills: 0 | COMMUNITY
Start: 2018-02-02

## 2018-02-02 RX ORDER — BACITRACIN ZINC 500 UNIT/G
1 OINTMENT IN PACKET (EA) TOPICAL
Qty: 0 | Refills: 0 | COMMUNITY
Start: 2018-02-02

## 2018-02-02 RX ORDER — INSULIN LISPRO 100/ML
0 VIAL (ML) SUBCUTANEOUS
Qty: 0 | Refills: 0 | COMMUNITY

## 2018-02-02 RX ORDER — DOCUSATE SODIUM 100 MG
1 CAPSULE ORAL
Qty: 0 | Refills: 0 | COMMUNITY
Start: 2018-02-02

## 2018-02-02 RX ORDER — FUROSEMIDE 40 MG
1 TABLET ORAL
Qty: 0 | Refills: 0 | COMMUNITY
Start: 2018-02-02

## 2018-02-02 RX ORDER — ATORVASTATIN CALCIUM 80 MG/1
1 TABLET, FILM COATED ORAL
Qty: 0 | Refills: 0 | COMMUNITY
Start: 2018-02-02

## 2018-02-02 RX ORDER — ISOSORBIDE MONONITRATE 60 MG/1
1 TABLET, EXTENDED RELEASE ORAL
Qty: 0 | Refills: 0 | COMMUNITY

## 2018-02-02 RX ORDER — SERTRALINE 25 MG/1
25 TABLET, FILM COATED ORAL DAILY
Qty: 0 | Refills: 0 | Status: DISCONTINUED | OUTPATIENT
Start: 2018-02-02 | End: 2018-02-02

## 2018-02-02 RX ORDER — FUROSEMIDE 40 MG
60 TABLET ORAL
Qty: 0 | Refills: 0 | COMMUNITY

## 2018-02-02 RX ORDER — LANOLIN ALCOHOL/MO/W.PET/CERES
1 CREAM (GRAM) TOPICAL
Qty: 0 | Refills: 0 | COMMUNITY
Start: 2018-02-02

## 2018-02-02 RX ORDER — ISOSORBIDE MONONITRATE 60 MG/1
1 TABLET, EXTENDED RELEASE ORAL
Qty: 0 | Refills: 0 | COMMUNITY
Start: 2018-02-02

## 2018-02-02 RX ORDER — SERTRALINE 25 MG/1
1 TABLET, FILM COATED ORAL
Qty: 0 | Refills: 0 | COMMUNITY
Start: 2018-02-02

## 2018-02-02 RX ORDER — PANTOPRAZOLE SODIUM 20 MG/1
1 TABLET, DELAYED RELEASE ORAL
Qty: 0 | Refills: 0 | COMMUNITY
Start: 2018-02-02

## 2018-02-02 RX ORDER — SIMETHICONE 80 MG/1
1 TABLET, CHEWABLE ORAL
Qty: 0 | Refills: 0 | COMMUNITY
Start: 2018-02-02

## 2018-02-02 RX ORDER — CARVEDILOL PHOSPHATE 80 MG/1
1 CAPSULE, EXTENDED RELEASE ORAL
Qty: 0 | Refills: 0 | COMMUNITY

## 2018-02-02 RX ADMIN — PANTOPRAZOLE SODIUM 40 MILLIGRAM(S): 20 TABLET, DELAYED RELEASE ORAL at 06:28

## 2018-02-02 RX ADMIN — SIMETHICONE 80 MILLIGRAM(S): 80 TABLET, CHEWABLE ORAL at 13:54

## 2018-02-02 RX ADMIN — CARVEDILOL PHOSPHATE 25 MILLIGRAM(S): 80 CAPSULE, EXTENDED RELEASE ORAL at 06:28

## 2018-02-02 RX ADMIN — Medication 2: at 13:52

## 2018-02-02 RX ADMIN — Medication 80 MILLIGRAM(S): at 06:27

## 2018-02-02 RX ADMIN — Medication 100 MILLIGRAM(S): at 06:31

## 2018-02-02 RX ADMIN — CLOPIDOGREL BISULFATE 75 MILLIGRAM(S): 75 TABLET, FILM COATED ORAL at 13:51

## 2018-02-02 RX ADMIN — Medication 40 MILLIEQUIVALENT(S): at 13:51

## 2018-02-02 RX ADMIN — Medication 1 APPLICATION(S): at 06:31

## 2018-02-02 RX ADMIN — Medication 1: at 08:59

## 2018-02-02 RX ADMIN — Medication 1 APPLICATION(S): at 06:27

## 2018-02-02 RX ADMIN — Medication 100 MILLIGRAM(S): at 06:28

## 2018-02-02 RX ADMIN — CARBAMIDE PEROXIDE 5 DROP(S): 81.86 SOLUTION/ DROPS AURICULAR (OTIC) at 06:32

## 2018-02-02 RX ADMIN — SIMETHICONE 80 MILLIGRAM(S): 80 TABLET, CHEWABLE ORAL at 06:27

## 2018-02-02 RX ADMIN — ISOSORBIDE MONONITRATE 60 MILLIGRAM(S): 60 TABLET, EXTENDED RELEASE ORAL at 13:51

## 2018-02-02 RX ADMIN — HEPARIN SODIUM 5000 UNIT(S): 5000 INJECTION INTRAVENOUS; SUBCUTANEOUS at 06:27

## 2018-02-02 RX ADMIN — Medication 81 MILLIGRAM(S): at 13:51

## 2018-02-02 NOTE — BEHAVIORAL HEALTH ASSESSMENT NOTE - NSBHCONSULTFOLLOWAFTERCARE_PSY_A_CORE FT
Please provide patient with outpatient referral.  [facility] outpatient - [phone]  [facility] outpatient - [phone]

## 2018-02-02 NOTE — BEHAVIORAL HEALTH ASSESSMENT NOTE - NSBHCONSULTRECOMMENDOTHER_PSY_A_CORE FT
1 can start Zoloft 25 daily  2 Can start Melatonin 5 mg qhs  3 Trazodone 50 mg qhs insomnia 1 Can start Zoloft 25 daily  2 Can start Melatonin 5 mg qhs  3 Trazodone 50 mg qhs insomnia

## 2018-02-02 NOTE — PROGRESS NOTE ADULT - PROBLEM SELECTOR PLAN 6
-C/w melatonin at bedtime.
-C/w melatonin at bedtime.
-C/w melatonin at bedtime.   -Ambien only if really necessary.
-Appears resolved.   -Zofran as needed.   -Simethicone BID for gas  -encouraged ambulation as tolerated   -C/w PPI daily while on DAPT.
-Appears resolved.   -Zofran as needed.   -Simethicone as needed for gas.   -C/w PPI daily while on DAPT.
-Appears resolved.   -Zofran as needed.   -Simethicone as needed for gas.   -C/w PPI daily while on DAPT.
-C/w melatonin at bedtime.
-C/w melatonin at bedtime.   -Ambien only if really necessary.
-Appears resolved.   -Zofran as needed.   -Simethicone as needed for gas.   -C/w PPI daily while on DAPT.
-Zofran as needed.   -Simethicone as needed for gas.   -C/w PPI daily while on DAPT.
-Patient says improved now.   -Advance diet as tolerated.   -Zofran as needed.   -Simethicone as needed for gas.   -C/w PPI daily while on DAPT.

## 2018-02-02 NOTE — CHART NOTE - NSCHARTNOTEFT_GEN_A_CORE
Patient is a 57y old  Male who presents with a chief complaint of CAD, acute on chronic heart failure (18 Jan 2018 23:26)      Vital Signs Last 24 Hrs  T(C): 36.7 (02 Feb 2018 12:17), Max: 37.1 (01 Feb 2018 20:51)  T(F): 98.1 (02 Feb 2018 12:17), Max: 98.7 (01 Feb 2018 20:51)  HR: 69 (02 Feb 2018 12:17) (66 - 75)  BP: 138/66 (02 Feb 2018 12:17) (132/70 - 160/75)  BP(mean): --  RR: 18 (02 Feb 2018 12:17) (17 - 18)  SpO2: 96% (02 Feb 2018 12:17) (94% - 96%)      Labs:      02-02    139  |  100  |  39<H>  ----------------------------<  160<H>  3.7   |  29  |  1.64<H>    Ca    9.1      02 Feb 2018 06:29  Mg     2.2     02-02              Radiology:    Physical Exam:  General: WN/WD NAD  Neurology: A&Ox3, nonfocal, LATIF x 4  Head:  Normocephalic, atraumatic  Respiratory: CTA B/L  CV: RRR, S1S2, no murmur  Abdominal: Soft, NT, ND no palpable mass  MSK: No edema, + peripheral pulses, FROM all 4 extremity    Discharge Note and Plan: Discussed with Dr Rowe patient medically stable for discharge   >Follow up with outpatient after discharge from Rehab   >  >  >

## 2018-02-02 NOTE — PROGRESS NOTE ADULT - PROBLEM SELECTOR PROBLEM 5
Essential hypertension
Hypokalemia

## 2018-02-02 NOTE — PROGRESS NOTE ADULT - PROBLEM SELECTOR PROBLEM 2
Coronary artery disease of native artery of native heart with stable angina pectoris
Hypertension, unspecified type
Acute on chronic diastolic heart failure
Coronary artery disease of native artery of native heart with stable angina pectoris
Acute on chronic diastolic heart failure
Acute on chronic diastolic heart failure
Acute on chronic heart failure, unspecified heart failure type

## 2018-02-02 NOTE — PROGRESS NOTE ADULT - PROBLEM SELECTOR PROBLEM 6
Psychophysiological insomnia
Non-intractable vomiting with nausea, unspecified vomiting type
Psychophysiological insomnia
Non-intractable vomiting with nausea, unspecified vomiting type

## 2018-02-02 NOTE — PROGRESS NOTE ADULT - PROBLEM SELECTOR PROBLEM 10
Depression
Malignant melanoma, unspecified site

## 2018-02-02 NOTE — PROGRESS NOTE ADULT - PROBLEM SELECTOR PROBLEM 1
Acute on chronic diastolic heart failure
Impacted cerumen of left ear
Acute on chronic diastolic heart failure
Acute on chronic diastolic heart failure
Coronary artery disease of native artery of native heart with stable angina pectoris
Acute on chronic diastolic heart failure
Coronary artery disease of native artery of native heart with stable angina pectoris
Type 2 diabetes mellitus with complication, without long-term current use of insulin
Coronary artery disease of native artery of native heart with stable angina pectoris

## 2018-02-02 NOTE — BEHAVIORAL HEALTH ASSESSMENT NOTE - CASE SUMMARY
Pt arrived to IR room 125 for para, no acute distress noted. Orders and labs reviewed on chart. Awaiting consent.      This is a 57-y.o. CM pt. with PMHx HTN, HF, DMT2, CHF, recently diagnosed with melanoma, meningioma, here for A oc HF in setting of LAD and RCA stenosis s/p BMS to LAD endorses depression in setting of multiple medical issues, anhedonia, financial issues, no suicidality or hopelessness. Likely depression in setting of medical issues and stressors.    I have seen and evaluated this patient myself. Chart, labs, meds reviewed. I agree with resident's assessment and plan.

## 2018-02-02 NOTE — PROGRESS NOTE ADULT - PROBLEM SELECTOR PROBLEM 3
Type 2 diabetes mellitus with complication, without long-term current use of insulin

## 2018-02-02 NOTE — PROGRESS NOTE ADULT - NSHPATTENDINGPLANDISCUSS_GEN_ALL_CORE
NP Holli
cardiology fellow, Dr. EDEL Chen; patient seen and examined.  Hx., exam and labs as above.  I agree with the assessment and recommendations.   Jesus Ambrocio M.D.  Cardiology Consult Service  [phone] or [phone]
cardiology fellow, Dr. EDEL Chen; patient seen and examined.  Hx., exam and labs as above.  I agree with the assessment and recommendations.  Jesus Ambrocio M.D.  Cardiology Consult Service  [phone] or [phone]
cardiology fellow, Dr. EDEL Chen; patient seen and examined.  Hx., exam and labs as above.  I agree with the assessment and recommendations.  Jesus Ambrocio M.D.  Cardiology Consult Service  [phone] or [phone]
cardiology fellow, Dr. EDEL Chen; patient seen and examined.  Hx., exam and labs as above.  I agree with the assessment and recommendations.   Jesus Ambrocio M.D.  Cardiology Consult Service  [phone] or [phone]
cardiology fellow, Dr. EDEL Chen; patient seen and examined.  Hx., exam and labs as above.  I agree with the assessment and recommendations.  Jesus Ambrocio M.D.  Cardiology Consult Service  [phone] or [phone]
to reach Cardiology Attending call during weekdays Spectra [phone].
to reach Cardiology Attending call during weekdays Spectra [phone].
to reach Cardiology Attending call during weekdays Spectra [phone].
to reach Cardiology Attending call during weekdays Spectra [phone].
to reach Cardiology Attending call during weekdays Spectra [phone].
NP Peter
NP
NP Peter
NP Ayanna
ASMITA Marrero

## 2018-02-02 NOTE — PROGRESS NOTE ADULT - PROBLEM SELECTOR PLAN 9
monitor creatinine with diuresis
-Patient reported that he hasn't used a CPAP machine in several years.   -Discussed importance of weight loss with patient.
creatinine improving to baseline with diuresis
creatinine improving to baseline with diuresis
monitor creatinine with diuresis
monitor creatinine with diuresis
-Patient reported that he hasn't used a CPAP machine in several years.   -Discussed importance of weight loss with patient.
-Patient reported that he hasn't used a CPAP machine in several years.   -Discussed importance of weight loss with patient.
-Patient reports that he hasn't used a CPAP machine in several years.   -Discussed importance of weight loss.

## 2018-02-02 NOTE — PROGRESS NOTE ADULT - ASSESSMENT
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 Patient transferred to medical service for further management.
57 year old male with PMH of CHF, DM-2, HTN, KRISTIAN, melanoma of left upper back s/p resection, and meningioma of left side of head s/p partial resection plan for outpatient radiation; presented to [facility] with SOB/cough, chest tightness, and LE edema had cardiac cath there showing stenosis of LAD and RCA; transferred to [facility] for PCI, now s/p BMS to LAD, now being diuresed for acute on chronic diastolic heart failure likely d/c to [facility] tomorrow.
HPI:  Patient is 58 yo  male with PMHx HTN, HF, DMT2 currently on no medication and not followed by endocrinology or PMD, KRISTIAN non compliant with CPAP, meningioma, and melanoma of the skin who presented to [facility] on 1/15 c/o chest pressure and dyspnea on exertion.  Patient was admitted to telemetry, cardiology Dr Alonso following.  Patient is s/p diagnostic R/LHC via RFA access earlier today showing pLAD 90%, mRCA 70%, ASA 325mg and Plavix 600mg administered.  Patient is now transferred to [facility] for PCI.  Patient is a poor historian. (18 Jan 2018 12:30)
57 year old  man with recently diagnosed systolic HF (11/2017 - EF 40% -> 55-60%),  HTN, DM2. Also recently diagnosed meningioma as well as melanoma of the skin. Presented with HF exacerbation found to have obstructive CAD; s/p LHC CECILIA LAD.    1. CAD  - cont asa, plavix, lipitor, coreg    2. Acute decompensated heart failure  -NYHA IV, ACC/AHA C- overloaded  -Lasix 60mg IV BID for goal of -500-1L.  -No ACE-i for now with SILKE. Continue Imdur, hydralazine  -monitor electrolytes and renal function, No nephrotoxic meds  -Ultimately outpatient follow up with Dr Anderson  -Severe pulm htn - obtain RHC result from OSH.
57 year old  man with recently diagnosed systolic HF (11/2017 - EF 40% -> 55-60%),  HTN, DM2. Also recently diagnosed meningioma as well as melanoma of the skin. Presented with HF exacerbation found to have obstructive CAD; s/p LHC CECILIA LAD.    1. CAD  -Cont asa, plavix, lipitor,     2. Acute decompensated heart failure. NYHA IV, ACC/AHA C. Continues to be overloaded.  -Cont IV lasix 60 mg BID for goal net neg 1-2L/24 hours as patient is still volume overloaded.  -No ACE-i for now with SILKE. Continue Imdur, hydralazine, BB  -Per RHC with Dr Alonso, PH likely 2/2 LV overload.    Didier Gibson MD  Cardiology Fellow [phone]
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 Patient transferred to medical service for further management.
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 with SOB/cough, chest tightness, and LE edema had cardiac cath there showing stenosis of LAD and RCA; transferred to [facility] for PCI, now s/p BMS to LAD. Patient transferred to medical service for further management.
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 Patient transferred to medical service for further management.
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 for PCI, now s/p BMS to LAD. Patient transferred to medical service for further management.
57 year old male with PMH of CHF, DM-2, HTN, KRISTIAN, melanoma of left upper back s/p resection, and meningioma of left side of head s/p partial resection plan for outpatient radiation; presented to [facility] with SOB/cough, chest tightness, and LE edema had cardiac cath there showing stenosis of LAD and RCA; transferred to [facility] for PCI, now s/p BMS to LAD, now being diuresed for acute on chronic diastolic heart failure d/c to [facility] today.
57 year old male with PMH of CHF, DM-2, HTN, KRISTIAN, melanoma of left upper back s/p resection, and meningioma of left side of head s/p partial resection plan for outpatient radiation; presented to [facility] with SOB/cough, chest tightness, and LE edema had cardiac cath there showing stenosis of LAD and RCA; transferred to [facility] for PCI, now s/p BMS to LAD, now being diuresed for acute on chronic diastolic heart failure likely d/c to [facility] tomorrow.
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 Patient transferred to medical service for further management.
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 with SOB/cough, chest tightness, and LE edema had cardiac cath there showing stenosis of LAD and RCA; transferred to [facility] for PCI, now s/p BMS to LAD. Patient transferred to medical service for further management.
56 y/o M w/ uncontrolled Type 2 DM uncontrolled w/ hyperglycemia, here with acute decompensated heart failure.
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 Patient had nausea/vomiting overnight and was unable to tolerate PO intake. He reports his FAIR is better now. He reports gas. He denies diarrhea or dysuria. PT evaluated the patient and recommend ADRIANNA. Patient being transferred to medical service for further management.
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 pt with hearing improvement
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

## 2018-02-02 NOTE — PROGRESS NOTE ADULT - PROBLEM SELECTOR PROBLEM 9
CKD (chronic kidney disease) stage 3, GFR 30-59 ml/min
Obstructive sleep apnea syndrome

## 2018-02-02 NOTE — PROGRESS NOTE ADULT - SUBJECTIVE AND OBJECTIVE BOX
Patient is a 57y old  Male who presents with a chief complaint of CAD, acute on chronic heart failure (18 Jan 2018 23:26)      SUBJECTIVE / OVERNIGHT EVENTS:  no acute events overnight  no nausea/vomiting/cp/sob  feels generally down but not suicidal      MEDICATIONS  (STANDING):  AQUAPHOR (petrolatum Ointment) 1 Application(s) Topical two times a day  aspirin enteric coated 81 milliGRAM(s) Oral daily  atorvastatin 80 milliGRAM(s) Oral at bedtime  BACItracin   Ointment 1 Application(s) Topical two times a day  carbamide peroxide Otic Solution 5 Drop(s) Left Ear two times a day  carvedilol 25 milliGRAM(s) Oral every 12 hours  clopidogrel Tablet 75 milliGRAM(s) Oral daily  dextrose 5%. 1000 milliLiter(s) (50 mL/Hr) IV Continuous <Continuous>  dextrose 50% Injectable 12.5 Gram(s) IV Push once  dextrose 50% Injectable 25 Gram(s) IV Push once  dextrose 50% Injectable 25 Gram(s) IV Push once  docusate sodium 100 milliGRAM(s) Oral two times a day  furosemide    Tablet 80 milliGRAM(s) Oral two times a day  heparin  Injectable 5000 Unit(s) SubCutaneous every 12 hours  hydrALAZINE 100 milliGRAM(s) Oral three times a day  influenza   Vaccine 0.5 milliLiter(s) IntraMuscular once  insulin glargine Injectable (LANTUS) 10 Unit(s) SubCutaneous at bedtime  insulin lispro (HumaLOG) corrective regimen sliding scale   SubCutaneous three times a day before meals  insulin lispro (HumaLOG) corrective regimen sliding scale   SubCutaneous at bedtime  isosorbide   mononitrate ER Tablet (IMDUR) 60 milliGRAM(s) Oral daily  melatonin 3 milliGRAM(s) Oral at bedtime  pantoprazole    Tablet 40 milliGRAM(s) Oral before breakfast  sertraline 25 milliGRAM(s) Oral daily  simethicone 80 milliGRAM(s) Chew three times a day    MEDICATIONS  (PRN):  acetaminophen   Tablet. 650 milliGRAM(s) Oral every 6 hours PRN Moderate Pain (4 - 6)  dextrose Gel 1 Dose(s) Oral once PRN Blood Glucose LESS THAN 70 milliGRAM(s)/deciliter  glucagon  Injectable 1 milliGRAM(s) IntraMuscular once PRN Glucose LESS THAN 70 milligrams/deciliter  ondansetron Injectable 4 milliGRAM(s) IV Push every 6 hours PRN Nausea and/or Vomiting      Vital Signs Last 24 Hrs  T(C): 36.7 (02 Feb 2018 12:17), Max: 37.1 (01 Feb 2018 20:51)  T(F): 98.1 (02 Feb 2018 12:17), Max: 98.7 (01 Feb 2018 20:51)  HR: 69 (02 Feb 2018 12:17) (66 - 75)  BP: 138/66 (02 Feb 2018 12:17) (132/70 - 160/75)  BP(mean): --  RR: 18 (02 Feb 2018 12:17) (17 - 18)  SpO2: 96% (02 Feb 2018 12:17) (94% - 96%)  CAPILLARY BLOOD GLUCOSE      POCT Blood Glucose.: 191 mg/dL (02 Feb 2018 09:20)  POCT Blood Glucose.: 168 mg/dL (01 Feb 2018 21:24)  POCT Blood Glucose.: 158 mg/dL (01 Feb 2018 18:08)    I&O's Summary    01 Feb 2018 07:01  -  02 Feb 2018 07:00  --------------------------------------------------------  IN: 1355 mL / OUT: 3150 mL / NET: -1795 mL    02 Feb 2018 07:01  -  02 Feb 2018 13:07  --------------------------------------------------------  IN: 280 mL / OUT: 1575 mL / NET: -1295 mL        PHYSICAL EXAM:  HEAD:  Atraumatic, Normocephalic  EYES: EOMI, PERRLA, conjunctiva and sclera clear  NECK: Supple, No JVD  CHEST/LUNG: Clear to auscultation bilaterally; No wheeze  HEART: Regular rate and rhythm; No murmurs  ABDOMEN: Soft, Nontender, Nondistended; Bowel sounds present  EXTREMITIES:  2+ Peripheral Pulses, +1 pitting edema in the LE b/l . Left leg>right leg  PSYCH: AAOx3, depressed  NEUROLOGY: non-focal    LABS:    02-02    139  |  100  |  39<H>  ----------------------------<  160<H>  3.7   |  29  |  1.64<H>    Ca    9.1      02 Feb 2018 06:29  Mg     2.2     02-02                RADIOLOGY & ADDITIONAL TESTS:    Imaging Personally Reviewed:    Consultant(s) Notes Reviewed:  cardiology, psychiatry    Care Discussed with Consultants/Other Providers:

## 2018-02-02 NOTE — PROGRESS NOTE ADULT - PROBLEM SELECTOR PROBLEM 4
DISPLAY PLAN FREE TEXT
Non-intractable vomiting with nausea, unspecified vomiting type
DISPLAY PLAN FREE TEXT
Non-intractable vomiting with nausea, unspecified vomiting type
SILKE (acute kidney injury)

## 2018-02-02 NOTE — BEHAVIORAL HEALTH ASSESSMENT NOTE - SUMMARY
58 yo  male with PMHx HTN, HF, DMT2, CHF, recently diagnosed with melanoma, meningioma, here for A oc HF in setting of LAD and RCA stenosis s/p BMS to LAD endorses depression in setting of multiple medical issues, anhedonia, financial issues no suicidality. Likely depression in setting of medical issues and stressors

## 2018-02-02 NOTE — PROGRESS NOTE ADULT - SUBJECTIVE AND OBJECTIVE BOX
Patient seen and examined at bedside.    Overnight Events: KHRIS. Continues to be net neg on PO lasix.    Review Of Systems: No chest pain, shortness of breath, or palpitations            Medications:  acetaminophen   Tablet. 650 milliGRAM(s) Oral every 6 hours PRN  AQUAPHOR (petrolatum Ointment) 1 Application(s) Topical two times a day  aspirin enteric coated 81 milliGRAM(s) Oral daily  atorvastatin 80 milliGRAM(s) Oral at bedtime  BACItracin   Ointment 1 Application(s) Topical two times a day  carbamide peroxide Otic Solution 5 Drop(s) Left Ear two times a day  carvedilol 25 milliGRAM(s) Oral every 12 hours  clopidogrel Tablet 75 milliGRAM(s) Oral daily  dextrose 5%. 1000 milliLiter(s) IV Continuous <Continuous>  dextrose 50% Injectable 12.5 Gram(s) IV Push once  dextrose 50% Injectable 25 Gram(s) IV Push once  dextrose 50% Injectable 25 Gram(s) IV Push once  dextrose Gel 1 Dose(s) Oral once PRN  docusate sodium 100 milliGRAM(s) Oral two times a day  furosemide    Tablet 80 milliGRAM(s) Oral two times a day  glucagon  Injectable 1 milliGRAM(s) IntraMuscular once PRN  heparin  Injectable 5000 Unit(s) SubCutaneous every 12 hours  hydrALAZINE 100 milliGRAM(s) Oral three times a day  influenza   Vaccine 0.5 milliLiter(s) IntraMuscular once  insulin glargine Injectable (LANTUS) 10 Unit(s) SubCutaneous at bedtime  insulin lispro (HumaLOG) corrective regimen sliding scale   SubCutaneous three times a day before meals  insulin lispro (HumaLOG) corrective regimen sliding scale   SubCutaneous at bedtime  isosorbide   mononitrate ER Tablet (IMDUR) 60 milliGRAM(s) Oral daily  melatonin 3 milliGRAM(s) Oral at bedtime  ondansetron Injectable 4 milliGRAM(s) IV Push every 6 hours PRN  pantoprazole    Tablet 40 milliGRAM(s) Oral before breakfast  simethicone 80 milliGRAM(s) Chew three times a day      PAST MEDICAL & SURGICAL HISTORY:  Meningioma  Malignant melanoma, unspecified site  Heart failure, unspecified heart failure chronicity, unspecified heart failure type  Type 2 diabetes mellitus with complication, without long-term current use of insulin  Enlarged heart  HTN (hypertension)  Sleep apnea  No significant past surgical history      Vitals:  T(F): 98.4 (02-02), Max: 98.8 (02-01)  HR: 66 (02-02) (66 - 75)  BP: 132/70 (02-02) (128/57 - 160/75)  RR: 18 (02-02)  SpO2: 94% (02-02)  I&O's Summary    01 Feb 2018 07:01  -  02 Feb 2018 07:00  --------------------------------------------------------  IN: 1355 mL / OUT: 3150 mL / NET: -1795 mL        Physical Exam:  Appearance: No Acute Distress  HEENT: MMM, No JVD  Cardiovascular: Normal S1 S2, RRR  Respiratory: Clear to auscultation b/l  Gastrointestinal: Soft, Non-tender, morbidly obese	  Skin: No cyanosis	  Neurologic: Non-focal  Extremities: No LE edema, cyanosis  Psychiatry: A & O x 3, Mood & affect appropriate    Interpretation of Telemetry:  SR 60-80      02-02    139  |  100  |  39<H>  ----------------------------<  160<H>  3.7   |  29  |  1.64<H>    Ca    9.1      02 Feb 2018 06:29  Mg     2.2     02-02      A/P:  57 year old  man with recently diagnosed systolic HF (11/2017 - EF 40% -> 55-60%),  HTN, DM2. Also, recently diagnosed meningioma as well as melanoma of the skin.   Presented with HF exacerbation found to have obstructive CAD; s/p LHC with CECILIA to LAD.    1. CAD, s/p PCI/CECILIA  -Cont ASA, Plavix, Lipitor    2. Acute decompensated heart failure. NYHA IV, ACC/AHA C. Now euvolemic.  -Decrease PO lasix to 40 mg BID since patient still negative and Cr uptrending  -No ACE-i for now with SILKE. Continue Imdur, hydralazine, BB  -Per [facility] with Dr Alonso, PH likely 2/2 LV overload.    3.  HTN - reasonable control on current meds.    Jeremy Chen MD  Cardiology Fellow [id number] Patient seen and examined at bedside.    Overnight Events: KHRIS. Continues to be net neg on PO lasix.    Review Of Systems: No chest pain, shortness of breath, or palpitations            Medications:  acetaminophen   Tablet. 650 milliGRAM(s) Oral every 6 hours PRN  AQUAPHOR (petrolatum Ointment) 1 Application(s) Topical two times a day  aspirin enteric coated 81 milliGRAM(s) Oral daily  atorvastatin 80 milliGRAM(s) Oral at bedtime  BACItracin   Ointment 1 Application(s) Topical two times a day  carbamide peroxide Otic Solution 5 Drop(s) Left Ear two times a day  carvedilol 25 milliGRAM(s) Oral every 12 hours  clopidogrel Tablet 75 milliGRAM(s) Oral daily  dextrose 5%. 1000 milliLiter(s) IV Continuous <Continuous>  dextrose 50% Injectable 12.5 Gram(s) IV Push once  dextrose 50% Injectable 25 Gram(s) IV Push once  dextrose 50% Injectable 25 Gram(s) IV Push once  dextrose Gel 1 Dose(s) Oral once PRN  docusate sodium 100 milliGRAM(s) Oral two times a day  furosemide    Tablet 80 milliGRAM(s) Oral two times a day  glucagon  Injectable 1 milliGRAM(s) IntraMuscular once PRN  heparin  Injectable 5000 Unit(s) SubCutaneous every 12 hours  hydrALAZINE 100 milliGRAM(s) Oral three times a day  influenza   Vaccine 0.5 milliLiter(s) IntraMuscular once  insulin glargine Injectable (LANTUS) 10 Unit(s) SubCutaneous at bedtime  insulin lispro (HumaLOG) corrective regimen sliding scale   SubCutaneous three times a day before meals  insulin lispro (HumaLOG) corrective regimen sliding scale   SubCutaneous at bedtime  isosorbide   mononitrate ER Tablet (IMDUR) 60 milliGRAM(s) Oral daily  melatonin 3 milliGRAM(s) Oral at bedtime  ondansetron Injectable 4 milliGRAM(s) IV Push every 6 hours PRN  pantoprazole    Tablet 40 milliGRAM(s) Oral before breakfast  simethicone 80 milliGRAM(s) Chew three times a day      PAST MEDICAL & SURGICAL HISTORY:  Meningioma  Malignant melanoma, unspecified site  Heart failure, unspecified heart failure chronicity, unspecified heart failure type  Type 2 diabetes mellitus with complication, without long-term current use of insulin  Enlarged heart  HTN (hypertension)  Sleep apnea  No significant past surgical history      Vitals:  T(F): 98.4 (02-02), Max: 98.8 (02-01)  HR: 66 (02-02) (66 - 75)  BP: 132/70 (02-02) (128/57 - 160/75)  RR: 18 (02-02)  SpO2: 94% (02-02)  I&O's Summary    Physical Exam:  Appearance: No Acute Distress  HEENT: MMM, No JVD  Cardiovascular: Normal S1 S2, RRR  Respiratory: Clear to auscultation b/l  Gastrointestinal: Soft, Non-tender, morbidly obese	  Skin: No cyanosis	  Neurologic: Non-focal  Extremities: No LE edema, cyanosis  Psychiatry: A & O x 3, Mood & affect appropriate    Interpretation of Telemetry:  SR 60-80    LABS:    02-02    139  |  100  |  39<H>  ----------------------------<  160<H>  3.7   |  29  |  1.64<H>    Ca    9.1      02 Feb 2018 06:29  Mg     2.2     02-02    I&O  01 Feb 2018 07:01  -  02 Feb 2018 07:00  --------------------------------------------------------  IN: 1355 mL / OUT: 3150 mL / NET: -1795 mL      A/P:  57 year old  man with recently diagnosed systolic HF (11/2017 - EF 40% -> 55-60%),  HTN, DM2. Also, recently diagnosed meningioma as well as melanoma of the skin.   Presented with HF exacerbation found to have obstructive CAD; s/p LHC with CECILIA to LAD.    1. CAD, s/p PCI/CECILIA  -Cont ASA, Plavix, Lipitor    2. Acute decompensated heart failure. NYHA IV, ACC/AHA C. Now euvolemic.  -Decrease PO lasix to 40 mg BID since patient still negative and Cr uptrending  -No ACE-i for now with SILKE. Continue Imdur, hydralazine, BB  -Per [facility] with Dr Alonso, PH likely 2/2 LV overload.    3.  HTN - reasonable control on current meds.    Jeremy Chen MD  Cardiology Fellow [id number]

## 2018-02-02 NOTE — BEHAVIORAL HEALTH ASSESSMENT NOTE - HPI (INCLUDE ILLNESS QUALITY, SEVERITY, DURATION, TIMING, CONTEXT, MODIFYING FACTORS, ASSOCIATED SIGNS AND SYMPTOMS)
58 yo  male with PMHx HTN, HF, DMT2, CHF, recently diagnosed with melanoma, meningioma, here for A oc HF in setting of LAD and RCA stenosis s/p BMS to LAD, psych consulted of depression. Patient states he is depressed due to multiple medical issues. Denies active SI, states would not mid dying. States prior to diagnosis was depressed, life was monotonous, getting up going to work and back to sleep. Endorses stressors of lack of money, possibly losing his house next month to foreclosure. Enjoys eating but states can not eta anything good 2/2 CHFNever seen a psychiatrist in the past. Endorses insomnia. Denies alcohol abuse, tobacco or drugs.

## 2018-02-02 NOTE — PROGRESS NOTE ADULT - PROBLEM SELECTOR PLAN 2
-S/p BMS to LAD.   -C/w ASA and plavix x 1 year  -C/w atorvastatin.   -C/w carvedilol.   -No ACEi in view of SILKE on ckd3.

## 2018-02-02 NOTE — PROGRESS NOTE ADULT - PROBLEM SELECTOR PROBLEM 7
Obstructive sleep apnea syndrome
Essential hypertension
Obstructive sleep apnea syndrome
Essential hypertension

## 2018-02-02 NOTE — PROGRESS NOTE ADULT - PROBLEM SELECTOR PLAN 7
-Patient reported that he hasn't used a CPAP machine in several years.   -Discussed importance of weight loss with patient.  -outpatient follow up
-Patient reported that he hasn't used a CPAP machine in several years.   -Discussed importance of weight loss with patient.
-Patient reported that he hasn't used a CPAP machine in several years.   -Discussed importance of weight loss with patient.  -outpatient follow up
-C/w carvedilol, hydralazine, and imdur. C/w imdur at 60mg daily, per cardio recs.
-Patient reported that he hasn't used a CPAP machine in several years.   -Discussed importance of weight loss with patient.
-Patient reported that he hasn't used a CPAP machine in several years.   -Discussed importance of weight loss with patient.  -outpatient follow up
-C/w carvedilol, hydralazine, and imdur. C/w imdur at 60mg daily, per cardio recs.
-C/w carvedilol, hydralazine, and imdur. Increase imdur to 60mg daily per cardio recs.
-C/w carvedilol, hydralazine, and imdur.

## 2018-02-02 NOTE — BEHAVIORAL HEALTH ASSESSMENT NOTE - NSBHCHARTREVIEWVS_PSY_A_CORE FT
Vital Signs Last 24 Hrs  T(C): 36.9 (02 Feb 2018 04:38), Max: 37.1 (01 Feb 2018 12:17)  T(F): 98.4 (02 Feb 2018 04:38), Max: 98.8 (01 Feb 2018 12:17)  HR: 66 (02 Feb 2018 04:38) (66 - 75)  BP: 132/70 (02 Feb 2018 04:38) (128/57 - 160/75)  BP(mean): --  RR: 18 (02 Feb 2018 04:38) (17 - 18)  SpO2: 94% (02 Feb 2018 04:38) (94% - 96%)

## 2018-02-02 NOTE — PROGRESS NOTE ADULT - PROBLEM SELECTOR PLAN 8
-Patient waiting for results of extent of melanoma on left upper back, sutures partially removed   -Outpatient f/u with rad onc for head tumor     11. Prophylactic measure: -Heparin SQ twice daily for DVT PPx. Ambulate as tolerated.
-I spoke with onc fellow following patient at [facility] Dr Howell ([phone]): had been evaluated for stage 2 melanoma at minimum.  Surgery told him he would need to f/u for lymphoscintigraphy to complete staging at [place].  Skull lesion was biopsied showing meningioma (WHO grade 1) causing mass effect/hearing issues.  Patient opted for radiation, not initiated yet.  Patient needs to follow up for this testing at [place] after rehab.  Oncology had urged patient to first take care of cardiac issues.
-Patient waiting for results of extent of melanoma on left upper back.   -Outpatient follow up.  -Outpatient f/u with rad onc for head tumor     11. Prophylactic measure: -Heparin SQ twice daily for DVT PPx. Ambulate as tolerated.     12. Dispo: -PT leslie RODAS. Plan to DC to [facility] once able.
-Most likely induced by anxiety due to patient's medical conditions and social situation.   -C/w melatonin at bedtime.   -Ambien only if really necessary.
-Patient waiting for results of extent of melanoma on left upper back, sutures partially removed   -Outpatient f/u with rad onc for head tumor     11. Prophylactic measure: -Heparin SQ twice daily for DVT PPx. Ambulate as tolerated.
-Patient waiting for results of extent of melanoma on left upper back, sutures partially removed   -attempted to reach [facility] at [phone], will fax over release of information for records   -Outpatient f/u with rad onc for head tumor     11. Prophylactic measure: -Heparin SQ twice daily for DVT PPx. Ambulate as tolerated.     12. Dispo: -PT leslie RODAS. Plan to DC to [facility] once able.
-Patient waiting for results of extent of melanoma on left upper back, sutures partially removed   -attempted to reach [facility] at [phone], will fax over release of information for records   -Outpatient f/u with rad onc for head tumor     11. Prophylactic measure: -Heparin SQ twice daily for DVT PPx. Ambulate as tolerated.     12. Dispo: -PT leslie RODAS. Plan to DC to [facility] once able.
-Patient waiting for results of extent of melanoma on left upper back, sutures partially removed.  Paged derm for removal of remaining stitches.  Will f/u.  -Outpatient f/u with rad onc for head tumor     11. Prophylactic measure: -Heparin SQ twice daily for DVT PPx. Ambulate as tolerated.
-per discussion with onc fellow following patient at [facility] Dr Howell ([phone]): had been evaluated for stage 2 melanoma at minimum.  Surgery told him he would need to f/u for lymphoscintigraphy to complete staging at [place].  Skull lesion was biopsied showing meningioma (WHO grade 1) causing mass effect/hearing issues.  Patient opted for radiation, not initiated yet.  Patient needs to follow up for this testing at [place] after rehab.  Oncology had urged patient to first take care of cardiac issues.
-per discussion with onc fellow following patient at [facility] Dr Howell ([phone]): had been evaluated for stage 2 melanoma at minimum.  Surgery told him he would need to f/u for lymphoscintigraphy to complete staging at [place].  Skull lesion was biopsied showing meningioma (WHO grade 1) causing mass effect/hearing issues.  Patient opted for radiation, not initiated yet.  Patient needs to follow up for this testing at [place] after rehab.  Oncology had urged patient to first take care of cardiac issues.
-Most likely induced by anxiety due to patient's medical conditions and social situation.   -C/w melatonin at bedtime.   -Ambien only if really necessary.
-Mostly induced by anxiety due to patient's medical conditions and social situation.   -C/w melatonin at bedtime.   -Ambien only if really necessary.
-Mostly induced by anxiety due to patient's medical conditions and social situation.   -Will start melatonin at bedtime.   -Ambien only if really necessary.

## 2018-02-02 NOTE — PROGRESS NOTE ADULT - PROVIDER SPECIALTY LIST ADULT
Cardiology
Endocrinology
Hospitalist
Cardiology
ENT
Hospitalist

## 2018-02-05 PROBLEM — Z00.00 ENCOUNTER FOR PREVENTIVE HEALTH EXAMINATION: Status: ACTIVE | Noted: 2018-02-05

## 2018-02-06 ENCOUNTER — OUTPATIENT (OUTPATIENT)
Dept: OUTPATIENT SERVICES | Facility: HOSPITAL | Age: 58
LOS: 1 days | Discharge: HOME | End: 2018-02-06
Payer: MEDICAID

## 2018-02-14 ENCOUNTER — APPOINTMENT (OUTPATIENT)
Dept: RADIATION ONCOLOGY | Facility: CLINIC | Age: 58
End: 2018-02-14
Payer: MEDICAID

## 2018-02-14 VITALS
WEIGHT: 250.22 LBS | RESPIRATION RATE: 22 BRPM | TEMPERATURE: 97.7 F | OXYGEN SATURATION: 93 % | HEART RATE: 66 BPM | HEIGHT: 69.69 IN | BODY MASS INDEX: 36.23 KG/M2 | DIASTOLIC BLOOD PRESSURE: 68 MMHG | SYSTOLIC BLOOD PRESSURE: 120 MMHG

## 2018-02-14 DIAGNOSIS — I25.10 ATHEROSCLEROTIC HEART DISEASE OF NATIVE CORONARY ARTERY W/OUT ANGINA PECTORIS: ICD-10-CM

## 2018-02-14 PROCEDURE — 99205 OFFICE O/P NEW HI 60 MIN: CPT | Mod: 25

## 2018-02-27 ENCOUNTER — CHART COPY (OUTPATIENT)
Age: 58
End: 2018-02-27

## 2018-03-02 ENCOUNTER — OTHER (OUTPATIENT)
Age: 58
End: 2018-03-02

## 2018-03-04 ENCOUNTER — FORM ENCOUNTER (OUTPATIENT)
Age: 58
End: 2018-03-04

## 2018-03-05 ENCOUNTER — OUTPATIENT (OUTPATIENT)
Dept: OUTPATIENT SERVICES | Facility: HOSPITAL | Age: 58
LOS: 1 days | End: 2018-03-05
Payer: COMMERCIAL

## 2018-03-05 ENCOUNTER — APPOINTMENT (OUTPATIENT)
Dept: MRI IMAGING | Facility: IMAGING CENTER | Age: 58
End: 2018-03-05
Payer: MEDICAID

## 2018-03-05 ENCOUNTER — RESULT REVIEW (OUTPATIENT)
Age: 58
End: 2018-03-05

## 2018-03-05 DIAGNOSIS — D32.9 BENIGN NEOPLASM OF MENINGES, UNSPECIFIED: ICD-10-CM

## 2018-03-05 PROCEDURE — A9585: CPT

## 2018-03-05 PROCEDURE — 88321 CONSLTJ&REPRT SLD PREP ELSWR: CPT

## 2018-03-05 PROCEDURE — 70553 MRI BRAIN STEM W/O & W/DYE: CPT

## 2018-03-05 PROCEDURE — 70553 MRI BRAIN STEM W/O & W/DYE: CPT | Mod: 26

## 2018-03-06 PROCEDURE — 77263 THER RADIOLOGY TX PLNG CPLX: CPT

## 2018-03-16 ENCOUNTER — APPOINTMENT (OUTPATIENT)
Dept: RADIATION ONCOLOGY | Facility: CLINIC | Age: 58
End: 2018-03-16

## 2018-03-22 ENCOUNTER — FORM ENCOUNTER (OUTPATIENT)
Age: 58
End: 2018-03-22

## 2018-03-23 ENCOUNTER — OUTPATIENT (OUTPATIENT)
Dept: OUTPATIENT SERVICES | Facility: HOSPITAL | Age: 58
LOS: 1 days | End: 2018-03-23

## 2018-03-23 ENCOUNTER — APPOINTMENT (OUTPATIENT)
Dept: NUCLEAR MEDICINE | Facility: HOSPITAL | Age: 58
End: 2018-03-23
Payer: MEDICAID

## 2018-03-23 DIAGNOSIS — C43.62 MALIGNANT MELANOMA OF LEFT UPPER LIMB, INCLUDING SHOULDER: ICD-10-CM

## 2018-03-23 LAB
HBV CORE AB SER-ACNC: NONREACTIVE — SIGNIFICANT CHANGE UP
HBV SURFACE AB SER-ACNC: NONREACTIVE — SIGNIFICANT CHANGE UP
HBV SURFACE AG SER-ACNC: NONREACTIVE — SIGNIFICANT CHANGE UP
HCV AB S/CO SERPL IA: 0.14 S/CO — SIGNIFICANT CHANGE UP
HCV AB SERPL-IMP: SIGNIFICANT CHANGE UP
HIV1 AG SER QL: SIGNIFICANT CHANGE UP
HIV1+2 AB SPEC QL: SIGNIFICANT CHANGE UP

## 2018-03-23 PROCEDURE — 78195 LYMPH SYSTEM IMAGING: CPT | Mod: 26

## 2018-03-25 LAB
HCV RNA SERPL NAA DL=5-ACNC: NOT DETECTED IU/ML — SIGNIFICANT CHANGE UP
HCV RNA SPEC NAA+PROBE-LOG IU: SIGNIFICANT CHANGE UP LOGIU/ML

## 2018-03-28 PROCEDURE — 77300 RADIATION THERAPY DOSE PLAN: CPT | Mod: 26

## 2018-03-28 PROCEDURE — 77301 RADIOTHERAPY DOSE PLAN IMRT: CPT | Mod: 26

## 2018-03-28 PROCEDURE — 77338 DESIGN MLC DEVICE FOR IMRT: CPT | Mod: 26

## 2018-04-03 PROCEDURE — G6002: CPT | Mod: 26

## 2018-04-04 PROCEDURE — G6002: CPT | Mod: 26

## 2018-04-05 PROCEDURE — G6002: CPT | Mod: 26

## 2018-04-06 PROCEDURE — G6002: CPT | Mod: 26

## 2018-04-09 PROCEDURE — G6002: CPT | Mod: 26

## 2018-04-09 PROCEDURE — 77427 RADIATION TX MANAGEMENT X5: CPT

## 2018-04-10 PROCEDURE — G6002: CPT | Mod: 26

## 2018-04-11 PROCEDURE — G6002: CPT | Mod: 26

## 2018-04-12 PROCEDURE — G6002: CPT | Mod: 26

## 2018-04-13 PROCEDURE — G6002: CPT | Mod: 26

## 2018-04-16 VITALS
DIASTOLIC BLOOD PRESSURE: 72 MMHG | HEART RATE: 61 BPM | SYSTOLIC BLOOD PRESSURE: 138 MMHG | TEMPERATURE: 98.6 F | RESPIRATION RATE: 18 BRPM | OXYGEN SATURATION: 95 % | BODY MASS INDEX: 36.19 KG/M2 | WEIGHT: 250 LBS

## 2018-04-16 PROCEDURE — G6002: CPT | Mod: 26

## 2018-04-16 PROCEDURE — 77427 RADIATION TX MANAGEMENT X5: CPT

## 2018-04-17 PROCEDURE — G6002: CPT | Mod: 26

## 2018-04-18 PROCEDURE — G6002: CPT | Mod: 26

## 2018-04-19 PROCEDURE — G6002: CPT | Mod: 26

## 2018-04-20 PROCEDURE — G6002: CPT | Mod: 26

## 2018-04-23 VITALS
OXYGEN SATURATION: 96 % | HEIGHT: 69 IN | WEIGHT: 241.18 LBS | SYSTOLIC BLOOD PRESSURE: 113 MMHG | BODY MASS INDEX: 35.72 KG/M2 | DIASTOLIC BLOOD PRESSURE: 63 MMHG | TEMPERATURE: 97.9 F | HEART RATE: 57 BPM | RESPIRATION RATE: 18 BRPM

## 2018-04-23 VITALS — BODY MASS INDEX: 35.62 KG/M2 | WEIGHT: 241.18 LBS

## 2018-04-23 PROCEDURE — 77014: CPT | Mod: 26

## 2018-04-23 PROCEDURE — 77427 RADIATION TX MANAGEMENT X5: CPT

## 2018-04-24 PROCEDURE — G6002: CPT | Mod: 26

## 2018-04-25 PROCEDURE — G6002: CPT | Mod: 26

## 2018-04-26 PROCEDURE — G6002: CPT | Mod: 26

## 2018-04-27 PROCEDURE — G6002: CPT | Mod: 26

## 2018-04-30 PROCEDURE — 77427 RADIATION TX MANAGEMENT X5: CPT

## 2018-04-30 PROCEDURE — 77014: CPT | Mod: 26

## 2018-05-01 PROCEDURE — G6002: CPT | Mod: 26

## 2018-05-02 PROCEDURE — G6002: CPT | Mod: 26

## 2018-05-03 PROCEDURE — G6002: CPT | Mod: 26

## 2018-05-04 PROCEDURE — G6002: CPT | Mod: 26

## 2018-05-07 VITALS
SYSTOLIC BLOOD PRESSURE: 114 MMHG | HEIGHT: 69 IN | TEMPERATURE: 98.2 F | RESPIRATION RATE: 18 BRPM | HEART RATE: 60 BPM | OXYGEN SATURATION: 92 % | DIASTOLIC BLOOD PRESSURE: 64 MMHG

## 2018-05-07 PROCEDURE — 77427 RADIATION TX MANAGEMENT X5: CPT

## 2018-05-07 PROCEDURE — 77014: CPT | Mod: 26

## 2018-05-08 PROCEDURE — G6002: CPT | Mod: 26

## 2018-05-09 PROCEDURE — G6002: CPT | Mod: 26

## 2018-05-10 PROCEDURE — G6002: CPT | Mod: 26

## 2018-05-11 PROCEDURE — G6002: CPT | Mod: 26

## 2018-05-14 VITALS
RESPIRATION RATE: 18 BRPM | HEART RATE: 60 BPM | SYSTOLIC BLOOD PRESSURE: 138 MMHG | OXYGEN SATURATION: 97 % | DIASTOLIC BLOOD PRESSURE: 63 MMHG

## 2018-05-14 PROCEDURE — G6002: CPT | Mod: 26

## 2018-05-14 PROCEDURE — 77427 RADIATION TX MANAGEMENT X5: CPT

## 2018-05-14 RX ORDER — INSULIN GLARGINE 100 [IU]/ML
100 INJECTION, SOLUTION SUBCUTANEOUS
Refills: 0 | Status: ACTIVE | COMMUNITY

## 2018-05-14 RX ORDER — ATORVASTATIN CALCIUM 40 MG/1
40 TABLET, FILM COATED ORAL
Refills: 0 | Status: ACTIVE | COMMUNITY

## 2018-05-14 RX ORDER — CLOPIDOGREL BISULFATE 75 MG/1
75 TABLET, FILM COATED ORAL
Refills: 0 | Status: ACTIVE | COMMUNITY

## 2018-05-14 RX ORDER — DOCUSATE SODIUM 100 MG/1
100 CAPSULE ORAL
Refills: 0 | Status: ACTIVE | COMMUNITY

## 2018-05-14 RX ORDER — UBIDECARENONE/VIT E ACET 100MG-5
CAPSULE ORAL
Refills: 0 | Status: ACTIVE | COMMUNITY

## 2018-05-14 RX ORDER — ASPIRIN 81 MG
81 TABLET, DELAYED RELEASE (ENTERIC COATED) ORAL
Refills: 0 | Status: ACTIVE | COMMUNITY

## 2018-05-29 ENCOUNTER — APPOINTMENT (OUTPATIENT)
Dept: SURGICAL ONCOLOGY | Facility: CLINIC | Age: 58
End: 2018-05-29
Payer: MEDICAID

## 2018-05-29 VITALS
RESPIRATION RATE: 15 BRPM | DIASTOLIC BLOOD PRESSURE: 64 MMHG | SYSTOLIC BLOOD PRESSURE: 111 MMHG | HEART RATE: 58 BPM | BODY MASS INDEX: 31.21 KG/M2 | WEIGHT: 218 LBS | HEIGHT: 70 IN

## 2018-05-29 DIAGNOSIS — Z78.9 OTHER SPECIFIED HEALTH STATUS: ICD-10-CM

## 2018-05-29 DIAGNOSIS — Z86.79 PERSONAL HISTORY OF OTHER DISEASES OF THE CIRCULATORY SYSTEM: ICD-10-CM

## 2018-05-29 DIAGNOSIS — Z86.39 PERSONAL HISTORY OF OTHER ENDOCRINE, NUTRITIONAL AND METABOLIC DISEASE: ICD-10-CM

## 2018-05-29 DIAGNOSIS — Z87.898 PERSONAL HISTORY OF OTHER SPECIFIED CONDITIONS: ICD-10-CM

## 2018-05-29 DIAGNOSIS — Z86.69 PERSONAL HISTORY OF OTHER DISEASES OF THE NERVOUS SYSTEM AND SENSE ORGANS: ICD-10-CM

## 2018-05-29 PROCEDURE — 99204 OFFICE O/P NEW MOD 45 MIN: CPT

## 2018-06-05 ENCOUNTER — FORM ENCOUNTER (OUTPATIENT)
Age: 58
End: 2018-06-05

## 2018-06-06 ENCOUNTER — OUTPATIENT (OUTPATIENT)
Dept: OUTPATIENT SERVICES | Facility: HOSPITAL | Age: 58
LOS: 1 days | End: 2018-06-06
Payer: COMMERCIAL

## 2018-06-06 ENCOUNTER — APPOINTMENT (OUTPATIENT)
Dept: NUCLEAR MEDICINE | Facility: IMAGING CENTER | Age: 58
End: 2018-06-06
Payer: MEDICAID

## 2018-06-06 DIAGNOSIS — C43.62 MALIGNANT MELANOMA OF LEFT UPPER LIMB, INCLUDING SHOULDER: ICD-10-CM

## 2018-06-06 PROCEDURE — 78816 PET IMAGE W/CT FULL BODY: CPT | Mod: 26,PI

## 2018-06-06 PROCEDURE — A9552: CPT

## 2018-06-06 PROCEDURE — 78816 PET IMAGE W/CT FULL BODY: CPT

## 2018-06-19 ENCOUNTER — APPOINTMENT (OUTPATIENT)
Dept: RADIATION ONCOLOGY | Facility: CLINIC | Age: 58
End: 2018-06-19
Payer: MEDICAID

## 2018-06-19 ENCOUNTER — APPOINTMENT (OUTPATIENT)
Dept: SURGICAL ONCOLOGY | Facility: CLINIC | Age: 58
End: 2018-06-19
Payer: MEDICAID

## 2018-06-19 VITALS
DIASTOLIC BLOOD PRESSURE: 68 MMHG | OXYGEN SATURATION: 98 % | WEIGHT: 211 LBS | SYSTOLIC BLOOD PRESSURE: 115 MMHG | HEIGHT: 70 IN | HEART RATE: 67 BPM | BODY MASS INDEX: 30.21 KG/M2

## 2018-06-19 DIAGNOSIS — R94.8 ABNORMAL RESULTS OF FUNCTION STUDIES OF OTHER ORGANS AND SYSTEMS: ICD-10-CM

## 2018-06-19 PROCEDURE — 99024 POSTOP FOLLOW-UP VISIT: CPT

## 2018-06-19 PROCEDURE — 99214 OFFICE O/P EST MOD 30 MIN: CPT

## 2018-06-21 ENCOUNTER — RX RENEWAL (OUTPATIENT)
Age: 58
End: 2018-06-21

## 2018-06-22 ENCOUNTER — RX RENEWAL (OUTPATIENT)
Age: 58
End: 2018-06-22

## 2018-06-25 ENCOUNTER — FORM ENCOUNTER (OUTPATIENT)
Age: 58
End: 2018-06-25

## 2018-06-26 ENCOUNTER — APPOINTMENT (OUTPATIENT)
Dept: ULTRASOUND IMAGING | Facility: IMAGING CENTER | Age: 58
End: 2018-06-26
Payer: MEDICAID

## 2018-06-26 ENCOUNTER — OUTPATIENT (OUTPATIENT)
Dept: OUTPATIENT SERVICES | Facility: HOSPITAL | Age: 58
LOS: 1 days | End: 2018-06-26
Payer: COMMERCIAL

## 2018-06-26 ENCOUNTER — RX RENEWAL (OUTPATIENT)
Age: 58
End: 2018-06-26

## 2018-06-26 ENCOUNTER — RESULT REVIEW (OUTPATIENT)
Age: 58
End: 2018-06-26

## 2018-06-26 DIAGNOSIS — R94.8 ABNORMAL RESULTS OF FUNCTION STUDIES OF OTHER ORGANS AND SYSTEMS: ICD-10-CM

## 2018-06-26 DIAGNOSIS — C43.62 MALIGNANT MELANOMA OF LEFT UPPER LIMB, INCLUDING SHOULDER: ICD-10-CM

## 2018-06-26 PROCEDURE — 88305 TISSUE EXAM BY PATHOLOGIST: CPT

## 2018-06-26 PROCEDURE — 20206 BIOPSY MUSCLE PERQ NEEDLE: CPT

## 2018-06-26 PROCEDURE — 76942 ECHO GUIDE FOR BIOPSY: CPT | Mod: 26

## 2018-06-26 PROCEDURE — 76942 ECHO GUIDE FOR BIOPSY: CPT

## 2018-06-26 PROCEDURE — 88305 TISSUE EXAM BY PATHOLOGIST: CPT | Mod: 26

## 2018-06-28 ENCOUNTER — FORM ENCOUNTER (OUTPATIENT)
Age: 58
End: 2018-06-28

## 2018-06-28 ENCOUNTER — OUTPATIENT (OUTPATIENT)
Dept: OUTPATIENT SERVICES | Facility: HOSPITAL | Age: 58
LOS: 1 days | End: 2018-06-28
Payer: MEDICAID

## 2018-06-28 VITALS
TEMPERATURE: 97 F | OXYGEN SATURATION: 97 % | HEART RATE: 64 BPM | HEIGHT: 72 IN | WEIGHT: 212.08 LBS | SYSTOLIC BLOOD PRESSURE: 132 MMHG | RESPIRATION RATE: 16 BRPM | DIASTOLIC BLOOD PRESSURE: 62 MMHG

## 2018-06-28 DIAGNOSIS — I10 ESSENTIAL (PRIMARY) HYPERTENSION: ICD-10-CM

## 2018-06-28 DIAGNOSIS — C43.9 MALIGNANT MELANOMA OF SKIN, UNSPECIFIED: ICD-10-CM

## 2018-06-28 DIAGNOSIS — Z95.5 PRESENCE OF CORONARY ANGIOPLASTY IMPLANT AND GRAFT: ICD-10-CM

## 2018-06-28 DIAGNOSIS — I42.9 CARDIOMYOPATHY, UNSPECIFIED: ICD-10-CM

## 2018-06-28 DIAGNOSIS — D32.9 BENIGN NEOPLASM OF MENINGES, UNSPECIFIED: ICD-10-CM

## 2018-06-28 DIAGNOSIS — G47.33 OBSTRUCTIVE SLEEP APNEA (ADULT) (PEDIATRIC): ICD-10-CM

## 2018-06-28 DIAGNOSIS — C43.62 MALIGNANT MELANOMA OF LEFT UPPER LIMB, INCLUDING SHOULDER: ICD-10-CM

## 2018-06-28 DIAGNOSIS — Z95.5 PRESENCE OF CORONARY ANGIOPLASTY IMPLANT AND GRAFT: Chronic | ICD-10-CM

## 2018-06-28 DIAGNOSIS — I25.10 ATHEROSCLEROTIC HEART DISEASE OF NATIVE CORONARY ARTERY WITHOUT ANGINA PECTORIS: ICD-10-CM

## 2018-06-28 DIAGNOSIS — E78.5 HYPERLIPIDEMIA, UNSPECIFIED: ICD-10-CM

## 2018-06-28 DIAGNOSIS — E11.9 TYPE 2 DIABETES MELLITUS WITHOUT COMPLICATIONS: ICD-10-CM

## 2018-06-28 DIAGNOSIS — H57.9 UNSPECIFIED DISORDER OF EYE AND ADNEXA: Chronic | ICD-10-CM

## 2018-06-28 LAB
ALBUMIN SERPL ELPH-MCNC: 3.7 G/DL — SIGNIFICANT CHANGE UP (ref 3.3–5)
ALP SERPL-CCNC: 84 U/L — SIGNIFICANT CHANGE UP (ref 40–120)
ALT FLD-CCNC: 11 U/L — SIGNIFICANT CHANGE UP (ref 4–41)
AST SERPL-CCNC: 9 U/L — SIGNIFICANT CHANGE UP (ref 4–40)
BILIRUB SERPL-MCNC: 0.5 MG/DL — SIGNIFICANT CHANGE UP (ref 0.2–1.2)
BUN SERPL-MCNC: 46 MG/DL — HIGH (ref 7–23)
CALCIUM SERPL-MCNC: 9.4 MG/DL — SIGNIFICANT CHANGE UP (ref 8.4–10.5)
CHLORIDE SERPL-SCNC: 101 MMOL/L — SIGNIFICANT CHANGE UP (ref 98–107)
CO2 SERPL-SCNC: 30 MMOL/L — SIGNIFICANT CHANGE UP (ref 22–31)
CREAT SERPL-MCNC: 1.42 MG/DL — HIGH (ref 0.5–1.3)
GLUCOSE SERPL-MCNC: 134 MG/DL — HIGH (ref 70–99)
HBA1C BLD-MCNC: 6 % — HIGH (ref 4–5.6)
HCT VFR BLD CALC: 31.1 % — LOW (ref 39–50)
HGB BLD-MCNC: 9.9 G/DL — LOW (ref 13–17)
MCHC RBC-ENTMCNC: 30.2 PG — SIGNIFICANT CHANGE UP (ref 27–34)
MCHC RBC-ENTMCNC: 31.8 % — LOW (ref 32–36)
MCV RBC AUTO: 94.8 FL — SIGNIFICANT CHANGE UP (ref 80–100)
NRBC # FLD: 0 — SIGNIFICANT CHANGE UP
PLATELET # BLD AUTO: 229 K/UL — SIGNIFICANT CHANGE UP (ref 150–400)
PMV BLD: 11 FL — SIGNIFICANT CHANGE UP (ref 7–13)
POTASSIUM SERPL-MCNC: 3.6 MMOL/L — SIGNIFICANT CHANGE UP (ref 3.5–5.3)
POTASSIUM SERPL-SCNC: 3.6 MMOL/L — SIGNIFICANT CHANGE UP (ref 3.5–5.3)
PROT SERPL-MCNC: 8 G/DL — SIGNIFICANT CHANGE UP (ref 6–8.3)
RBC # BLD: 3.28 M/UL — LOW (ref 4.2–5.8)
RBC # FLD: 15.8 % — HIGH (ref 10.3–14.5)
SODIUM SERPL-SCNC: 143 MMOL/L — SIGNIFICANT CHANGE UP (ref 135–145)
WBC # BLD: 7.12 K/UL — SIGNIFICANT CHANGE UP (ref 3.8–10.5)
WBC # FLD AUTO: 7.12 K/UL — SIGNIFICANT CHANGE UP (ref 3.8–10.5)

## 2018-06-28 PROCEDURE — 93010 ELECTROCARDIOGRAM REPORT: CPT

## 2018-06-28 RX ORDER — HYDRALAZINE HCL 50 MG
1 TABLET ORAL
Qty: 0 | Refills: 0 | COMMUNITY

## 2018-06-28 RX ORDER — TRAZODONE HCL 50 MG
1 TABLET ORAL
Qty: 0 | Refills: 0 | COMMUNITY

## 2018-06-28 NOTE — H&P PST ADULT - NEGATIVE NEUROLOGICAL SYMPTOMS
no weakness/no generalized seizures/no focal seizures/no transient paralysis/no vertigo/no loss of sensation/no loss of consciousness/no paresthesias/no syncope/no tremors/no headache

## 2018-06-28 NOTE — H&P PST ADULT - HISTORY OF PRESENT ILLNESS
57yo male with medical h/o KRISTIAN denies CPAP use, HTN, Cardiomyopathy, DMT2, Depression, Melanoma left shoulder, Meningioma, brain underwent radiation completed 5/14/2018 (6 weeks intervention) and CAD with cardiac stent pLAD 1/18/2018 @ [facility] and managed with ASA 81mg, Plavix 75mg. Pt reports PET scan for Melanoma to left shoulder showed abnormal results of abdomen. Pt presents today for Colonoscopy, Anesthesia scheduled for 7/3/2018. 57yo male with medical h/o KRISTIAN denies CPAP use, HTN, Cardiomyopathy, DMT2, Depression, Melanoma left shoulder, excision done 12/4/2017, Brain tumor (meningioma) underwent radiation completed 5/14/2018 (6 weeks intervention) and CAD with cardiac stent pLAD 1/18/2018 @ [facility] and managed with ASA 81mg, Plavix 75mg. Pt reports PET scan done 6/6/18 showed abnormal results of abdomen. Pt presents today for Colonoscopy, Anesthesia scheduled for 7/3/2018.

## 2018-06-28 NOTE — H&P PST ADULT - NEGATIVE MUSCULOSKELETAL SYMPTOMS
no muscle cramps/no neck pain/no joint swelling/no myalgia/no arm pain R/no arthralgia/no arthritis/no stiffness/no arm pain L/no back pain

## 2018-06-28 NOTE — H&P PST ADULT - NEGATIVE CARDIOVASCULAR SYMPTOMS
no peripheral edema/no dyspnea on exertion/no palpitations/no paroxysmal nocturnal dyspnea/no claudication/no chest pain/no orthopnea

## 2018-06-28 NOTE — H&P PST ADULT - PMH
CAD (coronary artery disease)    Enlarged heart    Heart failure, unspecified heart failure chronicity, unspecified heart failure type    HTN (hypertension)    Malignant melanoma, unspecified site    Meningioma    Sleep apnea    Type 2 diabetes mellitus with complication, without long-term current use of insulin

## 2018-06-28 NOTE — H&P PST ADULT - PROBLEM SELECTOR PLAN 1
Colonoscopy, Anesthesia scheduled for 7/3/2018. Colonoscopy, Anesthesia scheduled for 7/3/2018.  Pre-op instructions given. Pt verbalized understanding  Pepcid given for GI prophylaxis  Pending: Cardiology & Medical clearance    NOTE: [facility] was called to confirmed Plavix & ASA instructions, spoke to Charge SHAHRZAD Hamlin who reports pt was given order to stop Plavix and ASA 6/23/2018 Colonoscopy, Anesthesia scheduled for 7/3/2018.  Pre-op instructions given. Pt verbalized understanding  Pepcid given for GI prophylaxis  Pending: Cardiology & Medical clearance    NOTE: [facility] was called to confirmed Plavix & ASA instructions, spoke to Charge RN Catina Hamlin who reports pt was given order by PCP Dr. Marcus to stop Plavix and ASA 6/23/2018

## 2018-06-28 NOTE — H&P PST ADULT - NEGATIVE ENMT SYMPTOMS
no vertigo/no throat pain/no ear pain/no post-nasal discharge/no sinus symptoms/no nasal congestion/no nasal discharge/no dysphagia/no tinnitus

## 2018-06-28 NOTE — H&P PST ADULT - MUSCULOSKELETAL
detailed exam no joint swelling/no joint erythema/no calf tenderness/normal strength/no joint warmth/ROM intact details…

## 2018-06-28 NOTE — H&P PST ADULT - ABILITY TO HEAR (WITH HEARING AID OR HEARING APPLIANCE IF NORMALLY USED):
Mildly to Moderately Impaired: difficulty hearing in some environments or speaker may need to increase volume or speak distinctly/[place] left ear

## 2018-06-28 NOTE — H&P PST ADULT - PSH
Eye symptoms  ser eye surgery, left eye 6/25/2018 (bleeding in eye)  Stented coronary artery  pLAD 1/18/2018

## 2018-06-29 ENCOUNTER — APPOINTMENT (OUTPATIENT)
Dept: ULTRASOUND IMAGING | Facility: HOSPITAL | Age: 58
End: 2018-06-29

## 2018-06-29 ENCOUNTER — OTHER (OUTPATIENT)
Age: 58
End: 2018-06-29

## 2018-06-29 ENCOUNTER — RESULT REVIEW (OUTPATIENT)
Age: 58
End: 2018-06-29

## 2018-06-29 ENCOUNTER — OUTPATIENT (OUTPATIENT)
Dept: OUTPATIENT SERVICES | Facility: HOSPITAL | Age: 58
LOS: 1 days | End: 2018-06-29
Payer: COMMERCIAL

## 2018-06-29 DIAGNOSIS — R94.8 ABNORMAL RESULTS OF FUNCTION STUDIES OF OTHER ORGANS AND SYSTEMS: ICD-10-CM

## 2018-06-29 DIAGNOSIS — H57.9 UNSPECIFIED DISORDER OF EYE AND ADNEXA: Chronic | ICD-10-CM

## 2018-06-29 DIAGNOSIS — Z95.5 PRESENCE OF CORONARY ANGIOPLASTY IMPLANT AND GRAFT: Chronic | ICD-10-CM

## 2018-06-29 LAB — SURGICAL PATHOLOGY STUDY: SIGNIFICANT CHANGE UP

## 2018-06-29 PROCEDURE — 49180 BIOPSY ABDOMINAL MASS: CPT

## 2018-06-29 PROCEDURE — 88365 INSITU HYBRIDIZATION (FISH): CPT | Mod: 26,59

## 2018-06-29 PROCEDURE — 88305 TISSUE EXAM BY PATHOLOGIST: CPT

## 2018-06-29 PROCEDURE — 88364 INSITU HYBRIDIZATION (FISH): CPT | Mod: 26

## 2018-06-29 PROCEDURE — 85610 PROTHROMBIN TIME: CPT

## 2018-06-29 PROCEDURE — 88360 TUMOR IMMUNOHISTOCHEM/MANUAL: CPT

## 2018-06-29 PROCEDURE — 88365 INSITU HYBRIDIZATION (FISH): CPT

## 2018-06-29 PROCEDURE — 76942 ECHO GUIDE FOR BIOPSY: CPT

## 2018-06-29 PROCEDURE — 85027 COMPLETE CBC AUTOMATED: CPT

## 2018-06-29 PROCEDURE — 88364 INSITU HYBRIDIZATION (FISH): CPT

## 2018-06-29 PROCEDURE — 85730 THROMBOPLASTIN TIME PARTIAL: CPT

## 2018-06-29 PROCEDURE — 88173 CYTOPATH EVAL FNA REPORT: CPT | Mod: 26

## 2018-06-29 PROCEDURE — 88173 CYTOPATH EVAL FNA REPORT: CPT

## 2018-06-29 PROCEDURE — 88342 IMHCHEM/IMCYTCHM 1ST ANTB: CPT | Mod: 26,59

## 2018-06-29 PROCEDURE — 76942 ECHO GUIDE FOR BIOPSY: CPT | Mod: 26

## 2018-06-29 PROCEDURE — 88172 CYTP DX EVAL FNA 1ST EA SITE: CPT

## 2018-06-29 PROCEDURE — 88360 TUMOR IMMUNOHISTOCHEM/MANUAL: CPT | Mod: 26

## 2018-06-29 PROCEDURE — 88341 IMHCHEM/IMCYTCHM EA ADD ANTB: CPT

## 2018-06-29 PROCEDURE — 88305 TISSUE EXAM BY PATHOLOGIST: CPT | Mod: 26

## 2018-06-29 PROCEDURE — 88341 IMHCHEM/IMCYTCHM EA ADD ANTB: CPT | Mod: 26,59

## 2018-07-02 ENCOUNTER — OTHER (OUTPATIENT)
Age: 58
End: 2018-07-02

## 2018-07-03 ENCOUNTER — APPOINTMENT (OUTPATIENT)
Dept: GASTROENTEROLOGY | Facility: HOSPITAL | Age: 58
End: 2018-07-03

## 2018-07-14 ENCOUNTER — OUTPATIENT (OUTPATIENT)
Dept: OUTPATIENT SERVICES | Facility: HOSPITAL | Age: 58
LOS: 1 days | Discharge: HOME | End: 2018-07-14

## 2018-07-14 DIAGNOSIS — H57.9 UNSPECIFIED DISORDER OF EYE AND ADNEXA: Chronic | ICD-10-CM

## 2018-07-14 DIAGNOSIS — C43.8 MALIGNANT MELANOMA OF OVERLAPPING SITES OF SKIN: ICD-10-CM

## 2018-07-14 DIAGNOSIS — Z95.5 PRESENCE OF CORONARY ANGIOPLASTY IMPLANT AND GRAFT: Chronic | ICD-10-CM

## 2018-07-16 PROBLEM — E11.9 TYPE 2 DIABETES MELLITUS WITHOUT COMPLICATIONS: Chronic | Status: INACTIVE | Noted: 2017-11-11 | Resolved: 2018-01-18

## 2018-07-17 ENCOUNTER — APPOINTMENT (OUTPATIENT)
Dept: SURGICAL ONCOLOGY | Facility: CLINIC | Age: 58
End: 2018-07-17
Payer: MEDICAID

## 2018-07-17 ENCOUNTER — APPOINTMENT (OUTPATIENT)
Dept: SURGICAL ONCOLOGY | Facility: CLINIC | Age: 58
End: 2018-07-17

## 2018-07-17 VITALS
BODY MASS INDEX: 27.9 KG/M2 | HEART RATE: 70 BPM | RESPIRATION RATE: 15 BRPM | SYSTOLIC BLOOD PRESSURE: 100 MMHG | DIASTOLIC BLOOD PRESSURE: 58 MMHG | HEIGHT: 72 IN | WEIGHT: 206 LBS

## 2018-07-17 PROCEDURE — 99214 OFFICE O/P EST MOD 30 MIN: CPT

## 2018-07-19 PROBLEM — I51.7 CARDIOMEGALY: Chronic | Status: ACTIVE | Noted: 2017-11-11

## 2018-07-19 PROBLEM — C43.9 MALIGNANT MELANOMA OF SKIN, UNSPECIFIED: Chronic | Status: ACTIVE | Noted: 2018-01-18

## 2018-07-19 PROBLEM — G47.30 SLEEP APNEA, UNSPECIFIED: Chronic | Status: ACTIVE | Noted: 2017-11-11

## 2018-07-19 PROBLEM — D32.9 BENIGN NEOPLASM OF MENINGES, UNSPECIFIED: Chronic | Status: ACTIVE | Noted: 2018-01-18

## 2018-07-19 PROBLEM — I50.9 HEART FAILURE, UNSPECIFIED: Chronic | Status: ACTIVE | Noted: 2018-01-18

## 2018-07-19 PROBLEM — I25.10 ATHEROSCLEROTIC HEART DISEASE OF NATIVE CORONARY ARTERY WITHOUT ANGINA PECTORIS: Chronic | Status: ACTIVE | Noted: 2018-06-28

## 2018-07-19 PROBLEM — E11.8 TYPE 2 DIABETES MELLITUS WITH UNSPECIFIED COMPLICATIONS: Chronic | Status: ACTIVE | Noted: 2018-01-18

## 2018-07-19 PROBLEM — I10 ESSENTIAL (PRIMARY) HYPERTENSION: Chronic | Status: ACTIVE | Noted: 2017-11-11

## 2018-07-20 ENCOUNTER — OUTPATIENT (OUTPATIENT)
Dept: OUTPATIENT SERVICES | Facility: HOSPITAL | Age: 58
LOS: 1 days | Discharge: HOME | End: 2018-07-20
Payer: MEDICAID

## 2018-07-20 ENCOUNTER — APPOINTMENT (OUTPATIENT)
Dept: GASTROENTEROLOGY | Facility: HOSPITAL | Age: 58
End: 2018-07-20

## 2018-07-20 DIAGNOSIS — H57.9 UNSPECIFIED DISORDER OF EYE AND ADNEXA: Chronic | ICD-10-CM

## 2018-07-20 DIAGNOSIS — C43.62 MALIGNANT MELANOMA OF LEFT UPPER LIMB, INCLUDING SHOULDER: ICD-10-CM

## 2018-07-20 DIAGNOSIS — Z95.5 PRESENCE OF CORONARY ANGIOPLASTY IMPLANT AND GRAFT: Chronic | ICD-10-CM

## 2018-07-20 LAB — GLUCOSE BLDC GLUCOMTR-MCNC: 120 MG/DL — HIGH (ref 70–99)

## 2018-07-20 PROCEDURE — 45378 DIAGNOSTIC COLONOSCOPY: CPT | Mod: GC

## 2018-07-30 ENCOUNTER — APPOINTMENT (OUTPATIENT)
Dept: HEMATOLOGY ONCOLOGY | Facility: CLINIC | Age: 58
End: 2018-07-30

## 2018-07-30 ENCOUNTER — RESULT REVIEW (OUTPATIENT)
Age: 58
End: 2018-07-30

## 2018-07-30 VITALS
WEIGHT: 192.46 LBS | SYSTOLIC BLOOD PRESSURE: 151 MMHG | DIASTOLIC BLOOD PRESSURE: 77 MMHG | OXYGEN SATURATION: 98 % | HEIGHT: 71.54 IN | HEART RATE: 62 BPM | TEMPERATURE: 98.6 F | RESPIRATION RATE: 16 BRPM | BODY MASS INDEX: 26.36 KG/M2

## 2018-07-30 LAB
HCT VFR BLD CALC: 35.7 % — LOW (ref 39–50)
HGB BLD-MCNC: 12.3 G/DL — LOW (ref 13–17)
MCHC RBC-ENTMCNC: 32.7 PG — SIGNIFICANT CHANGE UP (ref 27–34)
MCHC RBC-ENTMCNC: 34.5 G/DL — SIGNIFICANT CHANGE UP (ref 32–36)
MCV RBC AUTO: 94.8 FL — SIGNIFICANT CHANGE UP (ref 80–100)
PLATELET # BLD AUTO: 273 K/UL — SIGNIFICANT CHANGE UP (ref 150–400)
RBC # BLD: 3.77 M/UL — LOW (ref 4.2–5.8)
RBC # FLD: 13.2 % — SIGNIFICANT CHANGE UP (ref 10.3–14.5)
WBC # BLD: 8.6 K/UL — SIGNIFICANT CHANGE UP (ref 3.8–10.5)
WBC # FLD AUTO: 8.6 K/UL — SIGNIFICANT CHANGE UP (ref 3.8–10.5)

## 2018-07-31 ENCOUNTER — APPOINTMENT (OUTPATIENT)
Dept: SURGICAL ONCOLOGY | Facility: CLINIC | Age: 58
End: 2018-07-31
Payer: MEDICAID

## 2018-07-31 VITALS — HEART RATE: 58 BPM | DIASTOLIC BLOOD PRESSURE: 68 MMHG | SYSTOLIC BLOOD PRESSURE: 123 MMHG | OXYGEN SATURATION: 95 %

## 2018-07-31 DIAGNOSIS — C80.1 SECONDARY MALIGNANT NEOPLASM OF RETROPERITONEUM AND PERITONEUM: ICD-10-CM

## 2018-07-31 DIAGNOSIS — C78.6 SECONDARY MALIGNANT NEOPLASM OF RETROPERITONEUM AND PERITONEUM: ICD-10-CM

## 2018-07-31 LAB
T3FREE SERPL-MCNC: 2.65 PG/ML — SIGNIFICANT CHANGE UP (ref 1.8–4.6)
T4 FREE SERPL-MCNC: 1.6 NG/DL — SIGNIFICANT CHANGE UP (ref 0.9–1.8)
THYROPEROXIDASE AB SERPL-ACNC: 194 IU/ML — HIGH (ref 0–34.9)

## 2018-07-31 PROCEDURE — 99214 OFFICE O/P EST MOD 30 MIN: CPT

## 2018-08-07 ENCOUNTER — APPOINTMENT (OUTPATIENT)
Dept: DERMATOLOGY | Facility: CLINIC | Age: 58
End: 2018-08-07
Payer: MEDICAID

## 2018-08-07 ENCOUNTER — LABORATORY RESULT (OUTPATIENT)
Age: 58
End: 2018-08-07

## 2018-08-07 DIAGNOSIS — D22.9 MELANOCYTIC NEVI, UNSPECIFIED: ICD-10-CM

## 2018-08-07 DIAGNOSIS — L82.1 OTHER SEBORRHEIC KERATOSIS: ICD-10-CM

## 2018-08-07 DIAGNOSIS — D48.9 NEOPLASM OF UNCERTAIN BEHAVIOR, UNSPECIFIED: ICD-10-CM

## 2018-08-07 PROCEDURE — 99204 OFFICE O/P NEW MOD 45 MIN: CPT

## 2018-08-10 ENCOUNTER — APPOINTMENT (OUTPATIENT)
Dept: INFUSION THERAPY | Facility: HOSPITAL | Age: 58
End: 2018-08-10

## 2018-08-10 ENCOUNTER — RESULT REVIEW (OUTPATIENT)
Age: 58
End: 2018-08-10

## 2018-08-10 ENCOUNTER — OUTPATIENT (OUTPATIENT)
Dept: OUTPATIENT SERVICES | Facility: HOSPITAL | Age: 58
LOS: 1 days | End: 2018-08-10
Payer: COMMERCIAL

## 2018-08-10 DIAGNOSIS — H57.9 UNSPECIFIED DISORDER OF EYE AND ADNEXA: Chronic | ICD-10-CM

## 2018-08-10 DIAGNOSIS — R59.9 ENLARGED LYMPH NODES, UNSPECIFIED: ICD-10-CM

## 2018-08-10 DIAGNOSIS — Z95.5 PRESENCE OF CORONARY ANGIOPLASTY IMPLANT AND GRAFT: Chronic | ICD-10-CM

## 2018-08-10 PROCEDURE — 88363 XM ARCHIVE TISSUE MOLEC ANAL: CPT

## 2018-08-13 ENCOUNTER — APPOINTMENT (OUTPATIENT)
Dept: HEMATOLOGY ONCOLOGY | Facility: CLINIC | Age: 58
End: 2018-08-13

## 2018-08-13 VITALS
BODY MASS INDEX: 26.35 KG/M2 | OXYGEN SATURATION: 98 % | WEIGHT: 191.8 LBS | DIASTOLIC BLOOD PRESSURE: 70 MMHG | TEMPERATURE: 98.2 F | HEART RATE: 57 BPM | SYSTOLIC BLOOD PRESSURE: 120 MMHG | RESPIRATION RATE: 16 BRPM

## 2018-08-13 DIAGNOSIS — C79.9 SECONDARY MALIGNANT NEOPLASM OF UNSPECIFIED SITE: ICD-10-CM

## 2018-08-14 ENCOUNTER — APPOINTMENT (OUTPATIENT)
Dept: RADIATION ONCOLOGY | Facility: CLINIC | Age: 58
End: 2018-08-14
Payer: MEDICAID

## 2018-08-14 VITALS — WEIGHT: 190.48 LBS | BODY MASS INDEX: 26.57 KG/M2

## 2018-08-14 VITALS
RESPIRATION RATE: 16 BRPM | HEIGHT: 71 IN | OXYGEN SATURATION: 97 % | TEMPERATURE: 97.9 F | SYSTOLIC BLOOD PRESSURE: 98 MMHG | HEART RATE: 56 BPM | DIASTOLIC BLOOD PRESSURE: 57 MMHG

## 2018-08-14 DIAGNOSIS — D32.9 BENIGN NEOPLASM OF MENINGES, UNSPECIFIED: ICD-10-CM

## 2018-08-14 PROCEDURE — 99213 OFFICE O/P EST LOW 20 MIN: CPT

## 2018-08-20 ENCOUNTER — OUTPATIENT (OUTPATIENT)
Dept: OUTPATIENT SERVICES | Facility: HOSPITAL | Age: 58
LOS: 1 days | Discharge: HOME | End: 2018-08-20

## 2018-08-20 DIAGNOSIS — Z95.5 PRESENCE OF CORONARY ANGIOPLASTY IMPLANT AND GRAFT: Chronic | ICD-10-CM

## 2018-08-20 DIAGNOSIS — H57.9 UNSPECIFIED DISORDER OF EYE AND ADNEXA: Chronic | ICD-10-CM

## 2018-08-20 DIAGNOSIS — C43.8 MALIGNANT MELANOMA OF OVERLAPPING SITES OF SKIN: ICD-10-CM

## 2018-08-21 ENCOUNTER — APPOINTMENT (OUTPATIENT)
Dept: OTOLARYNGOLOGY | Facility: CLINIC | Age: 58
End: 2018-08-21
Payer: MEDICAID

## 2018-08-21 VITALS — HEIGHT: 71 IN | BODY MASS INDEX: 26.6 KG/M2 | WEIGHT: 190 LBS

## 2018-08-21 DIAGNOSIS — H54.7 UNSPECIFIED VISUAL LOSS: ICD-10-CM

## 2018-08-21 DIAGNOSIS — Z85.820 PERSONAL HISTORY OF MALIGNANT MELANOMA OF SKIN: ICD-10-CM

## 2018-08-21 DIAGNOSIS — H90.A12 CONDUCTIVE HEARING LOSS, UNILATERAL, LEFT EAR WITH RESTRICTED HEARING ON THE CONTRALATERAL SIDE: ICD-10-CM

## 2018-08-21 DIAGNOSIS — Z86.69 PERSONAL HISTORY OF OTHER DISEASES OF THE NERVOUS SYSTEM AND SENSE ORGANS: ICD-10-CM

## 2018-08-21 DIAGNOSIS — H91.90 UNSPECIFIED HEARING LOSS, UNSPECIFIED EAR: ICD-10-CM

## 2018-08-21 DIAGNOSIS — Z86.79 PERSONAL HISTORY OF OTHER DISEASES OF THE CIRCULATORY SYSTEM: ICD-10-CM

## 2018-08-21 DIAGNOSIS — Z87.19 PERSONAL HISTORY OF OTHER DISEASES OF THE DIGESTIVE SYSTEM: ICD-10-CM

## 2018-08-21 DIAGNOSIS — Z80.9 FAMILY HISTORY OF MALIGNANT NEOPLASM, UNSPECIFIED: ICD-10-CM

## 2018-08-21 DIAGNOSIS — Z87.39 PERSONAL HISTORY OF OTHER DISEASES OF THE MUSCULOSKELETAL SYSTEM AND CONNECTIVE TISSUE: ICD-10-CM

## 2018-08-21 PROCEDURE — 92557 COMPREHENSIVE HEARING TEST: CPT

## 2018-08-21 PROCEDURE — 99214 OFFICE O/P EST MOD 30 MIN: CPT | Mod: 25

## 2018-08-21 PROCEDURE — 92504 EAR MICROSCOPY EXAMINATION: CPT

## 2018-08-21 PROCEDURE — 92567 TYMPANOMETRY: CPT

## 2018-08-21 RX ORDER — CIPROFLOXACIN AND DEXAMETHASONE 3; 1 MG/ML; MG/ML
0.3-0.1 SUSPENSION/ DROPS AURICULAR (OTIC) TWICE DAILY
Qty: 1 | Refills: 0 | Status: ACTIVE | COMMUNITY
Start: 2018-08-21 | End: 1900-01-01

## 2018-08-24 ENCOUNTER — APPOINTMENT (OUTPATIENT)
Dept: ENDOCRINOLOGY | Facility: CLINIC | Age: 58
End: 2018-08-24
Payer: MEDICAID

## 2018-08-24 VITALS
SYSTOLIC BLOOD PRESSURE: 124 MMHG | OXYGEN SATURATION: 98 % | HEART RATE: 70 BPM | WEIGHT: 195 LBS | DIASTOLIC BLOOD PRESSURE: 54 MMHG | HEIGHT: 71 IN | BODY MASS INDEX: 27.3 KG/M2

## 2018-08-24 LAB
GLUCOSE BLDC GLUCOMTR-MCNC: 216
HBA1C MFR BLD HPLC: 6.8
SURGICAL PATHOLOGY STUDY: SIGNIFICANT CHANGE UP

## 2018-08-24 PROCEDURE — 99205 OFFICE O/P NEW HI 60 MIN: CPT | Mod: 25

## 2018-08-24 PROCEDURE — 82962 GLUCOSE BLOOD TEST: CPT

## 2018-08-24 PROCEDURE — 83036 HEMOGLOBIN GLYCOSYLATED A1C: CPT | Mod: QW

## 2018-08-28 ENCOUNTER — APPOINTMENT (OUTPATIENT)
Dept: OTOLARYNGOLOGY | Facility: CLINIC | Age: 58
End: 2018-08-28
Payer: MEDICAID

## 2018-08-28 VITALS
HEART RATE: 54 BPM | DIASTOLIC BLOOD PRESSURE: 64 MMHG | HEIGHT: 72 IN | BODY MASS INDEX: 26.41 KG/M2 | SYSTOLIC BLOOD PRESSURE: 113 MMHG | WEIGHT: 195 LBS

## 2018-08-28 DIAGNOSIS — H60.392 OTHER INFECTIVE OTITIS EXTERNA, LEFT EAR: ICD-10-CM

## 2018-08-28 PROCEDURE — 92504 EAR MICROSCOPY EXAMINATION: CPT

## 2018-08-28 PROCEDURE — 92567 TYMPANOMETRY: CPT

## 2018-08-28 PROCEDURE — 99213 OFFICE O/P EST LOW 20 MIN: CPT | Mod: 25

## 2018-08-28 PROCEDURE — 92553 AUDIOMETRY AIR & BONE: CPT

## 2018-08-29 LAB
T3 SERPL-MCNC: 95 NG/DL
T4 FREE SERPL-MCNC: 1.5 NG/DL
TSH SERPL-ACNC: 0.22 UIU/ML
TSI ACT/NOR SER: <0.1 IU/L

## 2018-08-30 ENCOUNTER — APPOINTMENT (OUTPATIENT)
Dept: INFUSION THERAPY | Facility: HOSPITAL | Age: 58
End: 2018-08-30

## 2018-08-31 DIAGNOSIS — Z51.11 ENCOUNTER FOR ANTINEOPLASTIC CHEMOTHERAPY: ICD-10-CM

## 2018-09-06 ENCOUNTER — RESULT REVIEW (OUTPATIENT)
Age: 58
End: 2018-09-06

## 2018-09-06 ENCOUNTER — APPOINTMENT (OUTPATIENT)
Dept: HEMATOLOGY ONCOLOGY | Facility: CLINIC | Age: 58
End: 2018-09-06

## 2018-09-06 VITALS
BODY MASS INDEX: 26.07 KG/M2 | RESPIRATION RATE: 16 BRPM | SYSTOLIC BLOOD PRESSURE: 117 MMHG | TEMPERATURE: 98.6 F | OXYGEN SATURATION: 96 % | WEIGHT: 192.24 LBS | HEART RATE: 63 BPM | DIASTOLIC BLOOD PRESSURE: 66 MMHG

## 2018-09-06 LAB
BASOPHILS # BLD AUTO: 0 K/UL — SIGNIFICANT CHANGE UP (ref 0–0.2)
BASOPHILS NFR BLD AUTO: 0.6 % — SIGNIFICANT CHANGE UP (ref 0–2)
EOSINOPHIL # BLD AUTO: 0.3 K/UL — SIGNIFICANT CHANGE UP (ref 0–0.5)
EOSINOPHIL NFR BLD AUTO: 3.4 % — SIGNIFICANT CHANGE UP (ref 0–6)
HCT VFR BLD CALC: 38.6 % — LOW (ref 39–50)
HGB BLD-MCNC: 12.8 G/DL — LOW (ref 13–17)
LYMPHOCYTES # BLD AUTO: 1.2 K/UL — SIGNIFICANT CHANGE UP (ref 1–3.3)
LYMPHOCYTES # BLD AUTO: 14.1 % — SIGNIFICANT CHANGE UP (ref 13–44)
MCHC RBC-ENTMCNC: 31.6 PG — SIGNIFICANT CHANGE UP (ref 27–34)
MCHC RBC-ENTMCNC: 33.2 G/DL — SIGNIFICANT CHANGE UP (ref 32–36)
MCV RBC AUTO: 95 FL — SIGNIFICANT CHANGE UP (ref 80–100)
MONOCYTES # BLD AUTO: 0.8 K/UL — SIGNIFICANT CHANGE UP (ref 0–0.9)
MONOCYTES NFR BLD AUTO: 10 % — SIGNIFICANT CHANGE UP (ref 2–14)
NEUTROPHILS # BLD AUTO: 6 K/UL — SIGNIFICANT CHANGE UP (ref 1.8–7.4)
NEUTROPHILS NFR BLD AUTO: 71.9 % — SIGNIFICANT CHANGE UP (ref 43–77)
PLATELET # BLD AUTO: 332 K/UL — SIGNIFICANT CHANGE UP (ref 150–400)
RBC # BLD: 4.06 M/UL — LOW (ref 4.2–5.8)
RBC # FLD: 12.5 % — SIGNIFICANT CHANGE UP (ref 10.3–14.5)
WBC # BLD: 8.4 K/UL — SIGNIFICANT CHANGE UP (ref 3.8–10.5)
WBC # FLD AUTO: 8.4 K/UL — SIGNIFICANT CHANGE UP (ref 3.8–10.5)

## 2018-09-17 ENCOUNTER — APPOINTMENT (OUTPATIENT)
Dept: OTOLARYNGOLOGY | Facility: CLINIC | Age: 58
End: 2018-09-17
Payer: MEDICAID

## 2018-09-17 ENCOUNTER — OUTPATIENT (OUTPATIENT)
Dept: OUTPATIENT SERVICES | Facility: HOSPITAL | Age: 58
LOS: 1 days | Discharge: HOME | End: 2018-09-17

## 2018-09-17 VITALS
HEIGHT: 72 IN | WEIGHT: 192 LBS | BODY MASS INDEX: 26.01 KG/M2 | SYSTOLIC BLOOD PRESSURE: 140 MMHG | DIASTOLIC BLOOD PRESSURE: 80 MMHG | HEART RATE: 70 BPM

## 2018-09-17 DIAGNOSIS — C43.8 MALIGNANT MELANOMA OF OVERLAPPING SITES OF SKIN: ICD-10-CM

## 2018-09-17 DIAGNOSIS — H90.A32 MIXED CONDUCTIVE AND SENSORINEURAL HEARING, UNILATERAL, LEFT EAR WITH RESTRICTED HEARING ON THE  CONTRALATERAL SIDE: ICD-10-CM

## 2018-09-17 DIAGNOSIS — H57.9 UNSPECIFIED DISORDER OF EYE AND ADNEXA: Chronic | ICD-10-CM

## 2018-09-17 DIAGNOSIS — Z95.5 PRESENCE OF CORONARY ANGIOPLASTY IMPLANT AND GRAFT: Chronic | ICD-10-CM

## 2018-09-17 PROCEDURE — 99213 OFFICE O/P EST LOW 20 MIN: CPT

## 2018-09-20 ENCOUNTER — APPOINTMENT (OUTPATIENT)
Dept: INFUSION THERAPY | Facility: HOSPITAL | Age: 58
End: 2018-09-20

## 2018-09-21 DIAGNOSIS — Z51.11 ENCOUNTER FOR ANTINEOPLASTIC CHEMOTHERAPY: ICD-10-CM

## 2018-09-27 ENCOUNTER — RESULT REVIEW (OUTPATIENT)
Age: 58
End: 2018-09-27

## 2018-09-27 ENCOUNTER — APPOINTMENT (OUTPATIENT)
Dept: HEMATOLOGY ONCOLOGY | Facility: CLINIC | Age: 58
End: 2018-09-27

## 2018-09-27 VITALS
RESPIRATION RATE: 16 BRPM | HEART RATE: 74 BPM | OXYGEN SATURATION: 96 % | SYSTOLIC BLOOD PRESSURE: 114 MMHG | TEMPERATURE: 99.8 F | DIASTOLIC BLOOD PRESSURE: 70 MMHG | WEIGHT: 194 LBS | BODY MASS INDEX: 26.31 KG/M2

## 2018-09-27 DIAGNOSIS — Z87.440 PERSONAL HISTORY OF URINARY (TRACT) INFECTIONS: ICD-10-CM

## 2018-09-27 LAB
BASOPHILS # BLD AUTO: 0 K/UL — SIGNIFICANT CHANGE UP (ref 0–0.2)
BASOPHILS NFR BLD AUTO: 0.2 % — SIGNIFICANT CHANGE UP (ref 0–2)
EOSINOPHIL # BLD AUTO: 0.1 K/UL — SIGNIFICANT CHANGE UP (ref 0–0.5)
EOSINOPHIL NFR BLD AUTO: 1 % — SIGNIFICANT CHANGE UP (ref 0–6)
HCT VFR BLD CALC: 36.8 % — LOW (ref 39–50)
HGB BLD-MCNC: 12.4 G/DL — LOW (ref 13–17)
LYMPHOCYTES # BLD AUTO: 1.1 K/UL — SIGNIFICANT CHANGE UP (ref 1–3.3)
LYMPHOCYTES # BLD AUTO: 8 % — LOW (ref 13–44)
MCHC RBC-ENTMCNC: 31.3 PG — SIGNIFICANT CHANGE UP (ref 27–34)
MCHC RBC-ENTMCNC: 33.6 G/DL — SIGNIFICANT CHANGE UP (ref 32–36)
MCV RBC AUTO: 93.2 FL — SIGNIFICANT CHANGE UP (ref 80–100)
MONOCYTES # BLD AUTO: 1.1 K/UL — HIGH (ref 0–0.9)
MONOCYTES NFR BLD AUTO: 8.5 % — SIGNIFICANT CHANGE UP (ref 2–14)
NEUTROPHILS # BLD AUTO: 11 K/UL — HIGH (ref 1.8–7.4)
NEUTROPHILS NFR BLD AUTO: 82.3 % — HIGH (ref 43–77)
PLATELET # BLD AUTO: 335 K/UL — SIGNIFICANT CHANGE UP (ref 150–400)
RBC # BLD: 3.95 M/UL — LOW (ref 4.2–5.8)
RBC # FLD: 12.2 % — SIGNIFICANT CHANGE UP (ref 10.3–14.5)
WBC # BLD: 13.3 K/UL — HIGH (ref 3.8–10.5)
WBC # FLD AUTO: 13.3 K/UL — HIGH (ref 3.8–10.5)

## 2018-10-02 ENCOUNTER — OUTPATIENT (OUTPATIENT)
Dept: OUTPATIENT SERVICES | Facility: HOSPITAL | Age: 58
LOS: 1 days | Discharge: HOME | End: 2018-10-02

## 2018-10-02 ENCOUNTER — MESSAGE (OUTPATIENT)
Age: 58
End: 2018-10-02

## 2018-10-02 DIAGNOSIS — C43.8 MALIGNANT MELANOMA OF OVERLAPPING SITES OF SKIN: ICD-10-CM

## 2018-10-02 DIAGNOSIS — H57.9 UNSPECIFIED DISORDER OF EYE AND ADNEXA: Chronic | ICD-10-CM

## 2018-10-02 DIAGNOSIS — Z95.5 PRESENCE OF CORONARY ANGIOPLASTY IMPLANT AND GRAFT: Chronic | ICD-10-CM

## 2018-10-09 ENCOUNTER — APPOINTMENT (OUTPATIENT)
Dept: ENDOCRINOLOGY | Facility: CLINIC | Age: 58
End: 2018-10-09
Payer: MEDICAID

## 2018-10-09 VITALS — SYSTOLIC BLOOD PRESSURE: 112 MMHG | HEART RATE: 70 BPM | DIASTOLIC BLOOD PRESSURE: 68 MMHG | OXYGEN SATURATION: 97 %

## 2018-10-09 DIAGNOSIS — E06.9 THYROIDITIS, UNSPECIFIED: ICD-10-CM

## 2018-10-09 DIAGNOSIS — E11.65 TYPE 2 DIABETES MELLITUS WITH HYPERGLYCEMIA: ICD-10-CM

## 2018-10-09 LAB — GLUCOSE BLDC GLUCOMTR-MCNC: 370

## 2018-10-09 PROCEDURE — 99215 OFFICE O/P EST HI 40 MIN: CPT | Mod: 25

## 2018-10-09 PROCEDURE — 82962 GLUCOSE BLOOD TEST: CPT

## 2018-10-11 ENCOUNTER — FORM ENCOUNTER (OUTPATIENT)
Age: 58
End: 2018-10-11

## 2018-10-11 ENCOUNTER — APPOINTMENT (OUTPATIENT)
Dept: INFUSION THERAPY | Facility: HOSPITAL | Age: 58
End: 2018-10-11

## 2018-10-11 RX ORDER — INSULIN GLARGINE 100 [IU]/ML
100 INJECTION, SOLUTION SUBCUTANEOUS
Qty: 1 | Refills: 5 | Status: ACTIVE | OUTPATIENT
Start: 2018-10-09

## 2018-10-11 RX ORDER — INSULIN LISPRO 100 [IU]/ML
100 INJECTION, SOLUTION INTRAVENOUS; SUBCUTANEOUS
Qty: 1 | Refills: 1 | Status: ACTIVE | OUTPATIENT
Start: 2018-10-09

## 2018-10-12 ENCOUNTER — APPOINTMENT (OUTPATIENT)
Dept: CT IMAGING | Facility: IMAGING CENTER | Age: 58
End: 2018-10-12
Payer: MEDICAID

## 2018-10-12 ENCOUNTER — OUTPATIENT (OUTPATIENT)
Dept: OUTPATIENT SERVICES | Facility: HOSPITAL | Age: 58
LOS: 1 days | End: 2018-10-12
Payer: COMMERCIAL

## 2018-10-12 DIAGNOSIS — Z95.5 PRESENCE OF CORONARY ANGIOPLASTY IMPLANT AND GRAFT: Chronic | ICD-10-CM

## 2018-10-12 DIAGNOSIS — H57.9 UNSPECIFIED DISORDER OF EYE AND ADNEXA: Chronic | ICD-10-CM

## 2018-10-12 DIAGNOSIS — Z51.11 ENCOUNTER FOR ANTINEOPLASTIC CHEMOTHERAPY: ICD-10-CM

## 2018-10-12 DIAGNOSIS — C79.9 SECONDARY MALIGNANT NEOPLASM OF UNSPECIFIED SITE: ICD-10-CM

## 2018-10-12 LAB
T3 SERPL-MCNC: 100 NG/DL
T4 FREE SERPL-MCNC: 1.5 NG/DL
TSH SERPL-ACNC: 0.42 UIU/ML

## 2018-10-12 PROCEDURE — 82565 ASSAY OF CREATININE: CPT

## 2018-10-12 PROCEDURE — 74177 CT ABD & PELVIS W/CONTRAST: CPT

## 2018-10-12 PROCEDURE — 74177 CT ABD & PELVIS W/CONTRAST: CPT | Mod: 26

## 2018-10-12 PROCEDURE — 71260 CT THORAX DX C+: CPT

## 2018-10-12 PROCEDURE — 71260 CT THORAX DX C+: CPT | Mod: 26

## 2018-10-15 LAB — PANC ISLET CELL AB SER QL: NORMAL

## 2018-10-17 LAB — GAD65 AB SER-MCNC: 0.36 NMOL/L

## 2018-10-18 ENCOUNTER — APPOINTMENT (OUTPATIENT)
Dept: HEMATOLOGY ONCOLOGY | Facility: CLINIC | Age: 58
End: 2018-10-18

## 2018-10-18 ENCOUNTER — RESULT REVIEW (OUTPATIENT)
Age: 58
End: 2018-10-18

## 2018-10-18 VITALS
HEART RATE: 73 BPM | OXYGEN SATURATION: 98 % | DIASTOLIC BLOOD PRESSURE: 54 MMHG | RESPIRATION RATE: 16 BRPM | TEMPERATURE: 99.1 F | WEIGHT: 190.48 LBS | BODY MASS INDEX: 25.83 KG/M2 | SYSTOLIC BLOOD PRESSURE: 95 MMHG

## 2018-10-18 LAB
APTT BLD: 40.9 SEC — HIGH (ref 27.5–37.4)
HCT VFR BLD CALC: 34.7 % — LOW (ref 39–50)
HGB BLD-MCNC: 11.6 G/DL — LOW (ref 13–17)
INR BLD: 1.16 RATIO — SIGNIFICANT CHANGE UP (ref 0.88–1.16)
MCHC RBC-ENTMCNC: 31.6 PG — SIGNIFICANT CHANGE UP (ref 27–34)
MCHC RBC-ENTMCNC: 33.4 G/DL — SIGNIFICANT CHANGE UP (ref 32–36)
MCV RBC AUTO: 94.5 FL — SIGNIFICANT CHANGE UP (ref 80–100)
PLATELET # BLD AUTO: 317 K/UL — SIGNIFICANT CHANGE UP (ref 150–400)
PROTHROM AB SERPL-ACNC: 13.2 SEC — HIGH (ref 10–13.1)
RBC # BLD: 3.67 M/UL — LOW (ref 4.2–5.8)
RBC # FLD: 12.2 % — SIGNIFICANT CHANGE UP (ref 10.3–14.5)
WBC # BLD: 11.9 K/UL — HIGH (ref 3.8–10.5)
WBC # FLD AUTO: 11.9 K/UL — HIGH (ref 3.8–10.5)

## 2018-10-19 LAB — ZINC TRANSPORTER 8 AB: <10 U/ML

## 2018-10-24 ENCOUNTER — FORM ENCOUNTER (OUTPATIENT)
Age: 58
End: 2018-10-24

## 2018-10-25 ENCOUNTER — APPOINTMENT (OUTPATIENT)
Dept: ULTRASOUND IMAGING | Facility: IMAGING CENTER | Age: 58
End: 2018-10-25
Payer: MEDICAID

## 2018-10-25 ENCOUNTER — APPOINTMENT (OUTPATIENT)
Dept: HEMATOLOGY ONCOLOGY | Facility: CLINIC | Age: 58
End: 2018-10-25

## 2018-10-25 ENCOUNTER — RESULT REVIEW (OUTPATIENT)
Age: 58
End: 2018-10-25

## 2018-10-25 ENCOUNTER — OUTPATIENT (OUTPATIENT)
Dept: OUTPATIENT SERVICES | Facility: HOSPITAL | Age: 58
LOS: 1 days | End: 2018-10-25
Payer: COMMERCIAL

## 2018-10-25 VITALS
WEIGHT: 191.8 LBS | DIASTOLIC BLOOD PRESSURE: 61 MMHG | BODY MASS INDEX: 26.01 KG/M2 | TEMPERATURE: 98.7 F | SYSTOLIC BLOOD PRESSURE: 108 MMHG | RESPIRATION RATE: 16 BRPM | HEART RATE: 73 BPM | OXYGEN SATURATION: 97 %

## 2018-10-25 DIAGNOSIS — Z95.5 PRESENCE OF CORONARY ANGIOPLASTY IMPLANT AND GRAFT: Chronic | ICD-10-CM

## 2018-10-25 DIAGNOSIS — H57.9 UNSPECIFIED DISORDER OF EYE AND ADNEXA: Chronic | ICD-10-CM

## 2018-10-25 DIAGNOSIS — C79.9 SECONDARY MALIGNANT NEOPLASM OF UNSPECIFIED SITE: ICD-10-CM

## 2018-10-25 PROCEDURE — 88173 CYTOPATH EVAL FNA REPORT: CPT

## 2018-10-25 PROCEDURE — 76942 ECHO GUIDE FOR BIOPSY: CPT

## 2018-10-25 PROCEDURE — 88305 TISSUE EXAM BY PATHOLOGIST: CPT

## 2018-10-25 PROCEDURE — 20206 BIOPSY MUSCLE PERQ NEEDLE: CPT

## 2018-10-25 PROCEDURE — 88173 CYTOPATH EVAL FNA REPORT: CPT | Mod: 26

## 2018-10-25 PROCEDURE — 10022: CPT | Mod: 26,59

## 2018-10-25 PROCEDURE — 10022: CPT

## 2018-10-25 PROCEDURE — 76942 ECHO GUIDE FOR BIOPSY: CPT | Mod: 26

## 2018-10-25 PROCEDURE — 88305 TISSUE EXAM BY PATHOLOGIST: CPT | Mod: 26,59

## 2018-10-25 PROCEDURE — 88172 CYTP DX EVAL FNA 1ST EA SITE: CPT

## 2018-10-29 LAB
NON-GYNECOLOGICAL CYTOLOGY STUDY: SIGNIFICANT CHANGE UP
NON-GYNECOLOGICAL CYTOLOGY STUDY: SIGNIFICANT CHANGE UP

## 2018-10-30 ENCOUNTER — APPOINTMENT (OUTPATIENT)
Dept: SURGICAL ONCOLOGY | Facility: CLINIC | Age: 58
End: 2018-10-30
Payer: MEDICAID

## 2018-10-30 VITALS
HEIGHT: 72 IN | HEART RATE: 71 BPM | DIASTOLIC BLOOD PRESSURE: 69 MMHG | WEIGHT: 193 LBS | RESPIRATION RATE: 16 BRPM | SYSTOLIC BLOOD PRESSURE: 124 MMHG | BODY MASS INDEX: 26.14 KG/M2

## 2018-10-30 PROCEDURE — 99214 OFFICE O/P EST MOD 30 MIN: CPT

## 2018-11-01 ENCOUNTER — APPOINTMENT (OUTPATIENT)
Dept: INFUSION THERAPY | Facility: HOSPITAL | Age: 58
End: 2018-11-01

## 2018-11-01 ENCOUNTER — OUTPATIENT (OUTPATIENT)
Dept: OUTPATIENT SERVICES | Facility: HOSPITAL | Age: 58
LOS: 1 days | End: 2018-11-01
Payer: COMMERCIAL

## 2018-11-01 ENCOUNTER — RESULT REVIEW (OUTPATIENT)
Age: 58
End: 2018-11-01

## 2018-11-01 DIAGNOSIS — Z95.5 PRESENCE OF CORONARY ANGIOPLASTY IMPLANT AND GRAFT: Chronic | ICD-10-CM

## 2018-11-01 DIAGNOSIS — H57.9 UNSPECIFIED DISORDER OF EYE AND ADNEXA: Chronic | ICD-10-CM

## 2018-11-01 LAB
HCT VFR BLD CALC: 35.4 % — LOW (ref 39–50)
HGB BLD-MCNC: 12 G/DL — LOW (ref 13–17)
MCHC RBC-ENTMCNC: 32.1 PG — SIGNIFICANT CHANGE UP (ref 27–34)
MCHC RBC-ENTMCNC: 34.1 G/DL — SIGNIFICANT CHANGE UP (ref 32–36)
MCV RBC AUTO: 94.1 FL — SIGNIFICANT CHANGE UP (ref 80–100)
PLATELET # BLD AUTO: 344 K/UL — SIGNIFICANT CHANGE UP (ref 150–400)
RBC # BLD: 3.76 M/UL — LOW (ref 4.2–5.8)
RBC # FLD: 12.5 % — SIGNIFICANT CHANGE UP (ref 10.3–14.5)
WBC # BLD: 11.6 K/UL — HIGH (ref 3.8–10.5)
WBC # FLD AUTO: 11.6 K/UL — HIGH (ref 3.8–10.5)

## 2018-11-01 PROCEDURE — G9001: CPT

## 2018-11-02 DIAGNOSIS — C79.9 SECONDARY MALIGNANT NEOPLASM OF UNSPECIFIED SITE: ICD-10-CM

## 2018-11-06 ENCOUNTER — OUTPATIENT (OUTPATIENT)
Dept: OUTPATIENT SERVICES | Facility: HOSPITAL | Age: 58
LOS: 1 days | Discharge: HOME | End: 2018-11-06

## 2018-11-06 DIAGNOSIS — H57.9 UNSPECIFIED DISORDER OF EYE AND ADNEXA: Chronic | ICD-10-CM

## 2018-11-06 DIAGNOSIS — C43.8 MALIGNANT MELANOMA OF OVERLAPPING SITES OF SKIN: ICD-10-CM

## 2018-11-06 DIAGNOSIS — Z95.5 PRESENCE OF CORONARY ANGIOPLASTY IMPLANT AND GRAFT: Chronic | ICD-10-CM

## 2018-11-08 ENCOUNTER — APPOINTMENT (OUTPATIENT)
Dept: DERMATOLOGY | Facility: CLINIC | Age: 58
End: 2018-11-08
Payer: MEDICAID

## 2018-11-08 DIAGNOSIS — C43.62 MALIGNANT MELANOMA OF LEFT UPPER LIMB, INCLUDING SHOULDER: ICD-10-CM

## 2018-11-08 DIAGNOSIS — D22.9 MELANOCYTIC NEVI, UNSPECIFIED: ICD-10-CM

## 2018-11-08 DIAGNOSIS — Z12.83 ENCOUNTER FOR SCREENING FOR MALIGNANT NEOPLASM OF SKIN: ICD-10-CM

## 2018-11-08 DIAGNOSIS — R22.9 LOCALIZED SWELLING, MASS AND LUMP, UNSPECIFIED: ICD-10-CM

## 2018-11-08 PROCEDURE — 99214 OFFICE O/P EST MOD 30 MIN: CPT

## 2018-11-12 ENCOUNTER — APPOINTMENT (OUTPATIENT)
Dept: OTOLARYNGOLOGY | Facility: CLINIC | Age: 58
End: 2018-11-12
Payer: MEDICAID

## 2018-11-12 VITALS — BODY MASS INDEX: 26.14 KG/M2 | WEIGHT: 193 LBS | HEIGHT: 72 IN

## 2018-11-12 DIAGNOSIS — H61.302 ACQUIRED STENOSIS OF LEFT EXTERNAL EAR CANAL, UNSPECIFIED: ICD-10-CM

## 2018-11-12 DIAGNOSIS — H60.312 DIFFUSE OTITIS EXTERNA, LEFT EAR: ICD-10-CM

## 2018-11-12 DIAGNOSIS — H91.90 UNSPECIFIED HEARING LOSS, UNSPECIFIED EAR: ICD-10-CM

## 2018-11-12 PROCEDURE — 99213 OFFICE O/P EST LOW 20 MIN: CPT

## 2018-11-14 ENCOUNTER — APPOINTMENT (OUTPATIENT)
Dept: RADIATION ONCOLOGY | Facility: CLINIC | Age: 58
End: 2018-11-14

## 2018-11-15 ENCOUNTER — RESULT REVIEW (OUTPATIENT)
Age: 58
End: 2018-11-15

## 2018-11-15 ENCOUNTER — APPOINTMENT (OUTPATIENT)
Dept: HEMATOLOGY ONCOLOGY | Facility: CLINIC | Age: 58
End: 2018-11-15

## 2018-11-15 VITALS
OXYGEN SATURATION: 97 % | TEMPERATURE: 98.1 F | HEART RATE: 70 BPM | WEIGHT: 188.05 LBS | BODY MASS INDEX: 25.5 KG/M2 | RESPIRATION RATE: 16 BRPM | SYSTOLIC BLOOD PRESSURE: 95 MMHG | DIASTOLIC BLOOD PRESSURE: 57 MMHG

## 2018-11-15 VITALS — SYSTOLIC BLOOD PRESSURE: 102 MMHG | DIASTOLIC BLOOD PRESSURE: 64 MMHG

## 2018-11-15 DIAGNOSIS — I50.9 HEART FAILURE, UNSPECIFIED: ICD-10-CM

## 2018-11-15 LAB
HCT VFR BLD CALC: 38.1 % — LOW (ref 39–50)
HGB BLD-MCNC: 12.9 G/DL — LOW (ref 13–17)
MCHC RBC-ENTMCNC: 31.8 PG — SIGNIFICANT CHANGE UP (ref 27–34)
MCHC RBC-ENTMCNC: 33.8 G/DL — SIGNIFICANT CHANGE UP (ref 32–36)
MCV RBC AUTO: 94.1 FL — SIGNIFICANT CHANGE UP (ref 80–100)
PLATELET # BLD AUTO: 391 K/UL — SIGNIFICANT CHANGE UP (ref 150–400)
RBC # BLD: 4.05 M/UL — LOW (ref 4.2–5.8)
RBC # FLD: 12.9 % — SIGNIFICANT CHANGE UP (ref 10.3–14.5)
WBC # BLD: 13.3 K/UL — HIGH (ref 3.8–10.5)
WBC # FLD AUTO: 13.3 K/UL — HIGH (ref 3.8–10.5)

## 2018-11-15 RX ORDER — CIPROFLOXACIN HYDROCHLORIDE 500 MG/1
500 TABLET, FILM COATED ORAL
Qty: 14 | Refills: 0 | Status: DISCONTINUED | COMMUNITY
Start: 2018-10-01 | End: 2018-11-15

## 2018-11-15 RX ORDER — SODIUM SULFATE, POTASSIUM SULFATE, MAGNESIUM SULFATE 17.5; 3.13; 1.6 G/ML; G/ML; G/ML
17.5-3.13-1.6 SOLUTION, CONCENTRATE ORAL
Qty: 1 | Refills: 0 | Status: DISCONTINUED | COMMUNITY
Start: 2018-06-26 | End: 2018-11-15

## 2018-11-16 LAB
ALBUMIN SERPL ELPH-MCNC: 4.1 G/DL — SIGNIFICANT CHANGE UP (ref 3.3–5)
ALP SERPL-CCNC: 133 U/L — HIGH (ref 30–120)
ALT FLD-CCNC: 41 U/L — SIGNIFICANT CHANGE UP (ref 10–45)
ANION GAP SERPL CALC-SCNC: 22 MMOL/L — HIGH (ref 5–17)
AST SERPL-CCNC: 51 U/L — HIGH (ref 10–40)
BILIRUB SERPL-MCNC: 0.7 MG/DL — SIGNIFICANT CHANGE UP (ref 0.2–1.2)
BUN SERPL-MCNC: 85 MG/DL — HIGH (ref 7–23)
CALCIUM SERPL-MCNC: 9.2 MG/DL — SIGNIFICANT CHANGE UP (ref 8.4–10.5)
CHLORIDE SERPL-SCNC: 97 MMOL/L — SIGNIFICANT CHANGE UP (ref 96–108)
CO2 SERPL-SCNC: 23 MMOL/L — SIGNIFICANT CHANGE UP (ref 22–31)
CREAT SERPL-MCNC: 2.03 MG/DL — HIGH (ref 0.5–1.3)
GLUCOSE SERPL-MCNC: 161 MG/DL — HIGH (ref 70–99)
MAGNESIUM SERPL-MCNC: 2.9 MG/DL — HIGH (ref 1.6–2.6)
PHOSPHATE SERPL-MCNC: 5.2 MG/DL — HIGH (ref 2.5–4.5)
POTASSIUM SERPL-MCNC: 4.6 MMOL/L — SIGNIFICANT CHANGE UP (ref 3.5–5.3)
POTASSIUM SERPL-SCNC: 4.6 MMOL/L — SIGNIFICANT CHANGE UP (ref 3.5–5.3)
PROT SERPL-MCNC: 7.7 G/DL — SIGNIFICANT CHANGE UP (ref 6–8.3)
SODIUM SERPL-SCNC: 142 MMOL/L — SIGNIFICANT CHANGE UP (ref 135–145)

## 2018-11-19 ENCOUNTER — APPOINTMENT (OUTPATIENT)
Dept: HEMATOLOGY ONCOLOGY | Facility: CLINIC | Age: 58
End: 2018-11-19

## 2018-11-19 ENCOUNTER — RESULT REVIEW (OUTPATIENT)
Age: 58
End: 2018-11-19

## 2018-11-19 ENCOUNTER — APPOINTMENT (OUTPATIENT)
Dept: INFUSION THERAPY | Facility: HOSPITAL | Age: 58
End: 2018-11-19

## 2018-11-19 LAB
ANION GAP SERPL CALC-SCNC: 22 MMOL/L — HIGH (ref 5–17)
BUN SERPL-MCNC: 76 MG/DL — HIGH (ref 7–23)
C DIFF BY PCR RESULT: SIGNIFICANT CHANGE UP
C DIFF TOX GENS STL QL NAA+PROBE: SIGNIFICANT CHANGE UP
CALCIUM SERPL-MCNC: 9.6 MG/DL — SIGNIFICANT CHANGE UP (ref 8.4–10.5)
CHLORIDE SERPL-SCNC: 96 MMOL/L — SIGNIFICANT CHANGE UP (ref 96–108)
CO2 SERPL-SCNC: 22 MMOL/L — SIGNIFICANT CHANGE UP (ref 22–31)
CREAT SERPL-MCNC: 1.69 MG/DL — HIGH (ref 0.5–1.3)
GLUCOSE SERPL-MCNC: 129 MG/DL — HIGH (ref 70–99)
HCT VFR BLD CALC: 39.5 % — SIGNIFICANT CHANGE UP (ref 39–50)
HGB BLD-MCNC: 13.2 G/DL — SIGNIFICANT CHANGE UP (ref 13–17)
MAGNESIUM SERPL-MCNC: 2.8 MG/DL — HIGH (ref 1.6–2.6)
MCHC RBC-ENTMCNC: 31.4 PG — SIGNIFICANT CHANGE UP (ref 27–34)
MCHC RBC-ENTMCNC: 33.3 G/DL — SIGNIFICANT CHANGE UP (ref 32–36)
MCV RBC AUTO: 94.3 FL — SIGNIFICANT CHANGE UP (ref 80–100)
PHOSPHATE SERPL-MCNC: 3.9 MG/DL — SIGNIFICANT CHANGE UP (ref 2.5–4.5)
PLATELET # BLD AUTO: 373 K/UL — SIGNIFICANT CHANGE UP (ref 150–400)
POTASSIUM SERPL-MCNC: 4.5 MMOL/L — SIGNIFICANT CHANGE UP (ref 3.5–5.3)
POTASSIUM SERPL-SCNC: 4.5 MMOL/L — SIGNIFICANT CHANGE UP (ref 3.5–5.3)
RBC # BLD: 4.19 M/UL — LOW (ref 4.2–5.8)
RBC # FLD: 12.9 % — SIGNIFICANT CHANGE UP (ref 10.3–14.5)
SODIUM SERPL-SCNC: 140 MMOL/L — SIGNIFICANT CHANGE UP (ref 135–145)
WBC # BLD: 14.3 K/UL — HIGH (ref 3.8–10.5)
WBC # FLD AUTO: 14.3 K/UL — HIGH (ref 3.8–10.5)

## 2018-11-19 NOTE — REVIEW OF SYSTEMS
[Recent Change In Weight] : ~T recent weight change [Negative] : Allergic/Immunologic [Fever] : no fever [Chills] : no chills [FreeTextEntry2] : significant weight loss (from previous) [FreeTextEntry7] : +mild nausea [de-identified] : L axillary swelling / pain

## 2018-11-19 NOTE — REASON FOR VISIT
[Follow-Up Visit] : a follow-up [Initial Consultation] : an initial consultation [FreeTextEntry2] : Melanoma metastatic to abdomen, nodes; s/p meningioma; pulmonary hypertension

## 2018-11-19 NOTE — RESULTS/DATA
[FreeTextEntry1] : CT scan 10/15/18:\par \par IMPRESSION: Progressive metastatic disease involving liver, omentum, left  \par axillary lymph nodes, and mesenteric tumor mass herniated into the right  \par internal canal.\par \par \par PET Scan 6/6/18:\par \par IMPRESSION:  Abnormal whole body FDG-PET/CT scan. \par  \par 1. Enlarged, hypermetabolic left axillary lymph node is compatible with  \par metastasis. This is amenable to an ultrasound-guided percutaneous needle  \par biopsy. \par  \par 2. Moderate to large amount of abdominal and pelvic ascites with mildly  \par FDG-avid omental nodularity. Findings are compatible with carcinomatosis.  \par A primary neoplasm is not delineated on this scan. \par  \par 3. FDG-avid soft tissue attenuation density adjacent to sigmoid colon,  \par possibly a lymph node. Contrast-enhanced CT may be obtained for further  \par evaluation. \par  \par 4. Large non FDG-avid transosseous meningioma extending from left  \par supratentorial to infratentorial compartment, as see on MRI dated  \par 3/5/2018. \par  \par 5. Findings suggestive of thyroiditis. \par  \par 6. Inflammatory right maxillary sinus disease. Correlate clinically for  \par acute sinusitis.

## 2018-11-19 NOTE — HISTORY OF PRESENT ILLNESS
[Disease: _____________________] : Disease: [unfilled] [T: ___] : T[unfilled] [N: ___] : N[unfilled] [M: ___] : M[unfilled] [AJCC Stage: ____] : AJCC Stage: [unfilled] [Therapy: ___] : Therapy: [unfilled] [Cycle: ___] : Cycle: [unfilled] [Gastrointestinal] : Gastrointestinal [___________________________________] : Drug: [unfilled] [de-identified] : 58 year old male presents for an initial consultation today for malignant melanoma which is metastatic. The patient had not seen a medical doctor in over 15 years until October 2017. He had seeked care then due to what he described as severe water retention and he was initially treated with just diuretics. At the end of December 2017, he developed SOB, cough generalized edema and sought care at [facility] ED. There, was diagnosed with systolic heart failure (EF 40%), as well as HTN, DM2, KRISTIAN, and a left sided brain tumor. MRI of the brain performed on 11/30/17 revealed an extensive lobulated calvarial thickening involving the left parietal-occipital-temporal region with an enhancing soft tissue component. There was notable edema involving the left lateral cerebellum with mass effect on the 4th ventricle. Cardiac catheterization showed multivessel disease and patient was given ASA 325mg and Plavix 600mg. He was then transferred to [facility] for PCI and is now s/p BMS to LAD on 1/18/2018. Echocardiogram showed now preserved LV systolic function, but severe pulmonary hypertension, signs of RV pressure and volume overload due to acute on chronic diastolic heart failure.\par \par He also reported an enlarging left posterior shoulder lesion (“slug sized”) which had been present for over a year in December 2018 during his hospitalization at [facility]. It was biopsied and excised on 12/7/17at [facility] while hospitalized and pathology revealed nodular type melanoma with a mitotic rate >15/mm2 and extending to tissue edges with ulceration present, no regression, and measured 2.3 cm in greatest dimension, however the tumor thickness was unable to be accurately assessed and no sentinel node biopsy was performed. Also performed that day was a left side skull subtotal resection and pathology revealed meningothelial meningioma, WHO, Grade I , with invasion of the bone and soft tissue ( skeletal muscle). Patient underwent lymphoscintigraphy for pretreatment lymph node mapping on 3/23/18. He was found to have left axillary yimi accumulation of radiopharmaceutical. \par \par He completed XRT to the left brain meningioma (WHO grade 1) on 5/14/18. He was treated due to headaches and loss of hearing on the L side. His hearing has not returned at all. He was seeing Dr. Howell from oncology for both the melanoma and brain meningioma. \par \par On 6/6/18 patient underwent PET scan which showed hypermetabolic L axillary node, abdominal/pelvic ascites with FDG omental nodularity compatible with carcinomatosis, FDG avid soft tissue attenuation density adjacent to the sigmoid colon, possibly a lymph node, and findings suggestive of thyroiditis. On 6/26/18 with Dr. Garduno patient had left axillary lymph node biopsy revealing metastatic melanoma. No BRAF status was checked on this specimen. 6/29/18 patient had US guided core biopsy and FNA of the omentum which showed fibroadipose tissue with lymphoplasmacytic infiltrate and fibrosis. He then underwent colonoscopy which showed only diverticulosis. He has since been started on single agent ipilimumab. He had received 4 treatments with ipilimumab and now repeat imaging is showing progression of his disease in the mesentery and axilla. He has since received his first dose of single agent Nivolumab at 480 mg flat dose.  [de-identified] : BRAF negative [de-identified] : PATHOLOGY:\par Consult slides from [facility] original shoulder exision from 3/5/2018:\par Skin, left shoulder, excision ([id number]; 12/7/2017) \par - Melanoma, nodular type, mitotic rate >15/mm2, extending to tissue edges (see note). \par Note: Only one H&E stained section (A7) is provided for review. Ulceration is present. No regression changes are seen. The tumormeasures 2.3 cm in greatest dimension on the provided slide; however, tumor thickness cannot be accurately assessed on thissection.  A Mart1/Melan A immunostain performed by the [facility] is reviewed and is positive in the tumor cells. Thereis a focal increase in intraepidermal melanocytes; however, nodefinite in situ component is seen in this section. Thedifferential diagnosis includes a primary tumor and a metastasis.According to the accompanying report, the peripheral and deepmargins are uninvolved by tumor; inkedmargin is not present in the section provided. Clinicalcorrelation is recommended.\par \par \par 6/26/2018\par Left axillary lymph node, core biopsy \par - Metastatic malignant melanoma. \par \par \par 6/29/2018\par OMENTUM, US GUIDED CORE BIOPSY AND FNA \par SIGNIFICANT FINDINGS. \par Fibroadipose tissue with lymphoplasmacytic infiltrate and \par fibrosis\par  [FreeTextEntry1] : Ipilimumab 4 cycles August - October 2018 [de-identified] : Patient reports that over the past 2-3 days he has had around 6 total watery diarrhea bowel movements. He thinks they are decreasing in frequency and intensity, and he has no reported abdominal pain. No fevers or chills, no BRBPR or melena, and no nausea or vomiting. Reports he does have overall fatigue. No shortness of breath or swelling of extremities. No reported skin rash, and saw dermatology the other day and they did full body exam with no rash seen. Urine is darker than usual and less frequent.  [de-identified] : Diarrhea

## 2018-11-19 NOTE — ASSESSMENT
[Palliative] : Goals of care discussed with patient: Palliative [FreeTextEntry1] : 57 y/o M with no medical history prior to late 2017, when he was hospitalized for heart failure exacerbation, also diagnosed with T2DM, HTN, KRISTIAN and new CAD s/p PCI at [facility] with BMS and now medically optimized, who was also incidentally found with large "slug like" L shoulder mass which was excised and found to be malignant melanoma, with metastasis found in large L axillary lymph node (biopsy proven), also found with L sided meningioma with hearing loss and s/p XRT, and subsequently found with omental carcinomatosis with inconclusive core needle biopsy here for follow up after treatment course with ipilimumab.\par \par (1) Malignant Melanoma\par Patient with stage IV disease based on axillary biopsy. BRAF negative.\par Patient is now s/p 4 cycles of ipilimumab, CT scan for disease reassessment showing progression of his disease both in his axilla, as well as omentum, liver and presence of a new mesenteric mass.\par Now s/p repeat biopsy of axilla for foundation testing and FNA of omental disease confirming melanoma. \par s/p 1st dose of single agent Nivolumab on 11/1/18.\par No further surgical options at this point as per Dr. Garduno.\par \par (2) Diarhea\par Possibly in the setting of immunotherapy. \par Likely due to ipilimumab given time course, and he is now off of that medication. \par Grade 2 in severity at this time, no need for immediate steroids. \par If does not wero (patient indicates it already has) will collect sample for C. Diff testing. \par Imodium PRN. \par Will check CBC, CMP, Mg, P to evaluate for possible need for hydration. \par \par (3) Low TSH\par Patient with low TSH (0.20) and elevated TPO antibodies on presentation.\par Normal free T3/T4. TSH normalized.\par Referred to endocrinology, and currently undergoing surveillance and has normalized.\par Continued endocrinology follow up while on immunotherapy\par \par (4) Meningioma\par Patient is s/p definitive XRT due to headaches and hearing loss. \par Headaches are improved but still with hearing loss. This has been evaluated as well by ENT and appears to have some conductive hearing loss likely from radiation changes of otitis externa and canal stenosis. Being evaluated for intervention but may require hearing aid. \par Will monitor. Follow up with Dr. Moore. \par \par (5) CHF/CAD\par Continue on cardiac medications as currently ordered. \par Patient clinically euvolemic at the moment. \par Follow closely with cardiologist, especially in the setting of possibly needing hydration. \par \par Discussed in clinic with Dr. Vargas. Following longitudinally with Dr. Cho. \par \par Bradley Goldberg M.D. \par Hematology/Oncology Fellow\par [facility]\par [address] [address]\par [phone]\par

## 2018-11-19 NOTE — PHYSICAL EXAM
[Ambulatory and capable of all self care but unable to carry out any work activities] : Status 2- Ambulatory and capable of all self care but unable to carry out any work activities. Up and about more than 50% of waking hours [Obese] : obese [Normal] : affect appropriate [Ulcers] : no ulcers [Mucositis] : no mucositis [Thrush] : no thrush [Vesicles] : no vesicles [de-identified] : R inguinal canal fullness, nontender. [de-identified] : scar on L shoulder with some mild darkening of skin, L axillary subcutaneous nodule with evidence of underlying melanoma which is more prominent then previously.

## 2018-11-21 ENCOUNTER — APPOINTMENT (OUTPATIENT)
Dept: INFUSION THERAPY | Facility: HOSPITAL | Age: 58
End: 2018-11-21

## 2018-11-21 DIAGNOSIS — E86.0 DEHYDRATION: ICD-10-CM

## 2018-11-26 ENCOUNTER — APPOINTMENT (OUTPATIENT)
Dept: HEMATOLOGY ONCOLOGY | Facility: CLINIC | Age: 58
End: 2018-11-26

## 2018-11-26 ENCOUNTER — INPATIENT (INPATIENT)
Facility: HOSPITAL | Age: 58
LOS: 8 days | Discharge: NURSING, REHAB OR LONG-TERM CARE | End: 2018-12-05
Attending: HOSPITALIST | Admitting: HOSPITALIST
Payer: MEDICAID

## 2018-11-26 VITALS
HEART RATE: 74 BPM | RESPIRATION RATE: 16 BRPM | OXYGEN SATURATION: 97 % | SYSTOLIC BLOOD PRESSURE: 93 MMHG | TEMPERATURE: 97.9 F | DIASTOLIC BLOOD PRESSURE: 61 MMHG

## 2018-11-26 VITALS
HEART RATE: 75 BPM | OXYGEN SATURATION: 97 % | TEMPERATURE: 97 F | RESPIRATION RATE: 17 BRPM | DIASTOLIC BLOOD PRESSURE: 61 MMHG | SYSTOLIC BLOOD PRESSURE: 63 MMHG

## 2018-11-26 DIAGNOSIS — N39.0 URINARY TRACT INFECTION, SITE NOT SPECIFIED: ICD-10-CM

## 2018-11-26 DIAGNOSIS — I50.32 CHRONIC DIASTOLIC (CONGESTIVE) HEART FAILURE: ICD-10-CM

## 2018-11-26 DIAGNOSIS — A41.9 SEPSIS, UNSPECIFIED ORGANISM: ICD-10-CM

## 2018-11-26 DIAGNOSIS — Z29.9 ENCOUNTER FOR PROPHYLACTIC MEASURES, UNSPECIFIED: ICD-10-CM

## 2018-11-26 DIAGNOSIS — Z95.5 PRESENCE OF CORONARY ANGIOPLASTY IMPLANT AND GRAFT: Chronic | ICD-10-CM

## 2018-11-26 DIAGNOSIS — D32.9 BENIGN NEOPLASM OF MENINGES, UNSPECIFIED: ICD-10-CM

## 2018-11-26 DIAGNOSIS — N17.9 ACUTE KIDNEY FAILURE, UNSPECIFIED: ICD-10-CM

## 2018-11-26 DIAGNOSIS — H57.9 UNSPECIFIED DISORDER OF EYE AND ADNEXA: Chronic | ICD-10-CM

## 2018-11-26 DIAGNOSIS — M79.89 OTHER SPECIFIED SOFT TISSUE DISORDERS: ICD-10-CM

## 2018-11-26 DIAGNOSIS — E11.9 TYPE 2 DIABETES MELLITUS WITHOUT COMPLICATIONS: ICD-10-CM

## 2018-11-26 DIAGNOSIS — C43.9 MALIGNANT MELANOMA OF SKIN, UNSPECIFIED: ICD-10-CM

## 2018-11-26 LAB
ALBUMIN SERPL ELPH-MCNC: 3.6 G/DL — SIGNIFICANT CHANGE UP (ref 3.3–5)
ALP SERPL-CCNC: 135 U/L — HIGH (ref 40–120)
ALT FLD-CCNC: 19 U/L — SIGNIFICANT CHANGE UP (ref 4–41)
APPEARANCE UR: SIGNIFICANT CHANGE UP
APTT BLD: 24.6 SEC — LOW (ref 27.5–36.3)
AST SERPL-CCNC: 38 U/L — SIGNIFICANT CHANGE UP (ref 4–40)
BACTERIA # UR AUTO: NEGATIVE — SIGNIFICANT CHANGE UP
BASE EXCESS BLDV CALC-SCNC: -6.8 MMOL/L — SIGNIFICANT CHANGE UP
BASE EXCESS BLDV CALC-SCNC: -7.4 MMOL/L — SIGNIFICANT CHANGE UP
BASE EXCESS BLDV CALC-SCNC: -7.9 MMOL/L — SIGNIFICANT CHANGE UP
BASOPHILS # BLD AUTO: 0.03 K/UL — SIGNIFICANT CHANGE UP (ref 0–0.2)
BASOPHILS NFR BLD AUTO: 0.2 % — SIGNIFICANT CHANGE UP (ref 0–2)
BILIRUB SERPL-MCNC: 0.5 MG/DL — SIGNIFICANT CHANGE UP (ref 0.2–1.2)
BILIRUB UR-MCNC: SIGNIFICANT CHANGE UP
BLOOD GAS VENOUS - CREATININE: 3.23 MG/DL — HIGH (ref 0.5–1.3)
BLOOD GAS VENOUS - CREATININE: 3.47 MG/DL — HIGH (ref 0.5–1.3)
BLOOD GAS VENOUS - CREATININE: 3.47 MG/DL — HIGH (ref 0.5–1.3)
BLOOD UR QL VISUAL: SIGNIFICANT CHANGE UP
BUN SERPL-MCNC: 96 MG/DL — HIGH (ref 7–23)
CALCIUM SERPL-MCNC: 8.8 MG/DL — SIGNIFICANT CHANGE UP (ref 8.4–10.5)
CHLORIDE BLDV-SCNC: 102 MMOL/L — SIGNIFICANT CHANGE UP (ref 96–108)
CHLORIDE BLDV-SCNC: 106 MMOL/L — SIGNIFICANT CHANGE UP (ref 96–108)
CHLORIDE BLDV-SCNC: 99 MMOL/L — SIGNIFICANT CHANGE UP (ref 96–108)
CHLORIDE SERPL-SCNC: 92 MMOL/L — LOW (ref 98–107)
CO2 SERPL-SCNC: 15 MMOL/L — LOW (ref 22–31)
COLOR SPEC: SIGNIFICANT CHANGE UP
CREAT SERPL-MCNC: 2.56 MG/DL — HIGH (ref 0.5–1.3)
EOSINOPHIL # BLD AUTO: 0 K/UL — SIGNIFICANT CHANGE UP (ref 0–0.5)
EOSINOPHIL NFR BLD AUTO: 0 % — SIGNIFICANT CHANGE UP (ref 0–6)
GAS PNL BLDV: 134 MMOL/L — LOW (ref 136–146)
GAS PNL BLDV: 137 MMOL/L — SIGNIFICANT CHANGE UP (ref 136–146)
GAS PNL BLDV: 137 MMOL/L — SIGNIFICANT CHANGE UP (ref 136–146)
GLUCOSE BLDV-MCNC: 119 — HIGH (ref 70–99)
GLUCOSE BLDV-MCNC: 81 — SIGNIFICANT CHANGE UP (ref 70–99)
GLUCOSE BLDV-MCNC: 98 — SIGNIFICANT CHANGE UP (ref 70–99)
GLUCOSE SERPL-MCNC: 115 MG/DL — HIGH (ref 70–99)
GLUCOSE UR-MCNC: NEGATIVE — SIGNIFICANT CHANGE UP
HCO3 BLDV-SCNC: 17 MMOL/L — LOW (ref 20–27)
HCO3 BLDV-SCNC: 18 MMOL/L — LOW (ref 20–27)
HCO3 BLDV-SCNC: 18 MMOL/L — LOW (ref 20–27)
HCT VFR BLD CALC: 38.3 % — LOW (ref 39–50)
HCT VFR BLDV CALC: 32.4 % — LOW (ref 39–51)
HCT VFR BLDV CALC: 37.1 % — LOW (ref 39–51)
HCT VFR BLDV CALC: 41.2 % — SIGNIFICANT CHANGE UP (ref 39–51)
HGB BLD-MCNC: 12.6 G/DL — LOW (ref 13–17)
HGB BLDV-MCNC: 10.5 G/DL — LOW (ref 13–17)
HGB BLDV-MCNC: 12 G/DL — LOW (ref 13–17)
HGB BLDV-MCNC: 13.4 G/DL — SIGNIFICANT CHANGE UP (ref 13–17)
HYALINE CASTS # UR AUTO: HIGH
IMM GRANULOCYTES # BLD AUTO: 0.1 # — SIGNIFICANT CHANGE UP
IMM GRANULOCYTES NFR BLD AUTO: 0.8 % — SIGNIFICANT CHANGE UP (ref 0–1.5)
INR BLD: 1 — SIGNIFICANT CHANGE UP (ref 0.88–1.17)
KETONES UR-MCNC: NEGATIVE — SIGNIFICANT CHANGE UP
LACTATE BLDV-MCNC: 3.2 MMOL/L — HIGH (ref 0.5–2)
LACTATE BLDV-MCNC: 4.7 MMOL/L — CRITICAL HIGH (ref 0.5–2)
LACTATE BLDV-MCNC: 4.8 MMOL/L — CRITICAL HIGH (ref 0.5–2)
LEUKOCYTE ESTERASE UR-ACNC: SIGNIFICANT CHANGE UP
LYMPHOCYTES # BLD AUTO: 0.87 K/UL — LOW (ref 1–3.3)
LYMPHOCYTES # BLD AUTO: 6.7 % — LOW (ref 13–44)
MCHC RBC-ENTMCNC: 32 PG — SIGNIFICANT CHANGE UP (ref 27–34)
MCHC RBC-ENTMCNC: 32.9 % — SIGNIFICANT CHANGE UP (ref 32–36)
MCV RBC AUTO: 97.2 FL — SIGNIFICANT CHANGE UP (ref 80–100)
MONOCYTES # BLD AUTO: 1.31 K/UL — HIGH (ref 0–0.9)
MONOCYTES NFR BLD AUTO: 10.1 % — SIGNIFICANT CHANGE UP (ref 2–14)
NEUTROPHILS # BLD AUTO: 10.61 K/UL — HIGH (ref 1.8–7.4)
NEUTROPHILS NFR BLD AUTO: 82.2 % — HIGH (ref 43–77)
NITRITE UR-MCNC: NEGATIVE — SIGNIFICANT CHANGE UP
NRBC # FLD: 0 — SIGNIFICANT CHANGE UP
NT-PROBNP SERPL-SCNC: 1801 PG/ML — SIGNIFICANT CHANGE UP
PCO2 BLDV: 36 MMHG — LOW (ref 41–51)
PCO2 BLDV: 41 MMHG — SIGNIFICANT CHANGE UP (ref 41–51)
PCO2 BLDV: 41 MMHG — SIGNIFICANT CHANGE UP (ref 41–51)
PH BLDV: 7.27 PH — LOW (ref 7.32–7.43)
PH BLDV: 7.28 PH — LOW (ref 7.32–7.43)
PH BLDV: 7.3 PH — LOW (ref 7.32–7.43)
PH UR: 5.5 — SIGNIFICANT CHANGE UP (ref 5–8)
PLATELET # BLD AUTO: 307 K/UL — SIGNIFICANT CHANGE UP (ref 150–400)
PMV BLD: 10.8 FL — SIGNIFICANT CHANGE UP (ref 7–13)
PO2 BLDV: 34 MMHG — LOW (ref 35–40)
PO2 BLDV: 39 MMHG — SIGNIFICANT CHANGE UP (ref 35–40)
PO2 BLDV: 57 MMHG — HIGH (ref 35–40)
POTASSIUM BLDV-SCNC: 4 MMOL/L — SIGNIFICANT CHANGE UP (ref 3.4–4.5)
POTASSIUM BLDV-SCNC: 4.4 MMOL/L — SIGNIFICANT CHANGE UP (ref 3.4–4.5)
POTASSIUM BLDV-SCNC: 4.5 MMOL/L — SIGNIFICANT CHANGE UP (ref 3.4–4.5)
POTASSIUM SERPL-MCNC: 4.7 MMOL/L — SIGNIFICANT CHANGE UP (ref 3.5–5.3)
POTASSIUM SERPL-SCNC: 4.7 MMOL/L — SIGNIFICANT CHANGE UP (ref 3.5–5.3)
PROT SERPL-MCNC: 7.5 G/DL — SIGNIFICANT CHANGE UP (ref 6–8.3)
PROT UR-MCNC: 100 — HIGH
PROTHROM AB SERPL-ACNC: 11.4 SEC — SIGNIFICANT CHANGE UP (ref 9.8–13.1)
RBC # BLD: 3.94 M/UL — LOW (ref 4.2–5.8)
RBC # FLD: 15 % — HIGH (ref 10.3–14.5)
RBC CASTS # UR COMP ASSIST: SIGNIFICANT CHANGE UP (ref 0–?)
SAO2 % BLDV: 53.5 % — LOW (ref 60–85)
SAO2 % BLDV: 64.4 % — SIGNIFICANT CHANGE UP (ref 60–85)
SAO2 % BLDV: 85.7 % — HIGH (ref 60–85)
SODIUM SERPL-SCNC: 134 MMOL/L — LOW (ref 135–145)
SP GR SPEC: 1.02 — SIGNIFICANT CHANGE UP (ref 1–1.04)
SQUAMOUS # UR AUTO: SIGNIFICANT CHANGE UP
TROPONIN T, HIGH SENSITIVITY: 112 NG/L — CRITICAL HIGH (ref ?–14)
TROPONIN T, HIGH SENSITIVITY: 113 NG/L — CRITICAL HIGH (ref ?–14)
UROBILINOGEN FLD QL: NORMAL — SIGNIFICANT CHANGE UP
WBC # BLD: 12.92 K/UL — HIGH (ref 3.8–10.5)
WBC # FLD AUTO: 12.92 K/UL — HIGH (ref 3.8–10.5)
WBC UR QL: >50 — HIGH (ref 0–?)

## 2018-11-26 PROCEDURE — 74176 CT ABD & PELVIS W/O CONTRAST: CPT | Mod: 26

## 2018-11-26 PROCEDURE — 99223 1ST HOSP IP/OBS HIGH 75: CPT | Mod: GC

## 2018-11-26 PROCEDURE — 71045 X-RAY EXAM CHEST 1 VIEW: CPT | Mod: 26

## 2018-11-26 PROCEDURE — 71250 CT THORAX DX C-: CPT | Mod: 26

## 2018-11-26 RX ORDER — ASPIRIN/CALCIUM CARB/MAGNESIUM 324 MG
81 TABLET ORAL DAILY
Qty: 0 | Refills: 0 | Status: DISCONTINUED | OUTPATIENT
Start: 2018-11-26 | End: 2018-12-05

## 2018-11-26 RX ORDER — SODIUM CHLORIDE 9 MG/ML
1000 INJECTION, SOLUTION INTRAVENOUS ONCE
Qty: 0 | Refills: 0 | Status: COMPLETED | OUTPATIENT
Start: 2018-11-26 | End: 2018-11-26

## 2018-11-26 RX ORDER — SODIUM CHLORIDE 9 MG/ML
1000 INJECTION INTRAMUSCULAR; INTRAVENOUS; SUBCUTANEOUS ONCE
Qty: 0 | Refills: 0 | Status: COMPLETED | OUTPATIENT
Start: 2018-11-26 | End: 2018-11-26

## 2018-11-26 RX ORDER — HEPARIN SODIUM 5000 [USP'U]/ML
5000 INJECTION INTRAVENOUS; SUBCUTANEOUS EVERY 8 HOURS
Qty: 0 | Refills: 0 | Status: DISCONTINUED | OUTPATIENT
Start: 2018-11-26 | End: 2018-11-26

## 2018-11-26 RX ORDER — ATORVASTATIN CALCIUM 80 MG/1
40 TABLET, FILM COATED ORAL AT BEDTIME
Qty: 0 | Refills: 0 | Status: DISCONTINUED | OUTPATIENT
Start: 2018-11-26 | End: 2018-12-05

## 2018-11-26 RX ORDER — DOCUSATE SODIUM 100 MG
100 CAPSULE ORAL
Qty: 0 | Refills: 0 | Status: DISCONTINUED | OUTPATIENT
Start: 2018-11-26 | End: 2018-11-26

## 2018-11-26 RX ORDER — VANCOMYCIN HCL 1 G
1000 VIAL (EA) INTRAVENOUS ONCE
Qty: 0 | Refills: 0 | Status: COMPLETED | OUTPATIENT
Start: 2018-11-26 | End: 2018-11-26

## 2018-11-26 RX ORDER — SODIUM CHLORIDE 9 MG/ML
2000 INJECTION, SOLUTION INTRAVENOUS ONCE
Qty: 0 | Refills: 0 | Status: DISCONTINUED | OUTPATIENT
Start: 2018-11-26 | End: 2018-11-26

## 2018-11-26 RX ORDER — PIPERACILLIN AND TAZOBACTAM 4; .5 G/20ML; G/20ML
3.38 INJECTION, POWDER, LYOPHILIZED, FOR SOLUTION INTRAVENOUS ONCE
Qty: 0 | Refills: 0 | Status: COMPLETED | OUTPATIENT
Start: 2018-11-26 | End: 2018-11-26

## 2018-11-26 RX ORDER — SODIUM CHLORIDE 9 MG/ML
2000 INJECTION INTRAMUSCULAR; INTRAVENOUS; SUBCUTANEOUS ONCE
Qty: 0 | Refills: 0 | Status: COMPLETED | OUTPATIENT
Start: 2018-11-26 | End: 2018-11-26

## 2018-11-26 RX ORDER — CLOPIDOGREL BISULFATE 75 MG/1
75 TABLET, FILM COATED ORAL DAILY
Qty: 0 | Refills: 0 | Status: DISCONTINUED | OUTPATIENT
Start: 2018-11-26 | End: 2018-12-05

## 2018-11-26 RX ORDER — TRAZODONE HCL 50 MG
50 TABLET ORAL AT BEDTIME
Qty: 0 | Refills: 0 | Status: DISCONTINUED | OUTPATIENT
Start: 2018-11-26 | End: 2018-11-29

## 2018-11-26 RX ADMIN — SODIUM CHLORIDE 2000 MILLILITER(S): 9 INJECTION INTRAMUSCULAR; INTRAVENOUS; SUBCUTANEOUS at 18:41

## 2018-11-26 RX ADMIN — PIPERACILLIN AND TAZOBACTAM 200 GRAM(S): 4; .5 INJECTION, POWDER, LYOPHILIZED, FOR SOLUTION INTRAVENOUS at 14:58

## 2018-11-26 RX ADMIN — SODIUM CHLORIDE 1000 MILLILITER(S): 9 INJECTION INTRAMUSCULAR; INTRAVENOUS; SUBCUTANEOUS at 15:04

## 2018-11-26 RX ADMIN — SODIUM CHLORIDE 1000 MILLILITER(S): 9 INJECTION, SOLUTION INTRAVENOUS at 21:11

## 2018-11-26 RX ADMIN — PIPERACILLIN AND TAZOBACTAM 3.38 GRAM(S): 4; .5 INJECTION, POWDER, LYOPHILIZED, FOR SOLUTION INTRAVENOUS at 15:40

## 2018-11-26 RX ADMIN — Medication 250 MILLIGRAM(S): at 15:40

## 2018-11-26 RX ADMIN — SODIUM CHLORIDE 2000 MILLILITER(S): 9 INJECTION, SOLUTION INTRAVENOUS at 20:00

## 2018-11-26 RX ADMIN — SODIUM CHLORIDE 1000 MILLILITER(S): 9 INJECTION INTRAMUSCULAR; INTRAVENOUS; SUBCUTANEOUS at 21:11

## 2018-11-26 RX ADMIN — Medication 1000 MILLIGRAM(S): at 18:41

## 2018-11-26 RX ADMIN — SODIUM CHLORIDE 1000 MILLILITER(S): 9 INJECTION INTRAMUSCULAR; INTRAVENOUS; SUBCUTANEOUS at 18:41

## 2018-11-26 NOTE — ED PROVIDER NOTE - OBJECTIVE STATEMENT
59 yo male with PMH of metastatic melanoma, being treated by [facility] on immunotherapy (ipilimubab), CHF, HTN, DM, CAD with a cardiac stent, presents to the ED for nausea, vomiting, and loose stools x 5 days. Sent in from [facility] today where he had an appointment today. Pt is currently at an extended rehab center, [facility]. Pt states loose stools have resolved. S/p zofran by EMS, not nauseous at this time. Denies LOC, near syncope, CP, palpitations, abdominal pain, blood per mouth or rectum, urinary symptoms. Pt states he has not kept water.     Allergies: NKDA  Onc: Dr. Goldberg, [facility]  PMD: extended rehab facility Dr. Trevor Marcus

## 2018-11-26 NOTE — H&P ADULT - HISTORY OF PRESENT ILLNESS
Patient is a 57 y/o M with a PMH significant for metastatic melanoma s/p resection on immunotherapy with progression of disease, meningothelial meningioma s/p resection and RT, CAD s/p stent who presents from [facility] for dehydration, nausea, and vomiting. The patient was seen at [facility] last monday and was given IV fluids given his symptoms of nausea and vomiting. He states he felt better after the fluids and was sent back home. However on Thursday, he began to develop nausea and vomiting once again. He states he vomiting several times over the course of the holiday and the weekend and it was whatever he tried to eat or drink. Denies any blood in his vomit. He also noticed he was having some diarrhea (watery with brown stool mixed in) which he has been having on and off since he started on his immunotherapy 4 weeks ago. Had only 1 BM today. He denies any blood in his stool. He denied fevers, chills, CP, abdominal pain, or dysuria but states he had felt SOB on exertion the last few days along with some diaphoresis. He went to [facility] today and was sent in by his oncologist for further evaluation.     Vital signs upon arrival to the ED were significant for BP of 63/61 which subsequently improved with IV fluids, HR of 75, T of 97, and O2 sat of 97% on RA. He was given 4L of IV fluids total in the ED along with vancomycin and zosyn x 1. Patient is a 59 y/o Male with MHx  significant for severe diverticulosis, metastatic melanoma s/p resection on immunotherapy with progression of disease, meningothelial meningioma s/p resection and RT, CAD s/p stent who presents from [facility] for dehydration, nausea, and vomiting. The patient was seen at [facility] last monday and was given IV fluids given his symptoms of nausea and vomiting. He states he felt better after the fluids and was sent back home. However on Thursday, he began to develop nausea and vomiting once again. He states he vomiting several times over the course of the holiday and the weekend and it was whatever he tried to eat or drink. Denies any blood in his vomit. He also noticed he was having some diarrhea (watery with brown stool mixed in) which he has been having on and off since he started on his immunotherapy 4 weeks ago. Had only 1 BM today. He denies any blood in his stool. He denied fevers, chills, CP, abdominal pain, or dysuria but states he had felt SOB on exertion the last few days along with some diaphoresis. He went to [facility] today and was sent in by his oncologist for further evaluation.     ED course: hypotensive, improved with IV fluids, HR of 75, T of 97, and O2 sat of 97% on RA. He was given 4L of IV fluids total in the ED along with vancomycin and zosyn x 1. Patient is a 59 y/o Male, ambulatory with a wheelchair and a walker, with MHx  significant for severe diverticulosis, metastatic melanoma s/p resection on immunotherapy with progression of disease, meningothelial meningioma s/p resection and RT, CAD s/p stent who presents from [facility] for dehydration, nausea, and vomiting. The patient was seen at [facility] last monday and was given IV fluids given his symptoms of nausea and vomiting. He states he felt better after the fluids and was sent back home. However on Thursday, he began to develop nausea and vomiting once again. He states he vomiting several times over the course of the holiday and the weekend and it was whatever he tried to eat or drink. Denies any blood in his vomit. He also noticed he was having some diarrhea (watery with brown stool mixed in) which he has been having on and off since he started on his immunotherapy 4 weeks ago. Had only 1 BM today. He denies any blood in his stool. He denied fevers, chills, CP, abdominal pain, or dysuria but states he had felt SOB on exertion the last few days along with some diaphoresis. He went to [facility] today and was sent in by his oncologist for further evaluation.     ED course: hypotensive, improved with IV fluids, HR of 75, T of 97, and O2 sat of 97% on RA. He was given 4L of IV fluids total in the ED along with vancomycin and zosyn x 1.

## 2018-11-26 NOTE — ED CLERICAL - CLERICAL COMMENTS
pt with metastatic meloma & chf sent for worsening n,v,d. pls Ct chest & abd & assess cardiac function

## 2018-11-26 NOTE — H&P ADULT - NSHPREVIEWOFSYSTEMS_GEN_ALL_CORE
REVIEW OF SYSTEMS    CONSTITUTIONAL:  Denies fevers or chills   HEENT: Denies blurry vision, nasal congestion, + hearing loss in the L side (chronic)  SKIN:  Denies rash or itching.  CARDIOVASCULAR:  Denies chest pain, DAVID  RESPIRATORY:  + SOB on exertion, no cough  GASTROINTESTINAL:  + anorexia, nausea, vomiting, denies abdominal pain  GENITOURINARY:  Denies any hematuria, dysuria  NEUROLOGICAL:  Denies headache, dizziness, numbness, tingling  MUSCULOSKELETAL:  Denies muscle, back pain, joint pain or stiffness.  HEMATOLOGIC:  Denies anemia, bleeding or bruising.  LYMPHATICS:  Denies enlarged nodes.   PSYCHIATRIC:  Denies history of depression or anxiety.  ENDOCRINOLOGIC:  Denies reports of sweating, cold or heat intolerance. No polyuria or polydipsia.  ALLERGIES:  Denies history of asthma, hives, eczema or rhinitis. REVIEW OF SYSTEMS    CONSTITUTIONAL:  Denies fevers or chills   HEENT: Denies blurry vision, nasal congestion, + hearing loss in the L side (chronic)  SKIN:  Denies rash or itching.  CARDIOVASCULAR:  Denies chest pain, DAVID  RESPIRATORY:  + SOB on exertion, no cough  GASTROINTESTINAL:  + anorexia, nausea, vomiting, denies abdominal pain  GENITOURINARY:  Denies any hematuria, dysuria  NEUROLOGICAL:  Denies headache, dizziness, numbness, tingling  MUSCULOSKELETAL:  Denies muscle, back pain, joint pain or stiffness.  HEMATOLOGIC:  Denies bleeding or bruising.  PSYCHIATRIC:  Denies history of depression or anxiety.  ENDOCRINOLOGIC:  Denies reports of sweating, cold or heat intolerance. No polyuria or polydipsia.  ALLERGIES:  Denies history of asthma, hives, eczema or rhinitis.

## 2018-11-26 NOTE — ED PROVIDER NOTE - ATTENDING CONTRIBUTION TO CARE
Sarah Beth: I have seen and examined the patient face to face, have reviewed and addended the HPI, PE and a/p as necessary.     57 yo male with PMH of metastatic melanoma, being treated by [facility] on immunotherapy (ipilimubab) follows with Dr Albrecht, sent in today for dehydration and possible elevated creatinine.  Patient has been having progressive nausea and vomiting, unable to tolerate PO over the past 2-3 days.  Pt has history sig for CHF, HTN, DM, CAD with a cardiac stent.  Sent in from [facility] today where he had an appointment today. Pt is currently at an extended rehab center, [facility].  S/p zofran by EMS, not nauseous at this time. Denies LOC, near syncope, CP, palpitations, abdominal pain, blood per mouth or rectum, urinary symptoms. Pt states he has not kept water.     GEN - NAD; A+O x3; dehydrated, dry mucous membranes, CARD -s1s2, RRR, no M,G,R; PULM - CTA b/l, symmetric breath sounds; ABD:  +BS, ND, NT, soft, no guarding, no rebound, no masses; BACK: no CVA tenderness, Normal  spine; EXT: symmetric pulses, 2+ dp, capillary refill < 2 seconds, no clubbing, no cyanosis, no edema     57 yo M a/w dehydration and likely failure to thrive, possible underlying infection.  Will obtain labs, discussed with Dr. Goldberg requestion non-urgent imaging of chest and abd.  Empirically treat for possible infection.  Repeat lactate if >2.

## 2018-11-26 NOTE — H&P ADULT - PROBLEM SELECTOR PLAN 10
- DVT ppx held until CT head negative for mets from melanoma  - Clear liquid diet  - Dispo pending resolution of symptoms    GOC: Patient is DNR/DNI, will place paperwork in the chart    Sierra Montgomery  PGY 2 Night Admit  Pager [phone]

## 2018-11-26 NOTE — H&P ADULT - PROBLEM SELECTOR PLAN 4
- will check A1c in AM  - ISS for now and will start standing insulin once tolerating a regular diet - secondary to renal failure, also with lactic acidosis likely secondary to sepsis  - will continue to monitor for now, bicarbonate drip in the setting of sepsis is unlikely to be beneficial - secondary to renal failure, also with lactic acidosis likely secondary to sepsis  - will continue to monitor for now, bicarbonate drip in the setting of sepsis is unlikely to be beneficial so will recheck labs in the AM

## 2018-11-26 NOTE — H&P ADULT - PROBLEM SELECTOR PLAN 3
- patient with metastatic melanoma, no history of brain mets as of March 2018  - currently on immunotherapy, next dose due Wednesday  - will hold anticoagulation until CT head is done to rule out new brain mets given patient has had extensive progression of disease  - emailed heme/onc to see patient in the AM - patient with metastatic melanoma, no history of brain mets as of March 2018  - currently on immunotherapy, next dose due Wednesday  - patient with progression of disease seen on CT chest/abdomen/pelvis including new ascites (asymptomatic at this time)  - will hold anticoagulation until CT head is done to rule out new brain mets given patient has had extensive progression of disease  - emailed heme/onc to see patient in the AM - patient with stage IV metastatic melanoma with progression of disease despite therapy  - currently on immunotherapy (ipilimumab), last dose was 4 weeks ago and next dose due Wednesday of this week  - patient with progression of disease seen on CT chest/abdomen/pelvis including new ascites (asymptomatic at this time)  - will hold anticoagulation until CT head is done to rule out new brain mets given patient has had extensive progression of disease (last CT head in March 2018 and negative)  - emailed heme/onc to see patient in the AM with further recommendations

## 2018-11-26 NOTE — ED PROVIDER NOTE - PROGRESS NOTE DETAILS
onc fellow requesting blood cultures and vanc and zosyn in ED to empirically cover for infection Elvis Perry (Resident): lactate increased - patient BP soft, got 2L fluid - bedside US at this time shows patient could benefit from more fluids, small pleural effusion on R - will give more fluids and reassess

## 2018-11-26 NOTE — H&P ADULT - PROBLEM SELECTOR PLAN 9
- troponin elevated but likely in the setting of SILKE  - no concern for ACS at this time given EKG with no acute ST changes and lack of symptoms  - will continue ASA and plavix  - holding BP meds in the setting of hypotension on admission - patient with CAD, s/p stent in January 2018  - troponin elevated but likely in the setting of SILKE  - no concern for ACS at this time given EKG with no acute ST changes and lack of symptoms (no CP)  - will continue ASA and plavix  - holding BP meds in the setting of hypotension on admission No c/o CP, palpitations or FAIR; Screening Trop sent by ED; Very low suspicion of ACS;  - patient with CAD, s/p stent in January 2018  - troponin elevated but likely in the setting of SILKE  - very low suspicion of ACS at this time given unchanged EKG with no acute ST changes  and lack of symptoms as outlined above   - will continue ASA and plavix  - holding BP meds in the setting of hypotension, sepsis on admission

## 2018-11-26 NOTE — H&P ADULT - PROBLEM SELECTOR PLAN 1
- sepsis secondary to UTI  - will treat with zosyn given positive UA, urine culture pending - sepsis secondary to UTI  - will treat with zosyn given positive UA, urine culture pending  - BCx pending - sepsis secondary likely to UTI, elevated WBC with signs of end organ damage (lactate elevated)  - will treat with zosyn given positive UA, urine culture pending  - BCx pending - sepsis secondary likely to UTI, elevated WBC, lactate elevated  - will treat with zosyn given positive UA, urine culture pending  - BCx pending  -s/p 4L bolus in ED  -hold IV fluids, monitor closely volume status

## 2018-11-26 NOTE — ED ADULT NURSE NOTE - OBJECTIVE STATEMENT
PT brought into [facility]. A&OX4 male presents to the ED today from [facility] where he is being treated for metastatic melanoma with n/v/d with loose stools for 5 days. Patient denies pain at this time. As per patient he can not hold anything down including water. Denies CP/ dizziness/ lightheadedness. Patient resides at long care term facility and uses wheelchair to ambulate. Skin is clean dry and intact. Patient placed on cardiac monitor and notes to be in NSR. 20g Iv placed by EMS in right AC. 20G G iv placed in left forearm. Labs sent. EKG called for Will continue to monitor and reassess.

## 2018-11-26 NOTE — H&P ADULT - NSHPSOCIALHISTORY_GEN_ALL_CORE
Patient currently lives at an extended rehab facility secondary to his previous house going under foreclosure. He occasionally uses a wheelchair and/or walker to get around. He denies any tobacco or recreational drug use. Occasionally has a beer (1-3x a week).

## 2018-11-26 NOTE — H&P ADULT - NSHPPHYSICALEXAM_GEN_ALL_CORE
Vital Signs Last 24 Hrs  T(C): 36.8 (26 Nov 2018 21:10), Max: 36.8 (26 Nov 2018 16:20)  T(F): 98.2 (26 Nov 2018 21:10), Max: 98.2 (26 Nov 2018 16:20)  HR: 74 (26 Nov 2018 21:10) (74 - 79)  BP: 105/55 (26 Nov 2018 21:10) (63/61 - 113/56)  BP(mean): --  RR: 18 (26 Nov 2018 21:10) (16 - 18)  SpO2: 99% (26 Nov 2018 21:10) (96% - 99%)    PHYSICAL EXAM:    GENERAL: Comfortable, no acute distress   HEAD:  Normocephalic, atraumatic  EYES: EOMI, PERRLA  HEENT: dry mucus membranes  NECK: Supple, No JVD  NERVOUS SYSTEM:  AAOx3, no gross focal deficits noted  CHEST/LUNG: Clear to auscultation bilaterally with slightly diminished break sounds at the bases  HEART: RRR, no murmurs appreciated   ABDOMEN: +BS, soft, non tender, non distended   EXTREMITIES: L leg > R leg in diameter with 2+ edema, no erythema or warmth noted  MUSCULOSKELETAL: No muscle tenderness, no joint tenderness  SKIN: warm and dry, no rash Vital Signs Last 24 Hrs  T(C): 36.8 (26 Nov 2018 21:10), Max: 36.8 (26 Nov 2018 16:20)  T(F): 98.2 (26 Nov 2018 21:10), Max: 98.2 (26 Nov 2018 16:20)  HR: 74 (26 Nov 2018 21:10) (74 - 79)  BP: 105/55 (26 Nov 2018 21:10) (63/61 - 113/56)  BP(mean): --  RR: 18 (26 Nov 2018 21:10) (16 - 18)  SpO2: 99% (26 Nov 2018 21:10) (96% - 99%)    PHYSICAL EXAM:    GENERAL: Comfortable, no acute distress   EYES: EOMI, PERRLA  HEENT: dry mucus membranes  NECK: Supple, No JVD  NERVOUS SYSTEM:  AAOx3, no gross focal deficits noted  CHEST/LUNG: Clear to auscultation bilaterally with slightly diminished break sounds at the bases  HEART: RRR, no murmurs appreciated   ABDOMEN: +BS, soft, non tender, non distended   EXTREMITIES: L leg > R leg in diameter with 2+ edema, no erythema or warmth noted  MUSCULOSKELETAL: No muscle tenderness, no joint tenderness  SKIN: warm and dry, no rash

## 2018-11-26 NOTE — H&P ADULT - ASSESSMENT
Patient is a 57 y/o M with a PMH significant for metastatic melanoma s/p resection on immunotherapy with progression of disease, meningothelial meningioma s/p resection and RT, CAD s/p stent who presents from [facility] for dehydration, nausea, and vomiting, admitted for failure to thrive and SILKE. Patient is a 59 y/o M with a PMH significant for metastatic melanoma s/p resection on immunotherapy with progression of disease, meningothelial meningioma s/p resection and RT, CAD s/p stent who presents from [facility] for dehydration, nausea, and vomiting, admitted for sepsis secondary to UTI complicated by SILKE with metabolic acidosis. 59 y/o Male with MHx  significant for severe diverticulosis, metastatic melanoma s/p resection on immunotherapy with progression of disease, meningothelial meningioma s/p resection and RT, CAD s/p stent admitted for sepsis secondary to UTI complicated by SILKE with metabolic and lactic acidosis in the setting of worsening metastatic disease;

## 2018-11-26 NOTE — H&P ADULT - PROBLEM SELECTOR PLAN 8
- patient with unilateral, left sided swelling of the calf  - will order venous duplex to assess for clot  - holding AC while waiting for CT head - patient with unilateral, left sided swelling of the calf  - will order venous duplex to r/o DVT   - holding A/C pending CT head r/o brain mets

## 2018-11-26 NOTE — ED ADULT TRIAGE NOTE - CHIEF COMPLAINT QUOTE
[place] [facility] [place]. last Chemo 4 weeks ago. pt currently being treated for Metastatic Melanoma. given 4MG Zofran via R a/c #20g S/L placed by EMS.

## 2018-11-26 NOTE — H&P ADULT - PROBLEM SELECTOR PLAN 2
- likely pre-renal secondary to dehydration vs side effect from immunotherapy  - CT abdomen/pelvis does not show any signs of bladder obstruction  - s/p 4L of fluid, will recommend oral rehydration and recheck BMP - likely pre-renal secondary to dehydration vs side effect from immunotherapy  - CT abdomen/pelvis does not show any signs of bladder obstruction  - s/p 4L of fluid, will recommend oral rehydration and recheck BMP in the AM to assess for improvement  - renally dose medications as needed - likely pre-renal secondary to dehydration vs side effect from immunotherapy  - CT abdomen/pelvis does not show any signs of bladder obstruction  - s/p 4L of fluid, will recommend oral rehydration and recheck BMP in the AM to assess for improvement  - renally dose medications as needed  - given elev lactate and sepsis picture will not start on bicarb gtt, monitor pH after fluid resuscitation

## 2018-11-26 NOTE — H&P ADULT - NSHPLABSRESULTS_GEN_ALL_CORE
12.6    )-----------( 307      ( 2018 14:41 )             38.3           134<L>  |  92<L>  |  96<H>  ----------------------------<  115<H>  4.7   |  15<L>  |  2.56<H>    Ca    8.8      2018 14:41    TPro  7.5  /  Alb  3.6  /  TBili  0.5  /  DBili  x   /  AST  38  /  ALT  19  /  AlkPhos  135<H>          Troponin T, High Sensitivity (18 @ 17:55)    Troponin T, High Sensitivity: 112: Delta: 113 on   RESULT PREVIOUSLY NTFD  DATE / TIME CALLED: N/A  CALLED BY: N/A  Delta: 113 on       Urinalysis Basic - ( 2018 17:54 )    Color: DARK ORANGE / Appearance: TURBID / S.022 / pH: 5.5  Gluc: NEGATIVE / Ketone: NEGATIVE  / Bili: TRACE / Urobili: NORMAL   Blood: TRACE / Protein: 100 / Nitrite: NEGATIVE   Leuk Esterase: LARGE / RBC: 25-50 / WBC >50   Sq Epi: MODERATE / Non Sq Epi: x / Bacteria: NEGATIVE      RADIOLOGY    < from: Xray Chest 1 View AP/PA (18 @ 16:03) >      PROCEDURE DATE:  2018     ******PRELIMINARY REPORT******    ******PRELIMINARY REPORT******            INTERPRETATION:  no emergentfindings    < end of copied text >    < from: CT Chest No Cont (18 @ 17:36) >    IMPRESSION: Limited evaluation due to lack of IV contrast.    Worsening metastatic disease when compared to prior study on 10/12/2018.   Extensive omental caking significantly progressed from prior study. New   large volume of abdominopelvic ascites. Interval increase in size of   tumor implants in the left axilla and right groin.    Multiple bilateral pulmonary nodules measuring up to 3 to 4 mm.    < end of copied text > EKG, 11/26, nsr, 76bpm, qtc 569, RBBB, no acute Tw or ST changes - unchanged compared to EKG from 6/28/2018 - my reading     CXR - no focal lung consolidations, no pleural effusions - my reading     CT ABDOMEN AND PELVIS    CT CHEST    PROCEDURE DATE:  Nov 26 2018   COMPARISON: CT chest/abdomen/pelvis on 10/12/2018.  FINDINGS:  Limited evaluation due to lack of IV contrast.  CHEST:   LUNGS AND LARGE AIRWAYS: Patent central airways. Multiple bilateral   subcentimeter pulmonary nodules measuring up to 4 mm.  PLEURA: No pleural effusion.  VESSELS: Within normal limits.  HEART: Heart size is normal. No pericardial effusion. Coronary artery   calcification.  MEDIASTINUM AND FAITH: Right hilar lymph node (2, 34) increase in size now   measuring 2.1 x 1.3 cm, previously 1.4 x 1.0 cm.   CHEST WALL AND LOWER NECK: Enlarged heterogeneous thyroid gland with   calcification, unchanged. Multiple enlarged left axillary lymph nodes,   the largest measuring 8.6 x 5.6 cm demonstrating low central attenuation   most likely representing necrosis. This appears mildly increased in size   from 10/12/2018.  ABDOMEN AND PELVIS:  LIVER: Numerous subcentimeter hypodense lesions throughout the liver   which appears increased from prior study.  BILE DUCTS: Normal caliber.  GALLBLADDER: Within normal limits.  SPLEEN: Within normal limits.  PANCREAS: Within normal limits.  ADRENALS: Within normal limits.  KIDNEYS/URETERS: Tiny nonobstructing bilateral intrarenal calculi. No   hydronephrosis.  BLADDER: Within normal limits.  REPRODUCTIVE ORGANS: Prostate within normal limits.  BOWEL: No bowel obstruction. Colonic diverticulosis. Appendix is normal.  PERITONEUM: Large volume ascites, new. There is extensive omental   nodularity markedly progressed from prior study and most prominent in the   left upper and lower quadrant, new from prior study.  Multiple   subcentimeter peritoneal nodes identified.  VESSELS:  Atherosclerotic changes.  RETROPERITONEUM: No lymphadenopathy.    ABDOMINAL WALL: Herniated mesenteric tumor mass within the right inguinal   canal increase in size from prior study now measuring 7.4 x 4.7 cm,   previously 6.5 x 4.2 cm. Small amount of loculated ascites in the left   inguinal region  BONES: Degenerative spinal changes. Marked bilateral hip joint arthrosis.  IMPRESSION: Limited evaluation due to lack of IV contrast.  Worsening metastatic disease when compared to prior study on 10/12/2018.   Extensive omental caking significantly progressed from prior study. New   large volume of abdominopelvic ascites. Interval increase in size of   tumor implants in the left axilla and right groin.  Multiple bilateral pulmonary nodules measuring up to 3 to 4 mm.    TTE, 1/19/2018:  Conclusions:  1. Normal left atrium.  2. Left ventricular ejection fraction, by visual  estimation, is 55-60%.  Septal motion consistent with right  ventricular overload. No wall motion abnormality.  3. Grade II diastolic dysfunction.  LAP=23mmHG  4. Normal right atrium.  5. Normal right ventricular size and systolic function.  6. Estimated right ventricular systolic pressure equals 61  mm Hg, assuming right atrial pressure equals 8 mm Hg,  consistent with severe pulmonary hypertension.  7. Normal pericardium with no pericardial effusion.

## 2018-11-26 NOTE — ED PROVIDER NOTE - ENMT, MLM
Very dry mucous membranes; Airway patent, Nasal mucosa clear. Throat has no vesicles, no oropharyngeal exudates and uvula is midline.

## 2018-11-26 NOTE — H&P ADULT - PROBLEM SELECTOR PLAN 5
- stable s/p resection and RT - Echo in January with EF 55-60%, grade II diastolic dysfunction  - on aldactone, furosemide, carvedilol, hydralazine, isosorbide  - holding all BP meds at this time given hypotension upon presentation to the ED

## 2018-11-26 NOTE — ED ADULT NURSE NOTE - CHIEF COMPLAINT QUOTE
pt sent from [facility] for n/v/d since thanksgiving. last Chemo 4 weeks ago. pt currently being treated for Metastatic Melanoma. given 4MG Zofran via R a/c #20g S/L placed by EMS.

## 2018-11-26 NOTE — H&P ADULT - PROBLEM SELECTOR PLAN 6
- Echo in January with EF 55-60%, grade II diastolic dysfunction  - on aldactone, furosemide, carvedilol, hydralazine, isosorbide - will check A1c in AM  - ISS for now and will start standing insulin once tolerating a regular diet

## 2018-11-27 DIAGNOSIS — I25.10 ATHEROSCLEROTIC HEART DISEASE OF NATIVE CORONARY ARTERY WITHOUT ANGINA PECTORIS: ICD-10-CM

## 2018-11-27 DIAGNOSIS — E87.2 ACIDOSIS: ICD-10-CM

## 2018-11-27 LAB
ALBUMIN SERPL ELPH-MCNC: 3 G/DL — LOW (ref 3.3–5)
ALP SERPL-CCNC: 118 U/L — SIGNIFICANT CHANGE UP (ref 40–120)
ALT FLD-CCNC: 14 U/L — SIGNIFICANT CHANGE UP (ref 4–41)
APTT BLD: 119 SEC — HIGH (ref 27.5–36.3)
AST SERPL-CCNC: 34 U/L — SIGNIFICANT CHANGE UP (ref 4–40)
BILIRUB SERPL-MCNC: 0.4 MG/DL — SIGNIFICANT CHANGE UP (ref 0.2–1.2)
BUN SERPL-MCNC: 93 MG/DL — HIGH (ref 7–23)
CALCIUM SERPL-MCNC: 7.9 MG/DL — LOW (ref 8.4–10.5)
CHLORIDE SERPL-SCNC: 101 MMOL/L — SIGNIFICANT CHANGE UP (ref 98–107)
CO2 SERPL-SCNC: 16 MMOL/L — LOW (ref 22–31)
CREAT SERPL-MCNC: 2.27 MG/DL — HIGH (ref 0.5–1.3)
GLUCOSE BLDC GLUCOMTR-MCNC: 91 MG/DL — SIGNIFICANT CHANGE UP (ref 70–99)
GLUCOSE BLDC GLUCOMTR-MCNC: 93 MG/DL — SIGNIFICANT CHANGE UP (ref 70–99)
GLUCOSE BLDC GLUCOMTR-MCNC: 96 MG/DL — SIGNIFICANT CHANGE UP (ref 70–99)
GLUCOSE SERPL-MCNC: 85 MG/DL — SIGNIFICANT CHANGE UP (ref 70–99)
HBA1C BLD-MCNC: 5.7 % — HIGH (ref 4–5.6)
HCT VFR BLD CALC: 34.9 % — LOW (ref 39–50)
HCT VFR BLD CALC: 35 % — LOW (ref 39–50)
HGB BLD-MCNC: 11.2 G/DL — LOW (ref 13–17)
HGB BLD-MCNC: 11.2 G/DL — LOW (ref 13–17)
MAGNESIUM SERPL-MCNC: 2.8 MG/DL — HIGH (ref 1.6–2.6)
MCHC RBC-ENTMCNC: 30.8 PG — SIGNIFICANT CHANGE UP (ref 27–34)
MCHC RBC-ENTMCNC: 31.4 PG — SIGNIFICANT CHANGE UP (ref 27–34)
MCHC RBC-ENTMCNC: 32 % — SIGNIFICANT CHANGE UP (ref 32–36)
MCHC RBC-ENTMCNC: 32.1 % — SIGNIFICANT CHANGE UP (ref 32–36)
MCV RBC AUTO: 96.2 FL — SIGNIFICANT CHANGE UP (ref 80–100)
MCV RBC AUTO: 97.8 FL — SIGNIFICANT CHANGE UP (ref 80–100)
METHOD TYPE: SIGNIFICANT CHANGE UP
NRBC # FLD: 0 — SIGNIFICANT CHANGE UP
NRBC # FLD: 0 — SIGNIFICANT CHANGE UP
ORGANISM # SPEC MICROSCOPIC CNT: SIGNIFICANT CHANGE UP
ORGANISM # SPEC MICROSCOPIC CNT: SIGNIFICANT CHANGE UP
PHOSPHATE SERPL-MCNC: 5.5 MG/DL — HIGH (ref 2.5–4.5)
PLATELET # BLD AUTO: 235 K/UL — SIGNIFICANT CHANGE UP (ref 150–400)
PLATELET # BLD AUTO: 243 K/UL — SIGNIFICANT CHANGE UP (ref 150–400)
PMV BLD: 10.7 FL — SIGNIFICANT CHANGE UP (ref 7–13)
PMV BLD: 10.9 FL — SIGNIFICANT CHANGE UP (ref 7–13)
POTASSIUM SERPL-MCNC: 4.1 MMOL/L — SIGNIFICANT CHANGE UP (ref 3.5–5.3)
POTASSIUM SERPL-SCNC: 4.1 MMOL/L — SIGNIFICANT CHANGE UP (ref 3.5–5.3)
PROT SERPL-MCNC: 6.4 G/DL — SIGNIFICANT CHANGE UP (ref 6–8.3)
RBC # BLD: 3.57 M/UL — LOW (ref 4.2–5.8)
RBC # BLD: 3.64 M/UL — LOW (ref 4.2–5.8)
RBC # FLD: 15.2 % — HIGH (ref 10.3–14.5)
RBC # FLD: 15.2 % — HIGH (ref 10.3–14.5)
SODIUM SERPL-SCNC: 138 MMOL/L — SIGNIFICANT CHANGE UP (ref 135–145)
SPECIMEN SOURCE: SIGNIFICANT CHANGE UP
SPECIMEN SOURCE: SIGNIFICANT CHANGE UP
WBC # BLD: 11.67 K/UL — HIGH (ref 3.8–10.5)
WBC # BLD: 12.07 K/UL — HIGH (ref 3.8–10.5)
WBC # FLD AUTO: 11.67 K/UL — HIGH (ref 3.8–10.5)
WBC # FLD AUTO: 12.07 K/UL — HIGH (ref 3.8–10.5)

## 2018-11-27 PROCEDURE — 70450 CT HEAD/BRAIN W/O DYE: CPT | Mod: 26

## 2018-11-27 PROCEDURE — 93971 EXTREMITY STUDY: CPT | Mod: 26

## 2018-11-27 PROCEDURE — 99223 1ST HOSP IP/OBS HIGH 75: CPT | Mod: GC

## 2018-11-27 PROCEDURE — 99233 SBSQ HOSP IP/OBS HIGH 50: CPT | Mod: GC

## 2018-11-27 RX ORDER — SODIUM CHLORIDE 9 MG/ML
1000 INJECTION, SOLUTION INTRAVENOUS
Qty: 0 | Refills: 0 | Status: DISCONTINUED | OUTPATIENT
Start: 2018-11-27 | End: 2018-12-05

## 2018-11-27 RX ORDER — CEFAZOLIN SODIUM 1 G
2000 VIAL (EA) INJECTION EVERY 8 HOURS
Qty: 0 | Refills: 0 | Status: DISCONTINUED | OUTPATIENT
Start: 2018-11-27 | End: 2018-11-27

## 2018-11-27 RX ORDER — INSULIN LISPRO 100/ML
VIAL (ML) SUBCUTANEOUS AT BEDTIME
Qty: 0 | Refills: 0 | Status: DISCONTINUED | OUTPATIENT
Start: 2018-11-27 | End: 2018-12-05

## 2018-11-27 RX ORDER — DEXTROSE 50 % IN WATER 50 %
25 SYRINGE (ML) INTRAVENOUS ONCE
Qty: 0 | Refills: 0 | Status: DISCONTINUED | OUTPATIENT
Start: 2018-11-27 | End: 2018-12-05

## 2018-11-27 RX ORDER — GLUCAGON INJECTION, SOLUTION 0.5 MG/.1ML
1 INJECTION, SOLUTION SUBCUTANEOUS ONCE
Qty: 0 | Refills: 0 | Status: DISCONTINUED | OUTPATIENT
Start: 2018-11-27 | End: 2018-12-05

## 2018-11-27 RX ORDER — HEPARIN SODIUM 5000 [USP'U]/ML
INJECTION INTRAVENOUS; SUBCUTANEOUS
Qty: 25000 | Refills: 0 | Status: DISCONTINUED | OUTPATIENT
Start: 2018-11-27 | End: 2018-11-30

## 2018-11-27 RX ORDER — ONDANSETRON 8 MG/1
4 TABLET, FILM COATED ORAL ONCE
Qty: 0 | Refills: 0 | Status: COMPLETED | OUTPATIENT
Start: 2018-11-27 | End: 2018-11-27

## 2018-11-27 RX ORDER — SODIUM CHLORIDE 9 MG/ML
1000 INJECTION INTRAMUSCULAR; INTRAVENOUS; SUBCUTANEOUS
Qty: 0 | Refills: 0 | Status: DISCONTINUED | OUTPATIENT
Start: 2018-11-27 | End: 2018-11-28

## 2018-11-27 RX ORDER — DEXTROSE 50 % IN WATER 50 %
12.5 SYRINGE (ML) INTRAVENOUS ONCE
Qty: 0 | Refills: 0 | Status: DISCONTINUED | OUTPATIENT
Start: 2018-11-27 | End: 2018-12-05

## 2018-11-27 RX ORDER — HEPARIN SODIUM 5000 [USP'U]/ML
8000 INJECTION INTRAVENOUS; SUBCUTANEOUS EVERY 6 HOURS
Qty: 0 | Refills: 0 | Status: DISCONTINUED | OUTPATIENT
Start: 2018-11-27 | End: 2018-11-30

## 2018-11-27 RX ORDER — INSULIN LISPRO 100/ML
VIAL (ML) SUBCUTANEOUS
Qty: 0 | Refills: 0 | Status: DISCONTINUED | OUTPATIENT
Start: 2018-11-27 | End: 2018-12-05

## 2018-11-27 RX ORDER — PIPERACILLIN AND TAZOBACTAM 4; .5 G/20ML; G/20ML
3.38 INJECTION, POWDER, LYOPHILIZED, FOR SOLUTION INTRAVENOUS EVERY 8 HOURS
Qty: 0 | Refills: 0 | Status: DISCONTINUED | OUTPATIENT
Start: 2018-11-27 | End: 2018-11-27

## 2018-11-27 RX ORDER — HEPARIN SODIUM 5000 [USP'U]/ML
8000 INJECTION INTRAVENOUS; SUBCUTANEOUS ONCE
Qty: 0 | Refills: 0 | Status: COMPLETED | OUTPATIENT
Start: 2018-11-27 | End: 2018-11-27

## 2018-11-27 RX ORDER — HEPARIN SODIUM 5000 [USP'U]/ML
4000 INJECTION INTRAVENOUS; SUBCUTANEOUS EVERY 6 HOURS
Qty: 0 | Refills: 0 | Status: DISCONTINUED | OUTPATIENT
Start: 2018-11-27 | End: 2018-11-30

## 2018-11-27 RX ORDER — PETROLATUM,WHITE
1 JELLY (GRAM) TOPICAL THREE TIMES A DAY
Qty: 0 | Refills: 0 | Status: DISCONTINUED | OUTPATIENT
Start: 2018-11-27 | End: 2018-12-05

## 2018-11-27 RX ORDER — DEXTROSE 50 % IN WATER 50 %
15 SYRINGE (ML) INTRAVENOUS ONCE
Qty: 0 | Refills: 0 | Status: DISCONTINUED | OUTPATIENT
Start: 2018-11-27 | End: 2018-12-05

## 2018-11-27 RX ADMIN — SODIUM CHLORIDE 60 MILLILITER(S): 9 INJECTION INTRAMUSCULAR; INTRAVENOUS; SUBCUTANEOUS at 15:17

## 2018-11-27 RX ADMIN — ATORVASTATIN CALCIUM 40 MILLIGRAM(S): 80 TABLET, FILM COATED ORAL at 22:15

## 2018-11-27 RX ADMIN — Medication 81 MILLIGRAM(S): at 15:17

## 2018-11-27 RX ADMIN — HEPARIN SODIUM 1800 UNIT(S)/HR: 5000 INJECTION INTRAVENOUS; SUBCUTANEOUS at 15:18

## 2018-11-27 RX ADMIN — HEPARIN SODIUM 8000 UNIT(S): 5000 INJECTION INTRAVENOUS; SUBCUTANEOUS at 15:19

## 2018-11-27 RX ADMIN — ONDANSETRON 4 MILLIGRAM(S): 8 TABLET, FILM COATED ORAL at 01:15

## 2018-11-27 RX ADMIN — Medication 1 APPLICATION(S): at 04:25

## 2018-11-27 RX ADMIN — CLOPIDOGREL BISULFATE 75 MILLIGRAM(S): 75 TABLET, FILM COATED ORAL at 15:17

## 2018-11-27 RX ADMIN — PIPERACILLIN AND TAZOBACTAM 25 GRAM(S): 4; .5 INJECTION, POWDER, LYOPHILIZED, FOR SOLUTION INTRAVENOUS at 07:34

## 2018-11-27 RX ADMIN — Medication 50 MILLIGRAM(S): at 22:15

## 2018-11-27 RX ADMIN — PIPERACILLIN AND TAZOBACTAM 25 GRAM(S): 4; .5 INJECTION, POWDER, LYOPHILIZED, FOR SOLUTION INTRAVENOUS at 15:17

## 2018-11-27 NOTE — PROGRESS NOTE ADULT - PROBLEM SELECTOR PLAN 4
- secondary to renal failure, also with lactic acidosis likely secondary to sepsis  - will continue to monitor for now, bicarbonate drip in the setting of sepsis is unlikely to be beneficial so will recheck labs in the AM - secondary to renal failure, also with lactic acidosis likely secondary to sepsis  - will continue to monitor for now  - Repeat Lactate in the AM  - Trend BMP

## 2018-11-27 NOTE — REVIEW OF SYSTEMS
[Fatigue] : fatigue [Recent Change In Weight] : ~T recent weight change [Vomiting] : vomiting [Diarrhea] : diarrhea [Negative] : Allergic/Immunologic [Muscle Weakness] : muscle weakness [Difficulty Walking] : difficulty walking [Fever] : no fever [Chills] : no chills [Night Sweats] : no night sweats [Chest Pain] : no chest pain [Palpitations] : no palpitations [Lower Ext Edema] : no lower extremity edema [Shortness Of Breath] : no shortness of breath [Cough] : no cough [Abdominal Pain] : no abdominal pain [Constipation] : no constipation [Dysuria] : no dysuria [Muscle Pain] : no muscle pain [FreeTextEntry2] : Overall decline in functional status recently due to GI sx / dehydration [FreeTextEntry7] : persistent nausea [FreeTextEntry8] : darker urine [de-identified] : L axillary swelling / pain

## 2018-11-27 NOTE — PROGRESS NOTE ADULT - ASSESSMENT
59 y/o Male with MHx  significant for severe diverticulosis, metastatic melanoma s/p resection on immunotherapy with progression of disease, meningothelial meningioma s/p resection and RT, CAD s/p stent admitted for sepsis secondary GPC bacteremia likely 2/2 UTI c/b SILKE with metabolic acidosis in the setting of worsening metastatic disease

## 2018-11-27 NOTE — ASSESSMENT
[Palliative] : Goals of care discussed with patient: Palliative [Palliative Care Plan] : Patient was apprised of incurable nature of disease.  Hospice and Palliative care options discussed. [FreeTextEntry1] : 59 y/o M with no medical history prior to late 2017, when he was hospitalized for heart failure exacerbation, also diagnosed with T2DM, HTN, KRISTIAN and new CAD s/p PCI at [facility] with BMS and now medically optimized, who was also incidentally found with large "slug like" L shoulder mass which was excised and found to be malignant melanoma, with metastasis found in large L axillary lymph node (biopsy proven), also found with L sided meningioma with hearing loss and s/p XRT, and subsequently found with omental carcinomatosis biopsy proven metastatic melanoma now s/p 4 cycles ipilimumab with progression and now s/p 1 dose nivolumab 480 mg flat dose on 11/1/18 here for follow up c/o nausea/vomiting/diarrhea. \par \par (1) Malignant Melanoma\par Patient with stage IV disease, BRAF is negative.\par Patient is now s/p 4 cycles of ipilimumab, CT scan for disease reassessment at the time showing progression of his disease both in his axilla, as well as omentum, liver and presence of a new mesenteric mass.\par Now s/p repeat biopsy of axilla for foundation testing and FNA of omental disease confirming melanoma. \par s/p 1st dose of single agent Nivolumab on 11/1/18.\par \par (2) Nausea/Vomiting/Diarhea\par Possibly in the setting of immunotherapy. \par Likely due to ipilimumab given time course, and he is now off of that medication. \par C. diff checked and was negative, stool culture negative x 1. \par Given severity of symptoms and persistence, as well as likely recurrent SILKE and declining functional status will contacted EMS and will send to [facility] ER for further management. \par Will get abdominal imaging and infectious workup. \par If infectious workup negative and symptoms persistent may require IV steroids (solumedrol). \par Made triage at [facility] ER aware and will discuss with inpatient oncology team. \par \par (3) Low TSH\par Patient with low TSH (0.20) and elevated TPO antibodies on presentation.\par Normal free T3/T4. TSH normalized.\par Continued endocrinology follow up while on immunotherapy\par \par (4) Meningioma\par Patient is s/p definitive XRT due to headaches and hearing loss. \par Conductive hearing loss likely from radiation changes of otitis externa and canal stenosis. s/p intervention.\par Will monitor. Follow up with Dr. Moore. \par \par (5) CHF/CAD\par Continue on cardiac medications as currently ordered. \par Patient clinically hypovolemic at the moment due to N/V/D.  \par Will be sending to ER and should have cardiac status evaluated. \par He will likely be receiving a large amount of IVFs at [facility] and needs closely monitoring for symptoms of fluid overload. \par \par Discussed in clinic with Dr. Vargas. Following longitudinally with Dr. Cho. \par \par Bradley Goldberg M.D. \par Hematology/Oncology Fellow\par [facility]\par [address] [place] [address] [address]\par [phone]

## 2018-11-27 NOTE — PHYSICAL EXAM
[Obese] : obese [Normal] : affect appropriate [Capable of only limited self care, confined to bed or chair more than 50% of waking hours] : Status 3- Capable of only limited self care, confined to bed or chair more than 50% of waking hours [Ulcers] : no ulcers [Mucositis] : no mucositis [Thrush] : no thrush [Vesicles] : no vesicles [de-identified] : Very disheveled appearing, diaphoretic.  [de-identified] : R inguinal canal fullness, nontender. [de-identified] : scar on L shoulder with some mild darkening of skin, L axillary subcutaneous nodule with evidence of underlying melanoma which is more prominent then previously.  Decreased skin turgor overall.

## 2018-11-27 NOTE — PROGRESS NOTE ADULT - PROBLEM SELECTOR PLAN 2
- likely pre-renal secondary to dehydration vs side effect from immunotherapy  - CT abdomen/pelvis does not show any signs of bladder obstruction  - s/p 4L of fluid, will recommend oral rehydration and recheck BMP in the AM to assess for improvement  - renally dose medications as needed  - given elev lactate and sepsis picture will not start on bicarb gtt, monitor pH after fluid resuscitation - likely pre-renal secondary to dehydration vs side effect from immunotherapy  - CT abdomen/pelvis does not show any signs of bladder obstruction  - s/p 4L of fluid,   - trend BMP daily  - renally dose medications as needed  - given elev lactate and sepsis picture will not start on bicarb gtt, monitor pH after fluid resuscitation

## 2018-11-27 NOTE — HISTORY OF PRESENT ILLNESS
[Disease: _____________________] : Disease: [unfilled] [T: ___] : T[unfilled] [N: ___] : N[unfilled] [M: ___] : M[unfilled] [AJCC Stage: ____] : AJCC Stage: [unfilled] [Therapy: ___] : Therapy: [unfilled] [Cycle: ___] : Cycle: [unfilled] [Day: ___] : Day: [unfilled] [Gastrointestinal] : Gastrointestinal [___________________________________] : Drug: [unfilled] [de-identified] : 58 year old male presents for an initial consultation today for malignant melanoma which is metastatic. The patient had not seen a medical doctor in over 15 years until October 2017. He had seeked care then due to what he described as severe water retention and he was initially treated with just diuretics. At the end of December 2017, he developed SOB, cough generalized edema and sought care at [facility] ED. There, was diagnosed with systolic heart failure (EF 40%), as well as HTN, DM2, KRISTIAN, and a left sided brain tumor. MRI of the brain performed on 11/30/17 revealed an extensive lobulated calvarial thickening involving the left parietal-occipital-temporal region with an enhancing soft tissue component. There was notable edema involving the left lateral cerebellum with mass effect on the 4th ventricle. Cardiac catheterization showed multivessel disease and patient was given ASA 325mg and Plavix 600mg. He was then transferred to [facility] for PCI and is now s/p BMS to LAD on 1/18/2018. Echocardiogram showed now preserved LV systolic function, but severe pulmonary hypertension, signs of RV pressure and volume overload due to acute on chronic diastolic heart failure.\par \par He also reported an enlarging left posterior shoulder lesion (“slug sized”) which had been present for over a year in December 2018 during his hospitalization at [facility]. It was biopsied and excised on 12/7/17at [facility] while hospitalized and pathology revealed nodular type melanoma with a mitotic rate >15/mm2 and extending to tissue edges with ulceration present, no regression, and measured 2.3 cm in greatest dimension, however the tumor thickness was unable to be accurately assessed and no sentinel node biopsy was performed. Also performed that day was a left side skull subtotal resection and pathology revealed meningothelial meningioma, WHO, Grade I , with invasion of the bone and soft tissue ( skeletal muscle). Patient underwent lymphoscintigraphy for pretreatment lymph node mapping on 3/23/18. He was found to have left axillary yimi accumulation of radiopharmaceutical. \par \par He completed XRT to the left brain meningioma (WHO grade 1) on 5/14/18. He was treated due to headaches and loss of hearing on the L side. His hearing has not returned at all. He was seeing Dr. Howell from oncology for both the melanoma and brain meningioma. \par \par On 6/6/18 patient underwent PET scan which showed hypermetabolic L axillary node, abdominal/pelvic ascites with FDG omental nodularity compatible with carcinomatosis, FDG avid soft tissue attenuation density adjacent to the sigmoid colon, possibly a lymph node, and findings suggestive of thyroiditis. On 6/26/18 with Dr. Garduno patient had left axillary lymph node biopsy revealing metastatic melanoma. No BRAF status was checked on this specimen. 6/29/18 patient had US guided core biopsy and FNA of the omentum which showed fibroadipose tissue with lymphoplasmacytic infiltrate and fibrosis. He then underwent colonoscopy which showed only diverticulosis. He has since been started on single agent ipilimumab. He had received 4 treatments with ipilimumab and now repeat imaging is showing progression of his disease in the mesentery and axilla. He has since received his first dose of single agent Nivolumab at 480 mg flat dose on November 1st 2018. \par \par On his previous visit 10 days ago he reports two days of watery diarrhea which has abated but he did feel overall weak and fatigued. His Cr at that time uptrended to around 2 with high magnesium and phosphorus, however when called patient with results he told me he was feeling much better and drinking a lot of gatorade and definitively improving. He came to treatment room that Monday 11/19/18 for repeat lab check and IVFs and receive 500 CC NS and Cr showed improvement and electrolytes were corrected.  [de-identified] : BRAF negative [de-identified] : PATHOLOGY:\par Consult slides from [facility] original shoulder exision from 3/5/2018:\par Skin, left shoulder, excision ([id number]; 12/7/2017) \par - Melanoma, nodular type, mitotic rate >15/mm2, extending to tissue edges (see note). \par Note: Only one H&E stained section (A7) is provided for review. Ulceration is present. No regression changes are seen. The tumormeasures 2.3 cm in greatest dimension on the provided slide; however, tumor thickness cannot be accurately assessed on thissection.  A Mart1/Melan A immunostain performed by the [facility] is reviewed and is positive in the tumor cells. Thereis a focal increase in intraepidermal melanocytes; however, nodefinite in situ component is seen in this section. Thedifferential diagnosis includes a primary tumor and a metastasis.According to the accompanying report, the peripheral and deepmargins are uninvolved by tumor; inkedmargin is not present in the section provided. Clinicalcorrelation is recommended.\par \par \par 6/26/2018\par Left axillary lymph node, core biopsy \par - Metastatic malignant melanoma. \par \par \par 6/29/2018\par OMENTUM, US GUIDED CORE BIOPSY AND FNA \par SIGNIFICANT FINDINGS. \par Fibroadipose tissue with lymphoplasmacytic infiltrate and \par fibrosis\par  [FreeTextEntry1] : Ipilimumab 4 cycles August - October 2018 [de-identified] : He is now here for follow up preceding next dose of nivolumab this Wednesday. He reports that 2-3 days after his IVFs he started to feel worse again. He cannot quantify it but he was having multiple watery bowel movements a day and this progressed that in the last few days he has started to have severe nausea and has had vomiting and cannot keep anything down at this point. He feels dehydrated and his urine has once again become darker. He feels extremely weak and fatigued and has trouble getting up at all. No fevers or chills and he denies any dyspnea or chest pain. No swelling in his extremities.  [de-identified] : Diarrhea

## 2018-11-27 NOTE — PROGRESS NOTE ADULT - PROBLEM SELECTOR PLAN 9
No c/o CP, palpitations or FAIR; Screening Trop sent by ED; Very low suspicion of ACS;  - patient with CAD, s/p stent in January 2018  - troponin elevated but likely in the setting of SILKE  - very low suspicion of ACS at this time given unchanged EKG with no acute ST changes  and lack of symptoms as outlined above   - will continue ASA and plavix  - holding BP meds in the setting of hypotension, sepsis on admission

## 2018-11-27 NOTE — PROGRESS NOTE ADULT - PROBLEM SELECTOR PLAN 6
- will check A1c in AM  - ISS for now and will start standing insulin once tolerating a regular diet

## 2018-11-27 NOTE — CONSULT NOTE ADULT - ASSESSMENT
57 y/o Male, ambulatory with a wheelchair and a walker, with MHx  significant for severe diverticulosis, metastatic melanoma s/p resection (s/p 4 doses of ipilumumab and 1 dose of nivolumab on 11/1) with progression of disease, meningothelial meningioma s/p resection and RT, CAD s/p stent who presented from [facility] for dehydration, nausea, and vomiting.     Plan  - ID on board, f/u repeat blood cx  - supportive care  - CT showed worsening metastatic disease.  However, patient only received one dose of nivo so far, will likely need another dose to assess if responding  - c/w IVF for SILKE  - will need outpatient [place] up at [facility] [place]    Elena Peres DO  Hematology/Oncology Fellow, PGY4  Pager: [phone]

## 2018-11-27 NOTE — PROGRESS NOTE ADULT - PROBLEM SELECTOR PLAN 8
- patient with unilateral, left sided swelling of the calf  - will order venous duplex to r/o DVT   - holding A/C pending CT head r/o brain mets

## 2018-11-27 NOTE — PATIENT PROFILE ADULT - ABILITY TO HEAR (WITH HEARING AID OR HEARING APPLIANCE IF NORMALLY USED):
Mildly to Moderately Impaired: difficulty hearing in some environments or speaker may need to increase volume or speak distinctly/left ear deaf

## 2018-11-27 NOTE — CONSULT NOTE ADULT - SUBJECTIVE AND OBJECTIVE BOX
HPI:  57 y/o Male, ambulatory with a wheelchair and a walker, with MHx  significant for severe diverticulosis, metastatic melanoma s/p resection (s/p 4 doses of ipilumumab and 1 dose of nivolumab on ) with progression of disease, meningothelial meningioma s/p resection and RT, CAD s/p stent who presented from [facility] for dehydration, nausea, and vomiting.  Had some watery diarrhea, off and on since last dose of nivo.  Here, patient was found to be hypotensive but improved with IVF.  He was started on broad spectrum abx.  BCx positive for coag neg staph, UA + pending UCx.        PAST MEDICAL & SURGICAL HISTORY:  CAD (coronary artery disease)  Meningioma  Malignant melanoma, unspecified site  Heart failure, unspecified heart failure chronicity, unspecified heart failure type  Type 2 diabetes mellitus with complication, without long-term current use of insulin  Enlarged heart  HTN (hypertension)  Sleep apnea  Eye symptoms: ser eye surgery, left eye 2018 (bleeding in eye)  Stented coronary artery: pLAD 2018    MEDICATIONS  (STANDING):  aspirin enteric coated 81 milliGRAM(s) Oral daily  atorvastatin 40 milliGRAM(s) Oral at bedtime  clopidogrel Tablet 75 milliGRAM(s) Oral daily  dextrose 5%. 1000 milliLiter(s) (50 mL/Hr) IV Continuous <Continuous>  dextrose 50% Injectable 12.5 Gram(s) IV Push once  dextrose 50% Injectable 25 Gram(s) IV Push once  dextrose 50% Injectable 25 Gram(s) IV Push once  heparin  Infusion.  Unit(s)/Hr (18 mL/Hr) IV Continuous <Continuous>  insulin lispro (HumaLOG) corrective regimen sliding scale   SubCutaneous three times a day before meals  insulin lispro (HumaLOG) corrective regimen sliding scale   SubCutaneous at bedtime  sodium chloride 0.9%. 1000 milliLiter(s) (60 mL/Hr) IV Continuous <Continuous>  traZODone 50 milliGRAM(s) Oral at bedtime    MEDICATIONS  (PRN):  AQUAPHOR (petrolatum Ointment) 1 Application(s) Topical three times a day PRN for comfort  dextrose 40% Gel 15 Gram(s) Oral once PRN Blood Glucose LESS THAN 70 milliGRAM(s)/deciliter  glucagon  Injectable 1 milliGRAM(s) IntraMuscular once PRN Glucose LESS THAN 70 milligrams/deciliter  heparin  Injectable 8000 Unit(s) IV Push every 6 hours PRN For aPTT less than 40  heparin  Injectable 4000 Unit(s) IV Push every 6 hours PRN For aPTT between 40 - 57      Allergies    No Known Allergies    Intolerances        SOCIAL HISTORY:    FAMILY HISTORY:  No pertinent family history in first degree relatives      Vital Signs Last 24 Hrs  T(C): 36.7 (2018 15:41), Max: 36.8 (2018 21:10)  T(F): 98 (2018 15:41), Max: 98.3 (2018 00:25)  HR: 75 (2018 15:41) (74 - 86)  BP: 110/60 (2018 15:41) (96/75 - 112/60)  BP(mean): --  RR: 17 (2018 15:41) (17 - 18)  SpO2: 98% (2018 15:41) (97% - 99%)    PHYSICAL EXAM:    GENERAL: NAD, AAOx3   HEAD:  NC/AT  EYES: EOMI, PERRLA, no scleral icterus  HEENT: Moist mucous membranes  LUNG: Clear to auscultation bilaterally; No rales, rhonchi, wheezing, or rubs  HEART: RRR; No murmurs, rubs, or gallops  ABDOMEN: +BS, ST/ND/NT  EXTREMITIES:  2+ Peripheral Pulses, No clubbing, cyanosis, or edema  LAD: no palpable adenopathy    LABS:                        11.2   12.07 )-----------( 243      ( 2018 06:00 )             34.9         138  |  101  |  93<H>  ----------------------------<  85  4.1   |  16<L>  |  2.27<H>    Ca    7.9<L>      2018 06:00  Phos  5.5       Mg     2.8         TPro  6.4  /  Alb  3.0<L>  /  TBili  0.4  /  DBili  x   /  AST  34  /  ALT  14  /  AlkPhos  118      PT/INR - ( 2018 14:45 )   PT: 11.4 SEC;   INR: 1.00          PTT - ( 2018 14:45 )  PTT:24.6 SEC  Urinalysis Basic - ( 2018 17:54 )    Color: DARK ORANGE / Appearance: TURBID / S.022 / pH: 5.5  Gluc: NEGATIVE / Ketone: NEGATIVE  / Bili: TRACE / Urobili: NORMAL   Blood: TRACE / Protein: 100 / Nitrite: NEGATIVE   Leuk Esterase: LARGE / RBC: 25-50 / WBC >50   Sq Epi: MODERATE / Non Sq Epi: x / Bacteria: NEGATIVE            RADIOLOGY & ADDITIONAL STUDIES:

## 2018-11-27 NOTE — PROGRESS NOTE ADULT - PROBLEM SELECTOR PLAN 5
- Echo in January with EF 55-60%, grade II diastolic dysfunction  - on aldactone, furosemide, carvedilol, hydralazine, isosorbide  - holding all BP meds at this time given hypotension upon presentation to the ED

## 2018-11-27 NOTE — PROGRESS NOTE ADULT - PROBLEM SELECTOR PLAN 3
- patient with stage IV metastatic melanoma with progression of disease despite therapy  - currently on immunotherapy (ipilimumab), last dose was 4 weeks ago and next dose due Wednesday of this week  - patient with progression of disease seen on CT chest/abdomen/pelvis including new ascites (asymptomatic at this time)  - will hold anticoagulation until CT head is done to rule out new brain mets given patient has had extensive progression of disease (last CT head in March 2018 and negative)  - F/u heme/onc recommendations

## 2018-11-27 NOTE — PROGRESS NOTE ADULT - PROBLEM SELECTOR PLAN 1
- sepsis secondary likely to UTI, elevated WBC, lactate elevated  - will treat with zosyn given positive UA, urine culture pending  - BCx: GPC in clusters  -s/p 4L bolus in ED  -hold IV fluids, monitor closely volume status - sepsis secondary likely to UTI, elevated WBC, lactate elevated  - will treat with zosyn given positive UA, urine culture pending  - BCx: GPC in clusters  - Will consider ID consult.  -s/p 4L bolus in ED  -hold IV fluids, monitor closely volume status  - Consider TTE

## 2018-11-27 NOTE — CONSULT NOTE ADULT - ASSESSMENT
57 y/o Male with MHx  significant for severe diverticulosis, metastatic melanoma s/p resection on immunotherapy with progression of disease, meningothelial meningioma s/p resection and RT, CAD s/p stent admitted for hypotension, SILKE, dehydration, nausea and vomiting,  metabolic acidosis in the setting of worsening metastatic disease and to rule out infectious cause.   Mild leukocytosis. CT showing worsening of metastatic disease but no obvious source of infection. UA with epithelial cells and no bacteria.     Overall, hypotension and SILKE in the setting of severe dehydration from nausea, vomiting.     Suggest:  *stop antibiotics   *follow up repeat blood culture   *follow up on urine culture but he's been asymptomatic   *Oncology follow up for malignancy   *IV hydration   *Discussed with residents 57 y/o Male with MHx  significant for severe diverticulosis, metastatic melanoma s/p resection on immunotherapy with progression of disease, meningothelial meningioma s/p resection and RT, CAD s/p stent admitted for hypotension, SILKE, dehydration, nausea and vomiting,  metabolic acidosis in the setting of worsening metastatic disease and to rule out infectious cause.   Mild leukocytosis. CT showing worsening of metastatic disease but no obvious source of infection. UA with epithelial cells and no bacteria.   CONS in blood culture is likely a contaminant. Repeat blood cultures are pending.   Overall, hypotension and SILKE in the setting of severe dehydration from nausea, vomiting.     Suggest:  *stop antibiotics   *follow up repeat blood culture   *follow up on urine culture but he's been asymptomatic   *Oncology follow up for malignancy   *IV hydration   *Discussed with residents 58 m with severe diverticulosis, CAD s/p stent, metastatic melanoma s/p resection on immunotherapy with progression of disease, meningothelial meningioma s/p resection and RT, was started on a new immunotherapy 4 weeks ago and now admitted for hypotension, SILKE, dehydration, nausea and vomiting,  metabolic acidosis  afebrile here, WBC 12, no urinary symptoms and u/s appears contaminated  chest/abd/pelvis CT with worsening metastatic disease but no source of infection  blood cx: coag neg staph     dehydration and SILKE, FTT due to metastatic melanoma vs immunotherapy  no evidence of infection, CT with significantly worsening metastatic disease and omental caking  the coag neg staph bacteremia likely contaminant  hypermagnesemia due to renal insufficiency vs adrenal insufficiency or tumor break down    * repeat the blood cx  * TTE  * would monitor off antibiotics  * check am cortisol  * poor prognosis

## 2018-11-27 NOTE — PATIENT PROFILE ADULT - ANY IMPAIRED UPPER EXTREMITY FUNCTION WITHIN A WEEK PRIOR TO ADMISSION RELATED TO?
After Visit Summary   11/27/2018    Anuradha Gray    MRN: [id number]           Patient Information     Date Of Birth          1965        Visit Information        Provider Department      11/27/2018 2:20 PM Paradise Leggett MD [facility]        Today's Diagnoses     Encounter for well adult exam with abnormal findings    -  1    Cerebrovascular accident (CVA), unspecified mechanism (H)        Essential hypertension        Depression, unspecified depression type        Tobacco dependence syndrome        Screen for colon cancer        Need for prophylactic vaccination and inoculation against influenza        Primary insomnia        Cervicalgia          Care Instructions      Preventive Health Recommendations  Female Ages 50 - 64    Yearly exam: See your health care provider every year in order to  o Review health changes.   o Discuss preventive care.    o Review your medicines if your doctor has prescribed any.      Get a Pap test every three years (unless you have an abnormal result and your provider advises testing more often).    If you get Pap tests with HPV test, you only need to test every 5 years, unless you have an abnormal result.     You do not need a Pap test if your uterus was removed (hysterectomy) and you have not had cancer.    You should be tested each year for STDs (sexually transmitted diseases) if you're at risk.     Have a mammogram every 1 to 2 years.    Have a colonoscopy at age 50, or have a yearly FIT test (stool test). These exams screen for colon cancer.      Have a cholesterol test every 5 years, or more often if advised.    Have a diabetes test (fasting glucose) every three years. If you are at risk for diabetes, you should have this test more often.     If you are at risk for osteoporosis (brittle bone disease), think about having a bone density scan (DEXA).    Shots: Get a flu shot each year. Get a tetanus shot every 10 years.    Nutrition:      Eat at least 5 servings of fruits and vegetables each day.    Eat whole-grain bread, whole-wheat pasta and brown rice instead of white grains and rice.    Get adequate Calcium and Vitamin D.     Lifestyle    Exercise at least 150 minutes a week (30 minutes a day, 5 days a week). This will help you control your weight and prevent disease.    Limit alcohol to one drink per day.    No smoking.     Wear sunscreen to prevent skin cancer.     See your dentist every six months for an exam and cleaning.    See your eye doctor every 1 to 2 years.    Set up an appt with psychologist     Labs today     Flu shot today     Set up colonoscopy     Need to quit smoking           Follow-ups after your visit        Additional Services     GASTROENTEROLOGY ADULT REF PROCEDURE ONLY       Last Lab Result: No results found for: CR  Body mass index is 27.84 kg/(m^2).      Patient will be contacted to schedule procedure.     Please be aware that coverage of these services is subject to the terms and limitations of your health insurance plan.  Call member services at your health plan with any benefit or coverage questions.  Any procedures must be performed at a [facility] facility OR coordinated by your clinic's referral office.    Please bring the following with you to your appointment:    (1) Any X-Rays, CTs or MRIs which have been performed.  Contact the facility where they were done to arrange for  prior to your scheduled appointment.    (2) List of current medications   (3) This referral request   (4) Any documents/labs given to you for this referral            NEUROLOGY ADULT REFERRAL       Your provider has referred you for the following:   Consult at [facility]: [facility] [phone]   [email or web address]  [facility]: [facility] [phone]   [email or web address]    Please be aware that coverage of these services is  "subject to the terms and limitations of your health insurance plan.  Call member services at your health plan with any benefit or coverage questions.      Please bring the following with you to your appointment:    (1) Any X-Rays, CTs or MRIs which have been performed.  Contact the facility where they were done to arrange for  prior to your scheduled appointment.    (2) List of current medications  (3) This referral request   (4) Any documents/labs given to you for this referral                  Who to contact     If you have questions or need follow up information about today's clinic visit or your schedule please contact [facility] directly at [phone].  Normal or non-critical lab and imaging results will be communicated to you by MyChart, letter or phone within 4 business days after the clinic has received the results. If you do not hear from us within 7 days, please contact the clinic through MyChart or phone. If you have a critical or abnormal lab result, we will notify you by phone as soon as possible.  Submit refill requests through [facility] or call your pharmacy and they will forward the refill request to us. Please allow 3 business days for your refill to be completed.          Additional Information About Your Visit        Care EveryWhere ID     This is your Care EveryWhere ID. This could be used by other organizations to access your [place] medical records  [id number]        Your Vitals Were     Pulse Temperature Height Pulse Oximetry Breastfeeding? BMI (Body Mass Index)    69 98  F (36.7  C) (Tympanic) 5' 4\" (1.626 m) 98% No 27.84 kg/m2       Blood Pressure from Last 3 Encounters:   11/27/18 112/56   07/26/18 128/74   05/11/18 124/70    Weight from Last 3 Encounters:   11/27/18 162 lb 3.2 oz (73.6 kg)   07/26/18 161 lb 6.4 oz (73.2 kg)   05/11/18 162 lb (73.5 kg)              We Performed the Following     Basic metabolic panel     CBC with platelets differential     " GASTROENTEROLOGY ADULT REF PROCEDURE ONLY     Hepatic panel     Lipid panel reflex to direct LDL Fasting     NEUROLOGY ADULT REFERRAL     TSH with free T4 reflex          Today's Medication Changes          These changes are accurate as of 11/27/18  3:05 PM.  If you have any questions, ask your nurse or doctor.               These medicines have changed or have updated prescriptions.        Dose/Directions    * nicotine 21 MG/24HR 24 hr patch   Commonly known as:  NICODERM CQ   This may have changed:  Another medication with the same name was added. Make sure you understand how and when to take each.   Used for:  Tobacco dependence syndrome, Chronic pain syndrome, Cervicalgia, Chronic bilateral low back pain with right-sided sciatica, Encounter for screening mammogram for breast cancer, Special screening for malignant neoplasms, colon, Essential hypertension, Primary insomnia, Depression, unspecified depression type, Gastroesophageal reflux disease without esophagitis, Allergic conjunctivitis, bilateral   Changed by:  Paradise Leggett MD        Dose:  1 patch   Place 1 patch onto the skin every 24 hours   Quantity:  30 patch   Refills:  3       * nicotine 21 MG/24HR 24 hr patch   Commonly known as:  NICODERM CQ   This may have changed:  You were already taking a medication with the same name, and this prescription was added. Make sure you understand how and when to take each.   Used for:  Tobacco dependence syndrome   Changed by:  Paradise Leggett MD        Dose:  1 patch   Place 1 patch onto the skin every 24 hours   Quantity:  30 patch   Refills:  3       * Notice:  This list has 2 medication(s) that are the same as other medications prescribed for you. Read the directions carefully, and ask your doctor or other care provider to review them with you.         Where to get your medicines      These medications were sent to [facility] [address] [place] [address] AT [facility]  & [address]  [address] [facility] [address]     Phone:  [phone]     amLODIPine 10 MG tablet    citalopram 20 MG tablet    DULoxetine 60 MG capsule    gabapentin 300 MG capsule    losartan 100 MG tablet    metoprolol tartrate 100 MG tablet    nicotine 21 MG/24HR 24 hr patch         Some of these will need a paper prescription and others can be bought over the counter.  Ask your nurse if you have questions.     Bring a paper prescription for each of these medications     zolpidem 5 MG tablet                Primary Care Provider Office Phone # Fax #    Paradise Leggett [phone] [address] [facility] [address]        Equal Access to Services     ANNABELLE HATCH : Hadii shiv Long, waclaire watkins, qaybta kaalmada venancio, mykel brunson. So [facility] [phone].    ATENCIÓN: Si habla español, tiene a hollingsworth disposición servicios gratuitos de asistencia lingüística. Llame al [phone].    We comply with applicable federal civil rights laws and [place] laws. We do not discriminate on the basis of race, color, national origin, age, disability, sex, sexual orientation, or gender identity.            Thank you!     Thank you for choosing [facility]  for your care. Our goal is always to provide you with excellent care. Hearing back from our patients is one way we can continue to improve our services. Please take a few minutes to complete the written survey that you may receive in the mail after your visit with us. Thank you!             Your Updated Medication List - Protect others around you: Learn how to safely use, store and throw away your medicines at www.disposemymeds.org.          This list is accurate as of 11/27/18  3:05 PM.  Always use your most recent med list.                   Brand Name Dispense Instructions for use Diagnosis    amLODIPine 10 MG tablet    NORVASC    90 tablet    TAKE 1 TABLET(10 MG) BY  MOUTH DAILY    Essential hypertension       atorvastatin 80 MG tablet    LIPITOR    30 tablet    Take 1 tablet (80 mg) by mouth At Bedtime    Essential hypertension, Chronic pain syndrome, Cervicalgia, Chronic bilateral low back pain with right-sided sciatica, Encounter for screening mammogram for breast cancer, Special screening for malignant neoplasms, colon, Primary insomnia, Depression, unspecified depression type, Gastroesophageal reflux disease without esophagitis, Allergic conjunctivitis, bilateral, Tobacco dependence syndrome       citalopram 20 MG tablet    celeXA    30 tablet    Take 1 tablet (20 mg) by mouth daily    Depression, unspecified depression type       cyclobenzaprine 10 MG tablet    FLEXERIL    42 tablet    Take 1 tablet (10 mg) by mouth daily    Chronic pain syndrome, Cervicalgia, Chronic bilateral low back pain with right-sided sciatica, Encounter for screening mammogram for breast cancer, Special screening for malignant neoplasms, colon, Essential hypertension, Primary insomnia, Depression, unspecified depression type, Gastroesophageal reflux disease without esophagitis, Allergic conjunctivitis, bilateral, Tobacco dependence syndrome       DULoxetine 60 MG capsule    CYMBALTA    30 capsule    TAKE 1 CAPSULE(60 MG) BY MOUTH DAILY    Depression, unspecified depression type       fa-pyridoxine-cyancobalamin 2.5-25-2 MG Tabs per tablet     30 each    Take 1 tablet by mouth daily    Chronic pain syndrome, Cervicalgia, Chronic bilateral low back pain with right-sided sciatica, Encounter for screening mammogram for breast cancer, Special screening for malignant neoplasms, colon, Essential hypertension, Primary insomnia, Depression, unspecified depression type, Gastroesophageal reflux disease without esophagitis, Allergic conjunctivitis, bilateral, Tobacco dependence syndrome       gabapentin 300 MG capsule    NEURONTIN    120 capsule    Take 4 capsules (1,200 mg) by mouth 3 times daily     Cervicalgia       HYDROcodone-acetaminophen  MG per tablet    NORCO    180 tablet    Take 1 tablet by mouth every 4 hours as needed for moderate to severe pain    Cervicalgia, Chronic bilateral low back pain with right-sided sciatica, Chronic pain syndrome       losartan 100 MG tablet    COZAAR    90 tablet    TAKE 1 TABLET(100 MG) BY MOUTH DAILY    Essential hypertension       metoprolol tartrate 100 MG tablet    LOPRESSOR    90 tablet    TAKE 1 TABLET(100 MG) BY MOUTH DAILY    Essential hypertension       naproxen 500 MG tablet    NAPROSYN    60 tablet    TAKE 1 TABLET(500 MG) BY MOUTH TWICE DAILY    Chronic pain syndrome, Cervicalgia, Chronic bilateral low back pain with right-sided sciatica, Encounter for screening mammogram for breast cancer, Special screening for malignant neoplasms, colon, Essential hypertension, Primary insomnia, Depression, unspecified depression type, Gastroesophageal reflux disease without esophagitis, Allergic conjunctivitis, bilateral, Tobacco dependence syndrome       * nicotine 21 MG/24HR 24 hr patch    NICODERM CQ    30 patch    Place 1 patch onto the skin every 24 hours    Tobacco dependence syndrome, Chronic pain syndrome, Cervicalgia, Chronic bilateral low back pain with right-sided sciatica, Encounter for screening mammogram for breast cancer, Special screening for malignant neoplasms, colon, Essential hypertension, Primary insomnia, Depression, unspecified depression type, Gastroesophageal reflux disease without esophagitis, Allergic conjunctivitis, bilateral       * nicotine 21 MG/24HR 24 hr patch    NICODERM CQ    30 patch    Place 1 patch onto the skin every 24 hours    Tobacco dependence syndrome       omeprazole 20 MG DR capsule    priLOSEC    90 capsule    Take 1 capsule (20 mg) by mouth daily    Gastroesophageal reflux disease without esophagitis, Chronic pain syndrome, Cervicalgia, Chronic bilateral low back pain with right-sided sciatica, Encounter for screening  mammogram for breast cancer, Special screening for malignant neoplasms, colon, Essential hypertension, Primary insomnia, Depression, unspecified depression type, Tobacco dependence syndrome       oxyCODONE 15 MG 12 hr tablet    OxyCONTIN     Take 20 mg by mouth every 12 hours        vitamin B-12 1000 MCG CR tablet    RA VITAMIN B-12 TR    30 tablet    Take 1,000 mcg by mouth daily    Chronic pain syndrome, Cervicalgia, Chronic bilateral low back pain with right-sided sciatica, Encounter for screening mammogram for breast cancer, Special screening for malignant neoplasms, colon, Essential hypertension, Primary insomnia, Depression, unspecified depression type, Gastroesophageal reflux disease without esophagitis, Allergic conjunctivitis, bilateral, Tobacco dependence syndrome       vitamin D3 1000 units (25 mcg) tablet    CHOLECALCIFEROL    30 tablet    TAKE 1 TABLET BY MOUTH DAILY    Vitamin D deficiency       zolpidem 5 MG tablet    AMBIEN    30 tablet    TAKE ONE TABLET BY MOUTH AT BEDTIME    Primary insomnia       * Notice:  This list has 2 medication(s) that are the same as other medications prescribed for you. Read the directions carefully, and ask your doctor or other care provider to review them with you.       no

## 2018-11-27 NOTE — CONSULT NOTE ADULT - SUBJECTIVE AND OBJECTIVE BOX
Patient is a 58y old  Male who presents with a chief complaint of Sepsis secondary to UTI (2018 08:11)      HPI:  Patient is a 57 y/o Male, ambulatory with a wheelchair and a walker, with MHx  significant for severe diverticulosis, metastatic melanoma s/p resection on immunotherapy with progression of disease, meningothelial meningioma s/p resection and RT, CAD s/p stent who presents from [facility] for dehydration, nausea, and vomiting. The patient was seen at [facility] last monday and was given IV fluids given his symptoms of nausea and vomiting. He states he felt better after the fluids and was sent back home. However on Thursday, he began to develop nausea and vomiting once again. He states he vomiting several times over the course of the holiday and the weekend and it was whatever he tried to eat or drink. Denies any blood in his vomit. He also noticed he was having some diarrhea (watery with brown stool mixed in) which he has been having on and off since he started on his immunotherapy 4 weeks ago. Had only 1 BM today. He denies any blood in his stool. He denied fevers, chills, CP, abdominal pain, or dysuria but states he had felt SOB on exertion the last few days along with some diaphoresis. He went to [facility] today and was sent in by his oncologist for further evaluation.     ED course: hypotensive, improved with IV fluids, HR of 75, T of 97, and O2 sat of 97% on RA. He was given 4L of IV fluids total in the ED along with vancomycin and zosyn x 1. (2018 22:43)    Above reviewed, Pa presented with complaints of nausea, vomiting, dark urine. Found to be in SILKE. CONS in 1 blood culture bottle. Currently on Zosyn.  ID consulted for antibiotics management        PAST MEDICAL & SURGICAL HISTORY:  CAD (coronary artery disease)  Meningioma  Malignant melanoma, unspecified site  Heart failure, unspecified heart failure chronicity, unspecified heart failure type  Type 2 diabetes mellitus with complication, without long-term current use of insulin  Enlarged heart  HTN (hypertension)  Sleep apnea  Eye symptoms: ser eye surgery, left eye 2018 (bleeding in eye)  Stented coronary artery: pLAD 2018      Allergies  No Known Allergies        ANTIMICROBIALS:  piperacillin/tazobactam IVPB. 3.375 every 8 hours      OTHER MEDS: MEDICATIONS  (STANDING):  aspirin enteric coated 81 daily  atorvastatin 40 at bedtime  clopidogrel Tablet 75 daily  dextrose 40% Gel 15 once PRN  dextrose 50% Injectable 12.5 once  dextrose 50% Injectable 25 once  dextrose 50% Injectable 25 once  glucagon  Injectable 1 once PRN  heparin  Infusion.  <Continuous>  heparin  Injectable 8000 every 6 hours PRN  heparin  Injectable 4000 every 6 hours PRN  insulin lispro (HumaLOG) corrective regimen sliding scale  three times a day before meals  insulin lispro (HumaLOG) corrective regimen sliding scale  at bedtime  traZODone 50 at bedtime      SOCIAL HISTORY:     Never smoker  Used to drink 2-3 drinks per week but no longer  No drug use     FAMILY HISTORY:  No pertinent family history in first degree relatives      REVIEW OF SYSTEMS  [X  ] All other systems negative except as noted below:	    Constitutional:  [ ] fever [ ] chills  [ ] weight loss  [ ] weakness  Skin:  [ ] rash [ ] phlebitis	  Eyes: [ ] icterus [ ] pain  [ ] discharge	  ENMT: [ ] sore throat  [ ] thrush [ ] ulcers [ ] exudates  Respiratory: [ ] dyspnea [ ] hemoptysis [ ] cough [ ] sputum	  Cardiovascular:  [ ] chest pain [ ] palpitations [ ] edema	  Gastrointestinal:  [ X] nausea [X ] vomiting [ ] diarrhea [ ] constipation [ ] pain	  Genitourinary:  [ ] dysuria [ ] frequency [ ] hematuria [ ] discharge [ ] flank pain  [ ] incontinence  Musculoskeletal:  [ ] myalgias [ ] arthralgias [ ] arthritis  [ ] back pain  Neurological:  [ ] headache [ ] seizures  [ ] confusion/altered mental status  Psychiatric:  [ ] anxiety [ ] depression	  Hematology/Lymphatics:  [ ] lymphadenopathy  Endocrine:  [ ] adrenal [ ] thyroid  Allergic/Immunologic:	 [ ] transplant [ ] seasonal    Vital Signs Last 24 Hrs  T(F): 98 (18 @ 15:41), Max: 98.3 (18 @ 00:25)    Vital Signs Last 24 Hrs  HR: 75 (18 @ 15:41) (74 - 86)  BP: 110/60 (18 @ 15:41) (96/75 - 112/60)  RR: 17 (18 @ 15:41)  SpO2: 98% (18 @ 15:41) (96% - 99%)  Wt(kg): --    PHYSICAL EXAM:  General: non-toxic  HEAD/EYES: anicteric, PERRL  ENT:  supple  Cardiovascular:   S1, S2  Respiratory:  clear bilaterally  GI:  soft, distended but non-tender, normal bowel sounds  :  no CVA tenderness   Musculoskeletal:  Left LE significantly larger then right, nontender and nonerythematous  Neurologic:  grossly non-focal  Skin:  no rash  Psychiatric:  appropriate affect  Vascular:  no phlebitis        WBC Count: 12.07 K/uL ( @ 06:00)  WBC Count: 12.92 K/uL ( @ 14:41)                          11.2   12.07 )-----------( 243      ( 2018 06:00 )             34.9           138  |  101  |  93<H>  ----------------------------<  85  4.1   |  16<L>  |  2.27<H>    Ca    7.9<L>      2018 06:00  Phos  5.5       Mg     2.8         TPro  6.4  /  Alb  3.0<L>  /  TBili  0.4  /  DBili  x   /  AST  34  /  ALT  14  /  AlkPhos  118      Creatinine Trend: 2.27<--, 2.56<--, 1.69<--, 2.03<--, 1.15<--    Blood Gas Venous - Lactate: 3.2 mmol/L (18 @ 19:57)  Blood Gas Venous - Lactate: 4.8 mmol/L (18 @ 17:54)  Blood Gas Venous - Lactate: 4.7 mmol/L (18 @ 14:41)      Urinalysis Basic - ( 2018 17:54 )    Color: DARK ORANGE / Appearance: TURBID / S.022 / pH: 5.5  Gluc: NEGATIVE / Ketone: NEGATIVE  / Bili: TRACE / Urobili: NORMAL   Blood: TRACE / Protein: 100 / Nitrite: NEGATIVE   Leuk Esterase: LARGE / RBC: 25-50 / WBC >50   Sq Epi: MODERATE / Non Sq Epi: x / Bacteria: NEGATIVE        MICROBIOLOGY:    Culture - Blood (collected 18 @ 15:25)  Source: BLOOD VENOUS  Preliminary Report (18 @ 15:24):    NO ORGANISMS ISOLATED    NO ORGANISMS ISOLATED AT 24 HOURS    Culture - Blood (collected 18 @ 15:25)  Source: BLOOD PERIPHERAL    reported as part of the BCID paneluntil further notice.  Organism: BLOOD CULTURE PCR (18 @ 07:48)  Organism: BLOOD CULTURE PCR (18 @ 07:48)      -  Coagulase negative Staphylococcus: + DETECT YVETTE      Method Type: PCR          RADIOLOGY:  < from: CT Abdomen and Pelvis No Cont (18 @ 17:36) >  IMPRESSION: Limited evaluation due to lack of IV contrast.    Worsening metastatic disease when compared to prior study on 10/12/2018.   Extensive omental caking significantly progressed from prior study. New   large volume of abdominopelvic ascites. Interval increase in size of   tumor implants in the left axilla and right groin.    Multiple bilateral pulmonary nodules measuring up to 3 to 4 mm.    < end of copied text > Patient is a 58y old  Male who presents with a chief complaint of Sepsis secondary to UTI (2018 08:11)      HPI:  Patient is a 59 y/o Male, ambulatory with a wheelchair and a walker, with MHx  significant for severe diverticulosis, metastatic melanoma s/p resection on immunotherapy with progression of disease, meningothelial meningioma s/p resection and RT, CAD s/p stent who presents from [facility] for dehydration, nausea, and vomiting. The patient was seen at [facility] last monday and was given IV fluids given his symptoms of nausea and vomiting. He states he felt better after the fluids and was sent back home. However on Thursday, he began to develop nausea and vomiting once again. He states he vomiting several times over the course of the holiday and the weekend and it was whatever he tried to eat or drink. Denies any blood in his vomit. He also noticed he was having some diarrhea (watery with brown stool mixed in) which he has been having on and off since he started on his immunotherapy 4 weeks ago. Had only 1 BM today. He denies any blood in his stool. He denied fevers, chills, CP, abdominal pain, or dysuria but states he had felt SOB on exertion the last few days along with some diaphoresis. He went to [facility] today and was sent in by his oncologist for further evaluation.     ED course: hypotensive, improved with IV fluids, HR of 75, T of 97, and O2 sat of 97% on RA. He was given 4L of IV fluids total in the ED along with vancomycin and zosyn x 1. (2018 22:43)    Above reviewed, Pa presented with complaints of nausea, vomiting, dark urine. Found to be in SILKE. CONS in 1 blood culture bottle. Currently on Zosyn.  ID consulted for antibiotics management        PAST MEDICAL & SURGICAL HISTORY:  CAD (coronary artery disease)  Meningioma  Malignant melanoma, unspecified site  Heart failure, unspecified heart failure chronicity, unspecified heart failure type  Type 2 diabetes mellitus with complication, without long-term current use of insulin  Enlarged heart  HTN (hypertension)  Sleep apnea  Eye symptoms: ser eye surgery, left eye 2018 (bleeding in eye)  Stented coronary artery: pLAD 2018      Allergies  No Known Allergies        ANTIMICROBIALS:  piperacillin/tazobactam IVPB. 3.375 every 8 hours      OTHER MEDS: MEDICATIONS  (STANDING):  aspirin enteric coated 81 daily  atorvastatin 40 at bedtime  clopidogrel Tablet 75 daily  dextrose 40% Gel 15 once PRN  dextrose 50% Injectable 12.5 once  dextrose 50% Injectable 25 once  dextrose 50% Injectable 25 once  glucagon  Injectable 1 once PRN  heparin  Infusion.  <Continuous>  heparin  Injectable 8000 every 6 hours PRN  heparin  Injectable 4000 every 6 hours PRN  insulin lispro (HumaLOG) corrective regimen sliding scale  three times a day before meals  insulin lispro (HumaLOG) corrective regimen sliding scale  at bedtime  traZODone 50 at bedtime      SOCIAL HISTORY:     Never smoker  Used to drink 2-3 drinks per week but no longer  No drug use     FAMILY HISTORY:  No pertinent family history in first degree relatives      REVIEW OF SYSTEMS  [X  ] All other systems negative except as noted below:	    Constitutional:  [ ] fever [ ] chills  [ ] weight loss  [ ] weakness  Skin:  [ ] rash [ ] phlebitis	  Eyes: [ ] icterus [ ] pain  [ ] discharge	  ENMT: [ ] sore throat  [ ] thrush [ ] ulcers [ ] exudates  Respiratory: [ ] dyspnea [ ] hemoptysis [ ] cough [ ] sputum	  Cardiovascular:  [ ] chest pain [ ] palpitations [ ] edema	  Gastrointestinal:  [ X] nausea [X ] vomiting [ ] diarrhea [ ] constipation [ ] pain	  Genitourinary:  [ ] dysuria [ ] frequency [ ] hematuria [ ] discharge [ ] flank pain  [ ] incontinence  Musculoskeletal:  [ ] myalgias [ ] arthralgias [ ] arthritis  [ ] back pain  Neurological:  [ ] headache [ ] seizures  [ ] confusion/altered mental status  Psychiatric:  [ ] anxiety [ ] depression	  Hematology/Lymphatics:  [ ] lymphadenopathy  Endocrine:  [ ] adrenal [ ] thyroid  Allergic/Immunologic:	 [ ] transplant [ ] seasonal    Vital Signs Last 24 Hrs  T(F): 98 (18 @ 15:41), Max: 98.3 (18 @ 00:25)    Vital Signs Last 24 Hrs  HR: 75 (18 @ 15:41) (74 - 86)  BP: 110/60 (18 @ 15:41) (96/75 - 112/60)  RR: 17 (18 @ 15:41)  SpO2: 98% (18 @ 15:41) (96% - 99%)  Wt(kg): --    PHYSICAL EXAM:  General: non-toxic  HEAD: atraumatic normocephalic  EYES: anicteric, PERRL  ENT:  supple, no oropharyngeal lesions  Cardiovascular:   regular S1, S2  Respiratory:  clear bilaterally  GI:  soft, distended but non-tender, normal bowel sounds  :  no CVA tenderness, no suprapubic tenderness, no allen  Musculoskeletal:  Left LE significantly larger then right, nontender and nonerythematous  Neurologic:  grossly non-focal  Skin:  no rash  Psychiatric:  appropriate affect  Vascular:  no phlebitis        WBC Count: 12.07 K/uL ( @ 06:00)  WBC Count: 12.92 K/uL ( @ 14:41)                          11.2   12.07 )-----------( 243      ( 2018 06:00 )             34.9           138  |  101  |  93<H>  ----------------------------<  85  4.1   |  16<L>  |  2.27<H>    Ca    7.9<L>      2018 06:00  Phos  5.5       Mg     2.8         TPro  6.4  /  Alb  3.0<L>  /  TBili  0.4  /  DBili  x   /  AST  34  /  ALT  14  /  AlkPhos  118      Creatinine Trend: 2.27<--, 2.56<--, 1.69<--, 2.03<--, 1.15<--    Blood Gas Venous - Lactate: 3.2 mmol/L (18 @ 19:57)  Blood Gas Venous - Lactate: 4.8 mmol/L (18 @ 17:54)  Blood Gas Venous - Lactate: 4.7 mmol/L (18 @ 14:41)      Urinalysis Basic - ( 2018 17:54 )    Color: DARK ORANGE / Appearance: TURBID / S.022 / pH: 5.5  Gluc: NEGATIVE / Ketone: NEGATIVE  / Bili: TRACE / Urobili: NORMAL   Blood: TRACE / Protein: 100 / Nitrite: NEGATIVE   Leuk Esterase: LARGE / RBC: 25-50 / WBC >50   Sq Epi: MODERATE / Non Sq Epi: x / Bacteria: NEGATIVE        MICROBIOLOGY:    Culture - Blood (collected 11-26-18 @ 15:25)  Source: BLOOD VENOUS  Preliminary Report (18 @ 15:24):    NO ORGANISMS ISOLATED    NO ORGANISMS ISOLATED AT 24 HOURS    Culture - Blood (collected 18 @ 15:25)  Source: BLOOD PERIPHERAL    reported as part of the BCID paneluntil further notice.  Organism: BLOOD CULTURE PCR (18 @ 07:48)  Organism: BLOOD CULTURE PCR (18 @ 07:48)      -  Coagulase negative Staphylococcus: + DETECT YVETTE      Method Type: PCR          RADIOLOGY:  < from: CT Abdomen and Pelvis No Cont (18 @ 17:36) >  IMPRESSION: Limited evaluation due to lack of IV contrast.    Worsening metastatic disease when compared to prior study on 10/12/2018.   Extensive omental caking significantly progressed from prior study. New   large volume of abdominopelvic ascites. Interval increase in size of   tumor implants in the left axilla and right groin.    Multiple bilateral pulmonary nodules measuring up to 3 to 4 mm.    < end of copied text >

## 2018-11-27 NOTE — CONSULT NOTE ADULT - ATTENDING COMMENTS
Pt seen, examined and d/w fellow. Agree with above A/P. Pt with met melanoma s/o ipil with POD and more recently with nivol x 1. Admitted with N/V/diarrhe/dehydration a dAKI. Does not appear to have colitis. Improved somewhat with IVF and IV abx for potential bacteremia / UTI (cs pending). PE with lungs clear, heart RRR abd soft / NT / no rebound. Agree with A/P : continue IVF for dehydration / SILKE and abx pending final cx results. Further treatment of melanoma at the [facility] post d/c.

## 2018-11-27 NOTE — PROGRESS NOTE ADULT - SUBJECTIVE AND OBJECTIVE BOX
Edmond Benavides MD  PGY 1 Department of Internal Medicine  Pager: [phone]/ [phone]    Patient is a 58y old  Male who presents with a chief complaint of Sepsis secondary to UTI (2018 22:43)      SUBJECTIVE / OVERNIGHT EVENTS: Pt seen and examined. No acute overnight events. Pt reported no subjective complaints.     MEDICATIONS  (STANDING):  aspirin enteric coated 81 milliGRAM(s) Oral daily  atorvastatin 40 milliGRAM(s) Oral at bedtime  clopidogrel Tablet 75 milliGRAM(s) Oral daily  dextrose 5%. 1000 milliLiter(s) (50 mL/Hr) IV Continuous <Continuous>  dextrose 50% Injectable 12.5 Gram(s) IV Push once  dextrose 50% Injectable 25 Gram(s) IV Push once  dextrose 50% Injectable 25 Gram(s) IV Push once  insulin lispro (HumaLOG) corrective regimen sliding scale   SubCutaneous three times a day before meals  insulin lispro (HumaLOG) corrective regimen sliding scale   SubCutaneous at bedtime  piperacillin/tazobactam IVPB. 3.375 Gram(s) IV Intermittent every 8 hours  traZODone 50 milliGRAM(s) Oral at bedtime    MEDICATIONS  (PRN):  AQUAPHOR (petrolatum Ointment) 1 Application(s) Topical three times a day PRN for comfort  dextrose 40% Gel 15 Gram(s) Oral once PRN Blood Glucose LESS THAN 70 milliGRAM(s)/deciliter  glucagon  Injectable 1 milliGRAM(s) IntraMuscular once PRN Glucose LESS THAN 70 milligrams/deciliter      I&O's Summary      Vital Signs Last 24 Hrs  T(C): 36.8 (2018 04:30), Max: 36.8 (2018 16:20)  T(F): 98.2 (2018 04:30), Max: 98.3 (2018 00:25)  HR: 82 (2018 04:30) (74 - 86)  BP: 112/60 (2018 04:30) (63/61 - 113/56)  BP(mean): --  RR: 17 (2018 04:30) (16 - 18)  SpO2: 98% (2018 04:30) (96% - 99%)    CAPILLARY BLOOD GLUCOSE      POCT Blood Glucose.: 91 mg/dL (2018 00:41)      PHYSICAL EXAM:  GENERAL: NAD,   HEAD:  Atraumatic, Normocephalic  EYES: EOMI, PERRL, conjunctiva and sclera clear  NECK: No JVD  CHEST/LUNG: Clear to auscultation bilaterally; No wheeze  HEART: Regular rate and rhythm; No murmurs, rubs, or gallops  ABDOMEN: Soft, Nontender, Nondistended; Bowel sounds present  EXTREMITIES:  2+ Peripheral Pulses, No clubbing, cyanosis, or edema  PSYCH: AAOx3  NEUROLOGY: non-focal  SKIN: No rashes or lesions    LABS:                        11.2   12.07 )-----------( 243      ( 2018 06:00 )             34.9     Auto Eosinophil # x     / Auto Eosinophil % x     / Auto Neutrophil # x     / Auto Neutrophil % x     / BANDS % x                            12.6   12.92 )-----------( 307      ( 2018 14:41 )             38.3     Auto Eosinophil # 0.00  / Auto Eosinophil % 0.0   / Auto Neutrophil # 10.61 / Auto Neutrophil % 82.2  / BANDS % x            138  |  101  |  93<H>  ----------------------------<  85  4.1   |  16<L>  |  2.27<H>      134<L>  |  92<L>  |  96<H>  ----------------------------<  115<H>  4.7   |  15<L>  |  2.56<H>    Ca    7.9<L>      2018 06:00  TPro  6.4  /  Alb  3.0<L>  /  TBili  0.4  /  DBili  x   /  AST  34  /  ALT  14  /  AlkPhos  118    TPro  7.5  /  Alb  3.6  /  TBili  0.5  /  DBili  x   /  AST  38  /  ALT  19  /  AlkPhos  135<H>      PT/INR - ( 2018 14:45 )   PT: 11.4 SEC;   INR: 1.00          PTT - ( 2018 14:45 )  PTT:24.6 SEC      Urinalysis Basic - ( 2018 17:54 )    Color: DARK ORANGE / Appearance: TURBID / S.022 / pH: 5.5  Gluc: NEGATIVE / Ketone: NEGATIVE  / Bili: TRACE / Urobili: NORMAL   Blood: TRACE / Protein: 100 / Nitrite: NEGATIVE   Leuk Esterase: LARGE / RBC: 25-50 / WBC >50   Sq Epi: MODERATE / Non Sq Epi: x / Bacteria: NEGATIVE            RADIOLOGY & ADDITIONAL TESTS:    Imaging Personally Reviewed:    Consultant(s) Notes Reviewed:      Care Discussed with Consultants/Other Providers: Edmond Benavides MD  PGY 1 Department of Internal Medicine  Pager: [phone]/ [phone]    Patient is a 58y old  Male who presents with a chief complaint of Sepsis secondary to UTI (2018 22:43)      SUBJECTIVE / OVERNIGHT EVENTS: Pt seen and examined. Pt admitted to medicine. S/p 4L IVF and vanc/zosyn x1. Pt reported no nausea, however no vomiting. Otherwise, denies headaches, F/C, dizziness, syncope, CP/SOB, abdominal pain, dysuria, changes in BM, peripheral swelling, skin changes, new joint aches.     MEDICATIONS  (STANDING):  aspirin enteric coated 81 milliGRAM(s) Oral daily  atorvastatin 40 milliGRAM(s) Oral at bedtime  clopidogrel Tablet 75 milliGRAM(s) Oral daily  dextrose 5%. 1000 milliLiter(s) (50 mL/Hr) IV Continuous <Continuous>  dextrose 50% Injectable 12.5 Gram(s) IV Push once  dextrose 50% Injectable 25 Gram(s) IV Push once  dextrose 50% Injectable 25 Gram(s) IV Push once  insulin lispro (HumaLOG) corrective regimen sliding scale   SubCutaneous three times a day before meals  insulin lispro (HumaLOG) corrective regimen sliding scale   SubCutaneous at bedtime  piperacillin/tazobactam IVPB. 3.375 Gram(s) IV Intermittent every 8 hours  traZODone 50 milliGRAM(s) Oral at bedtime    MEDICATIONS  (PRN):  AQUAPHOR (petrolatum Ointment) 1 Application(s) Topical three times a day PRN for comfort  dextrose 40% Gel 15 Gram(s) Oral once PRN Blood Glucose LESS THAN 70 milliGRAM(s)/deciliter  glucagon  Injectable 1 milliGRAM(s) IntraMuscular once PRN Glucose LESS THAN 70 milligrams/deciliter      I&O's Summary      Vital Signs Last 24 Hrs  T(C): 36.8 (2018 04:30), Max: 36.8 (2018 16:20)  T(F): 98.2 (2018 04:30), Max: 98.3 (2018 00:25)  HR: 82 (2018 04:30) (74 - 86)  BP: 112/60 (2018 04:30) (63/61 - 113/56)  BP(mean): --  RR: 17 (2018 04:30) (16 - 18)  SpO2: 98% (2018 04:30) (96% - 99%)    CAPILLARY BLOOD GLUCOSE      POCT Blood Glucose.: 91 mg/dL (2018 00:41)      PHYSICAL EXAM:  GENERAL: NAD,   HEAD:  Atraumatic, Normocephalic  EYES: EOMI, PERRL, conjunctiva and sclera clear  NECK: No JVD  CHEST/LUNG: Clear to auscultation bilaterally; No wheeze  HEART: Regular rate and rhythm; No murmurs, rubs, or gallops  ABDOMEN: Soft, Nontender, Nondistended; Bowel sounds present  EXTREMITIES:  2+ Peripheral Pulses, No clubbing, cyanosis, or edema  PSYCH: AAOx3  NEUROLOGY: non-focal  SKIN: No rashes or lesions    LABS:                        11.2   12.07 )-----------( 243      ( 2018 06:00 )             34.9     Auto Eosinophil # x     / Auto Eosinophil % x     / Auto Neutrophil # x     / Auto Neutrophil % x     / BANDS % x                            12.6   12.92 )-----------( 307      ( 2018 14:41 )             38.3     Auto Eosinophil # 0.00  / Auto Eosinophil % 0.0   / Auto Neutrophil # 10.61 / Auto Neutrophil % 82.2  / BANDS % x            138  |  101  |  93<H>  ----------------------------<  85  4.1   |  16<L>  |  2.27<H>      134<L>  |  92<L>  |  96<H>  ----------------------------<  115<H>  4.7   |  15<L>  |  2.56<H>    Ca    7.9<L>      2018 06:00  TPro  6.4  /  Alb  3.0<L>  /  TBili  0.4  /  DBili  x   /  AST  34  /  ALT  14  /  AlkPhos  118    TPro  7.5  /  Alb  3.6  /  TBili  0.5  /  DBili  x   /  AST  38  /  ALT  19  /  AlkPhos  135<H>      PT/INR - ( 2018 14:45 )   PT: 11.4 SEC;   INR: 1.00          PTT - ( 2018 14:45 )  PTT:24.6 SEC      Urinalysis Basic - ( 2018 17:54 )    Color: DARK ORANGE / Appearance: TURBID / S.022 / pH: 5.5  Gluc: NEGATIVE / Ketone: NEGATIVE  / Bili: TRACE / Urobili: NORMAL   Blood: TRACE / Protein: 100 / Nitrite: NEGATIVE   Leuk Esterase: LARGE / RBC: 25-50 / WBC >50   Sq Epi: MODERATE / Non Sq Epi: x / Bacteria: NEGATIVE            RADIOLOGY & ADDITIONAL TESTS:    Imaging Personally Reviewed:    Consultant(s) Notes Reviewed:      Care Discussed with Consultants/Other Providers:

## 2018-11-27 NOTE — PROGRESS NOTE ADULT - PROBLEM SELECTOR PLAN 10
- DVT ppx held until CT head negative for mets from melanoma  - Clear liquid diet  - Dispo pending resolution of symptoms    GOC: Patient is DNR/DNI, will place paperwork in the chart    Sierra Montgomery  PGY 2 Night Admit  Pager [phone] - DVT ppx held until CT head negative for mets from melanoma  - Clear liquid diet  - Dispo pending resolution of symptoms    GOC: Patient is DNR/DNI,

## 2018-11-28 ENCOUNTER — TRANSCRIPTION ENCOUNTER (OUTPATIENT)
Age: 58
End: 2018-11-28

## 2018-11-28 ENCOUNTER — APPOINTMENT (OUTPATIENT)
Dept: INFUSION THERAPY | Facility: HOSPITAL | Age: 58
End: 2018-11-28

## 2018-11-28 DIAGNOSIS — I82.409 ACUTE EMBOLISM AND THROMBOSIS OF UNSPECIFIED DEEP VEINS OF UNSPECIFIED LOWER EXTREMITY: ICD-10-CM

## 2018-11-28 DIAGNOSIS — Z71.89 OTHER SPECIFIED COUNSELING: ICD-10-CM

## 2018-11-28 LAB
ALBUMIN SERPL ELPH-MCNC: 3.2 G/DL — LOW (ref 3.3–5)
ALP SERPL-CCNC: 116 U/L — SIGNIFICANT CHANGE UP (ref 40–120)
ALT FLD-CCNC: 15 U/L — SIGNIFICANT CHANGE UP (ref 4–41)
APTT BLD: 138.5 SEC — CRITICAL HIGH (ref 27.5–36.3)
APTT BLD: > 200 SEC — CRITICAL HIGH (ref 27.5–36.3)
AST SERPL-CCNC: 27 U/L — SIGNIFICANT CHANGE UP (ref 4–40)
BASE EXCESS BLDA CALC-SCNC: -11.9 MMOL/L — SIGNIFICANT CHANGE UP
BILIRUB DIRECT SERPL-MCNC: 0.2 MG/DL — SIGNIFICANT CHANGE UP (ref 0.1–0.2)
BILIRUB SERPL-MCNC: 0.3 MG/DL — SIGNIFICANT CHANGE UP (ref 0.2–1.2)
BUN SERPL-MCNC: 81 MG/DL — HIGH (ref 7–23)
BUN SERPL-MCNC: 87 MG/DL — HIGH (ref 7–23)
CALCIUM SERPL-MCNC: 8.1 MG/DL — LOW (ref 8.4–10.5)
CALCIUM SERPL-MCNC: 8.4 MG/DL — SIGNIFICANT CHANGE UP (ref 8.4–10.5)
CHLORIDE BLDA-SCNC: 106 MMOL/L — SIGNIFICANT CHANGE UP (ref 96–108)
CHLORIDE SERPL-SCNC: 100 MMOL/L — SIGNIFICANT CHANGE UP (ref 98–107)
CHLORIDE SERPL-SCNC: 99 MMOL/L — SIGNIFICANT CHANGE UP (ref 98–107)
CO2 SERPL-SCNC: 13 MMOL/L — LOW (ref 22–31)
CO2 SERPL-SCNC: 14 MMOL/L — LOW (ref 22–31)
CORTIS SERPL-MCNC: 12.8 UG/DL — SIGNIFICANT CHANGE UP (ref 2.7–18.4)
CREAT SERPL-MCNC: 1.6 MG/DL — HIGH (ref 0.5–1.3)
CREAT SERPL-MCNC: 1.76 MG/DL — HIGH (ref 0.5–1.3)
GLUCOSE BLDA-MCNC: 108 MG/DL — HIGH (ref 70–99)
GLUCOSE BLDC GLUCOMTR-MCNC: 101 MG/DL — HIGH (ref 70–99)
GLUCOSE BLDC GLUCOMTR-MCNC: 84 MG/DL — SIGNIFICANT CHANGE UP (ref 70–99)
GLUCOSE BLDC GLUCOMTR-MCNC: 85 MG/DL — SIGNIFICANT CHANGE UP (ref 70–99)
GLUCOSE BLDC GLUCOMTR-MCNC: 86 MG/DL — SIGNIFICANT CHANGE UP (ref 70–99)
GLUCOSE BLDC GLUCOMTR-MCNC: 93 MG/DL — SIGNIFICANT CHANGE UP (ref 70–99)
GLUCOSE SERPL-MCNC: 88 MG/DL — SIGNIFICANT CHANGE UP (ref 70–99)
GLUCOSE SERPL-MCNC: 97 MG/DL — SIGNIFICANT CHANGE UP (ref 70–99)
HCO3 BLDA-SCNC: 15 MMOL/L — LOW (ref 22–26)
HCT VFR BLD CALC: 35.6 % — LOW (ref 39–50)
HCT VFR BLD CALC: 38.2 % — LOW (ref 39–50)
HCT VFR BLDA CALC: 35.6 % — LOW (ref 39–51)
HGB BLD-MCNC: 11.3 G/DL — LOW (ref 13–17)
HGB BLD-MCNC: 12 G/DL — LOW (ref 13–17)
HGB BLDA-MCNC: 11.6 G/DL — LOW (ref 13–17)
LACTATE BLDA-SCNC: 9.9 MMOL/L — CRITICAL HIGH (ref 0.5–2)
LACTATE SERPL-SCNC: 8.8 MMOL/L — CRITICAL HIGH (ref 0.5–2)
MAGNESIUM SERPL-MCNC: 2.9 MG/DL — HIGH (ref 1.6–2.6)
MCHC RBC-ENTMCNC: 30.7 PG — SIGNIFICANT CHANGE UP (ref 27–34)
MCHC RBC-ENTMCNC: 31.4 % — LOW (ref 32–36)
MCHC RBC-ENTMCNC: 31.7 % — LOW (ref 32–36)
MCHC RBC-ENTMCNC: 31.7 PG — SIGNIFICANT CHANGE UP (ref 27–34)
MCV RBC AUTO: 100.8 FL — HIGH (ref 80–100)
MCV RBC AUTO: 96.7 FL — SIGNIFICANT CHANGE UP (ref 80–100)
NRBC # FLD: 0 — SIGNIFICANT CHANGE UP
NRBC # FLD: 0 — SIGNIFICANT CHANGE UP
ORGANISM # SPEC MICROSCOPIC CNT: SIGNIFICANT CHANGE UP
ORGANISM # SPEC MICROSCOPIC CNT: SIGNIFICANT CHANGE UP
PCO2 BLDA: 28 MMHG — LOW (ref 35–48)
PH BLDA: 7.3 PH — LOW (ref 7.35–7.45)
PHOSPHATE SERPL-MCNC: 4.3 MG/DL — SIGNIFICANT CHANGE UP (ref 2.5–4.5)
PLATELET # BLD AUTO: 244 K/UL — SIGNIFICANT CHANGE UP (ref 150–400)
PLATELET # BLD AUTO: 256 K/UL — SIGNIFICANT CHANGE UP (ref 150–400)
PMV BLD: 10.7 FL — SIGNIFICANT CHANGE UP (ref 7–13)
PMV BLD: 11.1 FL — SIGNIFICANT CHANGE UP (ref 7–13)
PO2 BLDA: 100 MMHG — SIGNIFICANT CHANGE UP (ref 83–108)
POTASSIUM BLDA-SCNC: 3.3 MMOL/L — LOW (ref 3.4–4.5)
POTASSIUM SERPL-MCNC: 3.6 MMOL/L — SIGNIFICANT CHANGE UP (ref 3.5–5.3)
POTASSIUM SERPL-MCNC: 4.1 MMOL/L — SIGNIFICANT CHANGE UP (ref 3.5–5.3)
POTASSIUM SERPL-SCNC: 3.6 MMOL/L — SIGNIFICANT CHANGE UP (ref 3.5–5.3)
POTASSIUM SERPL-SCNC: 4.1 MMOL/L — SIGNIFICANT CHANGE UP (ref 3.5–5.3)
PROT SERPL-MCNC: 6.7 G/DL — SIGNIFICANT CHANGE UP (ref 6–8.3)
RBC # BLD: 3.68 M/UL — LOW (ref 4.2–5.8)
RBC # BLD: 3.79 M/UL — LOW (ref 4.2–5.8)
RBC # FLD: 15.2 % — HIGH (ref 10.3–14.5)
RBC # FLD: 15.3 % — HIGH (ref 10.3–14.5)
SAO2 % BLDA: 97.6 % — SIGNIFICANT CHANGE UP (ref 95–99)
SODIUM BLDA-SCNC: 135 MMOL/L — LOW (ref 136–146)
SODIUM SERPL-SCNC: 137 MMOL/L — SIGNIFICANT CHANGE UP (ref 135–145)
SODIUM SERPL-SCNC: 139 MMOL/L — SIGNIFICANT CHANGE UP (ref 135–145)
SPECIMEN SOURCE: SIGNIFICANT CHANGE UP
WBC # BLD: 10.12 K/UL — SIGNIFICANT CHANGE UP (ref 3.8–10.5)
WBC # BLD: 11.24 K/UL — HIGH (ref 3.8–10.5)
WBC # FLD AUTO: 10.12 K/UL — SIGNIFICANT CHANGE UP (ref 3.8–10.5)
WBC # FLD AUTO: 11.24 K/UL — HIGH (ref 3.8–10.5)

## 2018-11-28 PROCEDURE — 99233 SBSQ HOSP IP/OBS HIGH 50: CPT | Mod: GC

## 2018-11-28 PROCEDURE — 93306 TTE W/DOPPLER COMPLETE: CPT | Mod: 26

## 2018-11-28 PROCEDURE — 71260 CT THORAX DX C+: CPT | Mod: 26

## 2018-11-28 PROCEDURE — 93010 ELECTROCARDIOGRAM REPORT: CPT

## 2018-11-28 PROCEDURE — 99233 SBSQ HOSP IP/OBS HIGH 50: CPT

## 2018-11-28 PROCEDURE — 74177 CT ABD & PELVIS W/CONTRAST: CPT | Mod: 26

## 2018-11-28 RX ORDER — SODIUM CHLORIDE 9 MG/ML
1000 INJECTION INTRAMUSCULAR; INTRAVENOUS; SUBCUTANEOUS
Qty: 0 | Refills: 0 | Status: DISCONTINUED | OUTPATIENT
Start: 2018-11-28 | End: 2018-11-29

## 2018-11-28 RX ORDER — VANCOMYCIN HCL 1 G
1000 VIAL (EA) INTRAVENOUS ONCE
Qty: 0 | Refills: 0 | Status: COMPLETED | OUTPATIENT
Start: 2018-11-28 | End: 2018-11-28

## 2018-11-28 RX ORDER — SIMETHICONE 80 MG/1
80 TABLET, CHEWABLE ORAL DAILY
Qty: 0 | Refills: 0 | Status: DISCONTINUED | OUTPATIENT
Start: 2018-11-28 | End: 2018-12-05

## 2018-11-28 RX ADMIN — Medication 250 MILLIGRAM(S): at 12:22

## 2018-11-28 RX ADMIN — Medication 50 MILLIGRAM(S): at 22:19

## 2018-11-28 RX ADMIN — SODIUM CHLORIDE 60 MILLILITER(S): 9 INJECTION INTRAMUSCULAR; INTRAVENOUS; SUBCUTANEOUS at 22:19

## 2018-11-28 RX ADMIN — HEPARIN SODIUM 0 UNIT(S)/HR: 5000 INJECTION INTRAVENOUS; SUBCUTANEOUS at 18:30

## 2018-11-28 RX ADMIN — CLOPIDOGREL BISULFATE 75 MILLIGRAM(S): 75 TABLET, FILM COATED ORAL at 12:27

## 2018-11-28 RX ADMIN — Medication 81 MILLIGRAM(S): at 12:27

## 2018-11-28 RX ADMIN — SODIUM CHLORIDE 60 MILLILITER(S): 9 INJECTION INTRAMUSCULAR; INTRAVENOUS; SUBCUTANEOUS at 18:31

## 2018-11-28 RX ADMIN — HEPARIN SODIUM 1300 UNIT(S)/HR: 5000 INJECTION INTRAVENOUS; SUBCUTANEOUS at 09:24

## 2018-11-28 RX ADMIN — ATORVASTATIN CALCIUM 40 MILLIGRAM(S): 80 TABLET, FILM COATED ORAL at 22:19

## 2018-11-28 RX ADMIN — HEPARIN SODIUM 1600 UNIT(S)/HR: 5000 INJECTION INTRAVENOUS; SUBCUTANEOUS at 00:13

## 2018-11-28 RX ADMIN — HEPARIN SODIUM 1000 UNIT(S)/HR: 5000 INJECTION INTRAVENOUS; SUBCUTANEOUS at 19:46

## 2018-11-28 RX ADMIN — SIMETHICONE 80 MILLIGRAM(S): 80 TABLET, CHEWABLE ORAL at 12:27

## 2018-11-28 RX ADMIN — HEPARIN SODIUM 0 UNIT(S)/HR: 5000 INJECTION INTRAVENOUS; SUBCUTANEOUS at 08:09

## 2018-11-28 NOTE — DISCHARGE NOTE ADULT - CARE PROVIDERS DIRECT ADDRESSES
[email or web address]

## 2018-11-28 NOTE — PROGRESS NOTE ADULT - PROBLEM SELECTOR PLAN 4
- secondary to worsening metastatic disease, also with lactic acidosis   - will continue to monitor for now  - Repeat Lactate 8.8  - Trend BMP - secondary sepsis 2/2 staph. aureus UTI, also with lactic acidosis   - will continue to monitor for now  - Repeat Lactate: 8.8  - Trend BMP  - Trend Lactate

## 2018-11-28 NOTE — DISCHARGE NOTE ADULT - MEDICATION SUMMARY - MEDICATIONS TO TAKE
I will START or STAY ON the medications listed below when I get home from the hospital:    Aldactone 25 mg oral tablet  -- orally once a day  -- Indication: For Chronic diastolic congestive heart failure    Aspir 81 oral delayed release tablet  -- 1 tab(s) by mouth once a day  -- Indication: For CAD (coronary artery disease)    sodium bicarbonate 650 mg oral tablet  -- 1 tab(s) by mouth 3 times a day  -- Indication: For Metabolic acidosis    enoxaparin  -- 90 milligram(s) subcutaneous 2 times a day  -- Indication: For DVT (deep venous thrombosis)    Admelog SoloStar 100 units/mL injectable solution  -- 5 milliliter(s) injectable 3 times a day  -- Indication: For Diabetes mellitus type 2, insulin dependent    Lantus 100 units/mL subcutaneous solution  -- 17 unit(s) subcutaneous once a day (at bedtime)  -- Indication: For Diabetes mellitus type 2, insulin dependent    Lipitor 40 mg oral tablet  -- 1 tab(s) by mouth once a day (at bedtime)  -- Indication: For CAD (coronary artery disease)    Plavix 75 mg oral tablet  -- 1 tab(s) by mouth once a day  -- Indication: For CAD (coronary artery disease)    Doculase 100 mg oral capsule  -- 1 cap(s) by mouth 2 times a day  -- Indication: For Need for prophylactic measure    simethicone 80 mg oral tablet, chewable  -- 1 tab(s) by mouth once a day  -- Indication: For Gas     melatonin 3 mg oral tablet  -- 1 tab(s) by mouth once a day (at bedtime)  -- Indication: For Insomnia    multivitamin  -- Indication: For Need for prophylactic measure I will START or STAY ON the medications listed below when I get home from the hospital:    Aspir 81 oral delayed release tablet  -- 1 tab(s) by mouth once a day  -- Indication: For CAD (coronary artery disease)    sodium bicarbonate 650 mg oral tablet  -- 1 tab(s) by mouth 3 times a day  -- Indication: For Metabolic acidosis    enoxaparin  -- 90 milligram(s) subcutaneous 2 times a day  -- Indication: For DVT (deep venous thrombosis)    Admelog SoloStar 100 units/mL injectable solution  -- 5 milliliter(s) injectable 3 times a day  -- Indication: For Diabetes mellitus type 2, insulin dependent    Lantus 100 units/mL subcutaneous solution  -- 17 unit(s) subcutaneous once a day (at bedtime)  -- Indication: For Diabetes mellitus type 2, insulin dependent    Lipitor 40 mg oral tablet  -- 1 tab(s) by mouth once a day (at bedtime)  -- Indication: For CAD (coronary artery disease)    Plavix 75 mg oral tablet  -- 1 tab(s) by mouth once a day  -- Indication: For CAD (coronary artery disease)    Doculase 100 mg oral capsule  -- 1 cap(s) by mouth 2 times a day  -- Indication: For Need for prophylactic measure    simethicone 80 mg oral tablet, chewable  -- 1 tab(s) by mouth once a day  -- Indication: For Gas     melatonin 3 mg oral tablet  -- 1 tab(s) by mouth once a day (at bedtime)  -- Indication: For Insomnia    multivitamin  -- Indication: For Need for prophylactic measure

## 2018-11-28 NOTE — DISCHARGE NOTE ADULT - COMMUNITY RESOURCES
[address] [id number] [phone]    [facility] [phone] [place] [address] [address] [phone]    [facility] [phone]

## 2018-11-28 NOTE — DISCHARGE NOTE ADULT - CONDITIONS AT DISCHARGE
patient is alert and orientedx4 able to make needs known. Patient is stable at the time of discharge. no c/o pain at times.

## 2018-11-28 NOTE — PROGRESS NOTE ADULT - PROBLEM SELECTOR PLAN 2
- likely pre-renal secondary to dehydration vs side effect from immunotherapy  - CT abdomen/pelvis does not show any signs of bladder obstruction  - Cr: 2.27 -> 1.76; continue to trend bmp

## 2018-11-28 NOTE — DISCHARGE NOTE ADULT - MEDICATION SUMMARY - MEDICATIONS TO STOP TAKING
I will STOP taking the medications listed below when I get home from the hospital:    Coreg 25 mg oral tablet  -- 1 tab(s) by mouth 2 times a day    Apresoline 100 mg oral tablet  -- 1 tab(s) by mouth 2 times a day    isosorbide mononitrate 60 mg oral tablet, extended release  -- 1 tab(s) by mouth once a day (in the morning)    traZODone 50 mg oral tablet  -- orally once a day (at bedtime) I will STOP taking the medications listed below when I get home from the hospital:    Coreg 25 mg oral tablet  -- 1 tab(s) by mouth 2 times a day    Apresoline 100 mg oral tablet  -- 1 tab(s) by mouth 2 times a day    isosorbide mononitrate 60 mg oral tablet, extended release  -- 1 tab(s) by mouth once a day (in the morning)    traZODone 50 mg oral tablet  -- orally once a day (at bedtime)    Aldactone 25 mg oral tablet  -- orally once a day I will STOP taking the medications listed below when I get home from the hospital:    Coreg 25 mg oral tablet  -- 1 tab(s) by mouth 2 times a day    Apresoline 100 mg oral tablet  -- 1 tab(s) by mouth 2 times a day    isosorbide mononitrate 60 mg oral tablet, extended release  -- 1 tab(s) by mouth once a day (in the morning)    traZODone 50 mg oral tablet  -- orally once a day (at bedtime)    Aldactone 25 mg oral tablet  -- orally once a day    Admelog SoloStar 100 units/mL injectable solution  -- 5 milliliter(s) injectable 3 times a day    Lantus 100 units/mL subcutaneous solution  -- 17 unit(s) subcutaneous once a day (at bedtime)

## 2018-11-28 NOTE — DISCHARGE NOTE ADULT - PLAN OF CARE
Follow Up You were found to have urinary tract infection and were treated with antibiotics. Please follow up with your primary medical provider for follow. If you have symptoms such as burning with urination or foul smelling urine please go to your nearest emergency room. You were found to have an elevated lactate level in your blood causing it to be more acidic than normal. We believe that the cause of the elevate level of lactate is due to progression of your melanoma. Please follow up with your oncologist or primary medical doctor for follow up. Follow up You were found to have an extensive blood clot in your left leg and have evidence of a clot within the blood vessels of your left lower lung. Please continue taking your blood thinner and follow up with your primary medical doctor or oncologist for continued management. You were found to have injury to your kidneys that resolved after fluid hydration. Please follow up with your primary medical doctor for continued management. You were found to have abnormal electrical activity of your heart which was evidenced by an elevated QTc in your electrocardiogram. We stopped your Trazadone as this medication could contribute to this abnormality. Please follow up with your primary medical doctor for follow up. You were found to have urinary tract infection and were treated with antibiotics. Please follow up with your primary medical provider for follow up. If you have symptoms such as burning with urination or foul smelling urine please go to your nearest emergency room. You have a history of high blood pressure. We stopped your blood pressure medication out of concern of lower your pressure while you had an infection. We did not restart your medications as your blood pressure remained normal. Please follow up with your primary medical doctor to restart your blood pressure medication. You were found to have an elevated lactate level in your blood causing it to be more acidic than normal. We believe that the cause of the elevate level of lactate is due to progression of your melanoma. Please follow up with your oncologist or primary medical doctor for follow up. Please have your provider repeat BMP and lactate levels three times per week.

## 2018-11-28 NOTE — PROGRESS NOTE ADULT - PROBLEM SELECTOR PLAN 1
Sepsis secondary likely to UTI, mild leukocytosis, worsening lactate (8.8)  ID consulted and recommended monitoring off abx. BCx (9/26): GPC in clusters  - Repeat blood cx  - C/w NS IVF @ 60cc  - F/u TTE Sepsis secondary likely to UTI (urine cx: Staph aureus), mild leukocytosis, worsening lactate (8.8)  ID consulted. BCx (9/26): GPC in clusters  - Repeat blood cx  - Will await ID recs regarding + Urine Cx and futher abx tx  - C/w NS IVF @ 60cc  - F/u TTE

## 2018-11-28 NOTE — PROGRESS NOTE ADULT - PROBLEM SELECTOR PLAN 3
- patient with stage IV metastatic melanoma with progression of disease despite therapy  - currently on immunotherapy (ipilimumab), last dose was 4 weeks ago and next dose due Wednesday of this week  - patient with progression of disease seen on CT chest/abdomen/pelvis including new ascites (asymptomatic at this time)  - will hold anticoagulation until CT head is done to rule out new brain mets given patient has had extensive progression of disease (last CT head in March 2018 and negative)  - [place] [facility] [place]

## 2018-11-28 NOTE — PROGRESS NOTE ADULT - ASSESSMENT
58 m with severe diverticulosis, CAD s/p stent, metastatic melanoma s/p resection on immunotherapy with progression of disease, meningothelial meningioma s/p resection and RT, was started on a new immunotherapy 4 weeks ago and now admitted for hypotension, SILKE, dehydration, nausea and vomiting,  metabolic acidosis  afebrile here, WBC 12, no urinary symptoms and u/s appears contaminated  chest/abd/pelvis CT with worsening metastatic disease but no source of infection  blood cx: coag neg staph     dehydration and SILKE, FTT due to metastatic melanoma vs immunotherapy  CT with significantly worsening metastatic disease and omental caking  the coag neg staph bacteremia likely contaminant but the urine cx with staph aureus  r/o staph bacteremia  elevated lactate to 9, but pt not toxic and exam benign   hypermagnesemia , metabolic acidosis    *  f/u the repeat the blood cx  * TTE  * vanco 1 g stat and then per level  * monitor lactate 58 m with severe diverticulosis, CAD s/p stent, metastatic melanoma s/p resection on immunotherapy with progression of disease, meningothelial meningioma s/p resection and RT, was started on a new immunotherapy 4 weeks ago and now admitted for hypotension, SILKE, dehydration, nausea and vomiting,  metabolic acidosis  afebrile here, WBC 12, no urinary symptoms and u/s appears contaminated  chest/abd/pelvis CT with worsening metastatic disease but no source of infection  blood cx: coag neg staph     dehydration and SILKE, FTT due to metastatic melanoma vs immunotherapy  CT with significantly worsening metastatic disease and omental caking  the coag neg staph bacteremia likely contaminant but the urine cx with staph aureus  r/o staph bacteremia  elevated lactate to 9, but pt not toxic and exam benign   hypermagnesemia , metabolic acidosis    *  f/u the repeat the blood cx  * TTE  * chest/abd/pelvis CT with contrast  * vanco 1 g stat and then per level  * monitor lactate

## 2018-11-28 NOTE — DISCHARGE NOTE ADULT - ADDITIONAL INSTRUCTIONS
Follow up with your oncologist regarding elevated lactate and progression of your melanoma. Follow up with your oncologist regarding elevated lactate and progression of your melanoma.  Follow up with your primary medical doctor to restart your blood pressure medication. Follow up with your oncologist regarding elevated lactat blood clot, and progression of your melanoma.  Follow up with your primary medical doctor to restart your blood pressure medication. Follow up with your oncologist regarding elevated lactat blood clot, and progression of your melanoma.  Follow up with your primary medical doctor to restart your blood pressure medication.    ******************************Instructions for Nursing facility***************************************  1. Please repeat BMP and lactate levels three times per week.

## 2018-11-28 NOTE — PROGRESS NOTE ADULT - SUBJECTIVE AND OBJECTIVE BOX
Edmond Benavides MD  PGY 1 Department of Internal Medicine  Pager: [phone]/ [phone]    Patient is a 58y old  Male who presents with a chief complaint of Sepsis secondary to UTI (2018 18:36)      SUBJECTIVE / OVERNIGHT EVENTS: Pt seen and examined. No acute overnight events. Pt reported no subjective complaints.     MEDICATIONS  (STANDING):  aspirin enteric coated 81 milliGRAM(s) Oral daily  atorvastatin 40 milliGRAM(s) Oral at bedtime  clopidogrel Tablet 75 milliGRAM(s) Oral daily  dextrose 5%. 1000 milliLiter(s) (50 mL/Hr) IV Continuous <Continuous>  dextrose 50% Injectable 12.5 Gram(s) IV Push once  dextrose 50% Injectable 25 Gram(s) IV Push once  dextrose 50% Injectable 25 Gram(s) IV Push once  heparin  Infusion.  Unit(s)/Hr (18 mL/Hr) IV Continuous <Continuous>  insulin lispro (HumaLOG) corrective regimen sliding scale   SubCutaneous three times a day before meals  insulin lispro (HumaLOG) corrective regimen sliding scale   SubCutaneous at bedtime  sodium chloride 0.9%. 1000 milliLiter(s) (60 mL/Hr) IV Continuous <Continuous>  traZODone 50 milliGRAM(s) Oral at bedtime    MEDICATIONS  (PRN):  AQUAPHOR (petrolatum Ointment) 1 Application(s) Topical three times a day PRN for comfort  dextrose 40% Gel 15 Gram(s) Oral once PRN Blood Glucose LESS THAN 70 milliGRAM(s)/deciliter  glucagon  Injectable 1 milliGRAM(s) IntraMuscular once PRN Glucose LESS THAN 70 milligrams/deciliter  heparin  Injectable 8000 Unit(s) IV Push every 6 hours PRN For aPTT less than 40  heparin  Injectable 4000 Unit(s) IV Push every 6 hours PRN For aPTT between 40 - 57      I&O's Summary    2018 07:01  -  2018 07:00  --------------------------------------------------------  IN: 300 mL / OUT: 350 mL / NET: -50 mL        Vital Signs Last 24 Hrs  T(C): 36.7 (2018 05:53), Max: 37.3 (2018 21:53)  T(F): 98 (2018 05:53), Max: 99.2 (2018 21:53)  HR: 72 (2018 05:53) (72 - 75)  BP: 110/58 (2018 05:53) (106/59 - 111/55)  BP(mean): --  RR: 18 (2018 05:53) (16 - 18)  SpO2: 98% (2018 05:53) (98% - 100%)    CAPILLARY BLOOD GLUCOSE      POCT Blood Glucose.: 85 mg/dL (2018 05:57)  POCT Blood Glucose.: 96 mg/dL (2018 22:05)  POCT Blood Glucose.: 91 mg/dL (2018 18:02)  POCT Blood Glucose.: 93 mg/dL (2018 14:34)  POCT Blood Glucose.: 112 mg/dL (2018 08:37)      PHYSICAL EXAM:  GENERAL: NAD,   HEAD:  Atraumatic, Normocephalic  EYES: EOMI, PERRL, conjunctiva and sclera clear  NECK: No JVD  CHEST/LUNG: Clear to auscultation bilaterally; No wheeze  HEART: Regular rate and rhythm; S1, S2, S3  ABDOMEN: Soft, Nontender, moderately distended; Bowel sounds present  EXTREMITIES:  2+ Peripheral Pulses, +2 pitting edema of LEs  PSYCH: AAOx3  NEUROLOGY: non-focal  SKIN: No rashes or lesions    LABS:                        11.3   11.24 )-----------( 244      ( 2018 06:15 )             35.6     Auto Eosinophil # x     / Auto Eosinophil % x     / Auto Neutrophil # x     / Auto Neutrophil % x     / BANDS % x                            11.2   11.67 )-----------( 235      ( 2018 21:40 )             35.0     Auto Eosinophil # x     / Auto Eosinophil % x     / Auto Neutrophil # x     / Auto Neutrophil % x     / BANDS % x                            11.2   12.07 )-----------( 243      ( 2018 06:00 )             34.9     Auto Eosinophil # x     / Auto Eosinophil % x     / Auto Neutrophil # x     / Auto Neutrophil % x     / BANDS % x        1128    139  |  100  |  87<H>  ----------------------------<  88  3.6   |  13<L>  |  1.76<H>      138  |  101  |  93<H>  ----------------------------<  85  4.1   |  16<L>  |  2.27<H>      134<L>  |  92<L>  |  96<H>  ----------------------------<  115<H>  4.7   |  15<L>  |  2.56<H>    Ca    8.1<L>      2018 06:15  Mg     2.9       Phos  4.3       TPro  6.4  /  Alb  3.0<L>  /  TBili  0.4  /  DBili  x   /  AST  34  /  ALT  14  /  AlkPhos  118    TPro  7.5  /  Alb  3.6  /  TBili  0.5  /  DBili  x   /  AST  38  /  ALT  19  /  AlkPhos  135<H>      PT/INR - ( 2018 14:45 )   PT: 11.4 SEC;   INR: 1.00          PTT - ( 2018 06:15 )  PTT:138.5 SEC      Urinalysis Basic - ( 2018 17:54 )    Color: DARK ORANGE / Appearance: TURBID / S.022 / pH: 5.5  Gluc: NEGATIVE / Ketone: NEGATIVE  / Bili: TRACE / Urobili: NORMAL   Blood: TRACE / Protein: 100 / Nitrite: NEGATIVE   Leuk Esterase: LARGE / RBC: 25-50 / WBC >50   Sq Epi: MODERATE / Non Sq Epi: x / Bacteria: NEGATIVE      Lactate, Blood: 8.8 mmol/L ( @ 06:15)        RADIOLOGY & ADDITIONAL TESTS:    Imaging Personally Reviewed:  < from: VA Duplex Ext Veins Lower Comp, Left. (18 @ 09:34) >  Summary/Impressions:  Extensive, occlusive deep vein thrombosis noted to extend  from the calf veins to the left common femoral vein.    < end of copied text >    Consultant(s) Notes Reviewed:      Care Discussed with Consultants/Other Providers:

## 2018-11-28 NOTE — PROGRESS NOTE ADULT - PROBLEM SELECTOR PLAN 10
- DVT ppx held until CT head negative for mets from melanoma  - Clear liquid diet  - Dispo pending resolution of symptoms    GOC: Patient is DNR/DNI,

## 2018-11-28 NOTE — PROGRESS NOTE ADULT - SUBJECTIVE AND OBJECTIVE BOX
Follow Up:  bacteremia    Interval History: pt stable and afebrile but the lactate is 9 and urine cx showed staph aureus    ROS:      All other systems negative    Constitutional: no fever, no chills  Head: no trauma  Eyes: no vision changes, no eye pain  ENT:  no sore throat, no rhinorrhea  Cardiovascular:  no chest pain, no palpitation  Respiratory:  no SOB, no cough  GI:  no abd pain, no vomiting, no diarrhea  urinary: no dysuria, no hematuria, no flank pain  musculoskeletal: LE edema  skin:  no rash  neurology:  no headache, no seizure, no change in mental status  psych: no anxiety, no depression         Allergies  No Known Allergies        ANTIMICROBIALS:      OTHER MEDS:  AQUAPHOR (petrolatum Ointment) 1 Application(s) Topical three times a day PRN  aspirin enteric coated 81 milliGRAM(s) Oral daily  atorvastatin 40 milliGRAM(s) Oral at bedtime  clopidogrel Tablet 75 milliGRAM(s) Oral daily  dextrose 40% Gel 15 Gram(s) Oral once PRN  dextrose 5%. 1000 milliLiter(s) IV Continuous <Continuous>  dextrose 50% Injectable 12.5 Gram(s) IV Push once  dextrose 50% Injectable 25 Gram(s) IV Push once  dextrose 50% Injectable 25 Gram(s) IV Push once  glucagon  Injectable 1 milliGRAM(s) IntraMuscular once PRN  heparin  Infusion.  Unit(s)/Hr IV Continuous <Continuous>  heparin  Injectable 8000 Unit(s) IV Push every 6 hours PRN  heparin  Injectable 4000 Unit(s) IV Push every 6 hours PRN  insulin lispro (HumaLOG) corrective regimen sliding scale   SubCutaneous three times a day before meals  insulin lispro (HumaLOG) corrective regimen sliding scale   SubCutaneous at bedtime  simethicone 80 milliGRAM(s) Chew daily  sodium chloride 0.9%. 1000 milliLiter(s) IV Continuous <Continuous>  traZODone 50 milliGRAM(s) Oral at bedtime      Vital Signs Last 24 Hrs  T(C): 36.7 (2018 13:05), Max: 37.3 (2018 21:53)  T(F): 98.1 (2018 13:05), Max: 99.2 (2018 21:53)  HR: 85 (2018 13:05) (72 - 85)  BP: 119/66 (2018 13:05) (106/59 - 119/66)  BP(mean): --  RR: 18 (2018 13:05) (16 - 18)  SpO2: 100% (2018 13:05) (98% - 100%)    Physical Exam:  General:    NAD,  non toxic  Head: atraumatic, normocephalic  Eye: normal sclera and conjunctiva  ENT:    no oropharyngeal lesions,   no LAD,   neck supple  Cardio:     regular S1, S2,  no murmur  Respiratory:    clear b/l,    no wheezing  abd:     soft,   BS +,   no tenderness,    no organomegaly  :   no CVAT,  no suprapubic tenderness,   no  allen  Musculoskeletal:   b/l LE edema L significantly >R  vascular: no lines, normal pulses  Skin:    no rash  Neurologic:     no focal deficit  psych: normal affect, no suicidal ideation                          11.3   11.24 )-----------( 244      ( 2018 06:15 )             35.6           139  |  100  |  87<H>  ----------------------------<  88  3.6   |  13<L>  |  1.76<H>    Ca    8.1<L>      2018 06:15  Phos  4.3       Mg     2.9         TPro  6.4  /  Alb  3.0<L>  /  TBili  0.4  /  DBili  x   /  AST  34  /  ALT  14  /  AlkPhos  118        Urinalysis Basic - ( 2018 17:54 )    Color: DARK ORANGE / Appearance: TURBID / S.022 / pH: 5.5  Gluc: NEGATIVE / Ketone: NEGATIVE  / Bili: TRACE / Urobili: NORMAL   Blood: TRACE / Protein: 100 / Nitrite: NEGATIVE   Leuk Esterase: LARGE / RBC: 25-50 / WBC >50   Sq Epi: MODERATE / Non Sq Epi: x / Bacteria: NEGATIVE        MICROBIOLOGY:  v  URINE MIDSTREAM  18 --  --  --      BLOOD PERIPHERAL  18 --  --  BLOOD CULTURE PCR  Gram Pos Cocci to be IDed      .Stool  18   No enteric pathogens isolated.  (Stool culture examined for Salmonella,  Shigella, Campylobacter, Aeromonas, Plesiomonas,  Vibrio, E.coli O157 and Yersinia)  --  --                RADIOLOGY:  Images below reviewed personally  < from: CT Chest No Cont (11.26.18 @ 17:36) >  IMPRESSION: Limited evaluation due to lack of IV contrast.    Worsening metastatic disease when compared to prior study on 10/12/2018.   Extensive omental caking significantly progressed from prior study. New   large volume of abdominopelvic ascites. Interval increase in size of   tumor implants in the left axilla and right groin.    Multiple bilateral pulmonary nodules measuring up to 3 to 4 mm.

## 2018-11-28 NOTE — DISCHARGE NOTE ADULT - PATIENT PORTAL LINK FT
You can access the [facility] Patient Portal, offered by [facility], by registering with the following website: [email or web address]

## 2018-11-28 NOTE — DISCHARGE NOTE ADULT - HOSPITAL COURSE
HPI:  Patient is a 57 y/o Male, ambulatory with a wheelchair and a walker, with MHx  significant for severe diverticulosis, metastatic melanoma s/p resection on immunotherapy with progression of disease, meningothelial meningioma s/p resection and RT, CAD s/p stent who presents from [facility] for dehydration, nausea, and vomiting. The patient was seen at [facility] last monday and was given IV fluids given his symptoms of nausea and vomiting. He states he felt better after the fluids and was sent back home. However on Thursday, he began to develop nausea and vomiting once again. He states he vomiting several times over the course of the holiday and the weekend and it was whatever he tried to eat or drink. Denies any blood in his vomit. He also noticed he was having some diarrhea (watery with brown stool mixed in) which he has been having on and off since he started on his immunotherapy 4 weeks ago. Had only 1 BM today. He denies any blood in his stool. He denied fevers, chills, CP, abdominal pain, or dysuria but states he had felt SOB on exertion the last few days along with some diaphoresis. He went to [facility] today and was sent in by his oncologist for further evaluation.     ED course: hypotensive, improved with IV fluids, HR of 75, T of 97, and O2 sat of 97% on RA. He was given 4L of IV fluids total in the ED along with vancomycin and zosyn x 1.     HOSPITAL COURSE:    Pt remained normotensive s/p initial 4L of IVF. Pt was kept on maintenance NS. Blood Cx were positive for GPC in clusters. ID was consulted, and recommended monitoring Pt off abx due to low suspicion of infection; hypotension likely 2/2 dehydration. Heme/Onc was consulted and recommened continued outpatient follow up for metastatic melanoma. Pt had continued metabolic acidosis and worsening lactate (8.8) on 11/28 that was thought to be from....    Pt examined and deemed stable to be discharged to home. HPI:  Patient is a 57 y/o Male, ambulatory with a wheelchair and a walker, with MHx  significant for severe diverticulosis, metastatic melanoma s/p resection on immunotherapy with progression of disease, meningothelial meningioma s/p resection and RT, CAD s/p stent who presents from [facility] for dehydration, nausea, and vomiting. The patient was seen at [facility] last monday and was given IV fluids given his symptoms of nausea and vomiting. He states he felt better after the fluids and was sent back home. However on Thursday, he began to develop nausea and vomiting once again. He states he vomiting several times over the course of the holiday and the weekend and it was whatever he tried to eat or drink. Denies any blood in his vomit. He also noticed he was having some diarrhea (watery with brown stool mixed in) which he has been having on and off since he started on his immunotherapy 4 weeks ago. Had only 1 BM today. He denies any blood in his stool. He denied fevers, chills, CP, abdominal pain, or dysuria but states he had felt SOB on exertion the last few days along with some diaphoresis. He went to [facility] today and was sent in by his oncologist for further evaluation.     ED course: hypotensive, improved with IV fluids, HR of 75, T of 97, and O2 sat of 97% on RA. He was given 4L of IV fluids total in the ED along with vancomycin and zosyn x 1.     HOSPITAL COURSE:    Pt remained normotensive s/p initial 4L of IVF. Pt was kept on maintenance NS. Blood Cx were positive for GPC in clusters. ID was consulted, and recommended monitoring Pt off abx due to low suspicion of infection; hypotension likely 2/2 dehydration. Heme/Onc was consulted and recommened continued outpatient follow up for metastatic melanoma.  One blood culture bottle was positive for staphylococcus haemolyticus; however this was believed to be a contaminant. ID was consulted and recommended starting Vancomycin given positive Staph. Aureous urine cultures; however Pt was asymptomatic and remained afebrile throughout hospital course..  Pt was treated w/ Vancomycin IV for total of 6 days. Pt was found to have extensive LLE DVT and evidence of LLL PE on CT chest. Pt was started on heparin drip after metastatic disease to brain was ruled out via Brain CT. Pt transitioned to Lovenox 90mg BID. Pt had continued metabolic acidosis and persistently elevated lactate >10. Infectious work up (CT chest, abdomen, and pelvic) were negative for infectious source or bowel ischemia. Pt was never hypoxic or HD unstable during hospital course. Pt has diagnostic tap which reveal no evidence of SBP. Elevated lactate likely 2/2 worsening metastatic disease given hepatic mets found on CT and likely impaired clearance and increased production (via Warburg effect).     Pt examined and deemed stable to be discharged with plan to follow up with oncology. HPI:  Patient is a 59 y/o Male, ambulatory with a wheelchair and a walker, with MHx  significant for severe diverticulosis, metastatic melanoma s/p resection on immunotherapy with progression of disease, meningothelial meningioma s/p resection and RT, CAD s/p stent who presents from [facility] for dehydration, nausea, and vomiting. The patient was seen at [facility] last monday and was given IV fluids given his symptoms of nausea and vomiting. He states he felt better after the fluids and was sent back home. However on Thursday, he began to develop nausea and vomiting once again. He states he vomiting several times over the course of the holiday and the weekend and it was whatever he tried to eat or drink. Denies any blood in his vomit. He also noticed he was having some diarrhea (watery with brown stool mixed in) which he has been having on and off since he started on his immunotherapy 4 weeks ago. Had only 1 BM today. He denies any blood in his stool. He denied fevers, chills, CP, abdominal pain, or dysuria but states he had felt SOB on exertion the last few days along with some diaphoresis. He went to [facility] today and was sent in by his oncologist for further evaluation.     ED course: hypotensive, improved with IV fluids, HR of 75, T of 97, and O2 sat of 97% on RA. He was given 4L of IV fluids total in the ED along with vancomycin and zosyn x 1.     HOSPITAL COURSE:    Pt remained normotensive s/p initial 4L of IVF. Pt was kept on maintenance NS. Blood Cx were positive for GPC in clusters. ID was consulted, and recommended monitoring Pt off abx due to low suspicion of infection; hypotension likely 2/2 dehydration. Heme/Onc was consulted and recommened continued outpatient follow up for metastatic melanoma.  One blood culture bottle was positive for staphylococcus haemolyticus; however this was believed to be a contaminant. ID was consulted and recommended starting Vancomycin given positive Staph. Aureous urine cultures; however Pt was asymptomatic and remained afebrile throughout hospital course..  Pt was treated w/ Vancomycin IV for total of 6 days. Pt was found to have extensive LLE DVT and evidence of LLL PE on CT chest. Pt was started on heparin drip after metastatic disease to brain was ruled out via Brain CT. Pt transitioned to Lovenox 90mg BID. Pt had continued metabolic acidosis and persistently elevated lactate >10. Infectious work up (CT chest, abdomen, and pelvic) were negative for infectious source or bowel ischemia. Pt was never hypoxic or HD unstable during hospital course. Pt has diagnostic tap which reveal no evidence of SBP. Elevated lactate likely 2/2 worsening metastatic disease given hepatic mets found on CT and likely impaired clearance and increased production (via Warburg effect).     Pt examined and deemed stable to be discharged with plan to follow up with oncology. Pt to have BMP and Lactate levels x3/week starting following day post-discharge. HPI:  Patient is a 59 y/o Male, ambulatory with a wheelchair and a walker, with MHx  significant for severe diverticulosis, metastatic melanoma s/p resection on immunotherapy with progression of disease, meningothelial meningioma s/p resection and RT, CAD s/p stent who presents from [facility] for dehydration, nausea, and vomiting. The patient was seen at [facility] last monday and was given IV fluids given his symptoms of nausea and vomiting. He states he felt better after the fluids and was sent back home. However on Thursday, he began to develop nausea and vomiting once again. He states he vomiting several times over the course of the holiday and the weekend and it was whatever he tried to eat or drink. Denies any blood in his vomit. He also noticed he was having some diarrhea (watery with brown stool mixed in) which he has been having on and off since he started on his immunotherapy 4 weeks ago. Had only 1 BM today. He denies any blood in his stool. He denied fevers, chills, CP, abdominal pain, or dysuria but states he had felt SOB on exertion the last few days along with some diaphoresis. He went to [facility] today and was sent in by his oncologist for further evaluation.     ED course: hypotensive, improved with IV fluids, HR of 75, T of 97, and O2 sat of 97% on RA. He was given 4L of IV fluids total in the ED along with vancomycin and zosyn x 1.     HOSPITAL COURSE:    Pt remained normotensive s/p initial 4L of IVF. Pt was kept on maintenance NS. Blood Cx were positive for GPC in clusters. ID was consulted, and recommended monitoring Pt off abx due to low suspicion of infection; hypotension likely 2/2 dehydration. Heme/Onc was consulted and recommened continued outpatient follow up for metastatic melanoma.  One blood culture bottle was positive for staphylococcus haemolyticus; however this was believed to be a contaminant. ID was consulted and recommended starting Vancomycin given positive Staph. Aureous urine cultures; however Pt was asymptomatic and remained afebrile throughout hospital course..  Pt was treated w/ Vancomycin IV for total of 6 days. Pt was found to have extensive LLE DVT and evidence of LLL PE on CT chest. Pt was started on heparin drip after metastatic disease to brain was ruled out via Brain CT. Pt transitioned to Lovenox 90mg BID. Pt had continued metabolic acidosis and persistently elevated lactate >10. Infectious work up (CT chest, abdomen, and pelvic) were negative for infectious source or bowel ischemia. Pt was never hypoxic or HD unstable during hospital course. Pt has diagnostic tap which reveal no evidence of SBP. Elevated lactate likely 2/2 worsening metastatic disease given hepatic mets found on CT and likely impaired clearance and increased production (via Warburg effect). Pt renal function remained normal, repeat VBG PH7.29, NaHCO3 was started. Encourage oral intake, Ensure pudding supplement.     Pt examined and deemed stable to be discharged with plan to follow up with oncology (Oncology was consulted and informed Dr. Goldberg and DR. Vargas about Pt condition). Pt to have BMP and Lactate levels x3/week starting following day post-discharge. Pt has follow up appointment in [facility] on 12/14, Pt will try to call to schedule early next week.

## 2018-11-28 NOTE — DISCHARGE NOTE ADULT - CARE PROVIDER_API CALL
Luiz Cho), Hematology; Internal Medicine; Medical Oncology  [address] [place] [address]  Phone: [phone]  Fax: [phone] Ryley Vargas), Hematology; Internal Medicine; Medical Oncology  [address] [place] [address]  Phone: [phone]  Fax: [phone]

## 2018-11-28 NOTE — DISCHARGE NOTE ADULT - CARE PLAN
Principal Discharge DX:	UTI (urinary tract infection)  Goal:	Follow Up  Assessment and plan of treatment:	You were found to have urinary tract infection and were treated with antibiotics. Please follow up with your primary medical provider for follow. If you have symptoms such as burning with urination or foul smelling urine please go to your nearest emergency room.  Secondary Diagnosis:	Metabolic acidosis  Goal:	Follow Up  Assessment and plan of treatment:	You were found to have an elevated lactate level in your blood causing it to be more acidic than normal. We believe that the cause of the elevate level of lactate is due to progression of your melanoma. Please follow up with your oncologist or primary medical doctor for follow up.  Secondary Diagnosis:	Venous thromboembolism (VTE)  Goal:	Follow up  Assessment and plan of treatment:	You were found to have an extensive blood clot in your left leg and have evidence of a clot within the blood vessels of your left lower lung. Please continue taking your blood thinner and follow up with your primary medical doctor or oncologist for continued management.  Secondary Diagnosis:	SILKE (acute kidney injury)  Goal:	Follow up  Assessment and plan of treatment:	You were found to have injury to your kidneys that resolved after fluid hydration. Please follow up with your primary medical doctor for continued management.  Secondary Diagnosis:	Prolonged QT interval Principal Discharge DX:	UTI (urinary tract infection)  Goal:	Follow Up  Assessment and plan of treatment:	You were found to have urinary tract infection and were treated with antibiotics. Please follow up with your primary medical provider for follow. If you have symptoms such as burning with urination or foul smelling urine please go to your nearest emergency room.  Secondary Diagnosis:	Metabolic acidosis  Goal:	Follow Up  Assessment and plan of treatment:	You were found to have an elevated lactate level in your blood causing it to be more acidic than normal. We believe that the cause of the elevate level of lactate is due to progression of your melanoma. Please follow up with your oncologist or primary medical doctor for follow up.  Secondary Diagnosis:	Venous thromboembolism (VTE)  Goal:	Follow up  Assessment and plan of treatment:	You were found to have an extensive blood clot in your left leg and have evidence of a clot within the blood vessels of your left lower lung. Please continue taking your blood thinner and follow up with your primary medical doctor or oncologist for continued management.  Secondary Diagnosis:	SILKE (acute kidney injury)  Goal:	Follow up  Assessment and plan of treatment:	You were found to have injury to your kidneys that resolved after fluid hydration. Please follow up with your primary medical doctor for continued management.  Secondary Diagnosis:	Prolonged QT interval  Goal:	Follow up  Assessment and plan of treatment:	You were found to have abnormal electrical activity of your heart which was evidenced by an elevated QTc in your electrocardiogram. We stopped your Trazadone as this medication could contribute to this abnormality. Please follow up with your primary medical doctor for follow up. Principal Discharge DX:	UTI (urinary tract infection)  Goal:	Follow Up  Assessment and plan of treatment:	You were found to have urinary tract infection and were treated with antibiotics. Please follow up with your primary medical provider for follow up. If you have symptoms such as burning with urination or foul smelling urine please go to your nearest emergency room.  Secondary Diagnosis:	Metabolic acidosis  Goal:	Follow Up  Assessment and plan of treatment:	You were found to have an elevated lactate level in your blood causing it to be more acidic than normal. We believe that the cause of the elevate level of lactate is due to progression of your melanoma. Please follow up with your oncologist or primary medical doctor for follow up.  Secondary Diagnosis:	Venous thromboembolism (VTE)  Goal:	Follow up  Assessment and plan of treatment:	You were found to have an extensive blood clot in your left leg and have evidence of a clot within the blood vessels of your left lower lung. Please continue taking your blood thinner and follow up with your primary medical doctor or oncologist for continued management.  Secondary Diagnosis:	SILKE (acute kidney injury)  Goal:	Follow up  Assessment and plan of treatment:	You were found to have injury to your kidneys that resolved after fluid hydration. Please follow up with your primary medical doctor for continued management.  Secondary Diagnosis:	Prolonged QT interval  Goal:	Follow up  Assessment and plan of treatment:	You were found to have abnormal electrical activity of your heart which was evidenced by an elevated QTc in your electrocardiogram. We stopped your Trazadone as this medication could contribute to this abnormality. Please follow up with your primary medical doctor for follow up.  Secondary Diagnosis:	HTN (hypertension)  Goal:	Follow up  Assessment and plan of treatment:	You have a history of high blood pressure. We stopped your blood pressure medication out of concern of lower your pressure while you had an infection. We did not restart your medications as your blood pressure remained normal. Please follow up with your primary medical doctor to restart your blood pressure medication. Principal Discharge DX:	UTI (urinary tract infection)  Goal:	Follow Up  Assessment and plan of treatment:	You were found to have urinary tract infection and were treated with antibiotics. Please follow up with your primary medical provider for follow up. If you have symptoms such as burning with urination or foul smelling urine please go to your nearest emergency room.  Secondary Diagnosis:	Metabolic acidosis  Goal:	Follow Up  Assessment and plan of treatment:	You were found to have an elevated lactate level in your blood causing it to be more acidic than normal. We believe that the cause of the elevate level of lactate is due to progression of your melanoma. Please follow up with your oncologist or primary medical doctor for follow up. Please have your provider repeat BMP and lactate levels three times per week.  Secondary Diagnosis:	Venous thromboembolism (VTE)  Goal:	Follow up  Assessment and plan of treatment:	You were found to have an extensive blood clot in your left leg and have evidence of a clot within the blood vessels of your left lower lung. Please continue taking your blood thinner and follow up with your primary medical doctor or oncologist for continued management.  Secondary Diagnosis:	SILKE (acute kidney injury)  Goal:	Follow up  Assessment and plan of treatment:	You were found to have injury to your kidneys that resolved after fluid hydration. Please follow up with your primary medical doctor for continued management.  Secondary Diagnosis:	Prolonged QT interval  Goal:	Follow up  Assessment and plan of treatment:	You were found to have abnormal electrical activity of your heart which was evidenced by an elevated QTc in your electrocardiogram. We stopped your Trazadone as this medication could contribute to this abnormality. Please follow up with your primary medical doctor for follow up.  Secondary Diagnosis:	HTN (hypertension)  Goal:	Follow up  Assessment and plan of treatment:	You have a history of high blood pressure. We stopped your blood pressure medication out of concern of lower your pressure while you had an infection. We did not restart your medications as your blood pressure remained normal. Please follow up with your primary medical doctor to restart your blood pressure medication.

## 2018-11-29 DIAGNOSIS — A41.9 SEPSIS, UNSPECIFIED ORGANISM: ICD-10-CM

## 2018-11-29 LAB
-  CEFAZOLIN: SIGNIFICANT CHANGE UP
-  CIPROFLOXACIN: SIGNIFICANT CHANGE UP
-  GENTAMICIN: SIGNIFICANT CHANGE UP
-  LEVOFLOXACIN: SIGNIFICANT CHANGE UP
-  OXACILLIN: SIGNIFICANT CHANGE UP
-  RIFAMPIN.: SIGNIFICANT CHANGE UP
-  TETRACYCLINE: SIGNIFICANT CHANGE UP
-  TRIMETHOPRIM/SULFAMETHOXAZOLE: SIGNIFICANT CHANGE UP
-  VANCOMYCIN: SIGNIFICANT CHANGE UP
APTT BLD: 53 SEC — HIGH (ref 27.5–36.3)
APTT BLD: 53.2 SEC — HIGH (ref 27.5–36.3)
APTT BLD: 87.7 SEC — HIGH (ref 27.5–36.3)
BACTERIA UR CULT: SIGNIFICANT CHANGE UP
BASOPHILS # BLD AUTO: 0.04 K/UL — SIGNIFICANT CHANGE UP (ref 0–0.2)
BASOPHILS NFR BLD AUTO: 0.4 % — SIGNIFICANT CHANGE UP (ref 0–2)
BUN SERPL-MCNC: 69 MG/DL — HIGH (ref 7–23)
CALCIUM SERPL-MCNC: 8.4 MG/DL — SIGNIFICANT CHANGE UP (ref 8.4–10.5)
CHLORIDE SERPL-SCNC: 100 MMOL/L — SIGNIFICANT CHANGE UP (ref 98–107)
CO2 SERPL-SCNC: 13 MMOL/L — LOW (ref 22–31)
CREAT SERPL-MCNC: 1.29 MG/DL — SIGNIFICANT CHANGE UP (ref 0.5–1.3)
EOSINOPHIL # BLD AUTO: 0.13 K/UL — SIGNIFICANT CHANGE UP (ref 0–0.5)
EOSINOPHIL NFR BLD AUTO: 1.4 % — SIGNIFICANT CHANGE UP (ref 0–6)
GLUCOSE BLDC GLUCOMTR-MCNC: 100 MG/DL — HIGH (ref 70–99)
GLUCOSE BLDC GLUCOMTR-MCNC: 70 MG/DL — SIGNIFICANT CHANGE UP (ref 70–99)
GLUCOSE BLDC GLUCOMTR-MCNC: 79 MG/DL — SIGNIFICANT CHANGE UP (ref 70–99)
GLUCOSE BLDC GLUCOMTR-MCNC: 81 MG/DL — SIGNIFICANT CHANGE UP (ref 70–99)
GLUCOSE BLDC GLUCOMTR-MCNC: 85 MG/DL — SIGNIFICANT CHANGE UP (ref 70–99)
GLUCOSE SERPL-MCNC: 87 MG/DL — SIGNIFICANT CHANGE UP (ref 70–99)
HCT VFR BLD CALC: 38.9 % — LOW (ref 39–50)
HGB BLD-MCNC: 12.4 G/DL — LOW (ref 13–17)
IMM GRANULOCYTES # BLD AUTO: 0.05 # — SIGNIFICANT CHANGE UP
IMM GRANULOCYTES NFR BLD AUTO: 0.5 % — SIGNIFICANT CHANGE UP (ref 0–1.5)
LACTATE SERPL-SCNC: 10.6 MMOL/L — CRITICAL HIGH (ref 0.5–2)
LYMPHOCYTES # BLD AUTO: 0.74 K/UL — LOW (ref 1–3.3)
LYMPHOCYTES # BLD AUTO: 7.9 % — LOW (ref 13–44)
MCHC RBC-ENTMCNC: 31.6 PG — SIGNIFICANT CHANGE UP (ref 27–34)
MCHC RBC-ENTMCNC: 31.9 % — LOW (ref 32–36)
MCV RBC AUTO: 99 FL — SIGNIFICANT CHANGE UP (ref 80–100)
METHOD TYPE: SIGNIFICANT CHANGE UP
MONOCYTES # BLD AUTO: 0.99 K/UL — HIGH (ref 0–0.9)
MONOCYTES NFR BLD AUTO: 10.6 % — SIGNIFICANT CHANGE UP (ref 2–14)
NEUTROPHILS # BLD AUTO: 7.37 K/UL — SIGNIFICANT CHANGE UP (ref 1.8–7.4)
NEUTROPHILS NFR BLD AUTO: 79.2 % — HIGH (ref 43–77)
NRBC # FLD: 0 — SIGNIFICANT CHANGE UP
ORGANISM # SPEC MICROSCOPIC CNT: SIGNIFICANT CHANGE UP
PLATELET # BLD AUTO: 264 K/UL — SIGNIFICANT CHANGE UP (ref 150–400)
PMV BLD: 10.9 FL — SIGNIFICANT CHANGE UP (ref 7–13)
POTASSIUM SERPL-MCNC: 3.9 MMOL/L — SIGNIFICANT CHANGE UP (ref 3.5–5.3)
POTASSIUM SERPL-SCNC: 3.9 MMOL/L — SIGNIFICANT CHANGE UP (ref 3.5–5.3)
RBC # BLD: 3.93 M/UL — LOW (ref 4.2–5.8)
RBC # FLD: 15.3 % — HIGH (ref 10.3–14.5)
SODIUM SERPL-SCNC: 139 MMOL/L — SIGNIFICANT CHANGE UP (ref 135–145)
SPECIMEN SOURCE: SIGNIFICANT CHANGE UP
SPECIMEN SOURCE: SIGNIFICANT CHANGE UP
VANCOMYCIN FLD-MCNC: 11.1 UG/ML — SIGNIFICANT CHANGE UP
WBC # BLD: 9.32 K/UL — SIGNIFICANT CHANGE UP (ref 3.8–10.5)
WBC # FLD AUTO: 9.32 K/UL — SIGNIFICANT CHANGE UP (ref 3.8–10.5)

## 2018-11-29 PROCEDURE — 99233 SBSQ HOSP IP/OBS HIGH 50: CPT

## 2018-11-29 PROCEDURE — 99233 SBSQ HOSP IP/OBS HIGH 50: CPT | Mod: GC

## 2018-11-29 RX ORDER — VANCOMYCIN HCL 1 G
1000 VIAL (EA) INTRAVENOUS ONCE
Qty: 0 | Refills: 0 | Status: COMPLETED | OUTPATIENT
Start: 2018-11-29 | End: 2018-11-29

## 2018-11-29 RX ORDER — SODIUM CHLORIDE 9 MG/ML
1000 INJECTION INTRAMUSCULAR; INTRAVENOUS; SUBCUTANEOUS
Qty: 0 | Refills: 0 | Status: DISCONTINUED | OUTPATIENT
Start: 2018-11-29 | End: 2018-12-01

## 2018-11-29 RX ORDER — LANOLIN ALCOHOL/MO/W.PET/CERES
5 CREAM (GRAM) TOPICAL AT BEDTIME
Qty: 0 | Refills: 0 | Status: DISCONTINUED | OUTPATIENT
Start: 2018-11-29 | End: 2018-11-30

## 2018-11-29 RX ORDER — VANCOMYCIN HCL 1 G
1000 VIAL (EA) INTRAVENOUS EVERY 12 HOURS
Qty: 0 | Refills: 0 | Status: DISCONTINUED | OUTPATIENT
Start: 2018-11-29 | End: 2018-11-29

## 2018-11-29 RX ORDER — ACETAMINOPHEN 500 MG
650 TABLET ORAL EVERY 6 HOURS
Qty: 0 | Refills: 0 | Status: DISCONTINUED | OUTPATIENT
Start: 2018-11-29 | End: 2018-12-05

## 2018-11-29 RX ADMIN — HEPARIN SODIUM 4000 UNIT(S): 5000 INJECTION INTRAVENOUS; SUBCUTANEOUS at 03:05

## 2018-11-29 RX ADMIN — SODIUM CHLORIDE 60 MILLILITER(S): 9 INJECTION INTRAMUSCULAR; INTRAVENOUS; SUBCUTANEOUS at 10:22

## 2018-11-29 RX ADMIN — Medication 250 MILLIGRAM(S): at 11:57

## 2018-11-29 RX ADMIN — Medication 81 MILLIGRAM(S): at 11:59

## 2018-11-29 RX ADMIN — HEPARIN SODIUM 1200 UNIT(S)/HR: 5000 INJECTION INTRAVENOUS; SUBCUTANEOUS at 02:38

## 2018-11-29 RX ADMIN — HEPARIN SODIUM 1400 UNIT(S)/HR: 5000 INJECTION INTRAVENOUS; SUBCUTANEOUS at 17:00

## 2018-11-29 RX ADMIN — ATORVASTATIN CALCIUM 40 MILLIGRAM(S): 80 TABLET, FILM COATED ORAL at 22:30

## 2018-11-29 RX ADMIN — SIMETHICONE 80 MILLIGRAM(S): 80 TABLET, CHEWABLE ORAL at 11:59

## 2018-11-29 RX ADMIN — HEPARIN SODIUM 1200 UNIT(S)/HR: 5000 INJECTION INTRAVENOUS; SUBCUTANEOUS at 10:22

## 2018-11-29 RX ADMIN — HEPARIN SODIUM 4000 UNIT(S): 5000 INJECTION INTRAVENOUS; SUBCUTANEOUS at 17:02

## 2018-11-29 RX ADMIN — CLOPIDOGREL BISULFATE 75 MILLIGRAM(S): 75 TABLET, FILM COATED ORAL at 11:59

## 2018-11-29 NOTE — PROGRESS NOTE ADULT - PROBLEM SELECTOR PLAN 1
Sepsis secondary likely to UTI (urine cx: Staph aureus), mild leukocytosis, worsening lactate (8.8)  ID consulted. BCx (9/26): GPC in clusters  - Repeat blood cx  - Will await ID recs regarding + Urine Cx and futher abx tx  - C/w NS IVF @ 60cc  - F/u TTE Sepsis secondary likely to UTI (urine cx: Staph aureus), mild leukocytosis, worsening lactate. TTE negative for infection.   ID consulted. BCx (9/26): GPC in clusters  - Repeat blood cx until negative  -  ID recs appreciated; Will dose Vanc by lvl  - C/w NS IVF @ 60cc

## 2018-11-29 NOTE — PROGRESS NOTE ADULT - PROBLEM SELECTOR PLAN 2
- likely pre-renal secondary to dehydration vs side effect from immunotherapy  - CT abdomen/pelvis does not show any signs of bladder obstruction  - Cr: 2.27 -> 1.76; continue to trend bmp - 2/2 lactic acidosis 2/2 urosepsis vs LLE ischemia d/t extensive clot burden vs metastatic melanoma  - Trend BMP  - Trend Lactate  - Follow up final read CT chest, A/P

## 2018-11-29 NOTE — PROGRESS NOTE ADULT - PROBLEM SELECTOR PLAN 4
- secondary sepsis 2/2 staph. aureus UTI, also with lactic acidosis   - will continue to monitor for now  - Repeat Lactate: 8.8  - Trend BMP  - Trend Lactate Extensive DVT found on L LE. Pt started on Heparin ggt after CTH r/o brain mets  - C/w Heparin drip

## 2018-11-29 NOTE — PHYSICAL THERAPY INITIAL EVALUATION ADULT - PATIENT PROFILE REVIEW, REHAB EVAL
ACTIVITY: ambulate with assistance; consult with Dalia Mcintyre RN --> pt. OK to ambulate with PT/yes

## 2018-11-29 NOTE — PROGRESS NOTE ADULT - ASSESSMENT
57 y/o Male with w/ severe diverticulosis, metastatic melanoma s/p resection on immunotherapy with progression of disease, meningothelial meningioma s/p resection and RT, CAD s/p stent admitted for sepsis secondary GPC bacteremia likely 2/2 Staph, Aureus UTI c/b L LE DVT, SILKE with metabolic acidosis in the setting of worsening metastatic disease

## 2018-11-29 NOTE — PHYSICAL THERAPY INITIAL EVALUATION ADULT - ADDITIONAL COMMENTS
Pt. left in bed post-PT in NAD with all lines/tubes intact & table/call bell within reach.  RN Dalia mcallister.

## 2018-11-29 NOTE — PROGRESS NOTE ADULT - PROBLEM SELECTOR PLAN 9
No c/o CP, palpitations or FAIR; Screening Trop sent by ED; Very low suspicion of ACS;  - patient with CAD, s/p stent in January 2018  - troponin elevated but likely in the setting of SILKE  - very low suspicion of ACS at this time given unchanged EKG with no acute ST changes  and lack of symptoms as outlined above   - will continue ASA and plavix  - holding BP meds in the setting of hypotension, sepsis on admission No c/o CP, palpitations or FAIR; Screening Trop sent by ED; Very low suspicion of ACS;  - patient with CAD, s/p stent in January 2018  - troponin elevated but likely in the setting of SILKE  - very low suspicion of ACS at this time given unchanged EKG with no acute ST changes  and lack of symptoms as outlined above   - will continue ASA and plavix

## 2018-11-29 NOTE — PHYSICAL THERAPY INITIAL EVALUATION ADULT - AMBULATION SKILLS, REHAB EVAL
independent/needs device/rolling walker for long distances; wheelchair when leaving the facility for medical appointments

## 2018-11-29 NOTE — PROGRESS NOTE ADULT - PROBLEM SELECTOR PLAN 6
- Echo in January with EF 55-60%, grade II diastolic dysfunction  - on aldactone, furosemide, carvedilol, hydralazine, isosorbide  - holding all BP meds at this time given hypotension upon presentation to the ED - Echo in January with EF 55-60%, grade II diastolic dysfunction  - on aldactone, furosemide, carvedilol, hydralazine, isosorbide  - consider restarting BP meds prn as BP remains stable.

## 2018-11-29 NOTE — PROGRESS NOTE ADULT - SUBJECTIVE AND OBJECTIVE BOX
Follow Up:  bacteremia    Interval History: pt stable and afebrile but the lactate is 10 and urine cx showed staph aureus    ROS:      All other systems negative    Constitutional: no fever, no chills  Head: no trauma  Eyes: no vision changes, no eye pain  ENT:  no sore throat, no rhinorrhea  Cardiovascular:  no chest pain, no palpitation  Respiratory:  no SOB, no cough  GI:  no abd pain, no vomiting, no diarrhea  urinary: no dysuria, no hematuria, no flank pain  musculoskeletal: LE edema  skin:  no rash  neurology:  no headache, no seizure, no change in mental status  psych: no anxiety, no depression         Allergies  No Known Allergies        ANTIMICROBIALS:      OTHER MEDS:  acetaminophen   Tablet .. 650 milliGRAM(s) Oral every 6 hours PRN  AQUAPHOR (petrolatum Ointment) 1 Application(s) Topical three times a day PRN  aspirin enteric coated 81 milliGRAM(s) Oral daily  atorvastatin 40 milliGRAM(s) Oral at bedtime  clopidogrel Tablet 75 milliGRAM(s) Oral daily  dextrose 40% Gel 15 Gram(s) Oral once PRN  dextrose 5%. 1000 milliLiter(s) IV Continuous <Continuous>  dextrose 50% Injectable 12.5 Gram(s) IV Push once  dextrose 50% Injectable 25 Gram(s) IV Push once  dextrose 50% Injectable 25 Gram(s) IV Push once  glucagon  Injectable 1 milliGRAM(s) IntraMuscular once PRN  heparin  Infusion.  Unit(s)/Hr IV Continuous <Continuous>  heparin  Injectable 8000 Unit(s) IV Push every 6 hours PRN  heparin  Injectable 4000 Unit(s) IV Push every 6 hours PRN  insulin lispro (HumaLOG) corrective regimen sliding scale   SubCutaneous three times a day before meals  insulin lispro (HumaLOG) corrective regimen sliding scale   SubCutaneous at bedtime  simethicone 80 milliGRAM(s) Chew daily  sodium chloride 0.9%. 1000 milliLiter(s) IV Continuous <Continuous>  traZODone 50 milliGRAM(s) Oral at bedtime      Vital Signs Last 24 Hrs  T(C): 36.7 (29 Nov 2018 11:06), Max: 37.1 (28 Nov 2018 21:57)  T(F): 98 (29 Nov 2018 11:06), Max: 98.7 (28 Nov 2018 21:57)  HR: 82 (29 Nov 2018 11:06) (71 - 82)  BP: 132/78 (29 Nov 2018 11:06) (110/65 - 132/78)  BP(mean): --  RR: 18 (29 Nov 2018 11:06) (17 - 18)  SpO2: 99% (29 Nov 2018 11:06) (99% - 100%)    Physical Exam:  General:    NAD,  non toxic  Head: atraumatic, normocephalic  Eye: normal sclera and conjunctiva  ENT:    no oropharyngeal lesions,   no LAD,   neck supple  Cardio:     regular S1, S2,  no murmur  Respiratory:    clear b/l,    no wheezing  abd:     soft,   BS +,   no tenderness,    no organomegaly  :   no CVAT,  no suprapubic tenderness,   no  allen  Musculoskeletal:   b/l LE edema L significantly >R  vascular: no lines, normal pulses  Skin:    no rash  Neurologic:     no focal deficit  psych: normal affect, no suicidal ideation                          12.4   9.32  )-----------( 264      ( 29 Nov 2018 09:05 )             38.9       11-29    139  |  100  |  69<H>  ----------------------------<  87  3.9   |  13<L>  |  1.29    Ca    8.4      29 Nov 2018 10:57  Phos  4.3     11-28  Mg     2.9     11-28    TPro  6.7  /  Alb  3.2<L>  /  TBili  0.3  /  DBili  0.2  /  AST  27  /  ALT  15  /  AlkPhos  116  11-28          MICROBIOLOGY:  Vancomycin Level, Random: 11.1 ug/mL (11-29-18 @ 09:05)  v  BLOOD PERIPHERAL  11-28-18 --  --  --      URINE MIDSTREAM  11-26-18 --  --  Staphylococcus aureus      BLOOD PERIPHERAL  11-26-18 --  --  BLOOD CULTURE PCR  Gram Pos Cocci to be IDed      .Stool  11-19-18   No enteric pathogens isolated.  (Stool culture examined for Salmonella,  Shigella, Campylobacter, Aeromonas, Plesiomonas,  Vibrio, E.coli O157 and Yersinia)  --  --                RADIOLOGY:  Images below reviewed personally  < from: CT Abdomen and Pelvis w/ IV Cont (11.28.18 @ 17:53) >    *  No infectious source is identified.  *  Unchanged metastatic disease.  *  Large volume of ascites unchanged from 11/26/2018.  *  Findings suspicious for subsegmental pulmonary embolism the left lower   lobe.  *  Findings raising a question of left lower extremity DVT. Correlate   with sonography.

## 2018-11-29 NOTE — PROGRESS NOTE ADULT - SUBJECTIVE AND OBJECTIVE BOX
Edmond Benavides MD  PGY 1 Department of Internal Medicine  Pager: [phone]/ [phone]    Patient is a 58y old  Male who presents with a chief complaint of Sepsis secondary to UTI (28 Nov 2018 15:27)      SUBJECTIVE / OVERNIGHT EVENTS: Pt seen and examined. No acute overnight events. Pt reported no nausea or vomiting. Pt reported increased "wet" flatulence, however no diarrhea. Stools nonbloody. Otherwise no other subjective complaints.      MEDICATIONS  (STANDING):  aspirin enteric coated 81 milliGRAM(s) Oral daily  atorvastatin 40 milliGRAM(s) Oral at bedtime  clopidogrel Tablet 75 milliGRAM(s) Oral daily  dextrose 5%. 1000 milliLiter(s) (50 mL/Hr) IV Continuous <Continuous>  dextrose 50% Injectable 12.5 Gram(s) IV Push once  dextrose 50% Injectable 25 Gram(s) IV Push once  dextrose 50% Injectable 25 Gram(s) IV Push once  heparin  Infusion.  Unit(s)/Hr (18 mL/Hr) IV Continuous <Continuous>  insulin lispro (HumaLOG) corrective regimen sliding scale   SubCutaneous three times a day before meals  insulin lispro (HumaLOG) corrective regimen sliding scale   SubCutaneous at bedtime  simethicone 80 milliGRAM(s) Chew daily  sodium chloride 0.9%. 1000 milliLiter(s) (60 mL/Hr) IV Continuous <Continuous>  traZODone 50 milliGRAM(s) Oral at bedtime    MEDICATIONS  (PRN):  acetaminophen   Tablet .. 650 milliGRAM(s) Oral every 6 hours PRN Mild Pain (1 - 3), Moderate Pain (4 - 6), Severe Pain (7 - 10)  AQUAPHOR (petrolatum Ointment) 1 Application(s) Topical three times a day PRN for comfort  dextrose 40% Gel 15 Gram(s) Oral once PRN Blood Glucose LESS THAN 70 milliGRAM(s)/deciliter  glucagon  Injectable 1 milliGRAM(s) IntraMuscular once PRN Glucose LESS THAN 70 milligrams/deciliter  heparin  Injectable 8000 Unit(s) IV Push every 6 hours PRN For aPTT less than 40  heparin  Injectable 4000 Unit(s) IV Push every 6 hours PRN For aPTT between 40 - 57      I&O's Summary    28 Nov 2018 07:01  -  29 Nov 2018 07:00  --------------------------------------------------------  IN: 0 mL / OUT: 300 mL / NET: -300 mL        Vital Signs Last 24 Hrs  T(C): 36.7 (29 Nov 2018 07:22), Max: 37.1 (28 Nov 2018 21:57)  T(F): 98 (29 Nov 2018 07:22), Max: 98.7 (28 Nov 2018 21:57)  HR: 78 (29 Nov 2018 07:22) (71 - 85)  BP: 117/87 (29 Nov 2018 07:22) (110/65 - 121/66)  BP(mean): --  RR: 18 (29 Nov 2018 07:22) (17 - 18)  SpO2: 100% (29 Nov 2018 07:22) (99% - 100%)    CAPILLARY BLOOD GLUCOSE      POCT Blood Glucose.: 84 mg/dL (28 Nov 2018 22:11)  POCT Blood Glucose.: 86 mg/dL (28 Nov 2018 18:34)  POCT Blood Glucose.: 101 mg/dL (28 Nov 2018 12:19)  POCT Blood Glucose.: 93 mg/dL (28 Nov 2018 08:41)      PHYSICAL EXAM:  GENERAL: NAD,   HEAD:  Atraumatic, Normocephalic  CHEST/LUNG: Clear to auscultation bilaterally; Decreased breath sounds on L axilla  HEART: Regular rate and rhythm; S1, S2, S3; No m/r/g  ABDOMEN: Soft, Nontender, moderately distended; Bowel sounds present  EXTREMITIES:  2+ Peripheral Pulses, +2 pitting edema of left LE. L LE cool to touch.  NEUROLOGY: No sensory or motor deficits of Upper and lower extremities.   SKIN: No rashes or lesions    LABS:                        12.0   10.12 )-----------( 256      ( 28 Nov 2018 15:50 )             38.2     Auto Eosinophil # x     / Auto Eosinophil % x     / Auto Neutrophil # x     / Auto Neutrophil % x     / BANDS % x                            11.3   11.24 )-----------( 244      ( 28 Nov 2018 06:15 )             35.6     Auto Eosinophil # x     / Auto Eosinophil % x     / Auto Neutrophil # x     / Auto Neutrophil % x     / BANDS % x                            11.2   11.67 )-----------( 235      ( 27 Nov 2018 21:40 )             35.0     Auto Eosinophil # x     / Auto Eosinophil % x     / Auto Neutrophil # x     / Auto Neutrophil % x     / BANDS % x        11-28    137  |  99  |  81<H>  ----------------------------<  97  4.1   |  14<L>  |  1.60<H>  11-28    139  |  100  |  87<H>  ----------------------------<  88  3.6   |  13<L>  |  1.76<H>    Ca    8.4      28 Nov 2018 15:50  Mg     2.9     11-28  Phos  4.3     11-28  TPro  6.7  /  Alb  3.2<L>  /  TBili  0.3  /  DBili  0.2  /  AST  27  /  ALT  15  /  AlkPhos  116  11-28    PTT - ( 29 Nov 2018 01:35 )  PTT:53.0 SEC        Lactate, Blood: 8.8 mmol/L (11-28 @ 06:15)        RADIOLOGY & ADDITIONAL TESTS:    Imaging Personally Reviewed: yes    Consultant(s) Notes Reviewed:      Care Discussed with Consultants/Other Providers: Edmond Benavides MD  PGY 1 Department of Internal Medicine  Pager: [phone]/ [phone]    Patient is a 58y old  Male who presents with a chief complaint of Sepsis secondary to UTI (28 Nov 2018 15:27)      SUBJECTIVE / OVERNIGHT EVENTS: Pt seen and examined. No acute overnight events. Pt reported no nausea or vomiting. Pt reported increased "wet" flatulence, however no diarrhea. Stools nonbloody. Otherwise no other subjective complaints.      MEDICATIONS  (STANDING):  aspirin enteric coated 81 milliGRAM(s) Oral daily  atorvastatin 40 milliGRAM(s) Oral at bedtime  clopidogrel Tablet 75 milliGRAM(s) Oral daily  dextrose 5%. 1000 milliLiter(s) (50 mL/Hr) IV Continuous <Continuous>  dextrose 50% Injectable 12.5 Gram(s) IV Push once  dextrose 50% Injectable 25 Gram(s) IV Push once  dextrose 50% Injectable 25 Gram(s) IV Push once  heparin  Infusion.  Unit(s)/Hr (18 mL/Hr) IV Continuous <Continuous>  insulin lispro (HumaLOG) corrective regimen sliding scale   SubCutaneous three times a day before meals  insulin lispro (HumaLOG) corrective regimen sliding scale   SubCutaneous at bedtime  simethicone 80 milliGRAM(s) Chew daily  sodium chloride 0.9%. 1000 milliLiter(s) (60 mL/Hr) IV Continuous <Continuous>  traZODone 50 milliGRAM(s) Oral at bedtime    MEDICATIONS  (PRN):  acetaminophen   Tablet .. 650 milliGRAM(s) Oral every 6 hours PRN Mild Pain (1 - 3), Moderate Pain (4 - 6), Severe Pain (7 - 10)  AQUAPHOR (petrolatum Ointment) 1 Application(s) Topical three times a day PRN for comfort  dextrose 40% Gel 15 Gram(s) Oral once PRN Blood Glucose LESS THAN 70 milliGRAM(s)/deciliter  glucagon  Injectable 1 milliGRAM(s) IntraMuscular once PRN Glucose LESS THAN 70 milligrams/deciliter  heparin  Injectable 8000 Unit(s) IV Push every 6 hours PRN For aPTT less than 40  heparin  Injectable 4000 Unit(s) IV Push every 6 hours PRN For aPTT between 40 - 57      I&O's Summary    28 Nov 2018 07:01  -  29 Nov 2018 07:00  --------------------------------------------------------  IN: 0 mL / OUT: 300 mL / NET: -300 mL        Vital Signs Last 24 Hrs  T(C): 36.7 (29 Nov 2018 07:22), Max: 37.1 (28 Nov 2018 21:57)  T(F): 98 (29 Nov 2018 07:22), Max: 98.7 (28 Nov 2018 21:57)  HR: 78 (29 Nov 2018 07:22) (71 - 85)  BP: 117/87 (29 Nov 2018 07:22) (110/65 - 121/66)  BP(mean): --  RR: 18 (29 Nov 2018 07:22) (17 - 18)  SpO2: 100% (29 Nov 2018 07:22) (99% - 100%)    CAPILLARY BLOOD GLUCOSE      POCT Blood Glucose.: 84 mg/dL (28 Nov 2018 22:11)  POCT Blood Glucose.: 86 mg/dL (28 Nov 2018 18:34)  POCT Blood Glucose.: 101 mg/dL (28 Nov 2018 12:19)  POCT Blood Glucose.: 93 mg/dL (28 Nov 2018 08:41)      PHYSICAL EXAM:  GENERAL: NAD,   HEAD:  Atraumatic, Normocephalic  CHEST/LUNG: Clear to auscultation bilaterally; Decreased breath sounds on L axilla  HEART: Regular rate and rhythm; S1, S2, S3; No m/r/g  ABDOMEN: Soft, Nontender, moderately distended; Bowel sounds present  EXTREMITIES:  2+ Peripheral Pulses, +2 pitting edema of left LE. L LE cool to touch.  NEUROLOGY: No sensory or motor deficits of Upper and lower extremities.   SKIN: No rashes or lesions    LABS:                        12.0   10.12 )-----------( 256      ( 28 Nov 2018 15:50 )             38.2     Auto Eosinophil # x     / Auto Eosinophil % x     / Auto Neutrophil # x     / Auto Neutrophil % x     / BANDS % x                            11.3   11.24 )-----------( 244      ( 28 Nov 2018 06:15 )             35.6     Auto Eosinophil # x     / Auto Eosinophil % x     / Auto Neutrophil # x     / Auto Neutrophil % x     / BANDS % x                            11.2   11.67 )-----------( 235      ( 27 Nov 2018 21:40 )             35.0     Auto Eosinophil # x     / Auto Eosinophil % x     / Auto Neutrophil # x     / Auto Neutrophil % x     / BANDS % x        11-28    137  |  99  |  81<H>  ----------------------------<  97  4.1   |  14<L>  |  1.60<H>  11-28    139  |  100  |  87<H>  ----------------------------<  88  3.6   |  13<L>  |  1.76<H>    Ca    8.4      28 Nov 2018 15:50  Mg     2.9     11-28  Phos  4.3     11-28  TPro  6.7  /  Alb  3.2<L>  /  TBili  0.3  /  DBili  0.2  /  AST  27  /  ALT  15  /  AlkPhos  116  11-28    PTT - ( 29 Nov 2018 01:35 )  PTT:53.0 SEC    11-29    139  |  100  |  69<H>  ----------------------------<  87  3.9   |  13<L>  |  1.29    Ca    8.4      29 Nov 2018 10:57  Phos  4.3     11-28  Mg     2.9     11-28    TPro  6.7  /  Alb  3.2<L>  /  TBili  0.3  /  DBili  0.2  /  AST  27  /  ALT  15  /  AlkPhos  116  11-28      Lactate, Blood: 8.8 mmol/L (11-28 @ 06:15)      < from: Transthoracic Echocardiogram (11.28.18 @ 15:57) >  ------------------------------------------------------------------------  CONCLUSIONS:  1. Mitral annular calcification, otherwise normal mitral  valve.  2. Calcified trileaflet aortic valve with normal opening.  3. Normal left ventricular internal dimensions and wall  thicknesses.  4. Endocardium not well visualized; grossly normal left  ventricular systolic function.  5. The right ventricle is not well visualized; grossly  normal right ventricular systolic function.  6. Left pleural effusion.    < end of copied text >    RADIOLOGY & ADDITIONAL TESTS:    Imaging Personally Reviewed: yes    Consultant(s) Notes Reviewed:      Care Discussed with Consultants/Other Providers:

## 2018-11-29 NOTE — PROGRESS NOTE ADULT - PROBLEM SELECTOR PLAN 3
- patient with stage IV metastatic melanoma with progression of disease despite therapy  - currently on immunotherapy (ipilimumab), last dose was 4 weeks ago and next dose due Wednesday of this week  - patient with progression of disease seen on CT chest/abdomen/pelvis including new ascites (asymptomatic at this time)  - will hold anticoagulation until CT head is done to rule out new brain mets given patient has had extensive progression of disease (last CT head in March 2018 and negative)  - Outpatient follow up at [facility] [place] per heme/onc recommendations Improving.  - likely pre-renal secondary to dehydration vs side effect from immunotherapy  - CT abdomen/pelvis does not show any signs of bladder obstruction  - continue to trend bmp

## 2018-11-29 NOTE — PROGRESS NOTE ADULT - ASSESSMENT
58 m with severe diverticulosis, CAD s/p stent, metastatic melanoma s/p resection on immunotherapy with progression of disease, meningothelial meningioma s/p resection and RT, was started on a new immunotherapy 4 weeks ago and now admitted for hypotension, SILKE, dehydration, nausea and vomiting,  metabolic acidosis  afebrile here, WBC 12, no urinary symptoms and u/s appears contaminated  chest/abd/pelvis CT with worsening metastatic disease but no source of infection  blood cx: coag neg staph   urine cx MSSA  TTE negative  lactate went up to 10 but chest/abd CT with contrast no source of infection    dehydration and SILKE, FTT due to metastatic melanoma vs immunotherapy  CT with significantly worsening metastatic disease and omental caking  the coag neg staph bacteremia likely contaminant but the urine cx with staph aureus  blood cx negative for 24 h and TTE negative  I called the lab there is no staph aureus in the blood, the coag neg staph will be  identified tomorrow to make sure it is not lugdunensis   elevated lactate to 10, but pt not toxic and exam benign   hypermagnesemia , metabolic acidosis    * c/w vanco per level for now  * the coag neg staph will be identified tomorrow, will keep the vanco for now  * if the repeat blood cx remains negative for 48 hours and the coag neg is not lugdunensis, will discontinue antibiotics

## 2018-11-29 NOTE — PHYSICAL THERAPY INITIAL EVALUATION ADULT - PERTINENT HX OF CURRENT PROBLEM, REHAB EVAL
Pt. is a 57 y/o male admitted to [facility] on 11/26/18 with a dx of UTI and sepsis.  PT consult request in order to evaluate for subacute rehab needs upon discharge.   H/O DM2.  (-) CXR.

## 2018-11-29 NOTE — PHYSICAL THERAPY INITIAL EVALUATION ADULT - CRITERIA FOR SKILLED THERAPEUTIC INTERVENTIONS
predicted duration of therapy intervention/Return to SNF for long-term care with in-house PT services/therapy frequency/anticipated discharge recommendation/impairments found/rehab potential

## 2018-11-30 DIAGNOSIS — C79.9 SECONDARY MALIGNANT NEOPLASM OF UNSPECIFIED SITE: ICD-10-CM

## 2018-11-30 DIAGNOSIS — R53.81 OTHER MALAISE: ICD-10-CM

## 2018-11-30 DIAGNOSIS — Z51.5 ENCOUNTER FOR PALLIATIVE CARE: ICD-10-CM

## 2018-11-30 DIAGNOSIS — I82.90 ACUTE EMBOLISM AND THROMBOSIS OF UNSPECIFIED VEIN: ICD-10-CM

## 2018-11-30 LAB
-  COAGULASE NEGATIVE STAPHYLOCOCCUS: SIGNIFICANT CHANGE UP
ALBUMIN FLD-MCNC: 2.5 G/DL — SIGNIFICANT CHANGE UP
APTT BLD: 55 SEC — HIGH (ref 27.5–36.3)
APTT BLD: 85.6 SEC — HIGH (ref 27.5–36.3)
BACTERIA BLD CULT: SIGNIFICANT CHANGE UP
BASOPHILS # BLD AUTO: 0.03 K/UL — SIGNIFICANT CHANGE UP (ref 0–0.2)
BASOPHILS NFR BLD AUTO: 0.3 % — SIGNIFICANT CHANGE UP (ref 0–2)
BODY FLUID TYPE: SIGNIFICANT CHANGE UP
BUN SERPL-MCNC: 60 MG/DL — HIGH (ref 7–23)
CALCIUM SERPL-MCNC: 8.6 MG/DL — SIGNIFICANT CHANGE UP (ref 8.4–10.5)
CHLORIDE SERPL-SCNC: 100 MMOL/L — SIGNIFICANT CHANGE UP (ref 98–107)
CLARITY SPEC: SIGNIFICANT CHANGE UP
CO2 SERPL-SCNC: 12 MMOL/L — LOW (ref 22–31)
COLOR FLD: YELLOW — SIGNIFICANT CHANGE UP
CREAT SERPL-MCNC: 1.01 MG/DL — SIGNIFICANT CHANGE UP (ref 0.5–1.3)
EOSINOPHIL # BLD AUTO: 0.14 K/UL — SIGNIFICANT CHANGE UP (ref 0–0.5)
EOSINOPHIL NFR BLD AUTO: 1.4 % — SIGNIFICANT CHANGE UP (ref 0–6)
GLUCOSE BLDC GLUCOMTR-MCNC: 76 MG/DL — SIGNIFICANT CHANGE UP (ref 70–99)
GLUCOSE BLDC GLUCOMTR-MCNC: 80 MG/DL — SIGNIFICANT CHANGE UP (ref 70–99)
GLUCOSE BLDC GLUCOMTR-MCNC: 82 MG/DL — SIGNIFICANT CHANGE UP (ref 70–99)
GLUCOSE BLDC GLUCOMTR-MCNC: 95 MG/DL — SIGNIFICANT CHANGE UP (ref 70–99)
GLUCOSE FLD-MCNC: 50 MG/DL — SIGNIFICANT CHANGE UP
GLUCOSE SERPL-MCNC: 74 MG/DL — SIGNIFICANT CHANGE UP (ref 70–99)
GRAM STN FLD: SIGNIFICANT CHANGE UP
HCT VFR BLD CALC: 38.4 % — LOW (ref 39–50)
HCT VFR BLD CALC: 38.6 % — LOW (ref 39–50)
HGB BLD-MCNC: 12.2 G/DL — LOW (ref 13–17)
HGB BLD-MCNC: 12.5 G/DL — LOW (ref 13–17)
IMM GRANULOCYTES # BLD AUTO: 0.04 # — SIGNIFICANT CHANGE UP
IMM GRANULOCYTES NFR BLD AUTO: 0.4 % — SIGNIFICANT CHANGE UP (ref 0–1.5)
LACTATE SERPL-SCNC: 11.9 MMOL/L — CRITICAL HIGH (ref 0.5–2)
LDH SERPL L TO P-CCNC: 692 U/L — SIGNIFICANT CHANGE UP
LYMPHOCYTES # BLD AUTO: 0.96 K/UL — LOW (ref 1–3.3)
LYMPHOCYTES # BLD AUTO: 9.8 % — LOW (ref 13–44)
LYMPHOCYTES NFR FLD: 15 % — SIGNIFICANT CHANGE UP
MACROPHAGES # FLD: 44 % — SIGNIFICANT CHANGE UP
MAGNESIUM SERPL-MCNC: 2.8 MG/DL — HIGH (ref 1.6–2.6)
MCHC RBC-ENTMCNC: 31.6 % — LOW (ref 32–36)
MCHC RBC-ENTMCNC: 31.7 PG — SIGNIFICANT CHANGE UP (ref 27–34)
MCHC RBC-ENTMCNC: 31.9 PG — SIGNIFICANT CHANGE UP (ref 27–34)
MCHC RBC-ENTMCNC: 32.6 % — SIGNIFICANT CHANGE UP (ref 32–36)
MCV RBC AUTO: 100.8 FL — HIGH (ref 80–100)
MCV RBC AUTO: 97.5 FL — SIGNIFICANT CHANGE UP (ref 80–100)
MONOCYTES # BLD AUTO: 1.12 K/UL — HIGH (ref 0–0.9)
MONOCYTES # FLD: 29 % — SIGNIFICANT CHANGE UP
MONOCYTES NFR BLD AUTO: 11.5 % — SIGNIFICANT CHANGE UP (ref 2–14)
NEUTROPHILS # BLD AUTO: 7.47 K/UL — HIGH (ref 1.8–7.4)
NEUTROPHILS NFR BLD AUTO: 76.6 % — SIGNIFICANT CHANGE UP (ref 43–77)
NEUTS SEG NFR FLD MANUAL: 12 % — SIGNIFICANT CHANGE UP
NRBC # FLD: 0 — SIGNIFICANT CHANGE UP
NRBC # FLD: 0 — SIGNIFICANT CHANGE UP
ORGANISM # SPEC MICROSCOPIC CNT: SIGNIFICANT CHANGE UP
PHOSPHATE SERPL-MCNC: 3 MG/DL — SIGNIFICANT CHANGE UP (ref 2.5–4.5)
PLATELET # BLD AUTO: 258 K/UL — SIGNIFICANT CHANGE UP (ref 150–400)
PLATELET # BLD AUTO: 279 K/UL — SIGNIFICANT CHANGE UP (ref 150–400)
PMV BLD: 10.9 FL — SIGNIFICANT CHANGE UP (ref 7–13)
PMV BLD: 11.3 FL — SIGNIFICANT CHANGE UP (ref 7–13)
POTASSIUM SERPL-MCNC: 3.8 MMOL/L — SIGNIFICANT CHANGE UP (ref 3.5–5.3)
POTASSIUM SERPL-SCNC: 3.8 MMOL/L — SIGNIFICANT CHANGE UP (ref 3.5–5.3)
PROT FLD-MCNC: 4.5 G/DL — SIGNIFICANT CHANGE UP
RBC # BLD: 3.83 M/UL — LOW (ref 4.2–5.8)
RBC # BLD: 3.94 M/UL — LOW (ref 4.2–5.8)
RBC # FLD: 15.4 % — HIGH (ref 10.3–14.5)
RBC # FLD: 15.7 % — HIGH (ref 10.3–14.5)
RCV VOL RI: 2000 CELL/UL — HIGH (ref 0–5)
SODIUM SERPL-SCNC: 137 MMOL/L — SIGNIFICANT CHANGE UP (ref 135–145)
SPECIMEN SOURCE: SIGNIFICANT CHANGE UP
TOTAL CELLS COUNTED, BODY FLUID: 100 CELLS — SIGNIFICANT CHANGE UP
TOTAL NUCLEATED CELL COUNT, BODY FLUID: 256 CELL/UL — HIGH (ref 0–5)
VANCOMYCIN FLD-MCNC: 15.7 UG/ML — SIGNIFICANT CHANGE UP
WBC # BLD: 9.76 K/UL — SIGNIFICANT CHANGE UP (ref 3.8–10.5)
WBC # BLD: 9.84 K/UL — SIGNIFICANT CHANGE UP (ref 3.8–10.5)
WBC # FLD AUTO: 9.76 K/UL — SIGNIFICANT CHANGE UP (ref 3.8–10.5)
WBC # FLD AUTO: 9.84 K/UL — SIGNIFICANT CHANGE UP (ref 3.8–10.5)

## 2018-11-30 PROCEDURE — 99233 SBSQ HOSP IP/OBS HIGH 50: CPT | Mod: GC

## 2018-11-30 PROCEDURE — 99233 SBSQ HOSP IP/OBS HIGH 50: CPT

## 2018-11-30 RX ORDER — HEPARIN SODIUM 5000 [USP'U]/ML
4000 INJECTION INTRAVENOUS; SUBCUTANEOUS EVERY 6 HOURS
Qty: 0 | Refills: 0 | Status: DISCONTINUED | OUTPATIENT
Start: 2018-11-30 | End: 2018-11-30

## 2018-11-30 RX ORDER — LANOLIN ALCOHOL/MO/W.PET/CERES
3 CREAM (GRAM) TOPICAL AT BEDTIME
Qty: 0 | Refills: 0 | Status: DISCONTINUED | OUTPATIENT
Start: 2018-11-30 | End: 2018-12-05

## 2018-11-30 RX ORDER — HEPARIN SODIUM 5000 [USP'U]/ML
INJECTION INTRAVENOUS; SUBCUTANEOUS
Qty: 25000 | Refills: 0 | Status: DISCONTINUED | OUTPATIENT
Start: 2018-11-30 | End: 2018-11-30

## 2018-11-30 RX ORDER — HEPARIN SODIUM 5000 [USP'U]/ML
8000 INJECTION INTRAVENOUS; SUBCUTANEOUS EVERY 6 HOURS
Qty: 0 | Refills: 0 | Status: DISCONTINUED | OUTPATIENT
Start: 2018-11-30 | End: 2018-12-03

## 2018-11-30 RX ORDER — VANCOMYCIN HCL 1 G
1000 VIAL (EA) INTRAVENOUS ONCE
Qty: 0 | Refills: 0 | Status: COMPLETED | OUTPATIENT
Start: 2018-11-30 | End: 2018-11-30

## 2018-11-30 RX ORDER — HEPARIN SODIUM 5000 [USP'U]/ML
4000 INJECTION INTRAVENOUS; SUBCUTANEOUS EVERY 6 HOURS
Qty: 0 | Refills: 0 | Status: DISCONTINUED | OUTPATIENT
Start: 2018-11-30 | End: 2018-12-03

## 2018-11-30 RX ORDER — HEPARIN SODIUM 5000 [USP'U]/ML
8000 INJECTION INTRAVENOUS; SUBCUTANEOUS ONCE
Qty: 0 | Refills: 0 | Status: DISCONTINUED | OUTPATIENT
Start: 2018-11-30 | End: 2018-11-30

## 2018-11-30 RX ORDER — HEPARIN SODIUM 5000 [USP'U]/ML
8000 INJECTION INTRAVENOUS; SUBCUTANEOUS EVERY 6 HOURS
Qty: 0 | Refills: 0 | Status: DISCONTINUED | OUTPATIENT
Start: 2018-11-30 | End: 2018-11-30

## 2018-11-30 RX ORDER — HEPARIN SODIUM 5000 [USP'U]/ML
1400 INJECTION INTRAVENOUS; SUBCUTANEOUS
Qty: 25000 | Refills: 0 | Status: DISCONTINUED | OUTPATIENT
Start: 2018-11-30 | End: 2018-12-03

## 2018-11-30 RX ADMIN — CLOPIDOGREL BISULFATE 75 MILLIGRAM(S): 75 TABLET, FILM COATED ORAL at 12:50

## 2018-11-30 RX ADMIN — HEPARIN SODIUM 1400 UNIT(S)/HR: 5000 INJECTION INTRAVENOUS; SUBCUTANEOUS at 12:58

## 2018-11-30 RX ADMIN — SODIUM CHLORIDE 60 MILLILITER(S): 9 INJECTION INTRAMUSCULAR; INTRAVENOUS; SUBCUTANEOUS at 00:48

## 2018-11-30 RX ADMIN — ATORVASTATIN CALCIUM 40 MILLIGRAM(S): 80 TABLET, FILM COATED ORAL at 21:20

## 2018-11-30 RX ADMIN — HEPARIN SODIUM 1600 UNIT(S)/HR: 5000 INJECTION INTRAVENOUS; SUBCUTANEOUS at 19:59

## 2018-11-30 RX ADMIN — Medication 250 MILLIGRAM(S): at 12:50

## 2018-11-30 RX ADMIN — HEPARIN SODIUM 1400 UNIT(S)/HR: 5000 INJECTION INTRAVENOUS; SUBCUTANEOUS at 00:48

## 2018-11-30 RX ADMIN — SIMETHICONE 80 MILLIGRAM(S): 80 TABLET, CHEWABLE ORAL at 12:50

## 2018-11-30 RX ADMIN — Medication 81 MILLIGRAM(S): at 12:50

## 2018-11-30 RX ADMIN — Medication 3 MILLIGRAM(S): at 21:20

## 2018-11-30 NOTE — CONSULT NOTE ADULT - PROBLEM/RECOMMENDATION-4
Please review and advise what labs if any this pt will need before her appt on 10/4/17. Thank you. CLC   DISPLAY PLAN FREE TEXT

## 2018-11-30 NOTE — PROGRESS NOTE ADULT - PROBLEM SELECTOR PLAN 5
- patient with stage IV metastatic melanoma with progression of disease despite therapy  - currently on immunotherapy (ipilimumab), last dose was 4 weeks ago and next dose due Wednesday of this week  - patient with progression of disease seen on CT chest/abdomen/pelvis including new ascites (asymptomatic at this time)  - will hold anticoagulation until CT head is done to rule out new brain mets given patient has had extensive progression of disease (last CT head in March 2018 and negative)  - [place] [facility] [place]c [place]

## 2018-11-30 NOTE — PROGRESS NOTE ADULT - SUBJECTIVE AND OBJECTIVE BOX
Patient afebrile overnight.  Lactate this AM 11.9.  Thus far, UCx shows staph aureus.  Repeat BCx pending.  CT A/P again shows abdominal ascities, patient to have diagnostic paracentesis.  	  Vital Signs Last 24 Hrs  T(C): 36.6 (30 Nov 2018 06:48), Max: 36.9 (29 Nov 2018 21:26)  T(F): 97.8 (30 Nov 2018 06:48), Max: 98.4 (29 Nov 2018 21:26)  HR: 81 (30 Nov 2018 06:48) (81 - 93)  BP: 129/68 (30 Nov 2018 06:48) (129/68 - 146/72)  BP(mean): --  RR: 18 (30 Nov 2018 06:48) (18 - 20)  SpO2: 100% (30 Nov 2018 06:48) (99% - 100%)  PHYSICAL EXAM:    GENERAL: NAD, AAOx3   HEAD:  NC/AT  EYES: EOMI, PERRLA, no scleral icterus  HEENT: Moist mucous membranes  LUNG: Clear to auscultation bilaterally; No rales, rhonchi, wheezing, or rubs  HEART: RRR; No murmurs, rubs, or gallops  ABDOMEN: +BS, distended  EXTREMITIES:  2+ Peripheral Pulses, No clubbing, cyanosis, or edema  LAD: no palpable adenopathy  11-30    137  |  100  |  60<H>  ----------------------------<  74  3.8   |  12<L>  |  1.01    Ca    8.6      30 Nov 2018 05:24  Phos  3.0     11-30  Mg     2.8     11-30    TPro  6.7  /  Alb  3.2<L>  /  TBili  0.3  /  DBili  0.2  /  AST  27  /  ALT  15  /  AlkPhos  116  11-28      CBC Full  -  ( 30 Nov 2018 05:24 )  WBC Count : 9.76 K/uL  Hemoglobin : 12.2 g/dL  Hematocrit : 38.6 %  Platelet Count - Automated : 258 K/uL  Mean Cell Volume : 100.8 fL  Mean Cell Hemoglobin : 31.9 pg  Mean Cell Hemoglobin Concentration : 31.6 %  Auto Neutrophil # : 7.47 K/uL  Auto Lymphocyte # : 0.96 K/uL  Auto Monocyte # : 1.12 K/uL  Auto Eosinophil # : 0.14 K/uL  Auto Basophil # : 0.03 K/uL  Auto Neutrophil % : 76.6 %  Auto Lymphocyte % : 9.8 %  Auto Monocyte % : 11.5 %  Auto Eosinophil % : 1.4 %  Auto Basophil % : 0.3 %      LIVER FUNCTIONS - ( 28 Nov 2018 15:50 )  Alb: 3.2 g/dL / Pro: 6.7 g/dL / ALK PHOS: 116 u/L / ALT: 15 u/L / AST: 27 u/L / GGT: x             PTT - ( 29 Nov 2018 23:40 )  PTT:85.6 SEC

## 2018-11-30 NOTE — PROGRESS NOTE ADULT - PROBLEM SELECTOR PLAN 6
- Echo in January with EF 55-60%, grade II diastolic dysfunction  - on aldactone, furosemide, carvedilol, hydralazine, isosorbide  - consider restarting BP meds prn as BP remains stable.

## 2018-11-30 NOTE — CONSULT NOTE ADULT - PROBLEM SELECTOR RECOMMENDATION 9
s/p 4 doses of ipilumumab, s/p 1 dose of nivolumab on 11/1. Missed his last dose because he was hosptialized. He is a patient of Dr. Goldberg. Patient to f/u at [facility]. Appreciate oncology involvement. s/p 4 doses of ipilumumab, s/p 1 dose of nivolumab on 11/1. Missed his last dose because he was hospitalized. He is a patient of Dr. Goldberg. Patient to f/u at [facility]. Appreciate oncology involvement.

## 2018-11-30 NOTE — PROGRESS NOTE ADULT - ASSESSMENT
59 y/o Male with w/ severe diverticulosis, metastatic melanoma s/p resection on immunotherapy with progression of disease, meningothelial meningioma s/p resection and RT, CAD s/p stent admitted for sepsis secondary GPC bacteremia likely 2/2 Staph, Aureus UTI c/b L LE DVT, SILKE with metabolic acidosis in the setting of worsening metastatic disease; currently HD stable 57 y/o Male with w/ severe diverticulosis, metastatic melanoma s/p resection on immunotherapy with progression of disease, meningothelial meningioma s/p resection and RT, CAD s/p stent admitted for sepsis secondary GPC bacteremia likely 2/2 Staph, Aureus UTI c/b L LE DVT, SILKE with metabolic acidosis in the setting of worsening metastatic disease; currently HD stable, however worsening lactic acidemia

## 2018-11-30 NOTE — PROGRESS NOTE ADULT - ASSESSMENT
58 m with severe diverticulosis, CAD s/p stent, metastatic melanoma s/p resection on immunotherapy with progression of disease, meningothelial meningioma s/p resection and RT, was started on a new immunotherapy 4 weeks ago and now admitted for hypotension, SILKE, dehydration, nausea and vomiting,  metabolic acidosis  afebrile here, WBC 12, no urinary symptoms and u/s appears contaminated  chest/abd/pelvis CT with worsening metastatic disease but no source of infection  blood cx: coag neg staph   urine cx MSSA  TTE negative  lactate went up to 10 but chest/abd CT with contrast no source of infection    dehydration and SILKE, FTT due to metastatic melanoma vs immunotherapy  CT with significantly worsening metastatic disease and omental caking  the coag neg staph bacteremia likely contaminant but the urine cx with staph aureus  blood cx negative for 48 h and TTE negative  I called the lab there is no staph aureus in the blood, the coag neg staph will be  identified tomorrow to make sure it is not lugdunensis   elevated lactate to 11.9, but pt not toxic and exam benign s/p paracentesis 11.30   hypermagnesemia , metabolic acidosis      * f/u the paracentesis results, if s/o SBP, start zosyn   * c/w vanco per level for now, started 11/26, now day 5  * the coag neg staph will be identified today, will keep the vanco for now  * if the repeat blood cx remains negative for 48 hours and the coag neg is not lugdunensis, will discontinue vanco

## 2018-11-30 NOTE — PROGRESS NOTE ADULT - ASSESSMENT
59 y/o Male, ambulatory with a wheelchair and a walker, with MHx  significant for severe diverticulosis, metastatic melanoma s/p resection (s/p 4 doses of ipilumumab and 1 dose of nivolumab on 11/1) with progression of disease, meningothelial meningioma s/p resection and RT, CAD s/p stent who presented from [facility] for dehydration, nausea, and vomiting.  Continues to have elevated lactate.    Elevated lactate  - UCx showing staph aureus, on vancomysin however lactate continuing to rise  - for diagnostic paracentesis   - will look at peripheral smear to see if toxic features  - f/u ID recommendations    Metastatic melanoma  - s/p 4 doses of ipilumumab, s/p 1 dose of nivolumab on 11/1  - [place] [facility] [place] Nausheen Hakim, DO  Hematology/Oncology Fellow, PGY4  Pager: [phone]

## 2018-11-30 NOTE — CONSULT NOTE ADULT - SUBJECTIVE AND OBJECTIVE BOX
HPI:  59 y/o man, ambulatory with a wheelchair and a walker, with MHx  significant for severe diverticulosis, metastatic melanoma s/p resection on immunotherapy with progression of disease, meningothelial meningioma s/p resection and RT, CAD s/p stent who presents from [facility] for dehydration, nausea, and vomiting. The patient was seen at [facility] last monday and was given IV fluids given his symptoms of nausea and vomiting. He states he felt better after the fluids and was sent back home. However on Thursday, he began to develop nausea and vomiting once again. He states he vomiting several times over the course of the holiday and the weekend and it was whatever he tried to eat or drink. Denies any blood in his vomit. He also noticed he was having some diarrhea (watery with brown stool mixed in) which he has been having on and off since he started on his immunotherapy 4 weeks prior. He denies any blood in his stool. He denied fevers, chills, CP, abdominal pain, or dysuria but states he had felt SOB on exertion the last few days along with some diaphoresis. He went to [facility] today and was sent in by his oncologist for further evaluation. On examination he was resting in bed in no acute distress. Denies any symptoms at this time.     PERTINENT PM/SXH:   CAD (coronary artery disease)  Meningioma  Malignant melanoma, unspecified site  Benign neoplasm of brain, unspecified brain region  Heart failure, unspecified heart failure chronicity, unspecified heart failure type  Type 2 diabetes mellitus with complication, without long-term current use of insulin  Enlarged heart  HTN (hypertension)  DM (diabetes mellitus)  Sleep apnea    Eye symptoms  Stented coronary artery  No significant past surgical history    FAMILY HISTORY:  No pertinent family history in first degree relatives      SOCIAL HISTORY:   Significant other/partner: Yes [ ]  No [ x] Children:  Yes [ ]  No [ x] Uatsdin/Spirituality:  Substance hx: Yes[ ]  No [ x]   Tobacco hx:  Yes [ ] No [ x]   Alcohol hx: Yes [ ] No [x ]   Home Opioid hx:  [ ] I-Stop Reference No:  Living Situation: [ ]Home  [x ]Long term care  [ ]Rehab [ ]Other    ADVANCE DIRECTIVES:    DNR  Yes  MOLST  [ ]  Living Will  [ ]   DECISION MAKER(s):  [ ] Health Care Proxy(s)  [x ] Surrogate(s)  [ ] Guardian           Name(s): Phone Number(s): David Garibay     BASELINE (I)ADL(s) (prior to admission):  [place]: [ ]Total  [x ] Moderate [ ]Dependent    Allergies    No Known Allergies    Intolerances    MEDICATIONS  (STANDING):  aspirin enteric coated 81 milliGRAM(s) Oral daily  atorvastatin 40 milliGRAM(s) Oral at bedtime  clopidogrel Tablet 75 milliGRAM(s) Oral daily  dextrose 5%. 1000 milliLiter(s) (50 mL/Hr) IV Continuous <Continuous>  dextrose 50% Injectable 12.5 Gram(s) IV Push once  dextrose 50% Injectable 25 Gram(s) IV Push once  dextrose 50% Injectable 25 Gram(s) IV Push once  heparin  Infusion. 1400 Unit(s)/Hr (14 mL/Hr) IV Continuous <Continuous>  insulin lispro (HumaLOG) corrective regimen sliding scale   SubCutaneous three times a day before meals  insulin lispro (HumaLOG) corrective regimen sliding scale   SubCutaneous at bedtime  melatonin 3 milliGRAM(s) Oral at bedtime  simethicone 80 milliGRAM(s) Chew daily  sodium chloride 0.9%. 1000 milliLiter(s) (60 mL/Hr) IV Continuous <Continuous>    MEDICATIONS  (PRN):  acetaminophen   Tablet .. 650 milliGRAM(s) Oral every 6 hours PRN Mild Pain (1 - 3), Moderate Pain (4 - 6), Severe Pain (7 - 10)  AQUAPHOR (petrolatum Ointment) 1 Application(s) Topical three times a day PRN for comfort  dextrose 40% Gel 15 Gram(s) Oral once PRN Blood Glucose LESS THAN 70 milliGRAM(s)/deciliter  glucagon  Injectable 1 milliGRAM(s) IntraMuscular once PRN Glucose LESS THAN 70 milligrams/deciliter  heparin  Injectable 8000 Unit(s) IV Push every 6 hours PRN For aPTT less than 40  heparin  Injectable 4000 Unit(s) IV Push every 6 hours PRN For aPTT between 40 - 57    PRESENT SYMPTOMS: [ ]Unable to obtain due to poor mentation   Source if other than patient:  [ ]Family   [ ]Team     Pain (Impact on QOL):  No pain  Location -   Severity -        Minimal acceptable level (0-10 scale):  Quality:   Onset:   Duration:                 Aggravating factors -  Relieving factors -  Radiation -    PAIN AD Score:     [email or web address]/[email or web address].pdf (press ctrl +  left click to view)    Dyspnea:  Yes [ ] No [x ] - [ ]Mild [ ]Moderate [ ]Severe  Anxiety:    Yes [ ] No [x ] - [ ]Mild [ ]Moderate [ ]Severe  Fatigue:    Yes [ ] No [x ] - [ ]Mild [ ]Moderate [ ]Severe  Nausea:    Yes [ ] No [x ] - [ ]Mild [ ]Moderate [ ]Severe                         Loss of appetite: Yes [ ] No [x ] - [ ]Mild [ ]Moderate [ ]Severe             Constipation:  Yes [ ] No [ x] - [ ]Mild [ ]Moderate [ ]Severe    Other Symptoms:  [ ]All other review of systems negative     Karnofsky Performance Score/Palliative Performance Status Version 2:        60 %    http://palliative.info/resource_material/[email or web address].pdf  PHYSICAL EXAM:  Vital Signs Last 24 Hrs  T(C): 36.7 (30 Nov 2018 14:15), Max: 36.9 (29 Nov 2018 21:26)  T(F): 98.1 (30 Nov 2018 14:15), Max: 98.4 (29 Nov 2018 21:26)  HR: 84 (30 Nov 2018 14:15) (81 - 93)  BP: 121/68 (30 Nov 2018 14:15) (121/68 - 146/72)  BP(mean): --  RR: 18 (30 Nov 2018 14:15) (18 - 20)  SpO2: 99% (30 Nov 2018 14:15) (99% - 100%) I&O's Summary    29 Nov 2018 07:01  -  30 Nov 2018 07:00  --------------------------------------------------------  IN: 660 mL / OUT: 300 mL / NET: 360 mL    GENERAL:  [x ]Alert  [x ]Oriented x 3  [ ]Lethargic  [ ]Cachexia  [ ]Unarousable  [ ]Verbal  [ ]Non-Verbal  Behavioral:   [ ] Anxiety  [ ] Delirium [ ] Agitation [x ] Calm  HEENT:  [ ]Normal   [x ]Dry mouth   [ ]ET Tube/Trach  [ ]Oral lesions  PULMONARY:   [x ]Clear  [ ]Tachypnea  [ ]Audible excessive secretions   [ ]Rhonchi        [ ]Right [ ]Left [ ]Bilateral  [ ]Crackles        [ ]Right [ ]Left [ ]Bilateral  [ ]Wheezing     [ ]Right [ ]Left [ ]Bilateral  CARDIOVASCULAR:    [ x]Regular [ ]Irregular [ ]Tachy  [ ]Steve [ ]Murmur [ ]Other  GASTROINTESTINAL:  [x ]Soft  [ ]Distended   [ ]+BS  [ x]Non tender [ ]Tender  [ ]PEG [ ]OGT/ NGT  Last BM: 11/30/18    GENITOURINARY:  [x ]Normal [ ] Incontinent   [ ]Oliguria/Anuria   [ ]Cook  MUSCULOSKELETAL:   [ ]Normal   [ x]Weakness  [ ]Bed/Wheelchair bound [x ]Edema - Left lower extremity   NEUROLOGIC:   [x ]No focal deficits  [ ] Cognitive impairment  [ ] Dysphagia [ ]Dysarthria [ ] Paresis [ ]Other   SKIN:   [x ]Normal   [ ]Pressure ulcer(s)  [ ]Rash    LABS:                        12.2   9.76  )-----------( 258      ( 30 Nov 2018 05:24 )             38.6   11-30    137  |  100  |  60<H>  ----------------------------<  74  3.8   |  12<L>  |  1.01    Ca    8.6      30 Nov 2018 05:24  Phos  3.0     11-30  Mg     2.8     11-30    TPro  6.7  /  Alb  3.2<L>  /  TBili  0.3  /  DBili  0.2  /  AST  27  /  ALT  15  /  AlkPhos  116  11-28  PTT - ( 29 Nov 2018 23:40 )  PTT:85.6 SEC      RADIOLOGY & ADDITIONAL STUDIES:  < from: CT Abdomen and Pelvis w/ IV Cont (11.28.18 @ 17:53) >  EXAM:  CT ABDOMEN AND PELVIS IC      EXAM:  CT CHEST IC        PROCEDURE DATE:  Nov 28 2018 IMPRESSION:     *  No infectious source is identified.  *  Unchanged metastatic disease.  *  Large volume of ascites unchanged from 11/26/2018.  *  Findings suspicious for subsegmental pulmonary embolism the left lower   lobe.  *  Findings raising a question of left lower extremity DVT. Correlate   with sonography.    Findings discussed by Dr. Wong with ASMITA Benavides on 11/29/18 at   11:10 AM with read back.     < end of copied text >    PROTEIN CALORIE MALNUTRITION PRESENT: [ ] Yes [ ] No  [ ] PPSV2 < or = to 30% [ ] significant weight loss  [ ] poor nutritional intake [ ] catabolic state [ ] anasarca     Albumin, Serum: 3.2 g/dL (11-28-18 @ 15:50)      REFERRALS:   [ ]Chaplaincy  [ ] Hospice  [ ]Child Life  [ ]Social Work  [ ]Case management [ ]Holistic Therapy   Goals of Care Discussion Document: HPI:  59 y/o man, ambulatory with a wheelchair and a walker, with MHx  significant for severe diverticulosis, metastatic melanoma s/p resection on immunotherapy with progression of disease, meningothelial meningioma s/p resection and RT, CAD s/p stent who presents from [facility] for dehydration, nausea, and vomiting. The patient was seen at [facility] last monday and was given IV fluids given his symptoms of nausea and vomiting. He states he felt better after the fluids and was sent back home. However on Thursday, he began to develop nausea and vomiting once again. He states he vomiting several times over the course of the holiday and the weekend and it was whatever he tried to eat or drink. Denies any blood in his vomit. He also noticed he was having some diarrhea (watery with brown stool mixed in) which he has been having on and off since he started on his immunotherapy 4 weeks prior. He denies any blood in his stool. He denied fevers, chills, CP, abdominal pain, or dysuria but states he had felt SOB on exertion the last few days along with some diaphoresis. He went to [facility] today and was sent in by his oncologist for further evaluation. On examination he was resting in bed in no acute distress. Denies any symptoms at this time.     PERTINENT PM/SXH:   CAD (coronary artery disease)  Meningioma  Malignant melanoma, unspecified site  Benign neoplasm of brain, unspecified brain region  Heart failure, unspecified heart failure chronicity, unspecified heart failure type  Type 2 diabetes mellitus with complication, without long-term current use of insulin  Enlarged heart  HTN (hypertension)  DM (diabetes mellitus)  Sleep apnea    Eye symptoms  Stented coronary artery  No significant past surgical history    FAMILY HISTORY:  No pertinent family history in first degree relatives    SOCIAL HISTORY:   Significant other/partner: Yes [ ]  No [ x] Children:  Yes [ ]  No [ x] Jewish/Spirituality:  Substance hx: Yes[ ]  No [ x]   Tobacco hx:  Yes [ ] No [ x]   Alcohol hx: Yes [ ] No [x ]   Home Opioid hx:  [ ] I-Stop Reference No:  Living Situation: [ ]Home  [x ]Long term care  [ ]Rehab [ ]Other    ADVANCE DIRECTIVES:    DNR  Yes  MOLST  [ ]  Living Will  [ ]     DECISION MAKER(s):  [ ] Health Care Proxy(s)  [x ] Surrogate(s)  [ ] Guardian           Name(s): Phone Number(s): David Garibay     BASELINE (I)ADL(s) (prior to admission):  [place]: [ ]Total  [x ] Moderate [ ]Dependent    Allergies    No Known Allergies    Intolerances    MEDICATIONS  (STANDING):  aspirin enteric coated 81 milliGRAM(s) Oral daily  atorvastatin 40 milliGRAM(s) Oral at bedtime  clopidogrel Tablet 75 milliGRAM(s) Oral daily  dextrose 5%. 1000 milliLiter(s) (50 mL/Hr) IV Continuous <Continuous>  dextrose 50% Injectable 12.5 Gram(s) IV Push once  dextrose 50% Injectable 25 Gram(s) IV Push once  dextrose 50% Injectable 25 Gram(s) IV Push once  heparin  Infusion. 1400 Unit(s)/Hr (14 mL/Hr) IV Continuous <Continuous>  insulin lispro (HumaLOG) corrective regimen sliding scale   SubCutaneous three times a day before meals  insulin lispro (HumaLOG) corrective regimen sliding scale   SubCutaneous at bedtime  melatonin 3 milliGRAM(s) Oral at bedtime  simethicone 80 milliGRAM(s) Chew daily  sodium chloride 0.9%. 1000 milliLiter(s) (60 mL/Hr) IV Continuous <Continuous>    MEDICATIONS  (PRN):  acetaminophen   Tablet .. 650 milliGRAM(s) Oral every 6 hours PRN Mild Pain (1 - 3), Moderate Pain (4 - 6), Severe Pain (7 - 10)  AQUAPHOR (petrolatum Ointment) 1 Application(s) Topical three times a day PRN for comfort  dextrose 40% Gel 15 Gram(s) Oral once PRN Blood Glucose LESS THAN 70 milliGRAM(s)/deciliter  glucagon  Injectable 1 milliGRAM(s) IntraMuscular once PRN Glucose LESS THAN 70 milligrams/deciliter  heparin  Injectable 8000 Unit(s) IV Push every 6 hours PRN For aPTT less than 40  heparin  Injectable 4000 Unit(s) IV Push every 6 hours PRN For aPTT between 40 - 57    PRESENT SYMPTOMS: [ ]Unable to obtain due to poor mentation   Source if other than patient:  [ ]Family   [ ]Team     Pain (Impact on QOL):  No pain, with current pain regimen.   Location -   Severity -        Minimal acceptable level (0-10 scale):  Quality:   Onset:   Duration:                 Aggravating factors -  Relieving factors -  Radiation -    PAIN AD Score:     [email or web address] (press ctrl +  left click to view)    Dyspnea:  Yes [ ] No [x ] - [ ]Mild [ ]Moderate [ ]Severe  Anxiety:    Yes [ ] No [x ] - [ ]Mild [ ]Moderate [ ]Severe  Fatigue:    Yes [ ] No [x ] - [ ]Mild [ ]Moderate [ ]Severe  Nausea:    Yes [ ] No [x ] - [ ]Mild [ ]Moderate [ ]Severe                         Loss of appetite: Yes [ ] No [x ] - [ ]Mild [ ]Moderate [ ]Severe             Constipation:  Yes [ ] No [ x] - [ ]Mild [ ]Moderate [ ]Severe    Other Symptoms:  [x ]All other review of systems negative     Karnofsky Performance Score/Palliative Performance Status Version 2:        60 %    http://palliative.info/resource_material/[email or web address].pdf    PHYSICAL EXAM:  Vital Signs Last 24 Hrs  T(C): 36.7 (30 Nov 2018 14:15), Max: 36.9 (29 Nov 2018 21:26)  T(F): 98.1 (30 Nov 2018 14:15), Max: 98.4 (29 Nov 2018 21:26)  HR: 84 (30 Nov 2018 14:15) (81 - 93)  BP: 121/68 (30 Nov 2018 14:15) (121/68 - 146/72)  BP(mean): --  RR: 18 (30 Nov 2018 14:15) (18 - 20)  SpO2: 99% (30 Nov 2018 14:15) (99% - 100%) I&O's Summary    29 Nov 2018 07:01  -  30 Nov 2018 07:00  --------------------------------------------------------  IN: 660 mL / OUT: 300 mL / NET: 360 mL    GENERAL:  [x ]Alert  [x ]Oriented x 3  [ ]Lethargic  [ ]Cachexia  [ ]Unarousable  [x ]Verbal  [ ]Non-Verbal  Behavioral:   [ ] Anxiety  [ ] Delirium [ ] Agitation [x ] Calm  HEENT:  [ ]Normal   [x ]Dry mouth   [ ]ET Tube/Trach  [ ]Oral lesions  PULMONARY:   [x ]Clear  [ ]Tachypnea  [ ]Audible excessive secretions   [ ]Rhonchi        [ ]Right [ ]Left [ ]Bilateral  [ ]Crackles        [ ]Right [ ]Left [ ]Bilateral  [ ]Wheezing     [ ]Right [ ]Left [ ]Bilateral  CARDIOVASCULAR:    [ x]Regular [ ]Irregular [ ]Tachy  [ ]Steve [ ]Murmur [ ]Other  GASTROINTESTINAL:  [x ]Soft  [ ]Distended   [ ]+BS  [ x]Non tender [ ]Tender  [ ]PEG [ ]OGT/ NGT  Last BM: 11/30/18    GENITOURINARY:  [x ]Normal [ ] Incontinent   [ ]Oliguria/Anuria   [ ]Cook  MUSCULOSKELETAL:   [ ]Normal   [ x]Weakness  [ ]Bed/Wheelchair bound [x ]Edema - Left lower extremity   NEUROLOGIC:   [x ]No focal deficits  [ ] Cognitive impairment  [ ] Dysphagia [ ]Dysarthria [ ] Paresis [ ]Other   SKIN:   [x ]Normal   [ ]Pressure ulcer(s)  [ ]Rash    LABS:                        12.2   9.76  )-----------( 258      ( 30 Nov 2018 05:24 )             38.6   11-30    137  |  100  |  60<H>  ----------------------------<  74  3.8   |  12<L>  |  1.01    Ca    8.6      30 Nov 2018 05:24  Phos  3.0     11-30  Mg     2.8     11-30    TPro  6.7  /  Alb  3.2<L>  /  TBili  0.3  /  DBili  0.2  /  AST  27  /  ALT  15  /  AlkPhos  116  11-28  PTT - ( 29 Nov 2018 23:40 )  PTT:85.6 SEC    RADIOLOGY & ADDITIONAL STUDIES:    < from: CT Abdomen and Pelvis w/ IV Cont (11.28.18 @ 17:53) >  EXAM:  CT ABDOMEN AND PELVIS IC      EXAM:  CT CHEST IC      PROCEDURE DATE:  Nov 28 2018 IMPRESSION:     *  No infectious source is identified.  *  Unchanged metastatic disease.  *  Large volume of ascites unchanged from 11/26/2018.  *  Findings suspicious for subsegmental pulmonary embolism the left lower   lobe.  *  Findings raising a question of left lower extremity DVT. Correlate   with sonography.    Findings discussed by Dr. Wogn with ASMITA Benavides on 11/29/18 at   11:10 AM with read back.     PROTEIN CALORIE MALNUTRITION PRESENT: [ ] Yes [ ] No  [ ] PPSV2 < or = to 30% [ ] significant weight loss  [ ] poor nutritional intake [ ] catabolic state [ ] anasarca     Albumin, Serum: 3.2 g/dL (11-28-18 @ 15:50)    REFERRALS:   [ ]Chaplaincy  [ ] Hospice  [ ]Child Life  [ ]Social Work  [ ]Case management [ ]Holistic Therapy   Goals of Care Discussion Document:

## 2018-11-30 NOTE — PROGRESS NOTE ADULT - PROBLEM SELECTOR PLAN 9
No c/o CP, palpitations or FAIR; Screening Trop sent by ED; Very low suspicion of ACS;  - patient with CAD, s/p stent in January 2018  - troponin elevated but likely in the setting of SILKE  - very low suspicion of ACS at this time given unchanged EKG with no acute ST changes  and lack of symptoms as outlined above   - will continue ASA and plavix

## 2018-11-30 NOTE — PROGRESS NOTE ADULT - PROBLEM SELECTOR PLAN 1
Sepsis secondary likely to UTI (urine cx: Staph aureus), mild leukocytosis, worsening lactate. TTE negative for infection.   ID consulted. BCx (9/26): GPC in clusters  - Repeat blood cx until negative  -  ID recs appreciated; Will dose Vanc by lvl  - C/w NS IVF @ 60cc Sepsis secondary likely to UTI (urine cx: Staph aureus), mild leukocytosis, worsening lactate. TTE negative for infection.   ID consulted. BCx (11/26): GPC in clusters  - Blood cx (11/28): NGTD  - ID recs appreciated; Will dose Vanc by lvl  - C/w NS IVF @ 60cc

## 2018-11-30 NOTE — PROGRESS NOTE ADULT - SUBJECTIVE AND OBJECTIVE BOX
Edmond Benavides MD  PGY 1 Department of Internal Medicine  Pager: [phone]/ [phone]    Patient is a 58y old  Male who presents with a chief complaint of Sepsis secondary to UTI (29 Nov 2018 15:29)      SUBJECTIVE / OVERNIGHT EVENTS: Pt seen and examined. No acute overnight events. Pt reported no subjective complaints.     MEDICATIONS  (STANDING):  aspirin enteric coated 81 milliGRAM(s) Oral daily  atorvastatin 40 milliGRAM(s) Oral at bedtime  clopidogrel Tablet 75 milliGRAM(s) Oral daily  dextrose 5%. 1000 milliLiter(s) (50 mL/Hr) IV Continuous <Continuous>  dextrose 50% Injectable 12.5 Gram(s) IV Push once  dextrose 50% Injectable 25 Gram(s) IV Push once  dextrose 50% Injectable 25 Gram(s) IV Push once  insulin lispro (HumaLOG) corrective regimen sliding scale   SubCutaneous three times a day before meals  insulin lispro (HumaLOG) corrective regimen sliding scale   SubCutaneous at bedtime  melatonin 3 milliGRAM(s) Oral at bedtime  simethicone 80 milliGRAM(s) Chew daily  sodium chloride 0.9%. 1000 milliLiter(s) (60 mL/Hr) IV Continuous <Continuous>    MEDICATIONS  (PRN):  acetaminophen   Tablet .. 650 milliGRAM(s) Oral every 6 hours PRN Mild Pain (1 - 3), Moderate Pain (4 - 6), Severe Pain (7 - 10)  AQUAPHOR (petrolatum Ointment) 1 Application(s) Topical three times a day PRN for comfort  dextrose 40% Gel 15 Gram(s) Oral once PRN Blood Glucose LESS THAN 70 milliGRAM(s)/deciliter  glucagon  Injectable 1 milliGRAM(s) IntraMuscular once PRN Glucose LESS THAN 70 milligrams/deciliter      I&O's Summary    28 Nov 2018 07:01  -  29 Nov 2018 07:00  --------------------------------------------------------  IN: 0 mL / OUT: 300 mL / NET: -300 mL        Vital Signs Last 24 Hrs  T(C): 36.6 (30 Nov 2018 06:48), Max: 36.9 (29 Nov 2018 21:26)  T(F): 97.8 (30 Nov 2018 06:48), Max: 98.4 (29 Nov 2018 21:26)  HR: 81 (30 Nov 2018 06:48) (78 - 93)  BP: 129/68 (30 Nov 2018 06:48) (117/87 - 146/72)  BP(mean): --  RR: 18 (30 Nov 2018 06:48) (18 - 20)  SpO2: 100% (30 Nov 2018 06:48) (99% - 100%)    CAPILLARY BLOOD GLUCOSE      POCT Blood Glucose.: 81 mg/dL (29 Nov 2018 22:20)  POCT Blood Glucose.: 79 mg/dL (29 Nov 2018 18:31)  POCT Blood Glucose.: 70 mg/dL (29 Nov 2018 17:53)  POCT Blood Glucose.: 100 mg/dL (29 Nov 2018 12:21)  POCT Blood Glucose.: 85 mg/dL (29 Nov 2018 09:15)      PHYSICAL EXAM:  GENERAL: NAD,   HEAD:  Atraumatic, Normocephalic  EYES: EOMI, PERRL, conjunctiva and sclera clear  NECK: No JVD  CHEST/LUNG: Clear to auscultation bilaterally; No wheeze  HEART: Regular rate and rhythm; No murmurs, rubs, or gallops  ABDOMEN: Soft, Nontender, Nondistended; Bowel sounds present  EXTREMITIES:  2+ Peripheral Pulses, No clubbing, cyanosis, or edema  PSYCH: AAOx3  NEUROLOGY: non-focal  SKIN: No rashes or lesions    LABS:                        12.2   9.76  )-----------( 258      ( 30 Nov 2018 05:24 )             38.6     Auto Eosinophil # 0.14  / Auto Eosinophil % 1.4   / Auto Neutrophil # 7.47  / Auto Neutrophil % 76.6  / BANDS % x                            12.4   9.32  )-----------( 264      ( 29 Nov 2018 09:05 )             38.9     Auto Eosinophil # 0.13  / Auto Eosinophil % 1.4   / Auto Neutrophil # 7.37  / Auto Neutrophil % 79.2  / BANDS % x                            12.0   10.12 )-----------( 256      ( 28 Nov 2018 15:50 )             38.2     Auto Eosinophil # x     / Auto Eosinophil % x     / Auto Neutrophil # x     / Auto Neutrophil % x     / BANDS % x        11-29    139  |  100  |  69<H>  ----------------------------<  87  3.9   |  13<L>  |  1.29  11-28    137  |  99  |  81<H>  ----------------------------<  97  4.1   |  14<L>  |  1.60<H>    Ca    8.4      29 Nov 2018 10:57  TPro  6.7  /  Alb  3.2<L>  /  TBili  0.3  /  DBili  0.2  /  AST  27  /  ALT  15  /  AlkPhos  116  11-28    PTT - ( 29 Nov 2018 23:40 )  PTT:85.6 SEC        Lactate, Blood: 11.9 mmol/L (11-30 @ 05:24)  Lactate, Blood: 10.6 mmol/L (11-29 @ 09:05)  Lactate, Blood: 8.8 mmol/L (11-28 @ 06:15)        RADIOLOGY & ADDITIONAL TESTS:    Imaging Personally Reviewed:    Consultant(s) Notes Reviewed:      Care Discussed with Consultants/Other Providers:

## 2018-11-30 NOTE — PROGRESS NOTE ADULT - PROBLEM SELECTOR PLAN 4
Extensive DVT found on L LE. Pt started on Heparin ggt after CTH r/o brain mets  - C/w Heparin drip Improving.  - likely pre-renal secondary to dehydration vs side effect from immunotherapy  - CT abdomen/pelvis does not show any signs of bladder obstruction  - continue to trend bmp

## 2018-11-30 NOTE — PROGRESS NOTE ADULT - PROBLEM SELECTOR PLAN 2
- 2/2 lactic acidosis 2/2 urosepsis vs LLE ischemia d/t extensive clot burden vs metastatic melanoma  - Trend BMP  - Trend Lactate  - Follow up final read CT chest, A/P - 2/2 lactic acidosis. Pt found to have large volume ascites on CT A/P.   - Dx paracentesis today to r/o SBP (however, no abdominal pain, rigid abdomen)  - Trend BMP  - Trend Lactate

## 2018-11-30 NOTE — CONSULT NOTE ADULT - PROBLEM SELECTOR RECOMMENDATION 4
Discussion had with patient, he understands his overall disease and his goal is to improve so he can continue with this immunotherapy. DNR/DNI remains consistent with his wishes at the end of life. Will need MOLST form to respect current wishes at this Extended care facility.

## 2018-11-30 NOTE — PROGRESS NOTE ADULT - PROBLEM SELECTOR PLAN 3
Improving.  - likely pre-renal secondary to dehydration vs side effect from immunotherapy  - CT abdomen/pelvis does not show any signs of bladder obstruction  - continue to trend bmp Extensive DVT found on L LE. Evidence of LLL subsegmental PE. Pt started on Heparin ggt after CTH r/o brain mets  - C/w Heparin drip  - May need IVC filter; consider IR consult

## 2018-11-30 NOTE — PROCEDURE NOTE - NSPROCDETAILS_GEN_ALL_CORE
ultrasound utilization/location identified, sterile technique used, catheter introduced, fluid drained

## 2018-11-30 NOTE — PROGRESS NOTE ADULT - SUBJECTIVE AND OBJECTIVE BOX
Follow Up:  bacteremia    Interval History: pt stable and afebrile but the lactate is 11.9 and urine cx showed staph aureus, s/p paracentesis today    ROS:      All other systems negative    Constitutional: no fever, no chills  Head: no trauma  Eyes: no vision changes, no eye pain  ENT:  no sore throat, no rhinorrhea  Cardiovascular:  no chest pain, no palpitation  Respiratory:  no SOB, no cough  GI:  no abd pain, no vomiting, no diarrhea  urinary: no dysuria, no hematuria, no flank pain  musculoskeletal: LE edema  skin:  no rash  neurology:  no headache, no seizure, no change in mental status  psych: no anxiety, no depression         Allergies  No Known Allergies        ANTIMICROBIALS:      OTHER MEDS:  acetaminophen   Tablet .. 650 milliGRAM(s) Oral every 6 hours PRN  AQUAPHOR (petrolatum Ointment) 1 Application(s) Topical three times a day PRN  aspirin enteric coated 81 milliGRAM(s) Oral daily  atorvastatin 40 milliGRAM(s) Oral at bedtime  clopidogrel Tablet 75 milliGRAM(s) Oral daily  dextrose 40% Gel 15 Gram(s) Oral once PRN  dextrose 5%. 1000 milliLiter(s) IV Continuous <Continuous>  dextrose 50% Injectable 12.5 Gram(s) IV Push once  dextrose 50% Injectable 25 Gram(s) IV Push once  dextrose 50% Injectable 25 Gram(s) IV Push once  glucagon  Injectable 1 milliGRAM(s) IntraMuscular once PRN  heparin  Infusion. 1400 Unit(s)/Hr IV Continuous <Continuous>  heparin  Injectable 8000 Unit(s) IV Push every 6 hours PRN  heparin  Injectable 4000 Unit(s) IV Push every 6 hours PRN  insulin lispro (HumaLOG) corrective regimen sliding scale   SubCutaneous three times a day before meals  insulin lispro (HumaLOG) corrective regimen sliding scale   SubCutaneous at bedtime  melatonin 3 milliGRAM(s) Oral at bedtime  simethicone 80 milliGRAM(s) Chew daily  sodium chloride 0.9%. 1000 milliLiter(s) IV Continuous <Continuous>      Vital Signs Last 24 Hrs  T(C): 36.7 (30 Nov 2018 14:15), Max: 36.9 (29 Nov 2018 21:26)  T(F): 98.1 (30 Nov 2018 14:15), Max: 98.4 (29 Nov 2018 21:26)  HR: 84 (30 Nov 2018 14:15) (81 - 93)  BP: 121/68 (30 Nov 2018 14:15) (121/68 - 146/72)  BP(mean): --  RR: 18 (30 Nov 2018 14:15) (18 - 20)  SpO2: 99% (30 Nov 2018 14:15) (99% - 100%)    Physical Exam:  General:    NAD,  non toxic  Head: atraumatic, normocephalic  Eye: normal sclera and conjunctiva  ENT:    no oropharyngeal lesions,   no LAD,   neck supple  Cardio:     regular S1, S2,  no murmur  Respiratory:    clear b/l,    no wheezing  abd:     soft,   BS +,   no tenderness,    no organomegaly  :   no CVAT,  no suprapubic tenderness,   no  allen  Musculoskeletal:   b/l LE edema L significantly >R  vascular: no lines, normal pulses  Skin:    no rash  Neurologic:     no focal deficit  psych: normal affect, no suicidal ideation                        12.2   9.76  )-----------( 258      ( 30 Nov 2018 05:24 )             38.6       11-30    137  |  100  |  60<H>  ----------------------------<  74  3.8   |  12<L>  |  1.01    Ca    8.6      30 Nov 2018 05:24  Phos  3.0     11-30  Mg     2.8     11-30    TPro  6.7  /  Alb  3.2<L>  /  TBili  0.3  /  DBili  0.2  /  AST  27  /  ALT  15  /  AlkPhos  116  11-28          MICROBIOLOGY:  Vancomycin Level, Random: 15.7 ug/mL (11-30-18 @ 05:24)  v  BLOOD PERIPHERAL  11-28-18 --  --  --      URINE MIDSTREAM  11-26-18 --  --  Staphylococcus aureus      BLOOD PERIPHERAL  11-26-18 --  --  BLOOD CULTURE PCR  Staphylococcus sp.,coag neg      .Stool  11-19-18   No enteric pathogens isolated.  (Stool culture examined for Salmonella,  Shigella, Campylobacter, Aeromonas, Plesiomonas,  Vibrio, E.coli O157 and Yersinia)  --  --                RADIOLOGY:  Images below reviewed personally  < from: CT Abdomen and Pelvis w/ IV Cont (11.28.18 @ 17:53) >    IMPRESSION:     *  No infectious source is identified.  *  Unchanged metastatic disease.  *  Large volume of ascites unchanged from 11/26/2018.  *  Findings suspicious for subsegmental pulmonary embolism the left lower   lobe.  *  Findings raising a question of left lower extremity DVT. Correlate   with sonography.

## 2018-12-01 DIAGNOSIS — R94.31 ABNORMAL ELECTROCARDIOGRAM [ECG] [EKG]: ICD-10-CM

## 2018-12-01 LAB
APTT BLD: 102.1 SEC — HIGH (ref 27.5–36.3)
APTT BLD: 82.2 SEC — HIGH (ref 27.5–36.3)
APTT BLD: 86.8 SEC — HIGH (ref 27.5–36.3)
BACTERIA BLD CULT: SIGNIFICANT CHANGE UP
BASOPHILS # BLD AUTO: 0.04 K/UL — SIGNIFICANT CHANGE UP (ref 0–0.2)
BASOPHILS NFR BLD AUTO: 0.4 % — SIGNIFICANT CHANGE UP (ref 0–2)
BUN SERPL-MCNC: 56 MG/DL — HIGH (ref 7–23)
CALCIUM SERPL-MCNC: 8.8 MG/DL — SIGNIFICANT CHANGE UP (ref 8.4–10.5)
CHLORIDE SERPL-SCNC: 98 MMOL/L — SIGNIFICANT CHANGE UP (ref 98–107)
CO2 SERPL-SCNC: 16 MMOL/L — LOW (ref 22–31)
CREAT SERPL-MCNC: 1.05 MG/DL — SIGNIFICANT CHANGE UP (ref 0.5–1.3)
EOSINOPHIL # BLD AUTO: 0.13 K/UL — SIGNIFICANT CHANGE UP (ref 0–0.5)
EOSINOPHIL NFR BLD AUTO: 1.3 % — SIGNIFICANT CHANGE UP (ref 0–6)
GLUCOSE BLDC GLUCOMTR-MCNC: 101 MG/DL — HIGH (ref 70–99)
GLUCOSE BLDC GLUCOMTR-MCNC: 102 MG/DL — HIGH (ref 70–99)
GLUCOSE BLDC GLUCOMTR-MCNC: 92 MG/DL — SIGNIFICANT CHANGE UP (ref 70–99)
GLUCOSE BLDC GLUCOMTR-MCNC: 98 MG/DL — SIGNIFICANT CHANGE UP (ref 70–99)
GLUCOSE SERPL-MCNC: 94 MG/DL — SIGNIFICANT CHANGE UP (ref 70–99)
HCT VFR BLD CALC: 38.2 % — LOW (ref 39–50)
HCT VFR BLD CALC: 38.2 % — LOW (ref 39–50)
HGB BLD-MCNC: 12.1 G/DL — LOW (ref 13–17)
HGB BLD-MCNC: 12.1 G/DL — LOW (ref 13–17)
IMM GRANULOCYTES # BLD AUTO: 0.04 # — SIGNIFICANT CHANGE UP
IMM GRANULOCYTES NFR BLD AUTO: 0.4 % — SIGNIFICANT CHANGE UP (ref 0–1.5)
LACTATE SERPL-SCNC: 10.4 MMOL/L — CRITICAL HIGH (ref 0.5–2)
LYMPHOCYTES # BLD AUTO: 0.73 K/UL — LOW (ref 1–3.3)
LYMPHOCYTES # BLD AUTO: 7.2 % — LOW (ref 13–44)
MAGNESIUM SERPL-MCNC: 2.8 MG/DL — HIGH (ref 1.6–2.6)
MCHC RBC-ENTMCNC: 31.3 PG — SIGNIFICANT CHANGE UP (ref 27–34)
MCHC RBC-ENTMCNC: 31.3 PG — SIGNIFICANT CHANGE UP (ref 27–34)
MCHC RBC-ENTMCNC: 31.7 % — LOW (ref 32–36)
MCHC RBC-ENTMCNC: 31.7 % — LOW (ref 32–36)
MCV RBC AUTO: 98.7 FL — SIGNIFICANT CHANGE UP (ref 80–100)
MCV RBC AUTO: 98.7 FL — SIGNIFICANT CHANGE UP (ref 80–100)
MONOCYTES # BLD AUTO: 0.99 K/UL — HIGH (ref 0–0.9)
MONOCYTES NFR BLD AUTO: 9.8 % — SIGNIFICANT CHANGE UP (ref 2–14)
NEUTROPHILS # BLD AUTO: 8.17 K/UL — HIGH (ref 1.8–7.4)
NEUTROPHILS NFR BLD AUTO: 80.9 % — HIGH (ref 43–77)
NRBC # FLD: 0 — SIGNIFICANT CHANGE UP
NRBC # FLD: 0 — SIGNIFICANT CHANGE UP
PHOSPHATE SERPL-MCNC: 3.1 MG/DL — SIGNIFICANT CHANGE UP (ref 2.5–4.5)
PLATELET # BLD AUTO: 290 K/UL — SIGNIFICANT CHANGE UP (ref 150–400)
PLATELET # BLD AUTO: 290 K/UL — SIGNIFICANT CHANGE UP (ref 150–400)
PMV BLD: 11 FL — SIGNIFICANT CHANGE UP (ref 7–13)
PMV BLD: 11 FL — SIGNIFICANT CHANGE UP (ref 7–13)
POTASSIUM SERPL-MCNC: 4 MMOL/L — SIGNIFICANT CHANGE UP (ref 3.5–5.3)
POTASSIUM SERPL-SCNC: 4 MMOL/L — SIGNIFICANT CHANGE UP (ref 3.5–5.3)
RBC # BLD: 3.87 M/UL — LOW (ref 4.2–5.8)
RBC # BLD: 3.87 M/UL — LOW (ref 4.2–5.8)
RBC # FLD: 15.8 % — HIGH (ref 10.3–14.5)
RBC # FLD: 15.8 % — HIGH (ref 10.3–14.5)
SODIUM SERPL-SCNC: 136 MMOL/L — SIGNIFICANT CHANGE UP (ref 135–145)
WBC # BLD: 10.1 K/UL — SIGNIFICANT CHANGE UP (ref 3.8–10.5)
WBC # BLD: 10.1 K/UL — SIGNIFICANT CHANGE UP (ref 3.8–10.5)
WBC # FLD AUTO: 10.1 K/UL — SIGNIFICANT CHANGE UP (ref 3.8–10.5)
WBC # FLD AUTO: 10.1 K/UL — SIGNIFICANT CHANGE UP (ref 3.8–10.5)

## 2018-12-01 PROCEDURE — 99233 SBSQ HOSP IP/OBS HIGH 50: CPT | Mod: GC

## 2018-12-01 PROCEDURE — 93010 ELECTROCARDIOGRAM REPORT: CPT

## 2018-12-01 RX ORDER — SODIUM CHLORIDE 9 MG/ML
1000 INJECTION INTRAMUSCULAR; INTRAVENOUS; SUBCUTANEOUS
Qty: 0 | Refills: 0 | Status: DISCONTINUED | OUTPATIENT
Start: 2018-12-01 | End: 2018-12-02

## 2018-12-01 RX ORDER — VANCOMYCIN HCL 1 G
1000 VIAL (EA) INTRAVENOUS ONCE
Qty: 0 | Refills: 0 | Status: COMPLETED | OUTPATIENT
Start: 2018-12-01 | End: 2018-12-01

## 2018-12-01 RX ADMIN — CLOPIDOGREL BISULFATE 75 MILLIGRAM(S): 75 TABLET, FILM COATED ORAL at 12:26

## 2018-12-01 RX ADMIN — SIMETHICONE 80 MILLIGRAM(S): 80 TABLET, CHEWABLE ORAL at 12:26

## 2018-12-01 RX ADMIN — HEPARIN SODIUM 1400 UNIT(S)/HR: 5000 INJECTION INTRAVENOUS; SUBCUTANEOUS at 18:19

## 2018-12-01 RX ADMIN — HEPARIN SODIUM 1400 UNIT(S)/HR: 5000 INJECTION INTRAVENOUS; SUBCUTANEOUS at 11:03

## 2018-12-01 RX ADMIN — ATORVASTATIN CALCIUM 40 MILLIGRAM(S): 80 TABLET, FILM COATED ORAL at 21:54

## 2018-12-01 RX ADMIN — HEPARIN SODIUM 1400 UNIT(S)/HR: 5000 INJECTION INTRAVENOUS; SUBCUTANEOUS at 03:29

## 2018-12-01 RX ADMIN — Medication 3 MILLIGRAM(S): at 21:55

## 2018-12-01 RX ADMIN — SODIUM CHLORIDE 60 MILLILITER(S): 9 INJECTION INTRAMUSCULAR; INTRAVENOUS; SUBCUTANEOUS at 12:25

## 2018-12-01 RX ADMIN — Medication 81 MILLIGRAM(S): at 12:26

## 2018-12-01 RX ADMIN — Medication 250 MILLIGRAM(S): at 12:25

## 2018-12-01 NOTE — PROGRESS NOTE ADULT - ASSESSMENT
57 y/o Male with w/ severe diverticulosis, metastatic melanoma s/p resection on immunotherapy with progression of disease, meningothelial meningioma s/p resection and RT, CAD s/p stent admitted for sepsis secondary GPC bacteremia likely 2/2 Staph, Aureus UTI c/b L LE DVT, SILKE with metabolic acidosis in the setting of worsening metastatic disease; currently HD stable, however lactate now decreasing.

## 2018-12-01 NOTE — PROGRESS NOTE ADULT - PROBLEM SELECTOR PLAN 1
Sepsis secondary likely to UTI (urine cx: Staph aureus), mild leukocytosis, worsening lactate. TTE negative for infection.   ID consulted. BCx (11/26): GPC in clusters  - Blood cx (11/28): NGTD  - ID recs appreciated; Will consider DCing Vanc  - C/w NS IVF @ 60cc

## 2018-12-01 NOTE — PROGRESS NOTE ADULT - PROBLEM SELECTOR PLAN 2
- 2/2 lactic acidosis. Pt found to have large volume ascites on CT A/P.   - Dx paracentesis 11/30 to r/o SBP; No evidence of SBP on body fluid analysis   - Trend BMP  - Trend Lactate

## 2018-12-01 NOTE — PROGRESS NOTE ADULT - PROBLEM SELECTOR PLAN 9
No c/o CP, palpitations or FAIR;   - patient with CAD, s/p stent in January 2018  - troponin previously elevated but likely in the setting of sepsis and SILKE  - will continue ASA and plavix

## 2018-12-01 NOTE — PROGRESS NOTE ADULT - PROBLEM SELECTOR PLAN 3
Extensive DVT found on L LE. Evidence of LLL subsegmental PE. Pt started on Heparin ggt after CTH r/o brain mets  - C/w Heparin drip  - May need IVC filter; consider IR consult

## 2018-12-01 NOTE — PROGRESS NOTE ADULT - PROBLEM SELECTOR PLAN 10
- DVT ppx held until CT head negative for mets from melanoma  - Clear liquid diet  - Dispo pending resolution of symptoms    GOC: Patient is DNR/DNI. Will need to complete MOLST form. - DVT ppx held until CT head negative for mets from melanoma  - Clear liquid diet  - Dispo pending resolution of symptoms  Problem 11: Meningioma   - stable s/p resection and RT      GOC: Patient is DNR/DNI. Will need to complete MOLST form.

## 2018-12-01 NOTE — PROGRESS NOTE ADULT - PROBLEM SELECTOR PLAN 5
Patient with stage IV metastatic melanoma with progression of disease despite therapy. Progression of disease seen on CT chest/abdomen/pelvis including new ascites (asymptomatic at this time)  - s/p 4 doses of ipilumumab, s/p 1 dose of nivolumab on 11/1  - will need to follow up outpatient at [facility] [place] with Dr. Goldberg Pt with prolonged QTc. Last QTc: 559.   - Pt noted to have QTc: 569. Trazodone was held. Repeat QTc: 559  - Avoid QTc prolonging agents.

## 2018-12-01 NOTE — PROVIDER CONTACT NOTE (CRITICAL VALUE NOTIFICATION) - ASSESSMENT
Patient alert and responsive. Vital signs stable. Afebrile. Dark colored urinary output. Approx 240ml Bile-like vomitus earlier

## 2018-12-01 NOTE — PROGRESS NOTE ADULT - PROBLEM SELECTOR PLAN 4
Improving.  - likely pre-renal secondary to dehydration vs side effect from immunotherapy  - CT abdomen/pelvis does not show any signs of bladder obstruction  - continue to trend bmp

## 2018-12-01 NOTE — PROGRESS NOTE ADULT - SUBJECTIVE AND OBJECTIVE BOX
Edmond Benavides MD  PGY 1 Department of Internal Medicine  Pager: [phone]/ [phone]    Patient is a 58y old  Male who presents with a chief complaint of Sepsis secondary to UTI (30 Nov 2018 14:29)      SUBJECTIVE / OVERNIGHT EVENTS: Pt seen and examined. Overnight, pt reported nausea after drinking ginger ale. Pt reported vomiting x1. Pt reported that he felt better, however remains nauseous. Pt reported no other subjective complaints    MEDICATIONS  (STANDING):  aspirin enteric coated 81 milliGRAM(s) Oral daily  atorvastatin 40 milliGRAM(s) Oral at bedtime  clopidogrel Tablet 75 milliGRAM(s) Oral daily  dextrose 5%. 1000 milliLiter(s) (50 mL/Hr) IV Continuous <Continuous>  dextrose 50% Injectable 12.5 Gram(s) IV Push once  dextrose 50% Injectable 25 Gram(s) IV Push once  dextrose 50% Injectable 25 Gram(s) IV Push once  heparin  Infusion. 1400 Unit(s)/Hr (14 mL/Hr) IV Continuous <Continuous>  insulin lispro (HumaLOG) corrective regimen sliding scale   SubCutaneous three times a day before meals  insulin lispro (HumaLOG) corrective regimen sliding scale   SubCutaneous at bedtime  melatonin 3 milliGRAM(s) Oral at bedtime  simethicone 80 milliGRAM(s) Chew daily  sodium chloride 0.9%. 1000 milliLiter(s) (60 mL/Hr) IV Continuous <Continuous>    MEDICATIONS  (PRN):  acetaminophen   Tablet .. 650 milliGRAM(s) Oral every 6 hours PRN Mild Pain (1 - 3), Moderate Pain (4 - 6), Severe Pain (7 - 10)  AQUAPHOR (petrolatum Ointment) 1 Application(s) Topical three times a day PRN for comfort  dextrose 40% Gel 15 Gram(s) Oral once PRN Blood Glucose LESS THAN 70 milliGRAM(s)/deciliter  glucagon  Injectable 1 milliGRAM(s) IntraMuscular once PRN Glucose LESS THAN 70 milligrams/deciliter  heparin  Injectable 8000 Unit(s) IV Push every 6 hours PRN For aPTT less than 40  heparin  Injectable 4000 Unit(s) IV Push every 6 hours PRN For aPTT between 40 - 57      I&O's Summary    30 Nov 2018 07:01  -  01 Dec 2018 07:00  --------------------------------------------------------  IN: 0 mL / OUT: 250 mL / NET: -250 mL        Vital Signs Last 24 Hrs  T(C): 36.7 (01 Dec 2018 06:18), Max: 36.7 (30 Nov 2018 14:15)  T(F): 98 (01 Dec 2018 06:18), Max: 98.1 (30 Nov 2018 14:15)  HR: 84 (01 Dec 2018 06:18) (84 - 92)  BP: 124/75 (01 Dec 2018 06:18) (114/66 - 124/75)  BP(mean): --  RR: 18 (01 Dec 2018 06:18) (18 - 18)  SpO2: 100% (01 Dec 2018 06:18) (99% - 100%)    CAPILLARY BLOOD GLUCOSE      POCT Blood Glucose.: 92 mg/dL (01 Dec 2018 08:52)  POCT Blood Glucose.: 80 mg/dL (30 Nov 2018 22:25)  POCT Blood Glucose.: 76 mg/dL (30 Nov 2018 17:57)  POCT Blood Glucose.: 82 mg/dL (30 Nov 2018 12:06)      PHYSICAL EXAM:  GENERAL: NAD,   HEAD:  Atraumatic, Normocephalic  CHEST/LUNG: Clear to auscultation bilaterally; Decreased breath sounds on L axilla. L mass anterior portion of axilla.   HEART: Regular rate and rhythm; S1, S2, S3; No m/r/g  ABDOMEN: Soft, Nontender, moderately distended; Bowel sounds present  EXTREMITIES:  2+ Peripheral Pulses, +2 pitting edema of left LE. L LE cool to touch.   NEUROLOGY: No sensory or motor deficits of Upper and lower extremities.   SKIN: No rashes or lesions    LABS:                        12.1   10.10 )-----------( 290      ( 01 Dec 2018 05:15 )             38.2     Auto Eosinophil # 0.13  / Auto Eosinophil % 1.3   / Auto Neutrophil # 8.17  / Auto Neutrophil % 80.9  / BANDS % x                            12.5   9.84  )-----------( 279      ( 30 Nov 2018 18:47 )             38.4     Auto Eosinophil # x     / Auto Eosinophil % x     / Auto Neutrophil # x     / Auto Neutrophil % x     / BANDS % x                            12.2   9.76  )-----------( 258      ( 30 Nov 2018 05:24 )             38.6     Auto Eosinophil # 0.14  / Auto Eosinophil % 1.4   / Auto Neutrophil # 7.47  / Auto Neutrophil % 76.6  / BANDS % x        12-01    136  |  98  |  56<H>  ----------------------------<  94  4.0   |  16<L>  |  1.05  11-30    137  |  100  |  60<H>  ----------------------------<  74  3.8   |  12<L>  |  1.01  11-29    139  |  100  |  69<H>  ----------------------------<  87  3.9   |  13<L>  |  1.29    Ca    8.8      01 Dec 2018 05:15  Mg     2.8     12-01  Phos  3.1     12-01    PTT - ( 01 Dec 2018 02:00 )  PTT:102.1 SEC        Lactate, Blood: 10.4 mmol/L (12-01 @ 05:15)  Lactate, Blood: 11.9 mmol/L (11-30 @ 05:24)  Lactate, Blood: 10.6 mmol/L (11-29 @ 09:05)    Culture - Body Fluid with Gram Stain (11.30.18 @ 12:58)    Culture - Body Fluid:   NO GROWTH - PRELIMINARY RESULTS    Gram Stain:   NOS^No Organisms Seen  WBC^White Blood Cells  QNTY CELLS IN GRAM STAIN: NO CELLS SEEN    Specimen Source: PERITONEAL FLUID    Culture - Blood (11.28.18 @ 10:36)    Culture - Blood:   NO ORGANISMS ISOLATED  NO ORGANISMS ISOLATED AT 48 HRS.    Specimen Source: BLOOD VENOUS    Culture - Blood (11.28.18 @ 10:36)    Culture - Blood:   NO ORGANISMS ISOLATED  NO ORGANISMS ISOLATED AT 48 HRS.    Specimen Source: BLOOD PERIPHERAL    Cell Count, Body Fluid (11.30.18 @ 12:05)    Body Fluid Type: PERITONEAL    Color - Body Fluid: YELLOW    Clarity Body Fluid: HAZY    Total Nucleated Cell Count, Body Fluid: 256 cell/uL    Total RBC Count: 2000: Red Cell count correlates with the number and proportion of  cells on cytospin preparation. cell/uL    Body Fluid Differential (11.30.18 @ 12:05)    Total Cells Counted, Body Fluid: 100 cells    Seg Count, Body Fluid: 12 %    Lymphocyte Count, Body Fluid: 15 %    Monocytes - Body Fluid: 29: WITH HEMOSEDERIN PRESENT %    Macrophage Count - Body Fluid: 44 %        RADIOLOGY & ADDITIONAL TESTS:    Imaging Personally Reviewed:      Consultant(s) Notes Reviewed:      Care Discussed with Consultants/Other Providers:

## 2018-12-02 LAB
-  CEFAZOLIN: SIGNIFICANT CHANGE UP
-  CIPROFLOXACIN: SIGNIFICANT CHANGE UP
-  CLINDAMYCIN: SIGNIFICANT CHANGE UP
-  COAGULASE NEGATIVE STAPHYLOCOCCUS: SIGNIFICANT CHANGE UP
-  ERYTHROMYCIN: SIGNIFICANT CHANGE UP
-  GENTAMICIN: SIGNIFICANT CHANGE UP
-  MOXIFLOXACIN(AEROBIC): SIGNIFICANT CHANGE UP
-  OXACILLIN: SIGNIFICANT CHANGE UP
-  PENICILLIN: SIGNIFICANT CHANGE UP
-  RIFAMPIN.: SIGNIFICANT CHANGE UP
-  TETRACYCLINE: SIGNIFICANT CHANGE UP
-  TRIMETHOPRIM/SULFAMETHOXAZOLE: SIGNIFICANT CHANGE UP
-  VANCOMYCIN: SIGNIFICANT CHANGE UP
APTT BLD: 71.2 SEC — HIGH (ref 27.5–36.3)
BACTERIA BLD CULT: SIGNIFICANT CHANGE UP
BUN SERPL-MCNC: 54 MG/DL — HIGH (ref 7–23)
CALCIUM SERPL-MCNC: 9.2 MG/DL — SIGNIFICANT CHANGE UP (ref 8.4–10.5)
CHLORIDE SERPL-SCNC: 98 MMOL/L — SIGNIFICANT CHANGE UP (ref 98–107)
CO2 SERPL-SCNC: 13 MMOL/L — LOW (ref 22–31)
CREAT SERPL-MCNC: 1.01 MG/DL — SIGNIFICANT CHANGE UP (ref 0.5–1.3)
GLUCOSE BLDC GLUCOMTR-MCNC: 103 MG/DL — HIGH (ref 70–99)
GLUCOSE BLDC GLUCOMTR-MCNC: 103 MG/DL — HIGH (ref 70–99)
GLUCOSE BLDC GLUCOMTR-MCNC: 92 MG/DL — SIGNIFICANT CHANGE UP (ref 70–99)
GLUCOSE BLDC GLUCOMTR-MCNC: 94 MG/DL — SIGNIFICANT CHANGE UP (ref 70–99)
GLUCOSE SERPL-MCNC: 91 MG/DL — SIGNIFICANT CHANGE UP (ref 70–99)
HCT VFR BLD CALC: 38.7 % — LOW (ref 39–50)
HGB BLD-MCNC: 12.2 G/DL — LOW (ref 13–17)
LACTATE SERPL-SCNC: 12.3 MMOL/L — CRITICAL HIGH (ref 0.5–2)
MAGNESIUM SERPL-MCNC: 2.8 MG/DL — HIGH (ref 1.6–2.6)
MCHC RBC-ENTMCNC: 31.5 % — LOW (ref 32–36)
MCHC RBC-ENTMCNC: 31.5 PG — SIGNIFICANT CHANGE UP (ref 27–34)
MCV RBC AUTO: 100 FL — SIGNIFICANT CHANGE UP (ref 80–100)
METHOD TYPE: SIGNIFICANT CHANGE UP
NRBC # FLD: 0 — SIGNIFICANT CHANGE UP
ORGANISM # SPEC MICROSCOPIC CNT: SIGNIFICANT CHANGE UP
ORGANISM # SPEC MICROSCOPIC CNT: SIGNIFICANT CHANGE UP
PHOSPHATE SERPL-MCNC: 3 MG/DL — SIGNIFICANT CHANGE UP (ref 2.5–4.5)
PLATELET # BLD AUTO: 327 K/UL — SIGNIFICANT CHANGE UP (ref 150–400)
PMV BLD: 11.1 FL — SIGNIFICANT CHANGE UP (ref 7–13)
POTASSIUM SERPL-MCNC: 4 MMOL/L — SIGNIFICANT CHANGE UP (ref 3.5–5.3)
POTASSIUM SERPL-SCNC: 4 MMOL/L — SIGNIFICANT CHANGE UP (ref 3.5–5.3)
RBC # BLD: 3.87 M/UL — LOW (ref 4.2–5.8)
RBC # FLD: 15.9 % — HIGH (ref 10.3–14.5)
SODIUM SERPL-SCNC: 137 MMOL/L — SIGNIFICANT CHANGE UP (ref 135–145)
WBC # BLD: 10.86 K/UL — HIGH (ref 3.8–10.5)
WBC # FLD AUTO: 10.86 K/UL — HIGH (ref 3.8–10.5)

## 2018-12-02 PROCEDURE — 99232 SBSQ HOSP IP/OBS MODERATE 35: CPT

## 2018-12-02 PROCEDURE — 99233 SBSQ HOSP IP/OBS HIGH 50: CPT | Mod: GC

## 2018-12-02 RX ORDER — SODIUM CHLORIDE 9 MG/ML
1000 INJECTION INTRAMUSCULAR; INTRAVENOUS; SUBCUTANEOUS
Qty: 0 | Refills: 0 | Status: DISCONTINUED | OUTPATIENT
Start: 2018-12-02 | End: 2018-12-03

## 2018-12-02 RX ADMIN — Medication 81 MILLIGRAM(S): at 11:25

## 2018-12-02 RX ADMIN — ATORVASTATIN CALCIUM 40 MILLIGRAM(S): 80 TABLET, FILM COATED ORAL at 21:38

## 2018-12-02 RX ADMIN — SIMETHICONE 80 MILLIGRAM(S): 80 TABLET, CHEWABLE ORAL at 11:25

## 2018-12-02 RX ADMIN — HEPARIN SODIUM 1400 UNIT(S)/HR: 5000 INJECTION INTRAVENOUS; SUBCUTANEOUS at 06:59

## 2018-12-02 RX ADMIN — CLOPIDOGREL BISULFATE 75 MILLIGRAM(S): 75 TABLET, FILM COATED ORAL at 11:25

## 2018-12-02 RX ADMIN — SODIUM CHLORIDE 60 MILLILITER(S): 9 INJECTION INTRAMUSCULAR; INTRAVENOUS; SUBCUTANEOUS at 18:03

## 2018-12-02 RX ADMIN — Medication 3 MILLIGRAM(S): at 21:39

## 2018-12-02 NOTE — PROGRESS NOTE ADULT - PROBLEM SELECTOR PLAN 4
Improved  - likely pre-renal secondary to dehydration vs side effect from immunotherapy  - CT abdomen/pelvis did not show any signs of bladder obstruction  - continue to trend bmp

## 2018-12-02 NOTE — PROGRESS NOTE ADULT - ASSESSMENT
59 y/o Male with w/ severe diverticulosis, metastatic melanoma s/p resection on immunotherapy with progression of disease, meningothelial meningioma s/p resection and RT, CAD s/p stent admitted for sepsis secondary GPC bacteremia likely 2/2 Staph, Aureus UTI c/b L LE DVT, SILKE with metabolic acidosis in the setting of worsening metastatic disease; currently HD stable, however lactate now decreasing.

## 2018-12-02 NOTE — PROVIDER CONTACT NOTE (OTHER) - SITUATION
Patient is ordered to receive 5th dose of IV Vancomycin, advised Dr. Pruitt to order vanco trough for patient prior o receiving dose however MD refuses to do so despite encouragement

## 2018-12-02 NOTE — PROGRESS NOTE ADULT - ASSESSMENT
58 m with severe diverticulosis, CAD s/p stent, metastatic melanoma s/p resection on immunotherapy with progression of disease, meningothelial meningioma s/p resection and RT, was started on a new immunotherapy 4 weeks ago and now admitted for hypotension, SILKE, dehydration, nausea and vomiting,  metabolic acidosis.  afebrile here, WBC 10K, weak but non toxic.   chest/abd/pelvis CT with worsening metastatic disease but no source of infection  blood cx: coag neg staph identified staph hemolyticus  urine cx MSSA. received 5 days vanco iv  TTE negative    dehydration and SILKE have improved with hydration    , FTT due to metastatic melanoma vs immunotherapy    CT with significantly worsening metastatic disease and omental caking    the coag neg staph bacteremia likely contaminant     spoke to lab - staph in blood coag neg - staph hemolyticus.    Vanco d/srini yesterday.    suggest;  if dysuria would repeat UA, urine culture                follow peritoneal culture    Ania Mclean MD  Pager: [phone]  After 5 PM or weekends please call fellow on call or office [phone]

## 2018-12-02 NOTE — PROVIDER CONTACT NOTE (MEDICATION) - ASSESSMENT
Patient alert and oriented x 4. Oral care done. Patient maintained on clear liquid. Vital signs stable.

## 2018-12-02 NOTE — PROGRESS NOTE ADULT - PROBLEM SELECTOR PLAN 6
Patient with stage IV metastatic melanoma with progression of disease despite therapy. Progression of disease seen on CT chest/abdomen/pelvis including new ascites (asymptomatic at this time)  - s/p 4 doses of ipilumumab, s/p 1 dose of nivolumab on 11/1  - will need to follow up outpatient at [facility] with Dr. Goldberg

## 2018-12-02 NOTE — PROGRESS NOTE ADULT - PROBLEM SELECTOR PLAN 1
Sepsis secondary likely to UTI (urine cx: Staph aureus), mild leukocytosis, worsening lactate. TTE negative for infection.   ID consulted. BCx (11/26): GPC in clusters  - Blood cx (11/28): NGTD  - ID recs appreciated; Will plan to DC Vanc  - C/w NS IVF @ 60cc Sepsis secondary likely to UTI (urine cx: Staph aureus), mild leukocytosis, worsening lactate. TTE negative for infection.   ID consulted. BCx (11/26): GPC in clusters  - Blood cx (11/28): NGTD  - ID recs appreciated; D/C'ed Vanco 12/1  - C/w NS IVF @ 60cc

## 2018-12-02 NOTE — PROGRESS NOTE ADULT - PROBLEM SELECTOR PLAN 10
- DVT ppx held until CT head negative for mets from melanoma  - full liquid diet  - Dispo pending resolution of symptoms  Problem 11: Meningioma   - stable s/p resection and RT  GOC: Patient is DNR/DNI. Will need to complete MOLST form.

## 2018-12-02 NOTE — PROGRESS NOTE ADULT - SUBJECTIVE AND OBJECTIVE BOX
Judson Nickerson MD  Cell: [phone]  Pager: [phone]    S/overnight:  Had nausea overnight with emesis x1.  Pt reports continued generalized mild discomfort along with abdominal bloating however stable.    VITAL SIGNS:  Vital Signs Last 24 Hrs  T(C): 36.6 (02 Dec 2018 07:00), Max: 36.8 (01 Dec 2018 20:56)  T(F): 97.9 (02 Dec 2018 07:00), Max: 98.2 (01 Dec 2018 20:56)  HR: 92 (02 Dec 2018 07:00) (88 - 95)  BP: 124/80 (02 Dec 2018 07:00) (124/80 - 144/73)  BP(mean): --  RR: 17 (02 Dec 2018 07:00) (17 - 18)  SpO2: 100% (02 Dec 2018 07:00) (100% - 100%)      PHYSICAL EXAM:   GENERAL: NAD  HEAD:  Atraumatic, Normocephalic  CHEST/LUNG: Clear to auscultation bilaterally; Decreased breath sounds on L axilla. L mass anterior portion of axilla.   HEART: Regular rate and rhythm; S1, S2, S3; No m/r/g  ABDOMEN: Soft, Nontender, moderately distended; Bowel sounds present  EXTREMITIES:  2+ Peripheral Pulses, +2 pitting edema of left LE. L LE cool to touch.   NEUROLOGY: No sensory or motor deficits of Upper and lower extremities.   SKIN: No rashes, multiple diffuse palpable masses/metastasis                          12.2   10.86 )-----------( 327      ( 02 Dec 2018 05:25 )             38.7     12-02    137  |  98  |  54<H>  ----------------------------<  91  4.0   |  13<L>  |  1.01    Ca    9.2      02 Dec 2018 05:25  Phos  3.0     12-02  Mg     2.8     12-02        CAPILLARY BLOOD GLUCOSE    POCT Blood Glucose.: 103 mg/dL (02 Dec 2018 08:52)  POCT Blood Glucose.: 98 mg/dL (01 Dec 2018 22:10)  POCT Blood Glucose.: 101 mg/dL (01 Dec 2018 18:15)  POCT Blood Glucose.: 102 mg/dL (01 Dec 2018 12:26)      MEDICATIONS  (STANDING):  aspirin enteric coated 81 milliGRAM(s) Oral daily  atorvastatin 40 milliGRAM(s) Oral at bedtime  clopidogrel Tablet 75 milliGRAM(s) Oral daily  dextrose 5%. 1000 milliLiter(s) (50 mL/Hr) IV Continuous <Continuous>  dextrose 50% Injectable 12.5 Gram(s) IV Push once  dextrose 50% Injectable 25 Gram(s) IV Push once  dextrose 50% Injectable 25 Gram(s) IV Push once  heparin  Infusion. 1400 Unit(s)/Hr (14 mL/Hr) IV Continuous <Continuous>  insulin lispro (HumaLOG) corrective regimen sliding scale   SubCutaneous three times a day before meals  insulin lispro (HumaLOG) corrective regimen sliding scale   SubCutaneous at bedtime  melatonin 3 milliGRAM(s) Oral at bedtime  simethicone 80 milliGRAM(s) Chew daily  sodium chloride 0.9%. 1000 milliLiter(s) (60 mL/Hr) IV Continuous <Continuous>

## 2018-12-02 NOTE — PROGRESS NOTE ADULT - PROBLEM SELECTOR PLAN 5
Pt with prolonged QTc. Last QTc: 559.   - Pt noted to have QTc: 569. Trazodone was held. Repeat QTc: 559  - Avoid QTc prolonging agents.

## 2018-12-02 NOTE — PROGRESS NOTE ADULT - SUBJECTIVE AND OBJECTIVE BOX
Follow Up:  bacteremia staph hemolyticus.  urine growing MSSA.    Interval History: pt remains afebrile but the lactate continues to be elevated 12.  had episode of vomit this AM; on clears.  feels ok now.     urine cx showed staph aureus, s/p paracentesis 11/30 culture neg 24 h  feels some discomfort with urination.    ROS:    All other systems negative        Allergies  No Known Allergies        ANTIMICROBIALS:  vanco 11/26-12/1    OTHER MEDS:  acetaminophen   Tablet .. 650 milliGRAM(s) Oral every 6 hours PRN  AQUAPHOR (petrolatum Ointment) 1 Application(s) Topical three times a day PRN  aspirin enteric coated 81 milliGRAM(s) Oral daily  atorvastatin 40 milliGRAM(s) Oral at bedtime  clopidogrel Tablet 75 milliGRAM(s) Oral daily  dextrose 40% Gel 15 Gram(s) Oral once PRN  dextrose 5%. 1000 milliLiter(s) IV Continuous <Continuous>  dextrose 50% Injectable 12.5 Gram(s) IV Push once  dextrose 50% Injectable 25 Gram(s) IV Push once  dextrose 50% Injectable 25 Gram(s) IV Push once  glucagon  Injectable 1 milliGRAM(s) IntraMuscular once PRN  heparin  Infusion. 1400 Unit(s)/Hr IV Continuous <Continuous>  heparin  Injectable 8000 Unit(s) IV Push every 6 hours PRN  heparin  Injectable 4000 Unit(s) IV Push every 6 hours PRN  insulin lispro (HumaLOG) corrective regimen sliding scale   SubCutaneous three times a day before meals  insulin lispro (HumaLOG) corrective regimen sliding scale   SubCutaneous at bedtime  melatonin 3 milliGRAM(s) Oral at bedtime  simethicone 80 milliGRAM(s) Chew daily  sodium chloride 0.9%. 1000 milliLiter(s) IV Continuous <Continuous>      Vital Signs Last 24 Hrs  T(F): 98.3 (12-02-18 @ 14:57), Max: 98.3 (12-02-18 @ 14:57)  HR: 91 (12-02-18 @ 14:57)  BP: 119/69 (12-02-18 @ 14:57)  RR: 17 (12-02-18 @ 14:57)  SpO2: 100% (12-02-18 @ 14:57) (100% - 100%)    Physical Exam:  General:    NAD,  non toxic, ill appearing  Head: atraumatic, normocephalic  Eye: normal sclera and conjunctiva  ENT:    no oropharyngeal lesions,   no LAD,   neck supple  Cardio:     regular S1, S2,  no murmur  Respiratory:    clear b/l,    no wheezing  abd:     soft,   BS +,   no tenderness  :   no CVAT,  no suprapubic tenderness,   no  allen  Musculoskeletal:   b/l LE edema L significantly >R  vascular: peripheral iv  Skin:   scattered mass lesions left axilla  Neurologic:     no focal deficit  psych: normal affect                                   12.2   10.86 )-----------( 327      ( 02 Dec 2018 05:25 )             38.7 12-02    137  |  98  |  54  ----------------------------<  91  4.0   |  13  |  1.01  Ca    9.2      02 Dec 2018 05:25Phos  3.0     12-02Mg     2.8     12-02          MICROBIOLOGY:  Vancomycin Level, Random: 15.7 ug/mL (11-30-18 @ 05:24)    BLOOD PERIPHERAL  11-28-18 --  --  --  Culture - Body Fluid with Gram Stain (11.30.18 @ 12:58)    Culture - Body Fluid:   NO GROWTH - PRELIMINARY RESULTS  NO ORGANISMS ISOLATED AT 24 HOURS    Gram Stain:   NOS^No Organisms Seen  WBC^White Blood Cells  QNTY CELLS IN GRAM STAIN: NO CELLS SEEN    Specimen Source: PERITONEAL FLUID        URINE MIDSTREAM  11-26-18 --  --  Staphylococcus aureus  Culture - Blood (11.28.18 @ 10:36)    Culture - Blood:   NO ORGANISMS ISOLATED  NO ORGANISMS ISOLATED AT 96 HOURS    Specimen Source: BLOOD VENOUS        BLOOD PERIPHERAL  11-26-18 --  --  BLOOD CULTURE PCR  Staphylococcus sp.,coag neg  Culture - Blood (11.26.18 @ 15:25)    -  Ciprofloxacin: S <=1 YVETTE    -  Clindamycin: S <=0.5 YVETTE    -  Cefazolin: R <=4 YVETTE    -  Oxacillin: R >2 YVETTE    -  Penicillin: R >8 YVETTE    -  Rifampin: S <=1 YVETTE    -  Moxifloxacin(Aerobic): S <=0.5 YVETTE    -  Vancomycin: S 4 YVETTE    -  Tetra/Doxy: S <=4 YVETTE    -  Trimethoprim/Sulfamethoxazole: S <=0.5/9.5 YVETTE    Culture - Blood:   ***Blood Panel PCR results on this specimen are available  approximately 3 hours after the Gram stain result***  Gram stain, PCR, and/or culture results may not always  correspond due to difference in methodologies  ------------------------------------------------------------  This PCR assay was performed using [employer].  The  following targets are tested for:  Enterococcus, vancomycin  resistant enterococci, Listeria monocytogenes,  coagulase  negative staphylococci, S. aureus, methicillin resistant S.  aureus, Streptococcus agalactiae (Group B), S. pneumoniae,  S. pyogenes (Group A), Acinetobacter baumannii, Enterobacter  cloacae, E. coli, Klebsiella oxytoca, K. pneumoniae, Proteus  sp., Serratia marcescens, Haemophilus influenzae, Neisseria  meningitidis, Pseudomonas aeruginosa, Candida albicans, C.  glabrata, C. krusei, C. parapsilosis, C. tropicalis and the  KPC resistance gene.  **NOTE: Due to technical problems, Proteus sp. will NOT be  reported as part of the BCID paneluntil further notice.    Culture - Blood:   Single set isolate, possible contaminant.  Contact microbiology if susceptibility testing is clinically  indicated.  OXICILLIN-RESISTANT staphylococci should be regarded as  RESISTANT to ALL other Beta-Lactams regardless of the  in-vitro results obtained.  These include: ALL  Penicillins, Cephalosporins, Amoxicillin-clavulanic  acid, Ticarcillin-clavulanic acid,  Ampicillin-sulbactam, and Imipenem.                *******************************                * This is an appended result. *             *******************************  A prior result that was reported as final has been changed.    -  Coagulase negative Staphylococcus: + DETECT YVETTE    -  Gentamicin: S <=4 YVETTE    -  Erythromycin: R    Specimen Source: BLOOD PERIPHERAL    Organism: BLOOD CULTURE PCR  ***Blood Panel PCR results on this specimen are available  approximately 3 hours after the Gram stain result***  Gram stain, PCR, and/or culture results may not always  correspond due to difference in methodologies  ------------------------------------------------------------  This PCR assay was performed using [employer].  The  following targets are tested for:  Enterococcus, vancomycin  resistant enterococci, Listeria monocytogenes,  coagulase  negative staphylococci, S. aureus, methicillin resistant S.  aureus, Streptococcus agalactiae (Group B), S. pneumoniae,  S. pyogenes (Group A), Acinetobacter baumannii, Enterobacter  cloacae, E. coli, Klebsiella oxytoca, K. pneumoniae, Proteus  sp., Serratia marcescens, Haemophilus influenzae, Neisseria  meningitidis, Pseudomonas aeruginosa, Candida albicans, C.  glabrata, C. krusei, C. parapsilosis, C. tropicalis and the  KPC resistance gene.  **NOTE: Due to technical problems, Proteus sp. will NOT be  reported as part of the BCID panel until further notice.    Organism: Staphylococcus haemolyticus    Gram Stain Blood:   ***** CRITICAL RESULT *****  PERSON CALLED / READ-BACK: KATIE MAGALLON RN  DATE / TIME CALLED: 11/27/18 0731  CALLED BY: ARDEN TOSCANO  GPC^Gram Pos Cocci In Clusters  AFTER: 14 HOURS INCUBATION  BOTTLE: AEROBIC BOTTLE    Organism Identification: BLOOD CULTURE PCR  Staphylococcus haemolyticus    Method Type: PCR    Method Type: MICROSCAN POS COMBO 34        .Stool  11-19-18   No enteric pathogens isolated.  (Stool culture examined for Salmonella,  Shigella, Campylobacter, Aeromonas, Plesiomonas,  Vibrio, E.coli O157 and Yersinia)  --  --                RADIOLOGY:  Images below reviewed personally  < from: CT Abdomen and Pelvis w/ IV Cont (11.28.18 @ 17:53) >    IMPRESSION:     *  No infectious source is identified.  *  Unchanged metastatic disease.  *  Large volume of ascites unchanged from 11/26/2018.  *  Findings suspicious for subsegmental pulmonary embolism the left lower   lobe.  *  Findings raising a question of left lower extremity DVT. Correlate   with sonography.

## 2018-12-03 LAB
ALBUMIN SERPL ELPH-MCNC: 2.9 G/DL — LOW (ref 3.3–5)
ALP SERPL-CCNC: 118 U/L — SIGNIFICANT CHANGE UP (ref 40–120)
ALT FLD-CCNC: 21 U/L — SIGNIFICANT CHANGE UP (ref 4–41)
APTT BLD: 85.6 SEC — HIGH (ref 27.5–36.3)
AST SERPL-CCNC: 38 U/L — SIGNIFICANT CHANGE UP (ref 4–40)
BACTERIA BLD CULT: SIGNIFICANT CHANGE UP
BACTERIA BLD CULT: SIGNIFICANT CHANGE UP
BILIRUB SERPL-MCNC: 0.2 MG/DL — SIGNIFICANT CHANGE UP (ref 0.2–1.2)
BUN SERPL-MCNC: 51 MG/DL — HIGH (ref 7–23)
CALCIUM SERPL-MCNC: 9.1 MG/DL — SIGNIFICANT CHANGE UP (ref 8.4–10.5)
CHLORIDE SERPL-SCNC: 100 MMOL/L — SIGNIFICANT CHANGE UP (ref 98–107)
CO2 SERPL-SCNC: 14 MMOL/L — LOW (ref 22–31)
CREAT SERPL-MCNC: 1.13 MG/DL — SIGNIFICANT CHANGE UP (ref 0.5–1.3)
GLUCOSE BLDC GLUCOMTR-MCNC: 105 MG/DL — HIGH (ref 70–99)
GLUCOSE BLDC GLUCOMTR-MCNC: 96 MG/DL — SIGNIFICANT CHANGE UP (ref 70–99)
GLUCOSE SERPL-MCNC: 87 MG/DL — SIGNIFICANT CHANGE UP (ref 70–99)
HCT VFR BLD CALC: 37.5 % — LOW (ref 39–50)
HGB BLD-MCNC: 11.7 G/DL — LOW (ref 13–17)
LACTATE SERPL-SCNC: 11.4 MMOL/L — CRITICAL HIGH (ref 0.5–2)
MAGNESIUM SERPL-MCNC: 2.9 MG/DL — HIGH (ref 1.6–2.6)
MCHC RBC-ENTMCNC: 30.8 PG — SIGNIFICANT CHANGE UP (ref 27–34)
MCHC RBC-ENTMCNC: 31.2 % — LOW (ref 32–36)
MCV RBC AUTO: 98.7 FL — SIGNIFICANT CHANGE UP (ref 80–100)
NRBC # FLD: 0 — SIGNIFICANT CHANGE UP
PHOSPHATE SERPL-MCNC: 2.9 MG/DL — SIGNIFICANT CHANGE UP (ref 2.5–4.5)
PLATELET # BLD AUTO: 345 K/UL — SIGNIFICANT CHANGE UP (ref 150–400)
PMV BLD: 11.3 FL — SIGNIFICANT CHANGE UP (ref 7–13)
POTASSIUM SERPL-MCNC: 4.1 MMOL/L — SIGNIFICANT CHANGE UP (ref 3.5–5.3)
POTASSIUM SERPL-SCNC: 4.1 MMOL/L — SIGNIFICANT CHANGE UP (ref 3.5–5.3)
PROT SERPL-MCNC: 6.5 G/DL — SIGNIFICANT CHANGE UP (ref 6–8.3)
RBC # BLD: 3.8 M/UL — LOW (ref 4.2–5.8)
RBC # FLD: 16.4 % — HIGH (ref 10.3–14.5)
SODIUM SERPL-SCNC: 140 MMOL/L — SIGNIFICANT CHANGE UP (ref 135–145)
WBC # BLD: 11.52 K/UL — HIGH (ref 3.8–10.5)
WBC # FLD AUTO: 11.52 K/UL — HIGH (ref 3.8–10.5)

## 2018-12-03 PROCEDURE — 99233 SBSQ HOSP IP/OBS HIGH 50: CPT

## 2018-12-03 PROCEDURE — 99232 SBSQ HOSP IP/OBS MODERATE 35: CPT

## 2018-12-03 PROCEDURE — 93010 ELECTROCARDIOGRAM REPORT: CPT

## 2018-12-03 RX ORDER — SODIUM CHLORIDE 9 MG/ML
1000 INJECTION INTRAMUSCULAR; INTRAVENOUS; SUBCUTANEOUS
Qty: 0 | Refills: 0 | Status: DISCONTINUED | OUTPATIENT
Start: 2018-12-03 | End: 2018-12-04

## 2018-12-03 RX ORDER — SODIUM BICARBONATE 1 MEQ/ML
650 SYRINGE (ML) INTRAVENOUS THREE TIMES A DAY
Qty: 0 | Refills: 0 | Status: DISCONTINUED | OUTPATIENT
Start: 2018-12-03 | End: 2018-12-05

## 2018-12-03 RX ORDER — ENOXAPARIN SODIUM 100 MG/ML
100 INJECTION SUBCUTANEOUS EVERY 12 HOURS
Qty: 0 | Refills: 0 | Status: DISCONTINUED | OUTPATIENT
Start: 2018-12-03 | End: 2018-12-04

## 2018-12-03 RX ADMIN — SODIUM CHLORIDE 60 MILLILITER(S): 9 INJECTION INTRAMUSCULAR; INTRAVENOUS; SUBCUTANEOUS at 03:52

## 2018-12-03 RX ADMIN — SODIUM CHLORIDE 60 MILLILITER(S): 9 INJECTION INTRAMUSCULAR; INTRAVENOUS; SUBCUTANEOUS at 09:45

## 2018-12-03 RX ADMIN — Medication 3 MILLIGRAM(S): at 21:51

## 2018-12-03 RX ADMIN — Medication 650 MILLIGRAM(S): at 21:51

## 2018-12-03 RX ADMIN — SODIUM CHLORIDE 60 MILLILITER(S): 9 INJECTION INTRAMUSCULAR; INTRAVENOUS; SUBCUTANEOUS at 21:51

## 2018-12-03 RX ADMIN — Medication 81 MILLIGRAM(S): at 12:05

## 2018-12-03 RX ADMIN — ENOXAPARIN SODIUM 100 MILLIGRAM(S): 100 INJECTION SUBCUTANEOUS at 19:49

## 2018-12-03 RX ADMIN — CLOPIDOGREL BISULFATE 75 MILLIGRAM(S): 75 TABLET, FILM COATED ORAL at 12:05

## 2018-12-03 RX ADMIN — ATORVASTATIN CALCIUM 40 MILLIGRAM(S): 80 TABLET, FILM COATED ORAL at 21:51

## 2018-12-03 RX ADMIN — SIMETHICONE 80 MILLIGRAM(S): 80 TABLET, CHEWABLE ORAL at 12:05

## 2018-12-03 RX ADMIN — HEPARIN SODIUM 1400 UNIT(S)/HR: 5000 INJECTION INTRAVENOUS; SUBCUTANEOUS at 07:55

## 2018-12-03 NOTE — DIETITIAN INITIAL EVALUATION ADULT. - NS AS NUTRI INTERV MEALS SNACK
1. Continue Soft diet as tolerated. 2. Continue Ensure Pudding 3x daily (510 kcals, 12g protein). 3. Please Encourage po intake, assist with meals and menu selections, provide alternatives PRN. 4. Monitor weights, labs, BM's, skin integrity, p.o. intake.

## 2018-12-03 NOTE — CHART NOTE - NSCHARTNOTEFT_GEN_A_CORE
Patients goals of care established. Primary team to do MOLST with patient. Palliative care will sign off at this time. Please reconsult as needed.

## 2018-12-03 NOTE — PROGRESS NOTE ADULT - ASSESSMENT
57 y/o Male with w/ severe diverticulosis, metastatic melanoma s/p resection on immunotherapy with progression of disease, meningothelial meningioma s/p resection and RT, CAD s/p stent admitted for sepsis secondary GPC bacteremia likely 2/2 Staph, Aureus UTI c/b L LE DVT, SILKE with metabolic acidosis in the setting of worsening metastatic disease; currently HD stable, however lactate now decreasing. 57 y/o Male with w/ severe diverticulosis, metastatic melanoma s/p resection on immunotherapy with progression of disease, meningothelial meningioma s/p resection and RT, CAD s/p stent admitted for sepsis secondary GPC bacteremia likely 2/2 Staph, Aureus UTI c/b L LE DVT, SILKE with metabolic acidosis in the setting of worsening metastatic disease; currently HD stable, however lactate remains elevated

## 2018-12-03 NOTE — PROGRESS NOTE ADULT - SUBJECTIVE AND OBJECTIVE BOX
Edmond Benavides MD  PGY 1 Department of Internal Medicine  Pager: [phone]/ [phone]    Patient is a 58y old  Male who presents with a chief complaint of Sepsis secondary to UTI (02 Dec 2018 16:19)      SUBJECTIVE / OVERNIGHT EVENTS: Pt seen and examined. No acute overnight events. Pt reported no subjective complaints.     MEDICATIONS  (STANDING):  aspirin enteric coated 81 milliGRAM(s) Oral daily  atorvastatin 40 milliGRAM(s) Oral at bedtime  clopidogrel Tablet 75 milliGRAM(s) Oral daily  dextrose 5%. 1000 milliLiter(s) (50 mL/Hr) IV Continuous <Continuous>  dextrose 50% Injectable 12.5 Gram(s) IV Push once  dextrose 50% Injectable 25 Gram(s) IV Push once  dextrose 50% Injectable 25 Gram(s) IV Push once  heparin  Infusion. 1400 Unit(s)/Hr (14 mL/Hr) IV Continuous <Continuous>  insulin lispro (HumaLOG) corrective regimen sliding scale   SubCutaneous three times a day before meals  insulin lispro (HumaLOG) corrective regimen sliding scale   SubCutaneous at bedtime  melatonin 3 milliGRAM(s) Oral at bedtime  simethicone 80 milliGRAM(s) Chew daily  sodium chloride 0.9%. 1000 milliLiter(s) (60 mL/Hr) IV Continuous <Continuous>    MEDICATIONS  (PRN):  acetaminophen   Tablet .. 650 milliGRAM(s) Oral every 6 hours PRN Mild Pain (1 - 3), Moderate Pain (4 - 6), Severe Pain (7 - 10)  AQUAPHOR (petrolatum Ointment) 1 Application(s) Topical three times a day PRN for comfort  dextrose 40% Gel 15 Gram(s) Oral once PRN Blood Glucose LESS THAN 70 milliGRAM(s)/deciliter  glucagon  Injectable 1 milliGRAM(s) IntraMuscular once PRN Glucose LESS THAN 70 milligrams/deciliter  heparin  Injectable 8000 Unit(s) IV Push every 6 hours PRN For aPTT less than 40  heparin  Injectable 4000 Unit(s) IV Push every 6 hours PRN For aPTT between 40 - 57      I&O's Summary    02 Dec 2018 07:01  -  03 Dec 2018 07:00  --------------------------------------------------------  IN: 0 mL / OUT: 300 mL / NET: -300 mL        Vital Signs Last 24 Hrs  T(C): 36.5 (03 Dec 2018 06:52), Max: 36.8 (02 Dec 2018 14:57)  T(F): 97.7 (03 Dec 2018 06:52), Max: 98.3 (02 Dec 2018 14:57)  HR: 92 (03 Dec 2018 06:52) (91 - 98)  BP: 122/75 (03 Dec 2018 06:52) (119/69 - 128/75)  BP(mean): --  RR: 18 (03 Dec 2018 06:52) (17 - 18)  SpO2: 100% (03 Dec 2018 06:52) (100% - 100%)    CAPILLARY BLOOD GLUCOSE      POCT Blood Glucose.: 94 mg/dL (02 Dec 2018 22:10)  POCT Blood Glucose.: 92 mg/dL (02 Dec 2018 17:56)  POCT Blood Glucose.: 103 mg/dL (02 Dec 2018 12:40)  POCT Blood Glucose.: 103 mg/dL (02 Dec 2018 08:52)      PHYSICAL EXAM:  GENERAL: NAD,   HEAD:  Atraumatic, Normocephalic  EYES: EOMI, PERRL, conjunctiva and sclera clear  NECK: No JVD  CHEST/LUNG: Clear to auscultation bilaterally; No wheeze  HEART: Regular rate and rhythm; No murmurs, rubs, or gallops  ABDOMEN: Soft, Nontender, Nondistended; Bowel sounds present  EXTREMITIES:  2+ Peripheral Pulses, No clubbing, cyanosis, or edema  PSYCH: AAOx3  NEUROLOGY: non-focal  SKIN: No rashes or lesions    LABS:                        12.2   10.86 )-----------( 327      ( 02 Dec 2018 05:25 )             38.7     Auto Eosinophil # x     / Auto Eosinophil % x     / Auto Neutrophil # x     / Auto Neutrophil % x     / BANDS % x        12-02    137  |  98  |  54<H>  ----------------------------<  91  4.0   |  13<L>  |  1.01    Ca    9.2      02 Dec 2018 05:25  Mg     2.8     12-02  Phos  3.0     12-02    PTT - ( 02 Dec 2018 05:25 )  PTT:71.2 SEC        Lactate, Blood: 12.3 mmol/L (12-02 @ 05:25)  Lactate, Blood: 10.4 mmol/L (12-01 @ 05:15)  Lactate, Blood: 11.9 mmol/L (11-30 @ 05:24)        RADIOLOGY & ADDITIONAL TESTS:    Imaging Personally Reviewed:    Consultant(s) Notes Reviewed:      Care Discussed with Consultants/Other Providers: Edmond Benavides MD  PGY 1 Department of Internal Medicine  Pager: [phone]/ [phone]    Patient is a 58y old  Male who presents with a chief complaint of Sepsis secondary to UTI (02 Dec 2018 16:19)      SUBJECTIVE / OVERNIGHT EVENTS: Pt seen and examined. No acute overnight events. Pt reported improved appetite. Pt stated he is able to tolerate larger quantity of food w/ mild nausea. Pt reported no other complaints.    MEDICATIONS  (STANDING):  aspirin enteric coated 81 milliGRAM(s) Oral daily  atorvastatin 40 milliGRAM(s) Oral at bedtime  clopidogrel Tablet 75 milliGRAM(s) Oral daily  dextrose 5%. 1000 milliLiter(s) (50 mL/Hr) IV Continuous <Continuous>  dextrose 50% Injectable 12.5 Gram(s) IV Push once  dextrose 50% Injectable 25 Gram(s) IV Push once  dextrose 50% Injectable 25 Gram(s) IV Push once  heparin  Infusion. 1400 Unit(s)/Hr (14 mL/Hr) IV Continuous <Continuous>  insulin lispro (HumaLOG) corrective regimen sliding scale   SubCutaneous three times a day before meals  insulin lispro (HumaLOG) corrective regimen sliding scale   SubCutaneous at bedtime  melatonin 3 milliGRAM(s) Oral at bedtime  simethicone 80 milliGRAM(s) Chew daily  sodium chloride 0.9%. 1000 milliLiter(s) (60 mL/Hr) IV Continuous <Continuous>    MEDICATIONS  (PRN):  acetaminophen   Tablet .. 650 milliGRAM(s) Oral every 6 hours PRN Mild Pain (1 - 3), Moderate Pain (4 - 6), Severe Pain (7 - 10)  AQUAPHOR (petrolatum Ointment) 1 Application(s) Topical three times a day PRN for comfort  dextrose 40% Gel 15 Gram(s) Oral once PRN Blood Glucose LESS THAN 70 milliGRAM(s)/deciliter  glucagon  Injectable 1 milliGRAM(s) IntraMuscular once PRN Glucose LESS THAN 70 milligrams/deciliter  heparin  Injectable 8000 Unit(s) IV Push every 6 hours PRN For aPTT less than 40  heparin  Injectable 4000 Unit(s) IV Push every 6 hours PRN For aPTT between 40 - 57      I&O's Summary    02 Dec 2018 07:01  -  03 Dec 2018 07:00  --------------------------------------------------------  IN: 0 mL / OUT: 300 mL / NET: -300 mL        Vital Signs Last 24 Hrs  T(C): 36.5 (03 Dec 2018 06:52), Max: 36.8 (02 Dec 2018 14:57)  T(F): 97.7 (03 Dec 2018 06:52), Max: 98.3 (02 Dec 2018 14:57)  HR: 92 (03 Dec 2018 06:52) (91 - 98)  BP: 122/75 (03 Dec 2018 06:52) (119/69 - 128/75)  BP(mean): --  RR: 18 (03 Dec 2018 06:52) (17 - 18)  SpO2: 100% (03 Dec 2018 06:52) (100% - 100%)    CAPILLARY BLOOD GLUCOSE      POCT Blood Glucose.: 94 mg/dL (02 Dec 2018 22:10)  POCT Blood Glucose.: 92 mg/dL (02 Dec 2018 17:56)  POCT Blood Glucose.: 103 mg/dL (02 Dec 2018 12:40)  POCT Blood Glucose.: 103 mg/dL (02 Dec 2018 08:52)      PHYSICAL EXAM:  GENERAL: NAD,   HEAD:  Atraumatic, Normocephalic  CHEST/LUNG: Clear to auscultation bilaterally; Decreased breath sounds on L axilla. L mass anterior portion of axilla.   HEART: Regular rate and rhythm; S1, S2, S3; No m/r/g  ABDOMEN: Soft, Nontender, moderately distended; Bowel sounds present  EXTREMITIES:  2+ Peripheral Pulses, +2 pitting edema of left LE. L LE cool to touch.   NEUROLOGY: No sensory or motor deficits of Upper and lower extremities.   SKIN: No rashes or lesions    LABS:                        11.7   11.52 )-----------( 345      ( 03 Dec 2018 06:00 )             37.5     Auto Eosinophil # x     / Auto Eosinophil % x     / Auto Neutrophil # x     / Auto Neutrophil % x     / BANDS % x                            12.2   10.86 )-----------( 327      ( 02 Dec 2018 05:25 )             38.7     Auto Eosinophil # x     / Auto Eosinophil % x     / Auto Neutrophil # x     / Auto Neutrophil % x     / BANDS % x        12-03    140  |  100  |  51<H>  ----------------------------<  87  4.1   |  14<L>  |  1.13  12-02    137  |  98  |  54<H>  ----------------------------<  91  4.0   |  13<L>  |  1.01    Ca    9.1      03 Dec 2018 06:00  Mg     2.9     12-03  Phos  2.9     12-03  TPro  6.5  /  Alb  2.9<L>  /  TBili  0.2  /  DBili  x   /  AST  38  /  ALT  21  /  AlkPhos  118  12-03    PTT - ( 03 Dec 2018 06:00 )  PTT:85.6 SEC        Lactate, Blood: 11.4 mmol/L (12-03 @ 06:00)  Lactate, Blood: 12.3 mmol/L (12-02 @ 05:25)  Lactate, Blood: 10.4 mmol/L (12-01 @ 05:15)              RADIOLOGY & ADDITIONAL TESTS:    Imaging Personally Reviewed:    Consultant(s) Notes Reviewed:      Care Discussed with Consultants/Other Providers:

## 2018-12-03 NOTE — PROGRESS NOTE ADULT - PROBLEM SELECTOR PLAN 2
- 2/2 lactic acidosis. Pt found to have large volume ascites on CT A/P.   - Dx paracentesis 11/30 to r/o SBP; No evidence of SBP on body fluid analysis   - Trend BMP  - Trend Lactate - 2/2 lactic acidosis. Pt found to have large volume ascites on CT A/P.   - Dx paracentesis 11/30 to r/o SBP; No evidence of SBP on body fluid analysis   - F/u Peritoneal fluid culture  - Trend BMP  - Trend Lactate 2/2 lactic acidosis. Pt found to have large volume ascites on CT A/P. Dx paracentesis 11/30 to r/o SBP; No evidence of SBP on body fluid analysis   Pt has persistently elevated lactate likely due to metastatic disease; specifically increased rates of lactate production by the neoplastic cells that shift to primarily anaerobic glycolysis (the "[place]" effect).   - Peritoneal fluid culture: NGTD  - Trend BMP  - Trend Lactate

## 2018-12-03 NOTE — DIETITIAN INITIAL EVALUATION ADULT. - PHYSICAL APPEARANCE
overweight/Patient with severe temporal wasting, moderate clavicle and shoulder muscle depletion, moderate triceps fat loss evident upon nutrition focused physical exam.

## 2018-12-03 NOTE — CHART NOTE - NSCHARTNOTEFT_GEN_A_CORE
NUTRITION SERVICES                                                                                  MALNUTRITION ALERT     Attention Health Care Provider: Upon nutritional assessment by the Registered Dietitian your patient was determined to meet criteria / has evidence of the following diagnosis/diagnoses:    [X] Severe Protein Calorie Malnutrition         By signing this assessment you are acknowledging the diagnosis/diagnoses.       PLAN OF CARE: Refer to Initial Dietitian Evaluation or Nutrition Follow-Up Documentation for Nutritional Recommendations.

## 2018-12-03 NOTE — DIETITIAN INITIAL EVALUATION ADULT. - OTHER INFO
Patient seen for length of stay nutrition follow-up. Per chart review patient with medical history of severe diverticulosis, metastatic melanoma s/p resection on immunotherapy with progression of disease, meningothelial meningioma s/p resection and RT, CAD s/p stent who presents with dehydration, nausea, and vomiting. Patient reports good appetite and PO intake at baseline. However, starting the day before Thanksgiving patient developed nausea/vomiting and could not tolerate PO intake. Patient also with "on and off" diarrhea. NKFA. Patient reports at nursing home he receives Ensure to supplement diet. While in-house patient receiving clear liquids. Patient notes he had first "real" meal last night (12/02). No GI distress (nausea/vomiting/diarrhea/constipation) noted at this time. Patient tolerating PO intake at this time and likes Ensure pudding supplement. Patient endorses weight loss prior to admission. UBW ~195#, admit weight noted to be 219#, possible bed-scale error given patient reports weight loss. Encouraged PO intake as tolerated.

## 2018-12-03 NOTE — DIETITIAN INITIAL EVALUATION ADULT. - PROBLEM SELECTOR PLAN 10
- DVT ppx held until CT head negative for mets from melanoma  - Clear liquid diet  - Dispo pending resolution of symptoms    GOC: Patient is DNR/DNI, will place paperwork in the chart    Sierra Montgomery  PGY 2 Night Admit  Pager [phone]

## 2018-12-03 NOTE — DIETITIAN INITIAL EVALUATION ADULT. - PROBLEM SELECTOR PLAN 1
- sepsis secondary likely to UTI, elevated WBC, lactate elevated  - will treat with zosyn given positive UA, urine culture pending  - BCx pending  -s/p 4L bolus in ED  -hold IV fluids, monitor closely volume status

## 2018-12-03 NOTE — DIETITIAN INITIAL EVALUATION ADULT. - ENERGY NEEDS
Height (cm): 182.88 (27 Nov 2018 00:25)  Weight (kg): 99.7 (27 Nov 2018 00:25)  BMI (kg/m2): 29.8 (27 Nov 2018 00:25)  IBW: 81kg +/-10%  3+ left leg, ankle edema per flowsheets

## 2018-12-03 NOTE — PROGRESS NOTE ADULT - PROBLEM SELECTOR PLAN 6
Patient with stage IV metastatic melanoma with progression of disease despite therapy. Progression of disease seen on CT chest/abdomen/pelvis including new ascites (asymptomatic at this time)  - s/p 4 doses of ipilumumab, s/p 1 dose of nivolumab on 11/1  - will need to follow up outpatient at [facility] with Dr. Goldberg

## 2018-12-03 NOTE — DIETITIAN INITIAL EVALUATION ADULT. - PERTINENT MEDS FT
acetaminophen   Tablet .. PRN  AQUAPHOR (petrolatum Ointment) PRN  aspirin enteric coated  atorvastatin  clopidogrel Tablet  dextrose 40% Gel PRN  dextrose 5%.  dextrose 50% Injectable  dextrose 50% Injectable  dextrose 50% Injectable  glucagon  Injectable PRN  heparin  Infusion.  heparin  Injectable PRN  heparin  Injectable PRN  insulin lispro (HumaLOG) corrective regimen sliding scale  insulin lispro (HumaLOG) corrective regimen sliding scale  melatonin  simethicone  sodium chloride 0.9%.

## 2018-12-03 NOTE — DIETITIAN INITIAL EVALUATION ADULT. - PROBLEM SELECTOR PLAN 3
- patient with stage IV metastatic melanoma with progression of disease despite therapy  - currently on immunotherapy (ipilimumab), last dose was 4 weeks ago and next dose due Wednesday of this week  - patient with progression of disease seen on CT chest/abdomen/pelvis including new ascites (asymptomatic at this time)  - will hold anticoagulation until CT head is done to rule out new brain mets given patient has had extensive progression of disease (last CT head in March 2018 and negative)  - emailed heme/onc to see patient in the AM with further recommendations

## 2018-12-03 NOTE — PROGRESS NOTE ADULT - PROBLEM SELECTOR PLAN 10
- DVT ppx held until CT head negative for mets from melanoma  - full liquid diet  - Dispo pending resolution of symptoms  Problem 11: Meningioma   - stable s/p resection and RT  GOC: Patient is DNR/DNI. Will need to complete MOLST form. - DVT ppx held until CT head negative for mets from melanoma  - Diet: Soft  - Dispo pending resolution of symptoms  Problem 11: Meningioma   - stable s/p resection and RT  GOC: Patient is DNR/DNI. Will need to complete MOLST form.

## 2018-12-03 NOTE — PROGRESS NOTE ADULT - SUBJECTIVE AND OBJECTIVE BOX
Follow Up: coag neg staph bacteremia with staph aureus in the urine    Interval History: the vanco was stopped after blood cxs were negative and the initial coag neg staph was not lugdunensis, pt still stable and afebrile off antibiotics    ROS:      All other systems negative    Constitutional: no fever, no chills  Head: no trauma  Eyes: no vision changes, no eye pain  ENT:  no sore throat, no rhinorrhea  Cardiovascular:  no chest pain, no palpitation  Respiratory:  some SOB after exertion, no cough  GI:  no abd pain, no vomiting, no diarrhea  urinary: no dysuria, no hematuria, no flank pain  musculoskeletal: LE edema  skin:  no rash  neurology:  no headache, no seizure, no change in mental status  psych: no anxiety, no depression         Allergies  No Known Allergies        ANTIMICROBIALS:      OTHER MEDS:  acetaminophen   Tablet .. 650 milliGRAM(s) Oral every 6 hours PRN  AQUAPHOR (petrolatum Ointment) 1 Application(s) Topical three times a day PRN  aspirin enteric coated 81 milliGRAM(s) Oral daily  atorvastatin 40 milliGRAM(s) Oral at bedtime  clopidogrel Tablet 75 milliGRAM(s) Oral daily  dextrose 40% Gel 15 Gram(s) Oral once PRN  dextrose 5%. 1000 milliLiter(s) IV Continuous <Continuous>  dextrose 50% Injectable 12.5 Gram(s) IV Push once  dextrose 50% Injectable 25 Gram(s) IV Push once  dextrose 50% Injectable 25 Gram(s) IV Push once  glucagon  Injectable 1 milliGRAM(s) IntraMuscular once PRN  heparin  Infusion. 1400 Unit(s)/Hr IV Continuous <Continuous>  heparin  Injectable 8000 Unit(s) IV Push every 6 hours PRN  heparin  Injectable 4000 Unit(s) IV Push every 6 hours PRN  insulin lispro (HumaLOG) corrective regimen sliding scale   SubCutaneous three times a day before meals  insulin lispro (HumaLOG) corrective regimen sliding scale   SubCutaneous at bedtime  melatonin 3 milliGRAM(s) Oral at bedtime  simethicone 80 milliGRAM(s) Chew daily  sodium chloride 0.9%. 1000 milliLiter(s) IV Continuous <Continuous>      Vital Signs Last 24 Hrs  T(C): 36.6 (03 Dec 2018 10:00), Max: 36.8 (02 Dec 2018 14:57)  T(F): 97.8 (03 Dec 2018 10:00), Max: 98.3 (02 Dec 2018 14:57)  HR: 88 (03 Dec 2018 10:00) (88 - 98)  BP: 143/78 (03 Dec 2018 10:00) (119/69 - 143/78)  BP(mean): --  RR: 18 (03 Dec 2018 10:00) (17 - 18)  SpO2: 99% (03 Dec 2018 10:00) (99% - 100%)    Physical Exam:  General:    NAD,  non toxic  Head: atraumatic, normocephalic  Eye: normal sclera and conjunctiva  ENT:    no oropharyngeal lesions,   no LAD,   neck supple  Cardio:     regular S1, S2,  no murmur  Respiratory:    clear b/l,    no wheezing  abd:     soft,   BS +,   no tenderness,    no organomegaly  :   no CVAT,  no suprapubic tenderness,   no  allen  Musculoskeletal:   b/l LE edema L significantly >R  vascular: no lines, normal pulses  Skin:    no rash  Neurologic:     no focal deficit  psych: normal affect, no suicidal ideation                          11.7   11.52 )-----------( 345      ( 03 Dec 2018 06:00 )             37.5       12-03    140  |  100  |  51<H>  ----------------------------<  87  4.1   |  14<L>  |  1.13    Ca    9.1      03 Dec 2018 06:00  Phos  2.9     12-03  Mg     2.9     12-03    TPro  6.5  /  Alb  2.9<L>  /  TBili  0.2  /  DBili  x   /  AST  38  /  ALT  21  /  AlkPhos  118  12-03          MICROBIOLOGY:  v  PERITONEAL FLUID  11-30-18 --  --    NOS^No Organisms Seen  WBC^White Blood Cells  QNTY CELLS IN GRAM STAIN: NO CELLS SEEN      BLOOD PERIPHERAL  11-28-18 --  --  --      URINE MIDSTREAM  11-26-18 --  --  Staphylococcus aureus      BLOOD PERIPHERAL  11-26-18 --  --  BLOOD CULTURE PCR  Staphylococcus haemolyticus      .Stool  11-19-18   No enteric pathogens isolated.  (Stool culture examined for Salmonella,  Shigella, Campylobacter, Aeromonas, Plesiomonas,  Vibrio, E.coli O157 and Yersinia)  --  --                RADIOLOGY:  Images below reviewed personally  < from: CT Abdomen and Pelvis w/ IV Cont (11.28.18 @ 17:53) >  IMPRESSION:     *  No infectious source is identified.  *  Unchanged metastatic disease.  *  Large volume of ascites unchanged from 11/26/2018.  *  Findings suspicious for subsegmental pulmonary embolism the left lower   lobe.  *  Findings raising a question of left lower extremity DVT. Correlate   with sonography.

## 2018-12-03 NOTE — DIETITIAN INITIAL EVALUATION ADULT. - PROBLEM SELECTOR PLAN 4
- secondary to renal failure, also with lactic acidosis likely secondary to sepsis  - will continue to monitor for now, bicarbonate drip in the setting of sepsis is unlikely to be beneficial so will recheck labs in the AM

## 2018-12-03 NOTE — DIETITIAN INITIAL EVALUATION ADULT. - PROBLEM SELECTOR PLAN 2
- likely pre-renal secondary to dehydration vs side effect from immunotherapy  - CT abdomen/pelvis does not show any signs of bladder obstruction  - s/p 4L of fluid, will recommend oral rehydration and recheck BMP in the AM to assess for improvement  - renally dose medications as needed  - given elev lactate and sepsis picture will not start on bicarb gtt, monitor pH after fluid resuscitation

## 2018-12-03 NOTE — PROGRESS NOTE ADULT - PROBLEM SELECTOR PLAN 1
Sepsis secondary likely to UTI (urine cx: Staph aureus), mild leukocytosis, worsening lactate. TTE negative for infection.   ID consulted. BCx (11/26): GPC in clusters  - Blood cx (11/28): NGTD  - ID recs appreciated; D/C'ed Vanco 12/1  - C/w NS IVF @ 60cc secondary likely to UTI (urine cx: Staph aureus), mild leukocytosis, worsening lactate. TTE negative for infection.   ID consulted. BCx (11/26): GPC in clusters  - Blood cx (11/28): NGTD  - ID recs appreciated; D/C'ed Vanco 12/1  - C/w NS IVF @ 60cc

## 2018-12-03 NOTE — PROGRESS NOTE ADULT - PROBLEM SELECTOR PLAN 4
Resolved.  - likely pre-renal secondary to dehydration vs side effect from immunotherapy  - CT abdomen/pelvis did not show any signs of bladder obstruction  - continue to trend bmp

## 2018-12-03 NOTE — DIETITIAN INITIAL EVALUATION ADULT. - PERTINENT LABORATORY DATA
12-03 Na 140 mmol/L Glu 87 mg/dL K+ 4.1 mmol/L Cr 1.13 mg/dL BUN 51 mg/dL<H> Phos 2.9 mg/dL Alb 2.9 g/dL<L> PAB n/a   Hgb 11.7 g/dL<L> Hct 37.5 %<L> HgA1C n/a    Hemoglobin A1C, Whole Blood: 5.7 % (11-27-18 @ 06:00)

## 2018-12-04 ENCOUNTER — APPOINTMENT (OUTPATIENT)
Dept: ENDOCRINOLOGY | Facility: CLINIC | Age: 58
End: 2018-12-04

## 2018-12-04 LAB
APTT BLD: 39.2 SEC — HIGH (ref 27.5–36.3)
BASE EXCESS BLDV CALC-SCNC: -9.7 MMOL/L — SIGNIFICANT CHANGE UP
BUN SERPL-MCNC: 50 MG/DL — HIGH (ref 7–23)
CALCIUM SERPL-MCNC: 9 MG/DL — SIGNIFICANT CHANGE UP (ref 8.4–10.5)
CHLORIDE SERPL-SCNC: 103 MMOL/L — SIGNIFICANT CHANGE UP (ref 98–107)
CO2 SERPL-SCNC: 13 MMOL/L — LOW (ref 22–31)
CREAT SERPL-MCNC: 1.07 MG/DL — SIGNIFICANT CHANGE UP (ref 0.5–1.3)
GLUCOSE SERPL-MCNC: 95 MG/DL — SIGNIFICANT CHANGE UP (ref 70–99)
HCO3 BLDV-SCNC: 17 MMOL/L — LOW (ref 20–27)
HCT VFR BLD CALC: 36.8 % — LOW (ref 39–50)
HGB BLD-MCNC: 11.5 G/DL — LOW (ref 13–17)
LACTATE SERPL-SCNC: 11.2 MMOL/L — CRITICAL HIGH (ref 0.5–2)
MAGNESIUM SERPL-MCNC: 2.7 MG/DL — HIGH (ref 1.6–2.6)
MCHC RBC-ENTMCNC: 31.3 % — LOW (ref 32–36)
MCHC RBC-ENTMCNC: 31.7 PG — SIGNIFICANT CHANGE UP (ref 27–34)
MCV RBC AUTO: 101.4 FL — HIGH (ref 80–100)
NRBC # FLD: 0 — SIGNIFICANT CHANGE UP
PCO2 BLDV: 34 MMHG — LOW (ref 41–51)
PH BLDV: 7.29 PH — LOW (ref 7.32–7.43)
PHOSPHATE SERPL-MCNC: 2.6 MG/DL — SIGNIFICANT CHANGE UP (ref 2.5–4.5)
PLATELET # BLD AUTO: 376 K/UL — SIGNIFICANT CHANGE UP (ref 150–400)
PMV BLD: 11 FL — SIGNIFICANT CHANGE UP (ref 7–13)
PO2 BLDV: 61 MMHG — HIGH (ref 35–40)
POTASSIUM SERPL-MCNC: 4.1 MMOL/L — SIGNIFICANT CHANGE UP (ref 3.5–5.3)
POTASSIUM SERPL-SCNC: 4.1 MMOL/L — SIGNIFICANT CHANGE UP (ref 3.5–5.3)
RBC # BLD: 3.63 M/UL — LOW (ref 4.2–5.8)
RBC # FLD: 16.4 % — HIGH (ref 10.3–14.5)
SAO2 % BLDV: 89.8 % — HIGH (ref 60–85)
SODIUM SERPL-SCNC: 140 MMOL/L — SIGNIFICANT CHANGE UP (ref 135–145)
WBC # BLD: 10.42 K/UL — SIGNIFICANT CHANGE UP (ref 3.8–10.5)
WBC # FLD AUTO: 10.42 K/UL — SIGNIFICANT CHANGE UP (ref 3.8–10.5)

## 2018-12-04 PROCEDURE — 99233 SBSQ HOSP IP/OBS HIGH 50: CPT

## 2018-12-04 PROCEDURE — 99232 SBSQ HOSP IP/OBS MODERATE 35: CPT

## 2018-12-04 RX ORDER — ENOXAPARIN SODIUM 100 MG/ML
90 INJECTION SUBCUTANEOUS
Qty: 0 | Refills: 0 | COMMUNITY
Start: 2018-12-04

## 2018-12-04 RX ORDER — SODIUM CHLORIDE 9 MG/ML
1000 INJECTION INTRAMUSCULAR; INTRAVENOUS; SUBCUTANEOUS
Qty: 0 | Refills: 0 | Status: DISCONTINUED | OUTPATIENT
Start: 2018-12-04 | End: 2018-12-05

## 2018-12-04 RX ORDER — SODIUM BICARBONATE 1 MEQ/ML
1 SYRINGE (ML) INTRAVENOUS
Qty: 0 | Refills: 0 | COMMUNITY
Start: 2018-12-04

## 2018-12-04 RX ORDER — ENOXAPARIN SODIUM 100 MG/ML
90 INJECTION SUBCUTANEOUS EVERY 12 HOURS
Qty: 0 | Refills: 0 | Status: DISCONTINUED | OUTPATIENT
Start: 2018-12-04 | End: 2018-12-05

## 2018-12-04 RX ORDER — LANOLIN ALCOHOL/MO/W.PET/CERES
1 CREAM (GRAM) TOPICAL
Qty: 0 | Refills: 0 | COMMUNITY
Start: 2018-12-04

## 2018-12-04 RX ORDER — SIMETHICONE 80 MG/1
1 TABLET, CHEWABLE ORAL
Qty: 0 | Refills: 0 | COMMUNITY
Start: 2018-12-04

## 2018-12-04 RX ADMIN — Medication 3 MILLIGRAM(S): at 21:59

## 2018-12-04 RX ADMIN — Medication 650 MILLIGRAM(S): at 21:59

## 2018-12-04 RX ADMIN — Medication 650 MILLIGRAM(S): at 13:13

## 2018-12-04 RX ADMIN — SODIUM CHLORIDE 60 MILLILITER(S): 9 INJECTION INTRAMUSCULAR; INTRAVENOUS; SUBCUTANEOUS at 16:02

## 2018-12-04 RX ADMIN — Medication 81 MILLIGRAM(S): at 12:14

## 2018-12-04 RX ADMIN — ENOXAPARIN SODIUM 90 MILLIGRAM(S): 100 INJECTION SUBCUTANEOUS at 17:35

## 2018-12-04 RX ADMIN — Medication 650 MILLIGRAM(S): at 04:10

## 2018-12-04 RX ADMIN — ATORVASTATIN CALCIUM 40 MILLIGRAM(S): 80 TABLET, FILM COATED ORAL at 21:59

## 2018-12-04 RX ADMIN — Medication 650 MILLIGRAM(S): at 18:39

## 2018-12-04 RX ADMIN — SODIUM CHLORIDE 60 MILLILITER(S): 9 INJECTION INTRAMUSCULAR; INTRAVENOUS; SUBCUTANEOUS at 21:59

## 2018-12-04 RX ADMIN — ENOXAPARIN SODIUM 100 MILLIGRAM(S): 100 INJECTION SUBCUTANEOUS at 06:55

## 2018-12-04 RX ADMIN — Medication 650 MILLIGRAM(S): at 19:39

## 2018-12-04 RX ADMIN — Medication 650 MILLIGRAM(S): at 03:40

## 2018-12-04 RX ADMIN — CLOPIDOGREL BISULFATE 75 MILLIGRAM(S): 75 TABLET, FILM COATED ORAL at 12:14

## 2018-12-04 RX ADMIN — SIMETHICONE 80 MILLIGRAM(S): 80 TABLET, CHEWABLE ORAL at 12:14

## 2018-12-04 RX ADMIN — Medication 650 MILLIGRAM(S): at 06:55

## 2018-12-04 RX ADMIN — SODIUM CHLORIDE 60 MILLILITER(S): 9 INJECTION INTRAMUSCULAR; INTRAVENOUS; SUBCUTANEOUS at 06:54

## 2018-12-04 NOTE — PROGRESS NOTE ADULT - PROBLEM SELECTOR PLAN 2
2/2 lactic acidosis. Pt found to have large volume ascites on CT A/P. Dx paracentesis 11/30 to r/o SBP; No evidence of SBP on body fluid analysis. No other infectious source noted in imaging studies. Evidence of LLL subsegmental PE found, however Pt not hypoxic.   Pt has persistently elevated lactate likely due to metastatic disease; specifically increased rates of lactate production by the neoplastic cells that shift to primarily anaerobic glycolysis (the "[place]" effect).   - Peritoneal fluid culture: NGTD  - Trend BMP  - Trend Lactate

## 2018-12-04 NOTE — PROGRESS NOTE ADULT - ASSESSMENT
58 m with severe diverticulosis, CAD s/p stent, metastatic melanoma s/p resection on immunotherapy with progression of disease, meningothelial meningioma s/p resection and RT, was started on a new immunotherapy 4 weeks ago and now admitted for hypotension, SILKE, dehydration, nausea and vomiting,  metabolic acidosis  afebrile here, WBC 12, no urinary symptoms and u/s appears contaminated  chest/abd/pelvis CT with worsening metastatic disease but no source of infection  blood cx: coag neg staph   urine cx MSSA  TTE negative  lactate went up to 10 but chest/abd CT with contrast no source of infection    dehydration and SILKE, FTT due to metastatic melanoma vs immunotherapy  CT with significantly worsening metastatic disease and omental caking  the coag neg staph (hemolyticus) bacteremia likely contaminant but the urine cx with staph aureus  blood cx negative  and TTE negative  elevated lactate, but pt not toxic and exam benign s/p paracentesis 11/30, cx negative, likely due to metastatic disease   hypermagnesemia , metabolic acidosis        * s/p 6 days of vanco  * stable and afebrile off antibiotics  * if fever or any signs of infection, pan culture and start vanco, zosyn  * will sign off, please call with questions

## 2018-12-04 NOTE — PROGRESS NOTE ADULT - SUBJECTIVE AND OBJECTIVE BOX
Follow Up: coag neg staph bacteremia with staph aureus in the urine    Interval History: the vanco was stopped after blood cxs were negative and the initial coag neg staph was not lugdunensis, pt still stable and afebrile off antibiotics    ROS:      All other systems negative    Constitutional: no fever, no chills  Head: no trauma  Eyes: no vision changes, no eye pain  ENT:  no sore throat, no rhinorrhea  Cardiovascular:  no chest pain, no palpitation  Respiratory:  some SOB after exertion, no cough  GI:  no abd pain, no vomiting, no diarrhea  urinary: no dysuria, no hematuria, no flank pain  musculoskeletal: LE edema, toe pain  skin:  no rash  neurology:  no headache, no seizure, no change in mental status  psych: no anxiety, no depression         Allergies  No Known Allergies        ANTIMICROBIALS:      OTHER MEDS:  acetaminophen   Tablet .. 650 milliGRAM(s) Oral every 6 hours PRN  AQUAPHOR (petrolatum Ointment) 1 Application(s) Topical three times a day PRN  aspirin enteric coated 81 milliGRAM(s) Oral daily  atorvastatin 40 milliGRAM(s) Oral at bedtime  clopidogrel Tablet 75 milliGRAM(s) Oral daily  dextrose 40% Gel 15 Gram(s) Oral once PRN  dextrose 5%. 1000 milliLiter(s) IV Continuous <Continuous>  dextrose 50% Injectable 12.5 Gram(s) IV Push once  dextrose 50% Injectable 25 Gram(s) IV Push once  dextrose 50% Injectable 25 Gram(s) IV Push once  enoxaparin Injectable 100 milliGRAM(s) SubCutaneous every 12 hours  glucagon  Injectable 1 milliGRAM(s) IntraMuscular once PRN  insulin lispro (HumaLOG) corrective regimen sliding scale   SubCutaneous three times a day before meals  insulin lispro (HumaLOG) corrective regimen sliding scale   SubCutaneous at bedtime  melatonin 3 milliGRAM(s) Oral at bedtime  simethicone 80 milliGRAM(s) Chew daily  sodium bicarbonate 650 milliGRAM(s) Oral three times a day  sodium chloride 0.9%. 1000 milliLiter(s) IV Continuous <Continuous>      Vital Signs Last 24 Hrs  T(C): 36.4 (04 Dec 2018 06:51), Max: 37.2 (03 Dec 2018 15:01)  T(F): 97.6 (04 Dec 2018 06:51), Max: 98.9 (03 Dec 2018 15:01)  HR: 88 (04 Dec 2018 06:51) (88 - 98)  BP: 125/74 (04 Dec 2018 06:51) (118/66 - 126/71)  BP(mean): --  RR: 17 (04 Dec 2018 06:51) (17 - 18)  SpO2: 100% (04 Dec 2018 06:51) (100% - 100%)    Physical Exam:  General:    NAD,  non toxic  Head: atraumatic, normocephalic  Eye: normal sclera and conjunctiva  ENT:    no oropharyngeal lesions,   no LAD,   neck supple  Cardio:     regular S1, S2,  no murmur  Respiratory:    clear b/l,    no wheezing  abd:     soft,   BS +,   no tenderness,    no organomegaly  :   no CVAT,  no suprapubic tenderness,   no  allen  Musculoskeletal:   b/l LE edema L significantly >R  vascular: no lines, normal pulses  Skin:    no rash  Neurologic:     no focal deficit  psych: normal affect, no suicidal ideation                          11.5   10.42 )-----------( 376      ( 04 Dec 2018 05:40 )             36.8       12-04    140  |  103  |  50<H>  ----------------------------<  95  4.1   |  13<L>  |  1.07    Ca    9.0      04 Dec 2018 05:40  Phos  2.6     12-04  Mg     2.7     12-04    TPro  6.5  /  Alb  2.9<L>  /  TBili  0.2  /  DBili  x   /  AST  38  /  ALT  21  /  AlkPhos  118  12-03          MICROBIOLOGY:  v  PERITONEAL FLUID  11-30-18 --  --    NOS^No Organisms Seen  WBC^White Blood Cells  QNTY CELLS IN GRAM STAIN: NO CELLS SEEN      BLOOD PERIPHERAL  11-28-18 --  --  --      URINE MIDSTREAM  11-26-18 --  --  Staphylococcus aureus      BLOOD PERIPHERAL  11-26-18 --  --  BLOOD CULTURE PCR  Staphylococcus haemolyticus      .Stool  11-19-18   No enteric pathogens isolated.  (Stool culture examined for Salmonella,  Shigella, Campylobacter, Aeromonas, Plesiomonas,  Vibrio, E.coli O157 and Yersinia)  --  --                RADIOLOGY:  Images below reviewed personally  < from: CT Abdomen and Pelvis w/ IV Cont (11.28.18 @ 17:53) >  IMPRESSION:     *  No infectious source is identified.  *  Unchanged metastatic disease.  *  Large volume of ascites unchanged from 11/26/2018.  *  Findings suspicious for subsegmental pulmonary embolism the left lower   lobe.  *  Findings raising a question of left lower extremity DVT. Correlate   with sonography.

## 2018-12-04 NOTE — PROGRESS NOTE ADULT - PROBLEM SELECTOR PLAN 1
secondary likely to UTI (urine cx: Staph aureus) s/p Vancomycin x6 w/ persistently elevated lactate.   ID consulted. BCx (11/26): GPC in clusters  - Blood cx (11/28): NGTD  - ID recs appreciated; D/C'ed Vanco 12/1  - C/w NS IVF @ 60cc

## 2018-12-04 NOTE — PROGRESS NOTE ADULT - PROBLEM SELECTOR PLAN 9
- DVT ppx held until CT head negative for mets from melanoma  - Diet: Soft  - Dispo pending resolution of symptoms  Problem 11: Meningioma   - stable s/p resection and RT  GOC: Patient is DNR/DNI. Will need to complete MOLST form.

## 2018-12-04 NOTE — PROGRESS NOTE ADULT - SUBJECTIVE AND OBJECTIVE BOX
Edmond Benavides MD  PGY 1 Department of Internal Medicine  Pager: [phone]/ [phone]    Patient is a 58y old  Male who presents with a chief complaint of Sepsis secondary to UTI (03 Dec 2018 14:29)      SUBJECTIVE / OVERNIGHT EVENTS: Pt seen and examined. No acute overnight events. Pt reported no subjective complaints.     MEDICATIONS  (STANDING):  aspirin enteric coated 81 milliGRAM(s) Oral daily  atorvastatin 40 milliGRAM(s) Oral at bedtime  clopidogrel Tablet 75 milliGRAM(s) Oral daily  dextrose 5%. 1000 milliLiter(s) (50 mL/Hr) IV Continuous <Continuous>  dextrose 50% Injectable 12.5 Gram(s) IV Push once  dextrose 50% Injectable 25 Gram(s) IV Push once  dextrose 50% Injectable 25 Gram(s) IV Push once  enoxaparin Injectable 100 milliGRAM(s) SubCutaneous every 12 hours  insulin lispro (HumaLOG) corrective regimen sliding scale   SubCutaneous three times a day before meals  insulin lispro (HumaLOG) corrective regimen sliding scale   SubCutaneous at bedtime  melatonin 3 milliGRAM(s) Oral at bedtime  simethicone 80 milliGRAM(s) Chew daily  sodium bicarbonate 650 milliGRAM(s) Oral three times a day  sodium chloride 0.9%. 1000 milliLiter(s) (60 mL/Hr) IV Continuous <Continuous>    MEDICATIONS  (PRN):  acetaminophen   Tablet .. 650 milliGRAM(s) Oral every 6 hours PRN Mild Pain (1 - 3), Moderate Pain (4 - 6), Severe Pain (7 - 10)  AQUAPHOR (petrolatum Ointment) 1 Application(s) Topical three times a day PRN for comfort  dextrose 40% Gel 15 Gram(s) Oral once PRN Blood Glucose LESS THAN 70 milliGRAM(s)/deciliter  glucagon  Injectable 1 milliGRAM(s) IntraMuscular once PRN Glucose LESS THAN 70 milligrams/deciliter      I&O's Summary    03 Dec 2018 07:01  -  04 Dec 2018 07:00  --------------------------------------------------------  IN: 0 mL / OUT: 400 mL / NET: -400 mL        Vital Signs Last 24 Hrs  T(C): 36.4 (04 Dec 2018 06:51), Max: 37.2 (03 Dec 2018 15:01)  T(F): 97.6 (04 Dec 2018 06:51), Max: 98.9 (03 Dec 2018 15:01)  HR: 88 (04 Dec 2018 06:51) (88 - 98)  BP: 125/74 (04 Dec 2018 06:51) (118/66 - 143/78)  BP(mean): --  RR: 17 (04 Dec 2018 06:51) (17 - 18)  SpO2: 100% (04 Dec 2018 06:51) (99% - 100%)    CAPILLARY BLOOD GLUCOSE      POCT Blood Glucose.: 106 mg/dL (03 Dec 2018 22:03)  POCT Blood Glucose.: 90 mg/dL (03 Dec 2018 18:23)  POCT Blood Glucose.: 96 mg/dL (03 Dec 2018 12:51)  POCT Blood Glucose.: 105 mg/dL (03 Dec 2018 09:08)      PHYSICAL EXAM:  GENERAL: NAD,   HEAD:  Atraumatic, Normocephalic  EYES: EOMI, PERRL, conjunctiva and sclera clear  NECK: No JVD  CHEST/LUNG: Clear to auscultation bilaterally; No wheeze  HEART: Regular rate and rhythm; No murmurs, rubs, or gallops  ABDOMEN: Soft, Nontender, Nondistended; Bowel sounds present  EXTREMITIES:  2+ Peripheral Pulses, No clubbing, cyanosis, or edema  PSYCH: AAOx3  NEUROLOGY: non-focal  SKIN: No rashes or lesions    LABS:                        11.5   10.42 )-----------( 376      ( 04 Dec 2018 05:40 )             36.8     Auto Eosinophil # x     / Auto Eosinophil % x     / Auto Neutrophil # x     / Auto Neutrophil % x     / BANDS % x                            11.7   11.52 )-----------( 345      ( 03 Dec 2018 06:00 )             37.5     Auto Eosinophil # x     / Auto Eosinophil % x     / Auto Neutrophil # x     / Auto Neutrophil % x     / BANDS % x        12-04    140  |  103  |  50<H>  ----------------------------<  95  4.1   |  13<L>  |  1.07  12-03    140  |  100  |  51<H>  ----------------------------<  87  4.1   |  14<L>  |  1.13    Ca    9.0      04 Dec 2018 05:40  Mg     2.7     12-04  Phos  2.6     12-04  TPro  6.5  /  Alb  2.9<L>  /  TBili  0.2  /  DBili  x   /  AST  38  /  ALT  21  /  AlkPhos  118  12-03    PTT - ( 04 Dec 2018 05:45 )  PTT:39.2 SEC        Lactate, Blood: 11.2 mmol/L (12-04 @ 05:40)  Lactate, Blood: 11.4 mmol/L (12-03 @ 06:00)  Lactate, Blood: 12.3 mmol/L (12-02 @ 05:25)        RADIOLOGY & ADDITIONAL TESTS:    Imaging Personally Reviewed:    Consultant(s) Notes Reviewed:      Care Discussed with Consultants/Other Providers:

## 2018-12-04 NOTE — PROGRESS NOTE ADULT - PROBLEM SELECTOR PLAN 4
Pt with prolonged QTc. Last QTc: 559.   - Pt noted to have QTc: 569. Trazodone was held. Repeat QTc: 554 (12/3)  - Avoid QTc prolonging agents.

## 2018-12-04 NOTE — PROGRESS NOTE ADULT - PROBLEM SELECTOR PLAN 3
Extensive DVT found on L LE. Evidence of LLL subsegmental PE. Pt started on Heparin ggt after CTH r/o brain mets  - Heparin stopped 12/3  - C/w lovenox 100mg BID

## 2018-12-04 NOTE — PROGRESS NOTE ADULT - PROBLEM SELECTOR PLAN 5
Patient with stage IV metastatic melanoma with progression of disease despite therapy. Progression of disease seen on CT chest/abdomen/pelvis including new ascites (asymptomatic at this time)  - s/p 4 doses of ipilumumab, s/p 1 dose of nivolumab on 11/1  - will need to follow up outpatient at [facility] with Dr. Goldberg

## 2018-12-05 VITALS
DIASTOLIC BLOOD PRESSURE: 75 MMHG | TEMPERATURE: 98 F | OXYGEN SATURATION: 99 % | HEART RATE: 95 BPM | SYSTOLIC BLOOD PRESSURE: 127 MMHG | RESPIRATION RATE: 17 BRPM

## 2018-12-05 DIAGNOSIS — E46 UNSPECIFIED PROTEIN-CALORIE MALNUTRITION: ICD-10-CM

## 2018-12-05 DIAGNOSIS — Z71.89 OTHER SPECIFIED COUNSELING: ICD-10-CM

## 2018-12-05 PROCEDURE — 99497 ADVNCD CARE PLAN 30 MIN: CPT | Mod: GC,25

## 2018-12-05 PROCEDURE — 99239 HOSP IP/OBS DSCHRG MGMT >30: CPT

## 2018-12-05 RX ADMIN — Medication 650 MILLIGRAM(S): at 06:56

## 2018-12-05 RX ADMIN — ENOXAPARIN SODIUM 90 MILLIGRAM(S): 100 INJECTION SUBCUTANEOUS at 06:55

## 2018-12-05 RX ADMIN — SODIUM CHLORIDE 60 MILLILITER(S): 9 INJECTION INTRAMUSCULAR; INTRAVENOUS; SUBCUTANEOUS at 06:55

## 2018-12-05 NOTE — PROGRESS NOTE ADULT - PROBLEM SELECTOR PLAN 5
Patient with stage IV metastatic melanoma with progression of disease despite therapy. Progression of disease seen on CT chest/abdomen/pelvis including new ascites (asymptomatic at this time)  - s/p 4 doses of ipilumumab, s/p 1 dose of nivolumab on 11/1  - [place] [facility] [place]

## 2018-12-05 NOTE — PROGRESS NOTE ADULT - PROBLEM SELECTOR PLAN 7
A1c: 5.7  - ISS No c/o CP, palpitations or FAIR;   - patient with CAD, s/p stent in January 2018  - troponin previously elevated but likely in the setting of sepsis and SILKE  - will continue ASA and plavix Extensive bedside discussion about Pt condition, advance metastatic melanoma, repeat CT showed progression of disease. Other comorbidities are including extensive DVT/PE, CAD with stents, diastolic HF. Severely elevated lactate, not responding with IVF, likely due to advance malignancy +/- poor oral intake. Pt expressed full understanding, MOLST form filled up, DNR/DNI, he still wants to continue immunotherapy in [facility]. Oncology updated about Pt currently condition. Very poor prognosis.   Time spent 22 min face to face.

## 2018-12-05 NOTE — PROGRESS NOTE ADULT - PROBLEM SELECTOR PROBLEM 9
Need for prophylactic measure Protein calorie malnutrition Diabetes mellitus type 2, insulin dependent

## 2018-12-05 NOTE — PROGRESS NOTE ADULT - PROBLEM SELECTOR PLAN 8
No c/o CP, palpitations or FAIR;   - patient with CAD, s/p stent in January 2018  - troponin previously elevated but likely in the setting of sepsis and SILKE  - will continue ASA and plavix Extensive bedside discussion about Pt condition, advance metastatic melanoma, repeat CT showed progression of disease. Other comorbidities are including extensive DVT/PE, CAD with stents, diastolic HF. Severely elevated lactate, not responding with IVF, likely due to advance malignancy +/- poor oral intake. Pt expressed full understanding, MOLST form filled up, DNR/DNI, he still wants to continue immunotherapy in [facility]. Oncology updated about Pt currently condition. Very poor prognosis.   Time spent 22 min face to face. Nutrition consult appreciated, severe protein calorie malnutrition, supplemented with Ensure Pudding, encourage oral intake.

## 2018-12-05 NOTE — PROGRESS NOTE ADULT - REASON FOR ADMISSION
Sepsis secondary to UTI

## 2018-12-05 NOTE — PROGRESS NOTE ADULT - PROBLEM SELECTOR PLAN 6
- Echo in January with EF 55-60%, grade II diastolic dysfunction  - on aldactone, furosemide, carvedilol, hydralazine, isosorbide  - consider restarting BP meds prn as BP remains stable. - Echo in January with EF 55-60%, grade II diastolic dysfunction  - on aldactone, furosemide, carvedilol, hydralazine, isosorbide previously  - continue to hold BP meds, currently BP remains stable.  - continue ASA and plavix

## 2018-12-05 NOTE — PROGRESS NOTE ADULT - ASSESSMENT
57 y/o Male with w/ severe diverticulosis, metastatic melanoma s/p resection on immunotherapy with progression of disease, meningothelial meningioma s/p resection and RT, CAD s/p stent admitted for sepsis secondary GPC bacteremia likely 2/2 Staph, Aureus UTI c/b L LE DVT, SILKE with metabolic acidosis in the setting of worsening metastatic disease; currently HD stable, however lactate remains elevated.

## 2018-12-05 NOTE — PROGRESS NOTE ADULT - PROBLEM SELECTOR PLAN 9
- DVT ppx held until CT head negative for mets from melanoma  - Diet: Soft  - Dispo: back to nursing facility  Problem 11: Meningioma   - stable s/p resection and RT  GOC: Patient is DNR/DNI. Will need to complete MOLST form. Nutrition consult appreciated, severe protein calorie malnutrition, supplemented with Ensure Pudding, encourage oral intake. Hg A1c 5.7, hold home Lantus and Humalog, Close monitor FS in NH, on HISS.

## 2018-12-05 NOTE — PROGRESS NOTE ADULT - SUBJECTIVE AND OBJECTIVE BOX
Edmond Benavides MD  PGY 1 Department of Internal Medicine  Pager: [phone]/ [phone]    Patient is a 58y old  Male who presents with a chief complaint of Sepsis secondary to UTI (04 Dec 2018 12:57)      SUBJECTIVE / OVERNIGHT EVENTS: Pt seen and examined. No acute overnight events. Pt reported no subjective complaints.     MEDICATIONS  (STANDING):  aspirin enteric coated 81 milliGRAM(s) Oral daily  atorvastatin 40 milliGRAM(s) Oral at bedtime  clopidogrel Tablet 75 milliGRAM(s) Oral daily  dextrose 5%. 1000 milliLiter(s) (50 mL/Hr) IV Continuous <Continuous>  dextrose 50% Injectable 12.5 Gram(s) IV Push once  dextrose 50% Injectable 25 Gram(s) IV Push once  dextrose 50% Injectable 25 Gram(s) IV Push once  enoxaparin Injectable 90 milliGRAM(s) SubCutaneous every 12 hours  insulin lispro (HumaLOG) corrective regimen sliding scale   SubCutaneous three times a day before meals  insulin lispro (HumaLOG) corrective regimen sliding scale   SubCutaneous at bedtime  melatonin 3 milliGRAM(s) Oral at bedtime  simethicone 80 milliGRAM(s) Chew daily  sodium bicarbonate 650 milliGRAM(s) Oral three times a day  sodium chloride 0.9%. 1000 milliLiter(s) (60 mL/Hr) IV Continuous <Continuous>    MEDICATIONS  (PRN):  acetaminophen   Tablet .. 650 milliGRAM(s) Oral every 6 hours PRN Mild Pain (1 - 3), Moderate Pain (4 - 6), Severe Pain (7 - 10)  AQUAPHOR (petrolatum Ointment) 1 Application(s) Topical three times a day PRN for comfort  dextrose 40% Gel 15 Gram(s) Oral once PRN Blood Glucose LESS THAN 70 milliGRAM(s)/deciliter  glucagon  Injectable 1 milliGRAM(s) IntraMuscular once PRN Glucose LESS THAN 70 milligrams/deciliter      I&O's Summary    04 Dec 2018 07:01  -  05 Dec 2018 07:00  --------------------------------------------------------  IN: 0 mL / OUT: 200 mL / NET: -200 mL        Vital Signs Last 24 Hrs  T(C): 36.6 (05 Dec 2018 06:54), Max: 37 (04 Dec 2018 15:08)  T(F): 97.9 (05 Dec 2018 06:54), Max: 98.6 (04 Dec 2018 15:08)  HR: 95 (05 Dec 2018 06:54) (95 - 101)  BP: 127/75 (05 Dec 2018 06:54) (123/81 - 130/81)  BP(mean): --  RR: 17 (05 Dec 2018 06:54) (17 - 18)  SpO2: 99% (05 Dec 2018 06:54) (99% - 100%)    CAPILLARY BLOOD GLUCOSE      POCT Blood Glucose.: 90 mg/dL (04 Dec 2018 21:46)  POCT Blood Glucose.: 88 mg/dL (04 Dec 2018 18:14)  POCT Blood Glucose.: 99 mg/dL (04 Dec 2018 12:39)  POCT Blood Glucose.: 87 mg/dL (04 Dec 2018 08:54)      PHYSICAL EXAM:  GENERAL: NAD,   HEAD:  Atraumatic, Normocephalic  CHEST/LUNG: Clear to auscultation bilaterally; Decreased breath sounds on L axilla. L mass anterior portion of axilla.   HEART: Regular rate and rhythm; S1, S2, S3; No m/r/g  ABDOMEN: Soft, Nontender, moderately distended; Bowel sounds present  EXTREMITIES:  2+ Peripheral Pulses, +2 pitting edema of left LE. L LE cool to touch.   NEUROLOGY: No sensory or motor deficits of Upper and lower extremities.   SKIN: No rashes or lesions    LABS:                        11.5   10.42 )-----------( 376      ( 04 Dec 2018 05:40 )             36.8     Auto Eosinophil # x     / Auto Eosinophil % x     / Auto Neutrophil # x     / Auto Neutrophil % x     / BANDS % x        12-04    140  |  103  |  50<H>  ----------------------------<  95  4.1   |  13<L>  |  1.07    Ca    9.0      04 Dec 2018 05:40  Mg     2.7     12-04  Phos  2.6     12-04    PTT - ( 04 Dec 2018 05:45 )  PTT:39.2 SEC        Lactate, Blood: 11.2 mmol/L (12-04 @ 05:40)  Lactate, Blood: 11.4 mmol/L (12-03 @ 06:00)  Lactate, Blood: 12.3 mmol/L (12-02 @ 05:25)        RADIOLOGY & ADDITIONAL TESTS:    Imaging Personally Reviewed:    Consultant(s) Notes Reviewed:      Care Discussed with Consultants/Other Providers:

## 2018-12-05 NOTE — PROGRESS NOTE ADULT - ATTENDING COMMENTS
Pt seen examined and d/w fellow. Agree with above A/P. Pt looks stable. PE lungs clear heart RRR S1S2 abd +BS soft / NT A/P : await results of paracentesis. If neg for SBP and other CXs return negative and still spiking and with elevated lactate and looks stable would consider whether the elevated lactate may be secondary to cancer as there are reports of switch of certain malignancies to anaerobic / glycolytic pathway as diagnosis of exclusion.  In that case would d/w primary onc re potential continuation of therapy for underlying met melanoma.
Patient was seen and examined with house staff, agree with the above finding and plan  59 yo M PMHx severe diverticulosis, metastatic melanoma s/p resection on immunotherapy with progression of disease, meningothelial meningioma s/p resection and RT, CAD s/p stent was  admitted for sepsis secondary GPC bacteremia likely 2/2 UTI, c/b SILKE with metabolic acidosis in the setting of worsening metastatic disease. Pt  still c/o feeling nauseous   -GPC bacteremia: was on vanco and zosyn, Abx were held as per ID as Coag neg staph likely contaminant. Urine positive with staph, on vanco, repeat blood cx no growth, s/p TTE, vanco dced as per ID recommendation, appreciated. No signs of sepsis clinically.  -elevated lactate and metabolic acidosis: etiology not clear, Ct scan noted, no evidence of infection or ischemia , as per oncology, diagnostic paracentesis negative for infection. Lactate still high, likely related to diffuse metastatic cancer, and diffuse DVT/PE, with poor nutrition. Repeat VBG PH 7.29, empirically started NaHCO3, f/u BMP and lactate, IVF.  -SILKE : likely pre renal , pt s/p 4 L in ED, now resolved , will continue maintenance fluid, avoid nephrotoxics   -New LLE DVT, also positive for PE, on heparin drip, changed to lovenox.    -Metastatic Melanoma: with progression of disease. Continue on immunotherapy, f/u oncology out patient. Overall poor prognosis.    -CAD, s/p stents, CHF, elevated troponin likely related to SILKE, pt denies CP  TTE noted. Continue on asa, plavix and statins    - prolonged Qtc, trazodone stopped, will repeat EKG, correcting electrolytes and correcting metabolic acidosis.   - Nausea : cannot use Zofran or Reglan due to prolonged Qtc, will try ativan for nausea   pt from NH, PT eval recommend Return to SNF for long-term care with in-house PT services  prognosis guarded, Pt is aware, still want continue immunotherapy. D/c planning NH if stable.
Patient seen and examined by myself , case discussed  with house staff ,agree with the above finding and plan  58 yr old male with PMHx of  severe diverticulosis, metastatic melanoma s/p resection on immunotherapy with progression of disease, meningothelial meningioma s/p resection and RT, CAD s/p stent admitted for sepsis secondary GPC bacteremia likely 2/2 UTI c/b SILKE with metabolic acidosis in the setting of worsening metastatic disease  GPC bacteremia : pt s/p one dose of vanco yesterday, ok to c/w zosyn for now ID eval requested. will f/u repeat blood cx source not clear . ?UTI , will check TTE , f/u repeat lactate   SILKE : likely pre renal , pt s/p 4 L in ED, will continue maintenance fluid , Cr trending down,. will continue to monitor, avoid nephrotoxics   New LLE DVT; C head negative, will start heparin drip   Meningioma on immunotherapy, with progression of disease , will f/u oncology eval   CAD, s/p stents, CHF, elevated troponin likely related to SILKE, pt denies CP, will check TTE   pt from NH, will request PT eval
Patient seen and examined by myself , case discussed  with house staff ,agree with the above finding and plan  58 yr old male with PMHx of  severe diverticulosis, metastatic melanoma s/p resection on immunotherapy with progression of disease, meningothelial meningioma s/p resection and RT, CAD s/p stent admitted for sepsis secondary GPC bacteremia likely 2/2 UTI c/b SILKE with metabolic acidosis in the setting of worsening metastatic disease  pt  still c/o feeling nauseous   GPC bacteremia : pt was started on vanco and zosyn from ED, but Abx were held as per ID recommendation as Coag neg staph was considered contaminant, Urine also growing staph, ID recommend restarting vanco ,    f/u repeat blood cx,  NGTD TTE noted   coag neg staph staph hemolyticus, vanco dced, ID f/u appreciated   elevated lactate and metabolic acidosis :etiology not clear Ct scan noted, no evidence of infection or ischemia , case d/w oncology, recommend checking diagnostic paracentesis , s/p para ,  reports negative for infection , Lactate still high ,likely related to metastatic cancer   SILKE : likely pre renal , pt s/p 4 L in ED, now resolved , will continue maintenance fluid , will continue to monitor, avoid nephrotoxics   New LLE DVT; also noted to have PE  CT head negative, c/w heparin drip   Metastatic Melanoma  on immunotherapy, with progression of disease ,  oncology eval  appreciated   CAD, s/p stents, CHF, elevated troponin likely related to SILKE, pt denies CP  TTE noted, pt on asa, plavix and heparin drip, can consider DC plavix, c/w statins    prolonged Qtc, trazodone stopped, will repeat EKG  Nausea : cannot use Zofran or Reglan due to prolonged Qtc, , will try ativan for nausea   pt from NH, PT eval recommend Return to SNF for long-term care with in-house PT services  prognosis guarded
Patient was seen and examined with house staff, agree with the above finding and plan  59 yo M PMHx severe diverticulosis, metastatic melanoma s/p resection on immunotherapy with progression of disease, meningothelial meningioma s/p resection and RT, CAD s/p stent was  admitted for sepsis secondary GPC bacteremia likely 2/2 UTI, c/b SILKE with metabolic acidosis in the setting of worsening metastatic disease. Pt  still c/o feeling nauseous   -GPC bacteremia: was on vanco and zosyn, Abx were held as per ID as Coag neg staph likely contaminant. Urine positive with staph, on vanco, repeat blood cx no growth, s/p TTE, vanco dced as per ID recommendation, appreciated. No signs of sepsis clinically.  -elevated lactate and metabolic acidosis: etiology not clear, Ct scan noted, no evidence of infection or ischemia , as per oncology, diagnostic paracentesis negative for infection. Lactate still high, likely related to diffuse metastatic cancer, and diffuse DVT/PE, with poor nutrition. Repeat VBG PH 7.29, empirically started NaHCO3. Close f/u BMP and lactate out patient.   -SILKE : likely pre renal , pt s/p 4 L in ED, now resolved. Avoid nephrotoxics, encourage oral intake.  -New LLE DVT, also positive for PE, on heparin drip, changed to lovenox.    -Metastatic Melanoma: with progression of disease. Pt want to continue on immunotherapy, f/u oncology out patient. Overall poor prognosis.    -CAD, s/p stents, CHF, elevated troponin likely related to SILKE, pt denies CP  TTE noted. Continue on asa, plavix and statins    - prolonged Qtc, trazodone stopped, will repeat EKG, correcting electrolytes and correcting metabolic acidosis.   - Nausea : cannot use Zofran or Reglan due to prolonged Qtc, can try ativan for nausea   Overall prognosis guarded, Pt is aware, still want continue immunotherapy. D/c NH today, transportation arrange. Discharge time 35 min.
Patient seen and examined by myself , case discussed  with house staff ,agree with the above finding and plan  58 yr old male with PMHx of  severe diverticulosis, metastatic melanoma s/p resection on immunotherapy with progression of disease, meningothelial meningioma s/p resection and RT, CAD s/p stent admitted for sepsis secondary GPC bacteremia likely 2/2 UTI c/b SILKE with metabolic acidosis in the setting of worsening metastatic disease  pt clinically feeling better today, nausea and vomiting as improved   GPC bacteremia : pt was started on vanco and zosyn from ED, but Abx were held as per ID recommendation as Coag neg staph was considered contaminant, Urine also growing staph, ID recommend restarting vanco ,   will f/u repeat blood cx will check TTE   elevated lactate and metabolic acidosis : will check ABG , repeat lactate  c/w IVF , and Abx   SILKE : likely pre renal , pt s/p 4 L in ED, will continue maintenance fluid , Cr trending down,. will continue to monitor, avoid nephrotoxics   New LLE DVT; C head negative, c/w heparin drip   Meningioma on immunotherapy, with progression of disease ,  oncology eval  appreciated   CAD, s/p stents, CHF, elevated troponin likely related to SILKE, pt denies CP, will check TTE   pt from NH, will request PT eval
Patient was seen and examined with house staff, agree with the above finding and plan  57 yo M PMHx severe diverticulosis, metastatic melanoma s/p resection on immunotherapy with progression of disease, meningothelial meningioma s/p resection and RT, CAD s/p stent was  admitted for sepsis secondary GPC bacteremia likely 2/2 UTI, c/b SILKE with metabolic acidosis in the setting of worsening metastatic disease. Pt  still c/o feeling nauseous   -GPC bacteremia: was on vanco and zosyn, Abx were held as per ID as Coag neg staph likely contaminant. Urine positive with staph, on vanco, repeat blood cx no growth, s/p TTE, vanco dced as per ID recommendation, appreciated. No signs of sepsis clinically.  -elevated lactate and metabolic acidosis: etiology not clear, Ct scan noted, no evidence of infection or ischemia , as per oncology, diagnostic paracentesis negative for infection. Lactate still high, likely related to diffuse metastatic cancer, and diffuse DVT/PE, with poor nutrition. Repeat VBG for PH, empirically started NaHCO3, f/u BMP and lactate.  -SILKE : likely pre renal , pt s/p 4 L in ED, now resolved , will continue maintenance fluid, avoid nephrotoxics   -New LLE DVT, also positive for PE, on heparin drip, will change to lovenos.    -Metastatic Melanoma: with progression of disease. Continue on immunotherapy, f/u oncology out patient. Overall poor prognosis.    -CAD, s/p stents, CHF, elevated troponin likely related to SILKE, pt denies CP  TTE noted. Continue on asa, plavix and statins    - prolonged Qtc, trazodone stopped, will repeat EKG, correcting electrolytes and correcting metabolic acidosis.   -Nausea : cannot use Zofran or Reglan due to prolonged Qtc, will try ativan for nausea   pt from NH, PT eval recommend Return to SNF for long-term care with in-house PT services  prognosis guarded.
Patient seen and examined by myself , case discussed  with house staff ,agree with the above finding and plan  58 yr old male with PMHx of  severe diverticulosis, metastatic melanoma s/p resection on immunotherapy with progression of disease, meningothelial meningioma s/p resection and RT, CAD s/p stent admitted for sepsis secondary GPC bacteremia likely 2/2 UTI c/b SILKE with metabolic acidosis in the setting of worsening metastatic disease  pt clinically feeling better , nausea and vomiting as improved   GPC bacteremia : pt was started on vanco and zosyn from ED, but Abx were held as per ID recommendation as Coag neg staph was considered contaminant, Urine also growing staph, ID recommend restarting vanco ,   will f/u repeat blood cx,  NGTD TTE noted   ID recommends if the repeat blood cx remains negative for 48 hours and the coag neg is not lugdunensis, will discontinue vanco  elevated lactate and metabolic acidosis :etiology not clear Ct scan noted, no evidence of infection or ischemia , case d/w oncology, recommend checking diagnostic paracentesis , s/p para today, prelim reports negative for infection   SILKE : likely pre renal , pt s/p 4 L in ED, now resolved , will continue maintenance fluid , will continue to monitor, avoid nephrotoxics   New LLE DVT; also noted to have PE  CT head negative, c/w heparin drip   Metastatic Melanoma  on immunotherapy, with progression of disease ,  oncology eval  appreciated   CAD, s/p stents, CHF, elevated troponin likely related to SILKE, pt denies CP  TTE noted, pt on asa, plavix and heparin drip, can consider DC plavix, c/w statins    pt from NH, will request PT eval, recommend Return to SNF for long-term care with in-house PT services  prognosis guarded
Patient seen and examined by myself , case discussed  with house staff ,agree with the above finding and plan  58 yr old male with PMHx of  severe diverticulosis, metastatic melanoma s/p resection on immunotherapy with progression of disease, meningothelial meningioma s/p resection and RT, CAD s/p stent admitted for sepsis secondary GPC bacteremia likely 2/2 UTI c/b SILKE with metabolic acidosis in the setting of worsening metastatic disease  pt c/o feeling nauseous today    GPC bacteremia : pt was started on vanco and zosyn from ED, but Abx were held as per ID recommendation as Coag neg staph was considered contaminant, Urine also growing staph, ID recommend restarting vanco ,   will f/u repeat blood cx,  NGTD TTE noted   ID recommends if the repeat blood cx remains negative for 48 hours and the coag neg is not lugdunensis, will discontinue vanco  elevated lactate and metabolic acidosis :etiology not clear Ct scan noted, no evidence of infection or ischemia , case d/w oncology, recommend checking diagnostic paracentesis , s/p para today, prelim reports negative for infection , Lactate slightly trended down   SILKE : likely pre renal , pt s/p 4 L in ED, now resolved , will continue maintenance fluid , will continue to monitor, avoid nephrotoxics   New LLE DVT; also noted to have PE  CT head negative, c/w heparin drip   Metastatic Melanoma  on immunotherapy, with progression of disease ,  oncology eval  appreciated   CAD, s/p stents, CHF, elevated troponin likely related to SILKE, pt denies CP  TTE noted, pt on asa, plavix and heparin drip, can consider DC plavix, c/w statins    pt from NH, PT eval recommend Return to SNF for long-term care with in-house PT services  prognosis guarded
Patient seen and examined by myself , case discussed  with house staff ,agree with the above finding and plan  58 yr old male with PMHx of  severe diverticulosis, metastatic melanoma s/p resection on immunotherapy with progression of disease, meningothelial meningioma s/p resection and RT, CAD s/p stent admitted for sepsis secondary GPC bacteremia likely 2/2 UTI c/b SILKE with metabolic acidosis in the setting of worsening metastatic disease  pt clinically feeling better today, nausea and vomiting as improved   GPC bacteremia : pt was started on vanco and zosyn from ED, but Abx were held as per ID recommendation as Coag neg staph was considered contaminant, Urine also growing staph, ID recommend restarting vanco ,   will f/u repeat blood cx  TTE noted as above   elevated lactate and metabolic acidosis :etiology not clear Ct scan noted, no evidence of infection or ischemia , case d/w oncology, recommend checking diagnostic paracentesis   SILKE : likely pre renal , pt s/p 4 L in ED, now resolved , will continue maintenance fluid , will continue to monitor, avoid nephrotoxics   New LLE DVT; also noted to have PE  CT head negative, c/w heparin drip   Meningioma on immunotherapy, with progression of disease ,  oncology eval  appreciated   CAD, s/p stents, CHF, elevated troponin likely related to SILKE, pt denies CP  TTE noted, pt on asa, plavix and heparin drip, can consider DC plavix   pt from NH, will request PT eval  prognosis guarded

## 2018-12-05 NOTE — PROGRESS NOTE ADULT - PROBLEM SELECTOR PLAN 10
Extensive bedside discussion about Pt condition, advance metastatic melanoma, repeat CT showed progression of disease. Other comorbidities are including extensive DVT/PE, CAD with stents, diastolic HF. Severely elevated lactate, not responding with IVF, likely due to advance malignancy +/- poor oral intake. Pt expressed full understanding, MOLST form filled up, DNR/DNI, he still wants to continue immunotherapy in [facility]. Oncology updated about Pt currently condition. Very poor prognosis.   Time spent 22 min face to face. - DVT ppx held until CT head negative for mets from melanoma  - Diet: Soft  - Dispo: back to nursing facility  Problem 11: Meningioma   - stable s/p resection and RT  GOC: Patient is DNR/DNI. Will need to complete MOLST form.

## 2018-12-06 LAB — BACTERIA FLD CULT: SIGNIFICANT CHANGE UP

## 2018-12-11 ENCOUNTER — APPOINTMENT (OUTPATIENT)
Dept: RADIATION ONCOLOGY | Facility: CLINIC | Age: 58
End: 2018-12-11

## 2018-12-12 ENCOUNTER — OUTPATIENT (OUTPATIENT)
Dept: OUTPATIENT SERVICES | Facility: HOSPITAL | Age: 58
LOS: 1 days | Discharge: HOME | End: 2018-12-12

## 2018-12-12 DIAGNOSIS — H57.9 UNSPECIFIED DISORDER OF EYE AND ADNEXA: Chronic | ICD-10-CM

## 2018-12-12 DIAGNOSIS — C43.8 MALIGNANT MELANOMA OF OVERLAPPING SITES OF SKIN: ICD-10-CM

## 2018-12-12 DIAGNOSIS — Z95.5 PRESENCE OF CORONARY ANGIOPLASTY IMPLANT AND GRAFT: Chronic | ICD-10-CM

## 2018-12-13 ENCOUNTER — APPOINTMENT (OUTPATIENT)
Dept: HEMATOLOGY ONCOLOGY | Facility: CLINIC | Age: 58
End: 2018-12-13

## 2018-12-13 ENCOUNTER — INPATIENT (INPATIENT)
Facility: HOSPITAL | Age: 58
LOS: 5 days | Discharge: HOSPICE | End: 2018-12-19
Attending: INTERNAL MEDICINE | Admitting: INTERNAL MEDICINE
Payer: MEDICAID

## 2018-12-13 VITALS
SYSTOLIC BLOOD PRESSURE: 102 MMHG | HEART RATE: 90 BPM | DIASTOLIC BLOOD PRESSURE: 71 MMHG | RESPIRATION RATE: 18 BRPM | OXYGEN SATURATION: 97 %

## 2018-12-13 VITALS
OXYGEN SATURATION: 100 % | HEART RATE: 96 BPM | SYSTOLIC BLOOD PRESSURE: 91 MMHG | DIASTOLIC BLOOD PRESSURE: 69 MMHG | RESPIRATION RATE: 16 BRPM

## 2018-12-13 DIAGNOSIS — C79.9 SECONDARY MALIGNANT NEOPLASM OF UNSPECIFIED SITE: ICD-10-CM

## 2018-12-13 DIAGNOSIS — Z95.5 PRESENCE OF CORONARY ANGIOPLASTY IMPLANT AND GRAFT: Chronic | ICD-10-CM

## 2018-12-13 DIAGNOSIS — H57.9 UNSPECIFIED DISORDER OF EYE AND ADNEXA: Chronic | ICD-10-CM

## 2018-12-13 LAB
ALBUMIN SERPL ELPH-MCNC: 2.7 G/DL — LOW (ref 3.3–5)
ALP SERPL-CCNC: 143 U/L — HIGH (ref 40–120)
ALT FLD-CCNC: 17 U/L — SIGNIFICANT CHANGE UP (ref 4–41)
AST SERPL-CCNC: 76 U/L — HIGH (ref 4–40)
BASE EXCESS BLDV CALC-SCNC: -3.5 MMOL/L — SIGNIFICANT CHANGE UP
BASE EXCESS BLDV CALC-SCNC: -4.6 MMOL/L — SIGNIFICANT CHANGE UP
BASOPHILS # BLD AUTO: 0.04 K/UL — SIGNIFICANT CHANGE UP (ref 0–0.2)
BASOPHILS NFR BLD AUTO: 0.5 % — SIGNIFICANT CHANGE UP (ref 0–2)
BILIRUB SERPL-MCNC: 0.3 MG/DL — SIGNIFICANT CHANGE UP (ref 0.2–1.2)
BLOOD GAS VENOUS - CREATININE: 2.51 MG/DL — HIGH (ref 0.5–1.3)
BLOOD GAS VENOUS - CREATININE: 2.57 MG/DL — HIGH (ref 0.5–1.3)
BUN SERPL-MCNC: 92 MG/DL — HIGH (ref 7–23)
BUN SERPL-MCNC: 95 MG/DL — HIGH (ref 7–23)
CALCIUM SERPL-MCNC: 8.3 MG/DL — LOW (ref 8.4–10.5)
CALCIUM SERPL-MCNC: 8.9 MG/DL — SIGNIFICANT CHANGE UP (ref 8.4–10.5)
CHLORIDE BLDV-SCNC: 102 MMOL/L — SIGNIFICANT CHANGE UP (ref 96–108)
CHLORIDE BLDV-SCNC: 104 MMOL/L — SIGNIFICANT CHANGE UP (ref 96–108)
CHLORIDE SERPL-SCNC: 96 MMOL/L — LOW (ref 98–107)
CHLORIDE SERPL-SCNC: 98 MMOL/L — SIGNIFICANT CHANGE UP (ref 98–107)
CO2 SERPL-SCNC: 18 MMOL/L — LOW (ref 22–31)
CO2 SERPL-SCNC: 19 MMOL/L — LOW (ref 22–31)
CREAT SERPL-MCNC: 1.88 MG/DL — HIGH (ref 0.5–1.3)
CREAT SERPL-MCNC: 2.04 MG/DL — HIGH (ref 0.5–1.3)
EOSINOPHIL # BLD AUTO: 0.01 K/UL — SIGNIFICANT CHANGE UP (ref 0–0.5)
EOSINOPHIL NFR BLD AUTO: 0.1 % — SIGNIFICANT CHANGE UP (ref 0–6)
GAS PNL BLDV: 128 MMOL/L — LOW (ref 136–146)
GAS PNL BLDV: 136 MMOL/L — SIGNIFICANT CHANGE UP (ref 136–146)
GLUCOSE BLDV-MCNC: 230 — HIGH (ref 70–99)
GLUCOSE BLDV-MCNC: 233 — HIGH (ref 70–99)
GLUCOSE SERPL-MCNC: 216 MG/DL — HIGH (ref 70–99)
GLUCOSE SERPL-MCNC: 224 MG/DL — HIGH (ref 70–99)
HCO3 BLDV-SCNC: 18 MMOL/L — LOW (ref 20–27)
HCO3 BLDV-SCNC: 18 MMOL/L — LOW (ref 20–27)
HCT VFR BLD CALC: 44 % — SIGNIFICANT CHANGE UP (ref 39–50)
HCT VFR BLDV CALC: 43.1 % — SIGNIFICANT CHANGE UP (ref 39–51)
HCT VFR BLDV CALC: 45.2 % — SIGNIFICANT CHANGE UP (ref 39–51)
HGB BLD-MCNC: 13.4 G/DL — SIGNIFICANT CHANGE UP (ref 13–17)
HGB BLDV-MCNC: 14 G/DL — SIGNIFICANT CHANGE UP (ref 13–17)
HGB BLDV-MCNC: 14.8 G/DL — SIGNIFICANT CHANGE UP (ref 13–17)
IMM GRANULOCYTES # BLD AUTO: 0.05 # — SIGNIFICANT CHANGE UP
IMM GRANULOCYTES NFR BLD AUTO: 0.6 % — SIGNIFICANT CHANGE UP (ref 0–1.5)
LACTATE BLDV-MCNC: 4.8 MMOL/L — CRITICAL HIGH (ref 0.5–2)
LACTATE BLDV-MCNC: 5.5 MMOL/L — CRITICAL HIGH (ref 0.5–2)
LDH SERPL L TO P-CCNC: 701 U/L — HIGH (ref 135–225)
LYMPHOCYTES # BLD AUTO: 0.58 K/UL — LOW (ref 1–3.3)
LYMPHOCYTES # BLD AUTO: 7 % — LOW (ref 13–44)
MAGNESIUM SERPL-MCNC: 3.3 MG/DL — HIGH (ref 1.6–2.6)
MCHC RBC-ENTMCNC: 30.5 % — LOW (ref 32–36)
MCHC RBC-ENTMCNC: 31.5 PG — SIGNIFICANT CHANGE UP (ref 27–34)
MCV RBC AUTO: 103.3 FL — HIGH (ref 80–100)
MONOCYTES # BLD AUTO: 0.76 K/UL — SIGNIFICANT CHANGE UP (ref 0–0.9)
MONOCYTES NFR BLD AUTO: 9.1 % — SIGNIFICANT CHANGE UP (ref 2–14)
NEUTROPHILS # BLD AUTO: 6.88 K/UL — SIGNIFICANT CHANGE UP (ref 1.8–7.4)
NEUTROPHILS NFR BLD AUTO: 82.7 % — HIGH (ref 43–77)
NRBC # FLD: 0 — SIGNIFICANT CHANGE UP
PCO2 BLDV: 57 MMHG — HIGH (ref 41–51)
PCO2 BLDV: 64 MMHG — HIGH (ref 41–51)
PH BLDV: 7.19 PH — CRITICAL LOW (ref 7.32–7.43)
PH BLDV: 7.21 PH — LOW (ref 7.32–7.43)
PHOSPHATE SERPL-MCNC: 5.5 MG/DL — HIGH (ref 2.5–4.5)
PLATELET # BLD AUTO: 470 K/UL — HIGH (ref 150–400)
PMV BLD: 10.9 FL — SIGNIFICANT CHANGE UP (ref 7–13)
PO2 BLDV: 27 MMHG — LOW (ref 35–40)
PO2 BLDV: < 24 MMHG — LOW (ref 35–40)
POTASSIUM BLDV-SCNC: 6 MMOL/L — HIGH (ref 3.4–4.5)
POTASSIUM BLDV-SCNC: 9.6 MMOL/L — CRITICAL HIGH (ref 3.4–4.5)
POTASSIUM SERPL-MCNC: 6.1 MMOL/L — HIGH (ref 3.5–5.3)
POTASSIUM SERPL-MCNC: SIGNIFICANT CHANGE UP MMOL/L (ref 3.5–5.3)
POTASSIUM SERPL-SCNC: 6.1 MMOL/L — HIGH (ref 3.5–5.3)
POTASSIUM SERPL-SCNC: SIGNIFICANT CHANGE UP MMOL/L (ref 3.5–5.3)
PROT SERPL-MCNC: 7.6 G/DL — SIGNIFICANT CHANGE UP (ref 6–8.3)
RBC # BLD: 4.26 M/UL — SIGNIFICANT CHANGE UP (ref 4.2–5.8)
RBC # FLD: 17.2 % — HIGH (ref 10.3–14.5)
SAO2 % BLDV: 21.7 % — LOW (ref 60–85)
SAO2 % BLDV: 41.1 % — LOW (ref 60–85)
SODIUM SERPL-SCNC: 135 MMOL/L — SIGNIFICANT CHANGE UP (ref 135–145)
SODIUM SERPL-SCNC: 136 MMOL/L — SIGNIFICANT CHANGE UP (ref 135–145)
WBC # BLD: 8.32 K/UL — SIGNIFICANT CHANGE UP (ref 3.8–10.5)
WBC # FLD AUTO: 8.32 K/UL — SIGNIFICANT CHANGE UP (ref 3.8–10.5)

## 2018-12-13 RX ORDER — SERTRALINE 25 MG/1
25 TABLET, FILM COATED ORAL
Refills: 0 | Status: DISCONTINUED | COMMUNITY
End: 2018-12-13

## 2018-12-13 RX ORDER — HYDRALAZINE HCL 100 MG
100 TABLET ORAL
Refills: 0 | Status: DISCONTINUED | COMMUNITY
End: 2018-12-13

## 2018-12-13 RX ORDER — SODIUM BICARBONATE 650 MG/1
650 TABLET ORAL 3 TIMES DAILY
Qty: 90 | Refills: 0 | Status: ACTIVE | COMMUNITY
Start: 2018-12-13

## 2018-12-13 RX ORDER — FUROSEMIDE 20 MG/1
20 TABLET ORAL
Qty: 30 | Refills: 0 | Status: DISCONTINUED | COMMUNITY
Start: 2017-12-16 | End: 2018-12-13

## 2018-12-13 RX ORDER — ISOSORBIDE MONONITRATE 30 MG/1
30 TABLET, EXTENDED RELEASE ORAL
Qty: 30 | Refills: 0 | Status: DISCONTINUED | COMMUNITY
Start: 2017-12-15 | End: 2018-12-13

## 2018-12-13 RX ORDER — TRAZODONE HCL 50 MG
50 TABLET ORAL
Refills: 0 | Status: DISCONTINUED | COMMUNITY
End: 2018-12-13

## 2018-12-13 RX ORDER — ENOXAPARIN SODIUM 100 MG/ML
100 INJECTION SUBCUTANEOUS
Qty: 1 | Refills: 0 | Status: ACTIVE | COMMUNITY
Start: 2018-12-13

## 2018-12-13 RX ORDER — SPIRONOLACTONE 25 MG/1
25 TABLET, FILM COATED ORAL
Refills: 0 | Status: DISCONTINUED | COMMUNITY
End: 2018-12-13

## 2018-12-13 RX ORDER — HYDROMORPHONE HYDROCHLORIDE 2 MG/ML
1 INJECTION INTRAMUSCULAR; INTRAVENOUS; SUBCUTANEOUS ONCE
Qty: 0 | Refills: 0 | Status: DISCONTINUED | OUTPATIENT
Start: 2018-12-13 | End: 2018-12-13

## 2018-12-13 RX ORDER — CARVEDILOL 12.5 MG/1
12.5 TABLET, FILM COATED ORAL
Refills: 0 | Status: DISCONTINUED | COMMUNITY
End: 2018-12-13

## 2018-12-13 RX ADMIN — HYDROMORPHONE HYDROCHLORIDE 1 MILLIGRAM(S): 2 INJECTION INTRAMUSCULAR; INTRAVENOUS; SUBCUTANEOUS at 20:49

## 2018-12-13 RX ADMIN — HYDROMORPHONE HYDROCHLORIDE 1 MILLIGRAM(S): 2 INJECTION INTRAMUSCULAR; INTRAVENOUS; SUBCUTANEOUS at 21:29

## 2018-12-13 NOTE — ED PROVIDER NOTE - GASTROINTESTINAL, MLM
Abdomen soft, non-tender, no guarding. +Fluid wave. Non-tender on my exam due to recent dilaudid (per pt).

## 2018-12-13 NOTE — ED PROVIDER NOTE - CONSTITUTIONAL, MLM
normal... Cachetic appearing, awake, alert, oriented to person, place, time/situation and in no apparent distress.

## 2018-12-13 NOTE — ED PROVIDER NOTE - MEDICAL DECISION MAKING DETAILS
58M w/ progressive malignant melanoma p/w abd pain and need dilaudid for pain control. Labs, IVF, will admit for care coordination/hospice services and pain control.

## 2018-12-13 NOTE — ED PROVIDER NOTE - SKIN, MLM
Skin normal color for race, warm, dry and intact. No evidence of rash. R groin and L axillary area with clear enlarged node vs progression of disease.

## 2018-12-13 NOTE — ED ADULT NURSE NOTE - OBJECTIVE STATEMENT
pt alert and oriented, states that today when he was in a ambulate to his doctors office he got a sudden severe pain in his abdomen. pt states EMS gave him a hot for pain and now it is mostly resolved. pt denies N/V at any point. pt is wheelchair bound due to prolonged periods of bedrest as he has CA. pt states he has brain tumor and melanoma with mets all over. pt states he and his doctors have decided to stop treating him. pt states he has a DNR.   labs drawn and sent, awaiting further orders.

## 2018-12-13 NOTE — ED ADULT NURSE NOTE - NSIMPLEMENTINTERV_GEN_ALL_ED
Implemented All Universal Safety Interventions:  [place] to call system. Call bell, personal items and telephone within reach. Instruct patient to call for assistance. Room bathroom lighting operational. Non-slip footwear when patient is off stretcher. Physically safe environment: no spills, clutter or unnecessary equipment. Stretcher in lowest position, wheels locked, appropriate side rails in place.

## 2018-12-13 NOTE — ED PROVIDER NOTE - ATTENDING CONTRIBUTION TO CARE
as documented by resident  "58M now unable to walk PMH severe diverticulosis, metastatic melanoma s/p resection on immunotherapy with progression of disease recently told that no more options exist and plan for hospice"  presents from [facility] for admission for hospice care.  pt had been there with complaint of MSK/abdominal pain.  States no recent distension, has been having BM and passing flatus.  had received 1mg of dilaudid at [facility] with improvement.    PE: cachetic, disheveled, large areas of tumor burden axilla, groin, abdomen; CTAB/L; s1 s2 no m/r/g abd soft/NT/ND    Imp: pain control and admission for hospice care

## 2018-12-13 NOTE — ED PROVIDER NOTE - OBJECTIVE STATEMENT
58M now unable to walk [facility] severe diverticulosis, metastatic melanoma s/p resection on immunotherapy with progression of disease recently told that no more options exist and plan for hospice, meningothelial meningioma s/p resection and RT, CAD s/p stent who presents from [facility] after having severe abdominal, groin, armpit, and leg pain requiring IV dilaudid. The pt's cancer has been progressing and he has had a 2/10 baseline pain at the site of his metastatic lesions, however today the pain was significantly worse although other characteristics of the pain did not change. The pt also reports his abdomen to be more "taut." The pt was seen by his oncologist, Dr. Bradley Goldberg today who told the pt to come to the hospital for pain control and coordination to set up hospice services. The pt came via ambulette and reports to have increased pain on the ride with bumps in the road. The pt denies chest pain, SOB, dysuria, recent illness, fevers, chills, current N/V/D/C. Pt reports wanting to be DNR/DNI, and that he would not like any surgeries. 58M now unable to walk [facility] severe diverticulosis, metastatic melanoma s/p resection on immunotherapy with progression of disease recently told that no more options exist and plan for hospice, meningothelial meningioma s/p resection and RT, CAD s/p stent, recent admission w/ elevated lactate c/b PE/DVT who presents from [facility] after having severe abdominal, groin, armpit, and leg pain requiring IV dilaudid. The pt's cancer has been progressing and he has had a 2/10 baseline pain at the site of his metastatic lesions, however today the pain was significantly worse although other characteristics of the pain did not change. The pt also reports his abdomen to be more "taut." The pt was seen by his oncologist, Dr. Bradley Goldberg today who told the pt to come to the hospital for pain control and coordination to set up hospice services. The pt came via ambulette and reports to have increased pain on the ride with bumps in the road. The pt denies chest pain, SOB, dysuria, recent illness, fevers, chills, current N/V/D/C. Pt reports wanting to be DNR/DNI, and that he would not like any surgeries.

## 2018-12-13 NOTE — ED ADULT NURSE NOTE - CHIEF COMPLAINT QUOTE
Patient with a h/o metastatic ca  arrives with ems from [facility] . C/O abdominal pain radiating to his groin and down his legs.  Dilaudid 1 mg ivp given at 1605 with some relief.

## 2018-12-13 NOTE — ED ADULT TRIAGE NOTE - CHIEF COMPLAINT QUOTE
Patient with a h/o metastatic ca  arrives with ems from [facility] . C/O abdominal pain radiating to his groin and down his legs.  Dilaudid 1 mg ivp given at 1605 with some relief.

## 2018-12-14 ENCOUNTER — INBOUND DOCUMENT (OUTPATIENT)
Age: 58
End: 2018-12-14

## 2018-12-14 ENCOUNTER — APPOINTMENT (OUTPATIENT)
Dept: INFUSION THERAPY | Facility: HOSPITAL | Age: 58
End: 2018-12-14

## 2018-12-14 DIAGNOSIS — E87.2 ACIDOSIS: ICD-10-CM

## 2018-12-14 DIAGNOSIS — C78.6 SECONDARY MALIGNANT NEOPLASM OF RETROPERITONEUM AND PERITONEUM: ICD-10-CM

## 2018-12-14 DIAGNOSIS — I27.82 CHRONIC PULMONARY EMBOLISM: ICD-10-CM

## 2018-12-14 DIAGNOSIS — C43.9 MALIGNANT MELANOMA OF SKIN, UNSPECIFIED: ICD-10-CM

## 2018-12-14 DIAGNOSIS — E87.5 HYPERKALEMIA: ICD-10-CM

## 2018-12-14 DIAGNOSIS — R19.7 DIARRHEA, UNSPECIFIED: ICD-10-CM

## 2018-12-14 DIAGNOSIS — I26.99 OTHER PULMONARY EMBOLISM WITHOUT ACUTE COR PULMONALE: ICD-10-CM

## 2018-12-14 DIAGNOSIS — R11.2 NAUSEA WITH VOMITING, UNSPECIFIED: ICD-10-CM

## 2018-12-14 DIAGNOSIS — K59.09 OTHER CONSTIPATION: ICD-10-CM

## 2018-12-14 DIAGNOSIS — E11.8 TYPE 2 DIABETES MELLITUS WITH UNSPECIFIED COMPLICATIONS: ICD-10-CM

## 2018-12-14 DIAGNOSIS — N17.9 ACUTE KIDNEY FAILURE, UNSPECIFIED: ICD-10-CM

## 2018-12-14 DIAGNOSIS — R10.84 GENERALIZED ABDOMINAL PAIN: ICD-10-CM

## 2018-12-14 DIAGNOSIS — Z71.89 OTHER SPECIFIED COUNSELING: ICD-10-CM

## 2018-12-14 LAB
APPEARANCE UR: SIGNIFICANT CHANGE UP
BACTERIA # UR AUTO: SIGNIFICANT CHANGE UP
BILIRUB UR-MCNC: NEGATIVE — SIGNIFICANT CHANGE UP
BLOOD UR QL VISUAL: NEGATIVE — SIGNIFICANT CHANGE UP
COARSE GRAN CASTS #/AREA URNS AUTO: SIGNIFICANT CHANGE UP (ref 0–?)
COLOR SPEC: SIGNIFICANT CHANGE UP
CREAT ?TM UR-MCNC: 113.4 MG/DL — SIGNIFICANT CHANGE UP
EPI CELLS # UR: SIGNIFICANT CHANGE UP
GLUCOSE BLDC GLUCOMTR-MCNC: 185 MG/DL — HIGH (ref 70–99)
GLUCOSE BLDC GLUCOMTR-MCNC: 188 MG/DL — HIGH (ref 70–99)
GLUCOSE BLDC GLUCOMTR-MCNC: 215 MG/DL — HIGH (ref 70–99)
GLUCOSE UR-MCNC: NEGATIVE — SIGNIFICANT CHANGE UP
KETONES UR-MCNC: NEGATIVE — SIGNIFICANT CHANGE UP
LEUKOCYTE ESTERASE UR-ACNC: SIGNIFICANT CHANGE UP
NITRITE UR-MCNC: NEGATIVE — SIGNIFICANT CHANGE UP
PH UR: 5.5 — SIGNIFICANT CHANGE UP (ref 5–8)
PROT UR-MCNC: 30 — SIGNIFICANT CHANGE UP
RBC CASTS # UR COMP ASSIST: SIGNIFICANT CHANGE UP (ref 0–?)
SP GR SPEC: 1.02 — SIGNIFICANT CHANGE UP (ref 1–1.04)
UROBILINOGEN FLD QL: NORMAL — SIGNIFICANT CHANGE UP
UUN UR-MCNC: 600.4 MG/DL — SIGNIFICANT CHANGE UP
WBC UR QL: HIGH (ref 0–?)

## 2018-12-14 PROCEDURE — 12345: CPT | Mod: NC,GC

## 2018-12-14 PROCEDURE — 71045 X-RAY EXAM CHEST 1 VIEW: CPT | Mod: 26

## 2018-12-14 PROCEDURE — 99223 1ST HOSP IP/OBS HIGH 75: CPT | Mod: GC

## 2018-12-14 PROCEDURE — 74176 CT ABD & PELVIS W/O CONTRAST: CPT | Mod: 26

## 2018-12-14 PROCEDURE — 49083 ABD PARACENTESIS W/IMAGING: CPT

## 2018-12-14 RX ORDER — SODIUM CHLORIDE 9 MG/ML
1000 INJECTION, SOLUTION INTRAVENOUS
Qty: 0 | Refills: 0 | Status: DISCONTINUED | OUTPATIENT
Start: 2018-12-14 | End: 2018-12-14

## 2018-12-14 RX ORDER — ALBUMIN HUMAN 25 %
50 VIAL (ML) INTRAVENOUS ONCE
Qty: 0 | Refills: 0 | Status: COMPLETED | OUTPATIENT
Start: 2018-12-14 | End: 2018-12-14

## 2018-12-14 RX ORDER — SODIUM CHLORIDE 9 MG/ML
1000 INJECTION INTRAMUSCULAR; INTRAVENOUS; SUBCUTANEOUS ONCE
Qty: 0 | Refills: 0 | Status: COMPLETED | OUTPATIENT
Start: 2018-12-14 | End: 2018-12-14

## 2018-12-14 RX ORDER — POLYETHYLENE GLYCOL 3350 17 G/17G
17 POWDER, FOR SOLUTION ORAL AT BEDTIME
Qty: 0 | Refills: 0 | Status: DISCONTINUED | OUTPATIENT
Start: 2018-12-14 | End: 2018-12-19

## 2018-12-14 RX ORDER — INSULIN HUMAN 100 [IU]/ML
5 INJECTION, SOLUTION SUBCUTANEOUS ONCE
Qty: 0 | Refills: 0 | Status: COMPLETED | OUTPATIENT
Start: 2018-12-14 | End: 2018-12-14

## 2018-12-14 RX ORDER — HEPARIN SODIUM 5000 [USP'U]/ML
1700 INJECTION INTRAVENOUS; SUBCUTANEOUS
Qty: 25000 | Refills: 0 | Status: DISCONTINUED | OUTPATIENT
Start: 2018-12-14 | End: 2018-12-17

## 2018-12-14 RX ORDER — SENNA PLUS 8.6 MG/1
2 TABLET ORAL DAILY
Qty: 0 | Refills: 0 | Status: DISCONTINUED | OUTPATIENT
Start: 2018-12-14 | End: 2018-12-14

## 2018-12-14 RX ORDER — HEPARIN SODIUM 5000 [USP'U]/ML
7500 INJECTION INTRAVENOUS; SUBCUTANEOUS EVERY 6 HOURS
Qty: 0 | Refills: 0 | Status: DISCONTINUED | OUTPATIENT
Start: 2018-12-14 | End: 2018-12-17

## 2018-12-14 RX ORDER — ENOXAPARIN SODIUM 100 MG/ML
90 INJECTION SUBCUTANEOUS EVERY 12 HOURS
Qty: 0 | Refills: 0 | Status: DISCONTINUED | OUTPATIENT
Start: 2018-12-14 | End: 2018-12-14

## 2018-12-14 RX ORDER — SODIUM CHLORIDE 9 MG/ML
1000 INJECTION, SOLUTION INTRAVENOUS
Qty: 0 | Refills: 0 | Status: DISCONTINUED | OUTPATIENT
Start: 2018-12-14 | End: 2018-12-19

## 2018-12-14 RX ORDER — SODIUM POLYSTYRENE SULFONATE 4.1 MEQ/G
30 POWDER, FOR SUSPENSION ORAL ONCE
Qty: 0 | Refills: 0 | Status: COMPLETED | OUTPATIENT
Start: 2018-12-14 | End: 2018-12-14

## 2018-12-14 RX ORDER — DEXTROSE 50 % IN WATER 50 %
25 SYRINGE (ML) INTRAVENOUS ONCE
Qty: 0 | Refills: 0 | Status: DISCONTINUED | OUTPATIENT
Start: 2018-12-14 | End: 2018-12-14

## 2018-12-14 RX ORDER — SODIUM CHLORIDE 9 MG/ML
1000 INJECTION INTRAMUSCULAR; INTRAVENOUS; SUBCUTANEOUS
Qty: 0 | Refills: 0 | Status: DISCONTINUED | OUTPATIENT
Start: 2018-12-14 | End: 2018-12-15

## 2018-12-14 RX ORDER — DEXTROSE 50 % IN WATER 50 %
15 SYRINGE (ML) INTRAVENOUS ONCE
Qty: 0 | Refills: 0 | Status: DISCONTINUED | OUTPATIENT
Start: 2018-12-14 | End: 2018-12-14

## 2018-12-14 RX ORDER — HYDROMORPHONE HYDROCHLORIDE 2 MG/ML
1 INJECTION INTRAMUSCULAR; INTRAVENOUS; SUBCUTANEOUS ONCE
Qty: 0 | Refills: 0 | Status: DISCONTINUED | OUTPATIENT
Start: 2018-12-14 | End: 2018-12-14

## 2018-12-14 RX ORDER — HYDROMORPHONE HYDROCHLORIDE 2 MG/ML
0.5 INJECTION INTRAMUSCULAR; INTRAVENOUS; SUBCUTANEOUS EVERY 4 HOURS
Qty: 0 | Refills: 0 | Status: DISCONTINUED | OUTPATIENT
Start: 2018-12-14 | End: 2018-12-14

## 2018-12-14 RX ORDER — INSULIN LISPRO 100/ML
VIAL (ML) SUBCUTANEOUS
Qty: 0 | Refills: 0 | Status: DISCONTINUED | OUTPATIENT
Start: 2018-12-14 | End: 2018-12-14

## 2018-12-14 RX ORDER — HEPARIN SODIUM 5000 [USP'U]/ML
3500 INJECTION INTRAVENOUS; SUBCUTANEOUS EVERY 6 HOURS
Qty: 0 | Refills: 0 | Status: DISCONTINUED | OUTPATIENT
Start: 2018-12-14 | End: 2018-12-17

## 2018-12-14 RX ORDER — ATORVASTATIN CALCIUM 80 MG/1
40 TABLET, FILM COATED ORAL AT BEDTIME
Qty: 0 | Refills: 0 | Status: DISCONTINUED | OUTPATIENT
Start: 2018-12-14 | End: 2018-12-19

## 2018-12-14 RX ORDER — INSULIN LISPRO 100/ML
VIAL (ML) SUBCUTANEOUS EVERY 6 HOURS
Qty: 0 | Refills: 0 | Status: DISCONTINUED | OUTPATIENT
Start: 2018-12-14 | End: 2018-12-14

## 2018-12-14 RX ORDER — DEXTROSE 50 % IN WATER 50 %
12.5 SYRINGE (ML) INTRAVENOUS ONCE
Qty: 0 | Refills: 0 | Status: DISCONTINUED | OUTPATIENT
Start: 2018-12-14 | End: 2018-12-14

## 2018-12-14 RX ORDER — INSULIN GLARGINE 100 [IU]/ML
8 INJECTION, SOLUTION SUBCUTANEOUS AT BEDTIME
Qty: 0 | Refills: 0 | Status: DISCONTINUED | OUTPATIENT
Start: 2018-12-14 | End: 2018-12-14

## 2018-12-14 RX ORDER — DEXTROSE 50 % IN WATER 50 %
25 SYRINGE (ML) INTRAVENOUS ONCE
Qty: 0 | Refills: 0 | Status: DISCONTINUED | OUTPATIENT
Start: 2018-12-14 | End: 2018-12-19

## 2018-12-14 RX ORDER — HYDROMORPHONE HYDROCHLORIDE 2 MG/ML
0.5 INJECTION INTRAMUSCULAR; INTRAVENOUS; SUBCUTANEOUS
Qty: 0 | Refills: 0 | Status: DISCONTINUED | OUTPATIENT
Start: 2018-12-14 | End: 2018-12-15

## 2018-12-14 RX ORDER — DOCUSATE SODIUM 100 MG
100 CAPSULE ORAL DAILY
Qty: 0 | Refills: 0 | Status: DISCONTINUED | OUTPATIENT
Start: 2018-12-14 | End: 2018-12-19

## 2018-12-14 RX ORDER — INSULIN LISPRO 100/ML
VIAL (ML) SUBCUTANEOUS AT BEDTIME
Qty: 0 | Refills: 0 | Status: DISCONTINUED | OUTPATIENT
Start: 2018-12-14 | End: 2018-12-14

## 2018-12-14 RX ORDER — DEXTROSE 50 % IN WATER 50 %
12.5 SYRINGE (ML) INTRAVENOUS ONCE
Qty: 0 | Refills: 0 | Status: DISCONTINUED | OUTPATIENT
Start: 2018-12-14 | End: 2018-12-19

## 2018-12-14 RX ORDER — DEXTROSE 50 % IN WATER 50 %
15 SYRINGE (ML) INTRAVENOUS ONCE
Qty: 0 | Refills: 0 | Status: DISCONTINUED | OUTPATIENT
Start: 2018-12-14 | End: 2018-12-19

## 2018-12-14 RX ORDER — ALBUTEROL 90 UG/1
2.5 AEROSOL, METERED ORAL ONCE
Qty: 0 | Refills: 0 | Status: COMPLETED | OUTPATIENT
Start: 2018-12-14 | End: 2018-12-14

## 2018-12-14 RX ORDER — GLUCAGON INJECTION, SOLUTION 0.5 MG/.1ML
1 INJECTION, SOLUTION SUBCUTANEOUS ONCE
Qty: 0 | Refills: 0 | Status: DISCONTINUED | OUTPATIENT
Start: 2018-12-14 | End: 2018-12-14

## 2018-12-14 RX ORDER — GLUCAGON INJECTION, SOLUTION 0.5 MG/.1ML
1 INJECTION, SOLUTION SUBCUTANEOUS ONCE
Qty: 0 | Refills: 0 | Status: DISCONTINUED | OUTPATIENT
Start: 2018-12-14 | End: 2018-12-19

## 2018-12-14 RX ORDER — SENNA PLUS 8.6 MG/1
2 TABLET ORAL AT BEDTIME
Qty: 0 | Refills: 0 | Status: DISCONTINUED | OUTPATIENT
Start: 2018-12-14 | End: 2018-12-19

## 2018-12-14 RX ORDER — SODIUM BICARBONATE 1 MEQ/ML
650 SYRINGE (ML) INTRAVENOUS THREE TIMES A DAY
Qty: 0 | Refills: 0 | Status: DISCONTINUED | OUTPATIENT
Start: 2018-12-14 | End: 2018-12-19

## 2018-12-14 RX ORDER — ASPIRIN/CALCIUM CARB/MAGNESIUM 324 MG
81 TABLET ORAL DAILY
Qty: 0 | Refills: 0 | Status: DISCONTINUED | OUTPATIENT
Start: 2018-12-14 | End: 2018-12-19

## 2018-12-14 RX ORDER — INSULIN LISPRO 100/ML
4 VIAL (ML) SUBCUTANEOUS
Qty: 0 | Refills: 0 | Status: DISCONTINUED | OUTPATIENT
Start: 2018-12-14 | End: 2018-12-14

## 2018-12-14 RX ADMIN — HYDROMORPHONE HYDROCHLORIDE 0.5 MILLIGRAM(S): 2 INJECTION INTRAMUSCULAR; INTRAVENOUS; SUBCUTANEOUS at 05:04

## 2018-12-14 RX ADMIN — Medication 650 MILLIGRAM(S): at 05:04

## 2018-12-14 RX ADMIN — Medication 650 MILLIGRAM(S): at 22:27

## 2018-12-14 RX ADMIN — HYDROMORPHONE HYDROCHLORIDE 0.5 MILLIGRAM(S): 2 INJECTION INTRAMUSCULAR; INTRAVENOUS; SUBCUTANEOUS at 19:01

## 2018-12-14 RX ADMIN — SODIUM CHLORIDE 250 MILLILITER(S): 9 INJECTION INTRAMUSCULAR; INTRAVENOUS; SUBCUTANEOUS at 23:03

## 2018-12-14 RX ADMIN — SODIUM CHLORIDE 1000 MILLILITER(S): 9 INJECTION INTRAMUSCULAR; INTRAVENOUS; SUBCUTANEOUS at 01:15

## 2018-12-14 RX ADMIN — SODIUM CHLORIDE 75 MILLILITER(S): 9 INJECTION INTRAMUSCULAR; INTRAVENOUS; SUBCUTANEOUS at 09:30

## 2018-12-14 RX ADMIN — SODIUM POLYSTYRENE SULFONATE 30 GRAM(S): 4.1 POWDER, FOR SUSPENSION ORAL at 01:47

## 2018-12-14 RX ADMIN — Medication 650 MILLIGRAM(S): at 11:58

## 2018-12-14 RX ADMIN — ATORVASTATIN CALCIUM 40 MILLIGRAM(S): 80 TABLET, FILM COATED ORAL at 22:27

## 2018-12-14 RX ADMIN — HEPARIN SODIUM 1700 UNIT(S)/HR: 5000 INJECTION INTRAVENOUS; SUBCUTANEOUS at 22:28

## 2018-12-14 RX ADMIN — HYDROMORPHONE HYDROCHLORIDE 0.5 MILLIGRAM(S): 2 INJECTION INTRAMUSCULAR; INTRAVENOUS; SUBCUTANEOUS at 19:18

## 2018-12-14 RX ADMIN — Medication 81 MILLIGRAM(S): at 11:58

## 2018-12-14 RX ADMIN — Medication 50 MILLILITER(S): at 18:42

## 2018-12-14 RX ADMIN — ALBUTEROL 2.5 MILLIGRAM(S): 90 AEROSOL, METERED ORAL at 01:15

## 2018-12-14 RX ADMIN — ENOXAPARIN SODIUM 90 MILLIGRAM(S): 100 INJECTION SUBCUTANEOUS at 05:04

## 2018-12-14 RX ADMIN — HYDROMORPHONE HYDROCHLORIDE 0.5 MILLIGRAM(S): 2 INJECTION INTRAMUSCULAR; INTRAVENOUS; SUBCUTANEOUS at 09:25

## 2018-12-14 RX ADMIN — HYDROMORPHONE HYDROCHLORIDE 0.5 MILLIGRAM(S): 2 INJECTION INTRAMUSCULAR; INTRAVENOUS; SUBCUTANEOUS at 09:50

## 2018-12-14 RX ADMIN — SODIUM CHLORIDE 75 MILLILITER(S): 9 INJECTION INTRAMUSCULAR; INTRAVENOUS; SUBCUTANEOUS at 21:00

## 2018-12-14 RX ADMIN — HYDROMORPHONE HYDROCHLORIDE 1 MILLIGRAM(S): 2 INJECTION INTRAMUSCULAR; INTRAVENOUS; SUBCUTANEOUS at 00:09

## 2018-12-14 RX ADMIN — INSULIN HUMAN 5 UNIT(S): 100 INJECTION, SOLUTION SUBCUTANEOUS at 01:46

## 2018-12-14 RX ADMIN — Medication 4: at 11:56

## 2018-12-14 RX ADMIN — HYDROMORPHONE HYDROCHLORIDE 0.5 MILLIGRAM(S): 2 INJECTION INTRAMUSCULAR; INTRAVENOUS; SUBCUTANEOUS at 16:30

## 2018-12-14 NOTE — CONSULT NOTE ADULT - SUBJECTIVE AND OBJECTIVE BOX
HPI:  58M with PMHX of T2DM, HTN, KRISTIAN and new CAD s/p PCI at [facility] with BMS and now medically optimized, who was also incidentally found with large "slug like" L shoulder mass which was excised and found to be malignant melanoma, with metastasis found in large L axillary lymph node (biopsy proven), also found with L sided meningioma with hearing loss and s/p XRT, and subsequently found with omental carcinomatosis biopsy proven metastatic melanoma now s/p 4 cycles ipilimumab with progression and now s/p 1 dose nivolumab 480 mg flat dose on 11/1/18, DVT/PE (on Lovenox) presenting with abdominal pain x 2 days. Patient reports the pain started two days ago in his abdomen, he has baseline 2/10 pain due to his known metastases. However, notes the pain has worsened He also notes his abdomen to be more "taut", firm and distended. Patient reports last BM 2 days ago, passing gas. Patient was seen by Dr. Goldberg and [facility] today and was told to come to the hospital for pain control and coordination for hospice services. The patient denies chest pain, SOB, dysuria, recent illness, fevers, chills, however dose endorse chronically diaphoretic current N/V/D/C. However, he reports on his his previous visits to chemotherapy and prior hospitalization he has loose BMs, that are intermittent with nausea and vomiting, which he currently denies. He reports at that time his Cr uptrended and with PO hydration the labs improved. Per Dr. Goldberg notes, patient does report to him for the last few days of multiple watery bowel movements a day and this progressed that in the last few days he has started to have severe nausea and has had vomiting and cannot keep anything down at this point. He feels dehydrated and his urine has once again become darker. Patient reports extreme fatigue and weakness, has not got out of bed in 1 week. Reports 30 lb weight loss in 3 months. Of note, patient's CT in 5/2018 showed evidence of progressive metastatic disease involving liver, omentum, left axillary lymph nodes, and mesenteric tumor mass herniated into the right internal canal. PET scan 6/2018 moderate to large amount of abdominal and pelvic ascites with mildly FDG-avid omental nodularity. Findings are compatible with carcinomatosis. A primary neoplasm is not delineated on this scan. FDG-avid soft tissue attenuation density adjacent to sigmoid colon, possibly a lymph node. Large non FDG-avid transosseous meningioma extending from left supratentorial to infratentorial compartment, as see on MRI dated 3/5/2018. Patient was told incurative treatment. Patient is DNR/DNI, requesting hospice.     In ED: /71 HR90 RR18 97% RA   Patient received NS 1 L IV x 1, Dilaudid 1 mg IV x 1, Albuterol,  Insulin 5 mg IV x 1, Kayexalate 30 g 2x (14 Dec 2018 02:10)      PERTINENT PMH REVIEWED:  [ ] YES [ ] NO           SOCIAL HISTORY:   Significant other/partner:  [ ] YES  [ x] NO               Children:  [ ] YES  x[ ] NO                   Yazidi/Spirituality: none  Substance hx:  [ ] YES   [x ] NO                   Tobacco hx:  [ ] YES  [ ] NO                       Alcohol hx: [ ] YES  [ ] NO         Home Opioid hx:  [x ] YES  [ ] NO   Living Situation: [ ] Home  [ ] Long term care  [ ] Rehab [ ] Other: assisted living facility       FAMILY HISTORY:  Family history of heart disease (Father)    BASELINE (I)ADLs (prior to admission):  [place]: [ x] total  [ ] moderate [ ] dependent    ADVANCE DIRECTIVES:    DNR [ x] YES [ ] NO                            [ ] Completed  MOLST  [x ] YES [ ] NO                      [ ] Completed  Health Care Proxy [x ] YES  [ ] NO   [ ] Completed  Living Will  [ ] YES [ ] NO             [ ] Surrogate  [ ] HCP  [ ] Guardian:                                                                  Phone#:    ALLERGIES:   Allergies    No Known Allergies    Intolerances        MEDICATIONS:   MEDICATIONS  (STANDING):  aspirin enteric coated 81 milliGRAM(s) Oral daily  dextrose 5%. 1000 milliLiter(s) (50 mL/Hr) IV Continuous <Continuous>  dextrose 50% Injectable 12.5 Gram(s) IV Push once  dextrose 50% Injectable 25 Gram(s) IV Push once  dextrose 50% Injectable 25 Gram(s) IV Push once  insulin lispro (HumaLOG) corrective regimen sliding scale   SubCutaneous three times a day before meals  insulin lispro (HumaLOG) corrective regimen sliding scale   SubCutaneous at bedtime  sodium bicarbonate 650 milliGRAM(s) Oral three times a day  sodium chloride 0.9%. 1000 milliLiter(s) (75 mL/Hr) IV Continuous <Continuous>    MEDICATIONS  (PRN):  dextrose 40% Gel 15 Gram(s) Oral once PRN Blood Glucose LESS THAN 70 milliGRAM(s)/deciliter  glucagon  Injectable 1 milliGRAM(s) IntraMuscular once PRN Glucose LESS THAN 70 milligrams/deciliter  HYDROmorphone  Injectable 0.5 milliGRAM(s) IV Push every 4 hours PRN moderate to severe pain      PRESENT SYMPTOMS:  Source: [ ] Patient   [ ] Family   [ ] Team     Pain:                        [ ] No [ ] Yes             [ ] Mild [ ] Moderate [ ] Severe    Onset -  Location -  Duration -  Character -  Alleviating/Aggravating -  Radiation -  Timing -      Dyspnea:                [ ] No [ ] Yes             [ ] Mild [ ] Moderate [ ] Severe    Anxiety:                  [ ] No [ ] Yes             [ ] Mild [ ] Moderate [ ] Severe    Fatigue:                  [ ] No [ ] Yes             [ ] Mild [ ] Moderate [ ] Severe    Nausea:                  [ ] No [ ] Yes             [ ] Mild [ ] Moderate [ ] Severe    Loss of appetite:   [ ] No [ ] Yes             [ ] Mild [ ] Moderate [ ] Severe    Constipation:        [ ] No [ ] Yes             [ ] Mild [ ] Moderate [ ] Severe    Other Symptoms:  [ ] All other review of systems negative   [ ] Unable to obtain due to poor mentation     Karnofsky Performance Score/Palliative Performance Status Version 2:         %    PHYSICAL EXAM:  Vital Signs Last 24 Hrs  T(C): 37 (14 Dec 2018 09:20), Max: 37 (14 Dec 2018 09:20)  T(F): 98.6 (14 Dec 2018 09:20), Max: 98.6 (14 Dec 2018 09:20)  HR: 103 (14 Dec 2018 09:20) (90 - 104)  BP: 105/69 (14 Dec 2018 09:20) (102/71 - 110/70)  BP(mean): --  RR: 14 (14 Dec 2018 09:20) (12 - 18)  SpO2: 100% (14 Dec 2018 09:20) (96% - 100%) I&O's Summary      General:  [ ] Alert  [ ] Oriented x      [ ] Lethargic  [ ] Agitated   [ ] Cachexia   [ ] Unarousable  [ ] Verbal  [ ] Non-Verbal    HEENT:  [ ] Normal   [ ] Dry mouth   [ ] ET Tube    [ ] Trach  [ ] Oral lesions    Lungs:   [ ] Clear [ ] Tachypnea  [ ] Audible excessive secretions   [ ] Rhonchi        [ ] Right [ ] Left [ ] Bilateral  [ ] Crackles        [ ] Right [ ] Left [ ] Bilateral  [ ] Wheezing     [ ] Right [ ] Left [ ] Bilateral    Cardiovascular:  [ ] Regular [ ] Irregular [ ] Tachycardia   [ ] Bradycardia  [ ] Murmur [ ] Other    Abdomen: [ ] Soft  [ ] Distended   [ ] +BS  [ ] Non tender [ ] Tender  [ ]PEG   [ ]OGT/ NGT   Last BM:       Genitourinary: [ ] Normal [ ] Incontinent   [ ] Oliguria/Anuria   [ ] Cook    Musculoskeletal:  [ ] Normal   [ ] Weakness  [ ] Bedbound/Wheelchair bound [ ] Edema    Neurological: [ ] No focal deficits  [ ] Cognitive impairment  [ ] Dysphagia [ ] Dysarthria [ ] Paresis [ ] Other     Skin: [ ] Normal   [ ] Pressure ulcer(s)                  [ ] Rash    LABS:  [ ] Critical Care time for unstable patient with organ failure.  Total Time:                 minutes  12-14    138  |  97<L>  |  91<H>  ----------------------------<  150<H>  5.1   |  16<L>  |  2.13<H>    Ca    8.5      14 Dec 2018 06:41  Phos  5.5     12-13  Mg     3.3     12-13    TPro  7.5  /  Alb  3.1<L>  /  TBili  0.2  /  DBili  x   /  AST  47<H>  /  ALT  11  /  AlkPhos  157<H>  12-14          Shock: [ ] Septic [ ] Cardiogenic [ ] Neurologic [ ] Hypovolemic  Vasopressors x   Inotrophs x     Protein Calorie Malnutrition: [ ] Mild [ ] Moderate [ ] Severe  Oral Intake: [ ] Unable/mouth care only [ ] Minimal [ ] Moderate [ ] Full Capability  Diet: [ ] NPO [ ] Tube feeds [ ] TPN [ ] Other     RADIOLOGY & ADDITIONAL STUDIES:    REFERRALS:   [ ] Chaplaincy  [ ] Hospice  [ ] Child Life  [ ] Social Work  [ ] Case management [ ] Holistic Therapy HPI:  58M with PMHX of T2DM, HTN, KRISTIAN and new CAD s/p PCI at [facility] with BMS and now medically optimized, who was also incidentally found with large "slug like" L shoulder mass which was excised and found to be malignant melanoma, with metastasis found in large L axillary lymph node (biopsy proven), also found with L sided meningioma with hearing loss and s/p XRT, and subsequently found with omental carcinomatosis biopsy proven metastatic melanoma now s/p 4 cycles ipilimumab with progression and now s/p 1 dose nivolumab 480 mg flat dose on 11/1/18, DVT/PE (on Lovenox) presenting with abdominal pain x 2 days. Patient reports the pain started two days ago in his abdomen, he has baseline 2/10 pain due to his known metastases. However, notes the pain has worsened He also notes his abdomen to be more "taut", firm and distended. Patient reports last BM 2 days ago, passing gas. Patient was seen by Dr. Goldberg and [facility] today and was told to come to the hospital for pain control and coordination for hospice services. The patient denies chest pain, SOB, dysuria, recent illness, fevers, chills, however dose endorse chronically diaphoretic current N/V/D/C. However, he reports on his his previous visits to chemotherapy and prior hospitalization he has loose BMs, that are intermittent with nausea and vomiting, which he currently denies. He reports at that time his Cr uptrended and with PO hydration the labs improved. Per Dr. Goldberg notes, patient does report to him for the last few days of multiple watery bowel movements a day and this progressed that in the last few days he has started to have severe nausea and has had vomiting and cannot keep anything down at this point. He feels dehydrated and his urine has once again become darker. Patient reports extreme fatigue and weakness, has not got out of bed in 1 week. Reports 30 lb weight loss in 3 months. Of note, patient's CT in 5/2018 showed evidence of progressive metastatic disease involving liver, omentum, left axillary lymph nodes, and mesenteric tumor mass herniated into the right internal canal. PET scan 6/2018 moderate to large amount of abdominal and pelvic ascites with mildly FDG-avid omental nodularity. Findings are compatible with carcinomatosis. A primary neoplasm is not delineated on this scan. FDG-avid soft tissue attenuation density adjacent to sigmoid colon, possibly a lymph node. Large non FDG-avid transosseous meningioma extending from left supratentorial to infratentorial compartment, as see on MRI dated 3/5/2018. Patient was told incurative treatment. Patient is DNR/DNI, requesting hospice.     In ED: /71 HR90 RR18 97% RA   Patient received NS 1 L IV x 1, Dilaudid 1 mg IV x 1, Albuterol,  Insulin 5 mg IV x 1, Kayexalate 30 g 2x (14 Dec 2018 02:10)      PERTINENT PMH REVIEWED:  [x ] YES [ ] NO           SOCIAL HISTORY:   Significant other/partner:  [ ] YES  [ x] NO               Children:  [ ] YES  x[ ] NO                   Christianity/Spirituality: none  Substance hx:  [ ] YES   [x ] NO                   Tobacco hx:  [ ] YES  [ ] NO                       Alcohol hx: [ ] YES  [ ] NO         Home Opioid hx:  [x ] YES  [ ] NO   Living Situation: [ ] Home  [x ] Long term care  [ ] Rehab [ ] Other    FAMILY HISTORY:  Family history of heart disease (Father)    BASELINE (I)ADLs (prior to admission):  [place]: [ x] total  [ ] moderate [ ] dependent    ADVANCE DIRECTIVES:    DNR [ x] YES [ ] NO                            [ ] Completed  MOLST  [x ] YES [ ] NO                      [ ] Completed  Health Care Proxy [x ] YES  [ ] NO   [ ] Completed  Living Will  [ ] YES [ ] NO             [ ] Surrogate  [ x] HCP  : David Garibay (brother)                                                      Phone#:[phone]    ALLERGIES:   Allergies    No Known Allergies    Intolerances        MEDICATIONS:   MEDICATIONS  (STANDING):  aspirin enteric coated 81 milliGRAM(s) Oral daily  dextrose 5%. 1000 milliLiter(s) (50 mL/Hr) IV Continuous <Continuous>  dextrose 50% Injectable 12.5 Gram(s) IV Push once  dextrose 50% Injectable 25 Gram(s) IV Push once  dextrose 50% Injectable 25 Gram(s) IV Push once  insulin lispro (HumaLOG) corrective regimen sliding scale   SubCutaneous three times a day before meals  insulin lispro (HumaLOG) corrective regimen sliding scale   SubCutaneous at bedtime  sodium bicarbonate 650 milliGRAM(s) Oral three times a day  sodium chloride 0.9%. 1000 milliLiter(s) (75 mL/Hr) IV Continuous <Continuous>    MEDICATIONS  (PRN):  dextrose 40% Gel 15 Gram(s) Oral once PRN Blood Glucose LESS THAN 70 milliGRAM(s)/deciliter  glucagon  Injectable 1 milliGRAM(s) IntraMuscular once PRN Glucose LESS THAN 70 milligrams/deciliter  HYDROmorphone  Injectable 0.5 milliGRAM(s) IV Push every 4 hours PRN moderate to severe pain      PRESENT SYMPTOMS:  Source: [x ] Patient   [ ] Family   [ ] Team     Pain:                        [ ] No [x ] Yes             [ ] Mild [ ] Moderate [ ] Severe    Onset - over 2 month ago  Location - abdomen  Duration - constant, worsening  Alleviating/Aggravating - hydromorphone make it better.   Radiation - back, legs    Dyspnea:                [x ] No [ ] Yes             [ ] Mild [ ] Moderate [ ] Severe    Anxiety:                  [x ] No [ ] Yes             [ ] Mild [ ] Moderate [ ] Severe    Fatigue:                  [ ] No [ x] Yes             [ ] Mild [x ] Moderate [ ] Severe    Nausea:                  [ ] No [x ] Yes             [ ] Mild [ ] Moderate [x ] Severe    Loss of appetite:   [ ] No [x ] Yes             [ ] Mild [ ] Moderate [ ] Severe    Constipation:        [ ] No [ x] Yes             [ ] Mild [ ] Moderate [ ] Severe    Other Symptoms:  [ ] All other review of systems negative : vomiting  [ ] Unable to obtain due to poor mentation     Karnofsky Performance Score/Palliative Performance Status Version 2:         %    PHYSICAL EXAM:  Vital Signs Last 24 Hrs  T(C): 37 (14 Dec 2018 09:20), Max: 37 (14 Dec 2018 09:20)  T(F): 98.6 (14 Dec 2018 09:20), Max: 98.6 (14 Dec 2018 09:20)  HR: 103 (14 Dec 2018 09:20) (90 - 104)  BP: 105/69 (14 Dec 2018 09:20) (102/71 - 110/70)  BP(mean): --  RR: 14 (14 Dec 2018 09:20) (12 - 18)  SpO2: 100% (14 Dec 2018 09:20) (96% - 100%) I&O's Summary      General:  [ ] Alert  [x ] Oriented x      [ ] Lethargic  [ ] Agitated   [ ] Cachexia   [ ] Unarousable  [ ] Verbal  [ ] Non-Verbal    HEENT:  [ ] Normal   [ x] Dry mouth   [ ] ET Tube    [ ] Trach  [ ] Oral lesions    Lungs:   [ x] Clear [ ] Tachypnea  [ ] Audible excessive secretions   [ ] Rhonchi        [ ] Right [ ] Left [ ] Bilateral  [ ] Crackles        [ ] Right [ ] Left [ ] Bilateral  [ ] Wheezing     [ ] Right [ ] Left [ ] Bilateral    Cardiovascular:  [x ] Regular [ ] Irregular [ ] Tachycardia   [ ] Bradycardia  [ ] Murmur [ ] Other    Abdomen: [ ] Soft  [x ] Distended   [ ] +BS  [ ] Non tender [ x] Tender  [ ]PEG   [ ]OGT/ NGT   Last BM:   as per pt 2 days ago.    Genitourinary: [ ] Normal [ ] Incontinent   [ ] Oliguria/Anuria   [ ] Cook    Musculoskeletal:  [ ] Normal   [x ] Weakness  [ ] Bedbound/Wheelchair bound [x ] Edema: LLE edema    Neurological: [x ] No focal deficits  [ ] Cognitive impairment  [ ] Dysphagia [ ] Dysarthria [ ] Paresis [ ] Other     Skin: [ x] Normal   [ ] Pressure ulcer(s)                  [ ] Rash    LABS:  [ ] Critical Care time for unstable patient with organ failure.  Total Time:                 minutes  12-14    138  |  97<L>  |  91<H>  ----------------------------<  150<H>  5.1   |  16<L>  |  2.13<H>    Ca    8.5      14 Dec 2018 06:41  Phos  5.5     12-13  Mg     3.3     12-13    TPro  7.5  /  Alb  3.1<L>  /  TBili  0.2  /  DBili  x   /  AST  47<H>  /  ALT  11  /  AlkPhos  157<H>  12-14      Protein Calorie Malnutrition: [ ] Mild [ x] Moderate [ ] Severe  Oral Intake: [ ] Unable/mouth care only [ x] Minimal [ ] Moderate [ ] Full Capability  Diet: [ ] NPO [ ] Tube feeds [ ] TPN [ ] Other     RADIOLOGY & ADDITIONAL STUDIES:  CT of the abd  Diffuse bilobar hepatic metastases and peritoneal carcinomatosis.  Increased large volume ascites.        REFERRALS:   [ ] Chaplaincy  [x ] Hospice  [ ] Child Life  [ ] Social Work  [ ] Case management [ ] Holistic Therapy HPI:  58M with PMHX of T2DM, HTN, KRISTIAN and new CAD s/p PCI at [facility] with BMS and now medically optimized, who was also incidentally found with large "slug like" L shoulder mass which was excised and found to be malignant melanoma, with metastasis found in large L axillary lymph node (biopsy proven), also found with L sided meningioma with hearing loss and s/p XRT, and subsequently found with omental carcinomatosis biopsy proven metastatic melanoma now s/p 4 cycles ipilimumab with progression and now s/p 1 dose nivolumab 480 mg flat dose on 11/1/18, DVT/PE (on Lovenox) presenting with abdominal pain x 2 days. Patient reports the pain started two days ago in his abdomen, he has baseline 2/10 pain due to his known metastases. However, notes the pain has worsened He also notes his abdomen to be more "taut", firm and distended. Patient reports last BM 2 days ago, passing gas. Patient was seen by Dr. Goldberg and [facility] today and was told to come to the hospital for pain control and coordination for hospice services. The patient denies chest pain, SOB, dysuria, recent illness, fevers, chills, however dose endorse chronically diaphoretic current N/V/D/C. However, he reports on his his previous visits to chemotherapy and prior hospitalization he has loose BMs, that are intermittent with nausea and vomiting, which he currently denies. He reports at that time his Cr uptrended and with PO hydration the labs improved. Per Dr. Goldberg notes, patient does report to him for the last few days of multiple watery bowel movements a day and this progressed that in the last few days he has started to have severe nausea and has had vomiting and cannot keep anything down at this point. He feels dehydrated and his urine has once again become darker. Patient reports extreme fatigue and weakness, has not got out of bed in 1 week. Reports 30 lb weight loss in 3 months. Of note, patient's CT in 5/2018 showed evidence of progressive metastatic disease involving liver, omentum, left axillary lymph nodes, and mesenteric tumor mass herniated into the right internal canal. PET scan 6/2018 moderate to large amount of abdominal and pelvic ascites with mildly FDG-avid omental nodularity. Findings are compatible with carcinomatosis. A primary neoplasm is not delineated on this scan. FDG-avid soft tissue attenuation density adjacent to sigmoid colon, possibly a lymph node. Large non FDG-avid transosseous meningioma extending from left supratentorial to infratentorial compartment, as see on MRI dated 3/5/2018. Patient was told incurative treatment. Patient is DNR/DNI, requesting hospice.     In ED: /71 HR90 RR18 97% RA   Patient received NS 1 L IV x 1, Dilaudid 1 mg IV x 1, Albuterol,  Insulin 5 mg IV x 1, Kayexalate 30 g 2x (14 Dec 2018 02:10)    Palliative Care consulted for Symptom management in the setting of metastatic disease.     PERTINENT PMH REVIEWED:  [x ] YES [ ] NO           SOCIAL HISTORY:   Significant other/partner:  [ ] YES  [ x] NO               Children:  [ ] YES  x[ ] NO                   Tenriism/Spirituality: none  Substance hx:  [ ] YES   [x ] NO                   Tobacco hx:  [ ] YES  [ ] NO                       Alcohol hx: [ ] YES  [ ] NO         Home Opioid hx:  [x ] YES  [ ] NO   Living Situation: [ ] Home  [x ] Long term care  [ ] Rehab [ ] Other    FAMILY HISTORY:  Family history of heart disease (Father)    BASELINE (I)ADLs (prior to admission):  [place]: [ ] total  [x ] moderate [ ] dependent    ADVANCE DIRECTIVES:    DNR [ x] YES [ ] NO                            [ ] Completed  MOLST  [x ] YES [ ] NO                      [ ] Completed  Health Care Proxy [x ] YES  [ ] NO   [ ] Completed  Living Will  [ ] YES [ x] NO             [ ] Surrogate  [ x] HCP  : David Garibay (brother)                                                      Phone#:[phone]    ALLERGIES:   Allergies    No Known Allergies    Intolerances        MEDICATIONS:   MEDICATIONS  (STANDING):  aspirin enteric coated 81 milliGRAM(s) Oral daily  dextrose 5%. 1000 milliLiter(s) (50 mL/Hr) IV Continuous <Continuous>  dextrose 50% Injectable 12.5 Gram(s) IV Push once  dextrose 50% Injectable 25 Gram(s) IV Push once  dextrose 50% Injectable 25 Gram(s) IV Push once  insulin lispro (HumaLOG) corrective regimen sliding scale   SubCutaneous three times a day before meals  insulin lispro (HumaLOG) corrective regimen sliding scale   SubCutaneous at bedtime  sodium bicarbonate 650 milliGRAM(s) Oral three times a day  sodium chloride 0.9%. 1000 milliLiter(s) (75 mL/Hr) IV Continuous <Continuous>    MEDICATIONS  (PRN):  dextrose 40% Gel 15 Gram(s) Oral once PRN Blood Glucose LESS THAN 70 milliGRAM(s)/deciliter  glucagon  Injectable 1 milliGRAM(s) IntraMuscular once PRN Glucose LESS THAN 70 milligrams/deciliter  HYDROmorphone  Injectable 0.5 milliGRAM(s) IV Push every 4 hours PRN moderate to severe pain      PRESENT SYMPTOMS:  Source: [x ] Patient   [ ] Family   [ ] Team     Pain:                        [ ] No [x ] Yes             [ ] Mild [ ] Moderate [ x] Severe    Onset - over 2 month ago  Location - abdomen  Duration - constant, worsening  Alleviating/Aggravating - hydromorphone alleviates it.   Radiation - back, legs    Dyspnea:                [x ] No [ ] Yes             [ ] Mild [ ] Moderate [ ] Severe    Anxiety:                  [x ] No [ ] Yes             [ ] Mild [ ] Moderate [ ] Severe    Fatigue:                  [ ] No [ x] Yes             [ ] Mild [x ] Moderate [ ] Severe    Nausea:                  [ ] No [x ] Yes             [ ] Mild [ ] Moderate [x ] Severe    Loss of appetite:   [ ] No [x ] Yes             [ ] Mild [ ] Moderate [ ] Severe    Constipation:        [ ] No [ x] Yes             [ ] Mild [ ] Moderate [ ] Severe    Other Symptoms:  [ ] All other review of systems negative : vomiting  [ ] Unable to obtain due to poor mentation     Karnofsky Performance Score/Palliative Performance Status Version 2: 50 %    PHYSICAL EXAM:  Vital Signs Last 24 Hrs  T(C): 37 (14 Dec 2018 09:20), Max: 37 (14 Dec 2018 09:20)  T(F): 98.6 (14 Dec 2018 09:20), Max: 98.6 (14 Dec 2018 09:20)  HR: 103 (14 Dec 2018 09:20) (90 - 104)  BP: 105/69 (14 Dec 2018 09:20) (102/71 - 110/70)  BP(mean): --  RR: 14 (14 Dec 2018 09:20) (12 - 18)  SpO2: 100% (14 Dec 2018 09:20) (96% - 100%) I&O's Summary      General:  [ ] Alert  [x ] Oriented x      [ ] Lethargic  [ ] Agitated   [ ] Cachexia   [ ] Unarousable  [ ] Verbal  [ ] Non-Verbal    HEENT:  [ ] Normal   [ x] Dry mouth   [ ] ET Tube    [ ] Trach  [ ] Oral lesions    Lungs:   [ x] Clear [ ] Tachypnea  [ ] Audible excessive secretions   [ ] Rhonchi        [ ] Right [ ] Left [ ] Bilateral  [ ] Crackles        [ ] Right [ ] Left [ ] Bilateral  [ ] Wheezing     [ ] Right [ ] Left [ ] Bilateral    Cardiovascular:  [x ] Regular [ ] Irregular [ ] Tachycardia   [ ] Bradycardia  [ ] Murmur [ ] Other    Abdomen: [ ] Soft  [x ] Distended   [ ] +BS  [ ] Non tender [ x] Tender  [ ]PEG   [ ]OGT/ NGT   Last BM:   as per pt 2 days ago.    Genitourinary: [ ] Normal [ ] Incontinent   [ ] Oliguria/Anuria   [ ] Cook    Musculoskeletal:  [ ] Normal   [x ] Weakness  [ ] Bedbound/Wheelchair bound [x ] Edema: LLE edema    Neurological: [x ] No focal deficits  [ ] Cognitive impairment  [ ] Dysphagia [ ] Dysarthria [ ] Paresis [ ] Other     Skin: [ x] Normal   [ ] Pressure ulcer(s)                  [ ] Rash    LABS:    12-14    138  |  97<L>  |  91<H>  ----------------------------<  150<H>  5.1   |  16<L>  |  2.13<H>    Ca    8.5      14 Dec 2018 06:41  Phos  5.5     12-13  Mg     3.3     12-13    TPro  7.5  /  Alb  3.1<L>  /  TBili  0.2  /  DBili  x   /  AST  47<H>  /  ALT  11  /  AlkPhos  157<H>  12-14      Protein Calorie Malnutrition: [ ] Mild [ x] Moderate [ ] Severe  Oral Intake: [ ] Unable/mouth care only [ x] Minimal [ ] Moderate [ ] Full Capability  Diet: [ ] NPO [ ] Tube feeds [ ] TPN [x ] Other     RADIOLOGY & ADDITIONAL STUDIES:  CT of the abd  Diffuse bilobar hepatic metastases and peritoneal carcinomatosis.  Increased large volume ascites.        REFERRALS:   [ ] Chaplaincy  [ ] Hospice  [ ] Child Life  [ ] Social Work  [ ] Case management [ ] Holistic Therapy

## 2018-12-14 NOTE — CONSULT NOTE ADULT - ASSESSMENT
59 y/o Male, ambulatory with a wheelchair and a walker, with MHx  significant for severe diverticulosis, metastatic melanoma s/p resection (s/p 4 doses of ipilumumab and 1 dose of nivolumab on 11/1) with progression of disease, meningothelial meningioma s/p resection and RT, CAD s/p stent, DVT/PE on lovenox, now with SILKE and abdominal pain.  At this point patient elects for comfort care and wishes to pursue hospice.    Metastatic melanoma  - s/p 4 doses of ipilumumab, s/p 1 dose of nivolumab on 11/1  - patient spoke with his outpatient oncologist, at this time wants to focus on comfort care.  Will not receive more disease modifying therapy.  - patient wants to pursue hospice, f/u palliative care consult  - can continue hep gtt at this time but if patient switches to inpatient hospice likely can discuss discontinuing AC  - will discuss with attending    Elena Peres DO  Hematology/Oncology Fellow, PGY4  Pager: [phone] 57 y/o Male, ambulatory with a wheelchair and a walker, with MHx  significant for severe diverticulosis, metastatic melanoma s/p resection (s/p 4 doses of ipilumumab and 1 dose of nivolumab on 11/1) with progression of disease, meningothelial meningioma s/p resection and RT, CAD s/p stent, DVT/PE on lovenox, now with SILKE and abdominal pain.  At this point patient elects for comfort care and wishes to pursue hospice.    Metastatic melanoma  - s/p 4 doses of ipilumumab, s/p 1 dose of nivolumab on 11/1  - patient spoke with his outpatient oncologist, at this time wants to focus on comfort care.  Will not receive more disease modifying therapy.  - patient wants to pursue hospice, f/u palliative care consult  - can continue hep gtt at this time but if patient switches to inpatient hospice can discuss discontinuing AC  - pain control    Elena Peres DO  Hematology/Oncology Fellow, PGY4  Pager: [phone]

## 2018-12-14 NOTE — CONSULT NOTE ADULT - ASSESSMENT
57 yo M patient with h/o DMT2,  HTN, KRISTIAN, CAD, malignant melanoma with mets to liver and peritoneal carcinomatosis  who is complaining of worsening abdominal pain who does not qualify for further disease modifying therapy and is requesting symptoms management.

## 2018-12-14 NOTE — H&P ADULT - PROBLEM SELECTOR PLAN 4
Patient with hyperK to 6.1 in the setting of SILKE   Being medically managed, no peaked T waves seen on EKG   Repeat BMP now, patient denies chest pain or palpitations Patient with hyperK to 6.1 in the setting of SILKE   Being medically managed, no peaked T waves seen on EKG   Repeat BMP w/improved K, patient denies chest pain or palpitations  can monitor on tele for now

## 2018-12-14 NOTE — CONSULT NOTE ADULT - PROBLEM SELECTOR RECOMMENDATION 6
Patient is not pursuing disease modifying therapy,  HCP is his Brother, David.  He says that he is DNR/ DNI.  Pt will like better symptoms management.  He will like to be refereed to Hospice but does not want to go to same facility he was living before. Patient is not pursuing disease modifying therapy,  HCP is his Brother, David.  Confirmed with pt advanced directives, DNR/DNI. Pending on discharge plan will determine hospice agency to make referral.

## 2018-12-14 NOTE — GOALS OF CARE CONVERSATION - PERSONAL ADVANCE DIRECTIVE - CONVERSATION DETAILS
[facility] - Met with pt Marty and spoke to his brother David via phone. Pt is desirous of Hospice Care but currently lives at [facility] and would like to be transferred elsewhere. He states that he is being admitted for a complete workup and states that he will meet with this RN on Monday to discuss further options.

## 2018-12-14 NOTE — H&P ADULT - PMH
CAD (coronary artery disease)    Enlarged heart    Heart failure, unspecified heart failure chronicity, unspecified heart failure type    HTN (hypertension)    Malignant melanoma, unspecified site    Meningioma    Sleep apnea    Type 2 diabetes mellitus with complication, without long-term current use of insulin CAD (coronary artery disease)    Chronic deep vein thrombosis (DVT) of lower extremity, unspecified laterality, unspecified vein    Enlarged heart    Heart failure, unspecified heart failure chronicity, unspecified heart failure type    HTN (hypertension)    Malignant melanoma, unspecified site    Meningioma    Other chronic pulmonary embolism without acute cor pulmonale    Sleep apnea    Type 2 diabetes mellitus with complication, without long-term current use of insulin

## 2018-12-14 NOTE — HISTORY OF PRESENT ILLNESS
[Disease: _____________________] : Disease: [unfilled] [T: ___] : T[unfilled] [N: ___] : N[unfilled] [M: ___] : M[unfilled] [AJCC Stage: ____] : AJCC Stage: [unfilled] [Therapy: ___] : Therapy: [unfilled] [Cycle: ___] : Cycle: [unfilled] [Gastrointestinal] : Gastrointestinal [___________________________________] : Drug: [unfilled] [de-identified] : 58 year old male presents for an initial consultation today for malignant melanoma which is metastatic. The patient had not seen a medical doctor in over 15 years until October 2017. He had seeked care then due to what he described as severe water retention and he was initially treated with just diuretics. At the end of December 2017, he developed SOB, cough generalized edema and sought care at [facility] ED. There, was diagnosed with systolic heart failure (EF 40%), as well as HTN, DM2, KRISTIAN, and a left sided brain tumor. MRI of the brain performed on 11/30/17 revealed an extensive lobulated calvarial thickening involving the left parietal-occipital-temporal region with an enhancing soft tissue component. There was notable edema involving the left lateral cerebellum with mass effect on the 4th ventricle. Cardiac catheterization showed multivessel disease and patient was given ASA 325mg and Plavix 600mg. He was then transferred to [facility] for PCI and is now s/p BMS to LAD on 1/18/2018. Echocardiogram showed now preserved LV systolic function, but severe pulmonary hypertension, signs of RV pressure and volume overload due to acute on chronic diastolic heart failure.\par \par He also reported an enlarging left posterior shoulder lesion (“slug sized”) which had been present for over a year in December 2018 during his hospitalization at [facility]. It was biopsied and excised on 12/7/17at [facility] while hospitalized and pathology revealed nodular type melanoma with a mitotic rate >15/mm2 and extending to tissue edges with ulceration present, no regression, and measured 2.3 cm in greatest dimension, however the tumor thickness was unable to be accurately assessed and no sentinel node biopsy was performed. Also performed that day was a left side skull subtotal resection and pathology revealed meningothelial meningioma, WHO, Grade I , with invasion of the bone and soft tissue ( skeletal muscle). Patient underwent lymphoscintigraphy for pretreatment lymph node mapping on 3/23/18. He was found to have left axillary yimi accumulation of radiopharmaceutical. \par \par He completed XRT to the left brain meningioma (WHO grade 1) on 5/14/18. He was treated due to headaches and loss of hearing on the L side. His hearing has not returned at all. He was seeing Dr. Howell from oncology for both the melanoma and brain meningioma. \par \par On 6/6/18 patient underwent PET scan which showed hypermetabolic L axillary node, abdominal/pelvic ascites with FDG omental nodularity compatible with carcinomatosis, FDG avid soft tissue attenuation density adjacent to the sigmoid colon, possibly a lymph node, and findings suggestive of thyroiditis. On 6/26/18 with Dr. Garduno patient had left axillary lymph node biopsy revealing metastatic melanoma. No BRAF status was checked on this specimen. 6/29/18 patient had US guided core biopsy and FNA of the omentum which showed fibroadipose tissue with lymphoplasmacytic infiltrate and fibrosis. He then underwent colonoscopy which showed only diverticulosis. He has since been started on single agent ipilimumab. He had received 4 treatments with ipilimumab and now repeat imaging is showing progression of his disease in the mesentery and axilla. He has since received his first dose of single agent Nivolumab at 480 mg flat dose on November 1st 2018. \par \par On his previous visit 10 days ago he reports two days of watery diarrhea which has abated but he did feel overall weak and fatigued. His Cr at that time uptrended to around 2 with high magnesium and phosphorus, however when called patient with results he told me he was feeling much better and drinking a lot of gatorade and definitively improving. He came to treatment room that Monday 11/19/18 for repeat lab check and IVFs and receive 500 CC NS and Cr showed improvement and electrolytes were corrected. When he presented for follow up thereafter he was feeling worse and N/V/D had returned. He was sent to the hospital at that point and was found with no evidence of colitis but with evidence of worsening peritoneal and liver disease with malignant ascites. He had lactic acidosis and was treated with broad spectrum abx but no source of infection was ever found and the lactate level was deemed to likely be due to the melanoma (Warburg effect). He was also found with large LLE DVT and PE. Was started on enoxaparin. Discharged Dec 5th and here today for follow up.  [de-identified] : BRAF negative [de-identified] : PATHOLOGY:\par Consult slides from [facility] original shoulder exision from 3/5/2018:\par Skin, left shoulder, excision ([id number]; 12/7/2017) \par - Melanoma, nodular type, mitotic rate >15/mm2, extending to tissue edges (see note). \par Note: Only one H&E stained section (A7) is provided for review. Ulceration is present. No regression changes are seen. The tumormeasures 2.3 cm in greatest dimension on the provided slide; however, tumor thickness cannot be accurately assessed on thissection.  A Mart1/Melan A immunostain performed by the [facility] is reviewed and is positive in the tumor cells. Thereis a focal increase in intraepidermal melanocytes; however, nodefinite in situ component is seen in this section. Thedifferential diagnosis includes a primary tumor and a metastasis.According to the accompanying report, the peripheral and deepmargins are uninvolved by tumor; inkedmargin is not present in the section provided. Clinicalcorrelation is recommended.\par \par \par 6/26/2018\par Left axillary lymph node, core biopsy \par - Metastatic malignant melanoma. \par \par \par 6/29/2018\par OMENTUM, US GUIDED CORE BIOPSY AND FNA \par SIGNIFICANT FINDINGS. \par Fibroadipose tissue with lymphoplasmacytic infiltrate and \par fibrosis\par  [FreeTextEntry1] : Ipilimumab 4 cycles August - October 2018 [de-identified] : Patient reports that when he was discharged he felt that the nausea and vomiting came back for a day but then subsided. He reports today he feels "horrible", with severe pain in his lower extremities and abdomen which is not helped with percocet at home. He reports that he cannot even raise from a seated position. He is not able to really eat at all and feels that he is simply deteriorating.  [de-identified] : Diarrhea

## 2018-12-14 NOTE — PROGRESS NOTE ADULT - PROBLEM SELECTOR PLAN 2
Hx of diarrhea intermittent, not current, had formed bowel movement 12/14  Less likely infectious etiology likely 2/2 immunotherapy   If continues will send stool cx, GI PCR  C. diff and stool cx in outpatient setting negative   Electrolytes stable

## 2018-12-14 NOTE — CONSULT NOTE ADULT - PROBLEM SELECTOR RECOMMENDATION 2
Diffuse bilobar hepatic metastases and peritoneal carcinomatosis w/  Increased large volume ascites.  Obstruction was rule out.   Plan as above. Diffuse bilobar hepatic metastases and peritoneal carcinomatosis w/  Increased large volume ascites.  No signs of obstruction on CT abdomen.  Plan as above.

## 2018-12-14 NOTE — CONSULT NOTE ADULT - SUBJECTIVE AND OBJECTIVE BOX
HPI:  57 y/o Male, ambulatory with a wheelchair and a walker, with MHx  significant for severe diverticulosis, metastatic melanoma (s/p 4 doses of ipilumumab and 1 dose of nivolumab on 11/1) with progression of disease, meningothelial meningioma s/p resection and RT, CAD s/p stent, DVT/PE, recent admission for dehydration and lactic acidosis presents with abdominal pain and SILKE.      In ED: /71 HR90 RR18 97% RA   Patient received NS 1 L IV x 1, Dilaudid 1 mg IV x 1, Albuterol,  Insulin 5 mg IV x 1, Kayexalate 30 g 2x (14 Dec 2018 02:10)      PAST MEDICAL & SURGICAL HISTORY:  Other chronic pulmonary embolism without acute cor pulmonale  Chronic deep vein thrombosis (DVT) of lower extremity, unspecified laterality, unspecified vein  CAD (coronary artery disease)  Meningioma  Malignant melanoma, unspecified site  Heart failure, unspecified heart failure chronicity, unspecified heart failure type  Type 2 diabetes mellitus with complication, without long-term current use of insulin  Enlarged heart  HTN (hypertension)  Sleep apnea  Eye symptoms: ser eye surgery, left eye 6/25/2018 (bleeding in eye)  Stented coronary artery: pLAD 1/18/2018    MEDICATIONS  (STANDING):  aspirin enteric coated 81 milliGRAM(s) Oral daily  dextrose 5%. 1000 milliLiter(s) (50 mL/Hr) IV Continuous <Continuous>  dextrose 50% Injectable 12.5 Gram(s) IV Push once  dextrose 50% Injectable 25 Gram(s) IV Push once  dextrose 50% Injectable 25 Gram(s) IV Push once  docusate sodium 100 milliGRAM(s) Oral daily  insulin lispro (HumaLOG) corrective regimen sliding scale   SubCutaneous every 6 hours  senna 2 Tablet(s) Oral at bedtime  sodium bicarbonate 650 milliGRAM(s) Oral three times a day  sodium chloride 0.9%. 1000 milliLiter(s) (75 mL/Hr) IV Continuous <Continuous>    MEDICATIONS  (PRN):  dextrose 40% Gel 15 Gram(s) Oral once PRN Blood Glucose LESS THAN 70 milliGRAM(s)/deciliter  glucagon  Injectable 1 milliGRAM(s) IntraMuscular once PRN Glucose LESS THAN 70 milligrams/deciliter  HYDROmorphone  Injectable 0.5 milliGRAM(s) IV Push every 2 hours PRN moderate to severe pain      Allergies    No Known Allergies    Intolerances        SOCIAL HISTORY:    FAMILY HISTORY:  Family history of heart disease (Father)      Vital Signs Last 24 Hrs  T(C): 37 (14 Dec 2018 09:20), Max: 37 (14 Dec 2018 09:20)  T(F): 98.6 (14 Dec 2018 09:20), Max: 98.6 (14 Dec 2018 09:20)  HR: 103 (14 Dec 2018 09:20) (90 - 104)  BP: 105/69 (14 Dec 2018 09:20) (102/71 - 110/70)  BP(mean): --  RR: 14 (14 Dec 2018 09:20) (12 - 18)  SpO2: 100% (14 Dec 2018 09:20) (96% - 100%)    PHYSICAL EXAM:    GENERAL: NAD, AAOx3   HEAD:  NC/AT  EYES: EOMI, PERRLA, no scleral icterus  HEENT: Moist mucous membranes  LUNG: Clear to auscultation bilaterally; No rales, rhonchi, wheezing, or rubs  HEART: RRR; No murmurs, rubs, or gallops  ABDOMEN: +BS, ST/ND/NT  EXTREMITIES:  2+ Peripheral Pulses, No clubbing, cyanosis, or edema  LAD: no palpable adenopathy    LABS:                        13.4   8.32  )-----------( 470      ( 13 Dec 2018 19:00 )             44.0     12-14    138  |  97<L>  |  91<H>  ----------------------------<  150<H>  5.1   |  16<L>  |  2.13<H>    Ca    8.5      14 Dec 2018 06:41  Phos  5.5     12-13  Mg     3.3     12-13    TPro  7.5  /  Alb  3.1<L>  /  TBili  0.2  /  DBili  x   /  AST  47<H>  /  ALT  11  /  AlkPhos  157<H>  12-14              RADIOLOGY & ADDITIONAL STUDIES: HPI:  59 y/o Male, ambulatory with a wheelchair and a walker, with MHx  significant for severe diverticulosis, metastatic melanoma (s/p 4 doses of ipilumumab and 1 dose of nivolumab on 11/1) with progression of disease, meningothelial meningioma s/p resection and RT, CAD s/p stent, DVT/PE, recent admission for dehydration and lactic acidosis presents with abdominal pain and SILKE.  Patient spoke with his outpatient oncologist Dr. Goldberg, he is not a candidate for more immunotherapy.  He is now interested in hospice.    In ED: /71 HR90 RR18 97% RA   Patient received NS 1 L IV x 1, Dilaudid 1 mg IV x 1, Albuterol,  Insulin 5 mg IV x 1, Kayexalate 30 g 2x (14 Dec 2018 02:10)      PAST MEDICAL & SURGICAL HISTORY:  Other chronic pulmonary embolism without acute cor pulmonale  Chronic deep vein thrombosis (DVT) of lower extremity, unspecified laterality, unspecified vein  CAD (coronary artery disease)  Meningioma  Malignant melanoma, unspecified site  Heart failure, unspecified heart failure chronicity, unspecified heart failure type  Type 2 diabetes mellitus with complication, without long-term current use of insulin  Enlarged heart  HTN (hypertension)  Sleep apnea  Eye symptoms: ser eye surgery, left eye 6/25/2018 (bleeding in eye)  Stented coronary artery: pLAD 1/18/2018    MEDICATIONS  (STANDING):  aspirin enteric coated 81 milliGRAM(s) Oral daily  dextrose 5%. 1000 milliLiter(s) (50 mL/Hr) IV Continuous <Continuous>  dextrose 50% Injectable 12.5 Gram(s) IV Push once  dextrose 50% Injectable 25 Gram(s) IV Push once  dextrose 50% Injectable 25 Gram(s) IV Push once  docusate sodium 100 milliGRAM(s) Oral daily  insulin lispro (HumaLOG) corrective regimen sliding scale   SubCutaneous every 6 hours  senna 2 Tablet(s) Oral at bedtime  sodium bicarbonate 650 milliGRAM(s) Oral three times a day  sodium chloride 0.9%. 1000 milliLiter(s) (75 mL/Hr) IV Continuous <Continuous>    MEDICATIONS  (PRN):  dextrose 40% Gel 15 Gram(s) Oral once PRN Blood Glucose LESS THAN 70 milliGRAM(s)/deciliter  glucagon  Injectable 1 milliGRAM(s) IntraMuscular once PRN Glucose LESS THAN 70 milligrams/deciliter  HYDROmorphone  Injectable 0.5 milliGRAM(s) IV Push every 2 hours PRN moderate to severe pain      Allergies    No Known Allergies    Intolerances        SOCIAL HISTORY:    FAMILY HISTORY:  Family history of heart disease (Father)      Vital Signs Last 24 Hrs  T(C): 37 (14 Dec 2018 09:20), Max: 37 (14 Dec 2018 09:20)  T(F): 98.6 (14 Dec 2018 09:20), Max: 98.6 (14 Dec 2018 09:20)  HR: 103 (14 Dec 2018 09:20) (90 - 104)  BP: 105/69 (14 Dec 2018 09:20) (102/71 - 110/70)  BP(mean): --  RR: 14 (14 Dec 2018 09:20) (12 - 18)  SpO2: 100% (14 Dec 2018 09:20) (96% - 100%)    PHYSICAL EXAM:    GENERAL: NAD, AAOx3   HEAD:  NC/AT  EYES: EOMI, PERRLA, no scleral icterus  HEENT: Moist mucous membranes  LUNG: Clear to auscultation bilaterally; No rales, rhonchi, wheezing, or rubs  HEART: RRR; No murmurs, rubs, or gallops  ABDOMEN: +BS, ST/ND/NT  EXTREMITIES:  2+ Peripheral Pulses, No clubbing, cyanosis, or edema  LAD: no palpable adenopathy    LABS:                        13.4   8.32  )-----------( 470      ( 13 Dec 2018 19:00 )             44.0     12-14    138  |  97<L>  |  91<H>  ----------------------------<  150<H>  5.1   |  16<L>  |  2.13<H>    Ca    8.5      14 Dec 2018 06:41  Phos  5.5     12-13  Mg     3.3     12-13    TPro  7.5  /  Alb  3.1<L>  /  TBili  0.2  /  DBili  x   /  AST  47<H>  /  ALT  11  /  AlkPhos  157<H>  12-14              RADIOLOGY & ADDITIONAL STUDIES: HPI:  57 y/o Male, ambulatory with a wheelchair and a walker, with MHx  significant for severe diverticulosis, metastatic melanoma (s/p 4 doses of ipilumumab and 1 dose of nivolumab on 11/1) with progression of disease, meningothelial meningioma s/p resection and RT, CAD s/p stent, DVT/PE, recent admission for dehydration and lactic acidosis presents with abdominal pain and SILKE.  Patient spoke with his outpatient oncologist Dr. Goldberg, he is not a candidate for more immunotherapy.  He is now interested in hospice.    In ED: /71 HR90 RR18 97% RA   Patient received NS 1 L IV x 1, Dilaudid 1 mg IV x 1, Albuterol,  Insulin 5 mg IV x 1, Kayexalate 30 g 2x (14 Dec 2018 02:10)      PAST MEDICAL & SURGICAL HISTORY:  Other chronic pulmonary embolism without acute cor pulmonale  Chronic deep vein thrombosis (DVT) of lower extremity, unspecified laterality, unspecified vein  CAD (coronary artery disease)  Meningioma  Malignant melanoma, unspecified site  Heart failure, unspecified heart failure chronicity, unspecified heart failure type  Type 2 diabetes mellitus with complication, without long-term current use of insulin  Enlarged heart  HTN (hypertension)  Sleep apnea  Eye symptoms: ser eye surgery, left eye 6/25/2018 (bleeding in eye)  Stented coronary artery: pLAD 1/18/2018    MEDICATIONS  (STANDING):  aspirin enteric coated 81 milliGRAM(s) Oral daily  dextrose 5%. 1000 milliLiter(s) (50 mL/Hr) IV Continuous <Continuous>  dextrose 50% Injectable 12.5 Gram(s) IV Push once  dextrose 50% Injectable 25 Gram(s) IV Push once  dextrose 50% Injectable 25 Gram(s) IV Push once  docusate sodium 100 milliGRAM(s) Oral daily  insulin lispro (HumaLOG) corrective regimen sliding scale   SubCutaneous every 6 hours  senna 2 Tablet(s) Oral at bedtime  sodium bicarbonate 650 milliGRAM(s) Oral three times a day  sodium chloride 0.9%. 1000 milliLiter(s) (75 mL/Hr) IV Continuous <Continuous>    MEDICATIONS  (PRN):  dextrose 40% Gel 15 Gram(s) Oral once PRN Blood Glucose LESS THAN 70 milliGRAM(s)/deciliter  glucagon  Injectable 1 milliGRAM(s) IntraMuscular once PRN Glucose LESS THAN 70 milligrams/deciliter  HYDROmorphone  Injectable 0.5 milliGRAM(s) IV Push every 2 hours PRN moderate to severe pain      Allergies    No Known Allergies    Intolerances        SOCIAL HISTORY:    FAMILY HISTORY:  Family history of heart disease (Father)      Vital Signs Last 24 Hrs  T(C): 37 (14 Dec 2018 09:20), Max: 37 (14 Dec 2018 09:20)  T(F): 98.6 (14 Dec 2018 09:20), Max: 98.6 (14 Dec 2018 09:20)  HR: 103 (14 Dec 2018 09:20) (90 - 104)  BP: 105/69 (14 Dec 2018 09:20) (102/71 - 110/70)  BP(mean): --  RR: 14 (14 Dec 2018 09:20) (12 - 18)  SpO2: 100% (14 Dec 2018 09:20) (96% - 100%)    PHYSICAL EXAM:    GENERAL: NAD, AAOx3   HEAD:  NC/AT  EYES: EOMI, PERRLA  HEENT: Moist mucous membranes  LUNG: decreased breath sounds  HEART: RRR; No murmurs, rubs, or gallops  ABDOMEN: + distended    LABS:                        13.4   8.32  )-----------( 470      ( 13 Dec 2018 19:00 )             44.0     12-14    138  |  97<L>  |  91<H>  ----------------------------<  150<H>  5.1   |  16<L>  |  2.13<H>    Ca    8.5      14 Dec 2018 06:41  Phos  5.5     12-13  Mg     3.3     12-13    TPro  7.5  /  Alb  3.1<L>  /  TBili  0.2  /  DBili  x   /  AST  47<H>  /  ALT  11  /  AlkPhos  157<H>  12-14              RADIOLOGY & ADDITIONAL STUDIES:

## 2018-12-14 NOTE — CONSULT NOTE ADULT - PROBLEM SELECTOR RECOMMENDATION 3
Patient complaining of pain 10/10,   Pt was using Percocet at home, does not remember the dose.   Will give Hydromorphone 0.5 mg q 2 hrs PRN  Will follow up,   Please contact palliative team if pain is not well control with current regimen. Patient complaining of pain 10/10,   Pt was using Percocet at home, does not remember the dose. Currently not working.  Would continue with Hydromorphone 0.5 mg IV q 2 hrs PRN, as pt is vomiting.   Pt would benefit from tap if large volume ascites.   Please page for uncontrolled symptoms [phone].

## 2018-12-14 NOTE — REVIEW OF SYSTEMS
[Fatigue] : fatigue [Recent Change In Weight] : ~T recent weight change [Abdominal Pain] : abdominal pain [Vomiting] : vomiting [Muscle Weakness] : muscle weakness [Difficulty Walking] : difficulty walking [Negative] : Allergic/Immunologic [Fever] : no fever [Chills] : no chills [Night Sweats] : no night sweats [Chest Pain] : no chest pain [Palpitations] : no palpitations [Lower Ext Edema] : no lower extremity edema [Shortness Of Breath] : no shortness of breath [Cough] : no cough [Constipation] : no constipation [Diarrhea] : no diarrhea [Dysuria] : no dysuria [Muscle Pain] : no muscle pain [FreeTextEntry2] : Overall decline in functional status due to pain and weakness [FreeTextEntry7] : persistent nausea [FreeTextEntry9] : Severe b/l lower extremity pain [de-identified] : L axillary swelling / pain

## 2018-12-14 NOTE — H&P ADULT - PROBLEM SELECTOR PLAN 7
Extensive GOC discussion with patient, patient is DNR/DNI, no aggressive measures as in surgery or HD  However, is agreeable to IVF and IV abx if needed  Hospice referral in the AM   Palliative consult to assist with pain management and comfort Hx of DVT and PE on previous hospitilizatoin  On Lovenox BID dosing, restarted home medication  However, will need to recalculate weight given patient reports 30 lb weight loss in 3 months to confirm proper dosing Hx of DVT and PE on previous hospitalization  On Lovenox BID dosing, restarted home medication  However, will need to recalculate weight given patient reports 30 lb weight loss in 3 months to confirm proper dosing Hx of DVT and PE on previous hospitalization  On Lovenox BID dosing, rec'd one dose in ED  Will need to recalculate weight given patient reports 30 lb weight loss in 3 months to confirm proper dosing; further doses on hold in light of SILKE; monitor Cr, currently eGFR>30, will need to discuss with patient starting on heparin gtt vs continuing lovenox and monitoring Cr vs discontinuing for comfort

## 2018-12-14 NOTE — CONSULT NOTE ADULT - PROBLEM SELECTOR RECOMMENDATION 4
-Pt have been vomiting, this could be due to Chemo or carcinomatosis.   If QTC <500 can use Metoclopramide 10 mg TID PRN  If QTC is >500 use Ativan 0.5 mgs q 4 hrs PRN 2/2 chemo vs large volume ascites vs constipation.   If QTC <500 can use Metoclopramide 10 mg TID atc  If QTC is >500 use Ativan 0.5 mgs q 4 hrs PRN for nausea/vomiting.

## 2018-12-14 NOTE — ASSESSMENT
[Palliative] : Goals of care discussed with patient: Palliative [Palliative Care Plan] : patient was apprised of terminal prognosis of 6 month or less. Hospice and Palliative care options discussed [FreeTextEntry1] : 59 y/o M with no medical history prior to late 2017, when he was hospitalized for heart failure exacerbation, also diagnosed with T2DM, HTN, KRISTIAN and new CAD s/p PCI at [facility] with BMS and now medically optimized, who was also incidentally found with large "slug like" L shoulder mass which was excised and found to be malignant melanoma, with metastasis found in large L axillary lymph node (biopsy proven), also found with L sided meningioma with hearing loss and s/p XRT, and subsequently found with omental carcinomatosis biopsy proven metastatic melanoma now s/p 4 cycles ipilimumab with progression and now s/p 1 dose nivolumab 480 mg flat dose on 11/1/18 c/b severe nausea and vomiting admitted and found with severe lactic acidosis likely due to melanoma and now here for follow up post-discharge 12/5/18.\par \par (1) Malignant Melanoma\par Patient with stage IV disease, BRAF is negative.\par Ever since first dose of nivolumab on 11/1 patient has had an overall deterioration of functional status. He reports today pain so severe and weakness so profound he cannot stand. Images inpatient had shown continued disease progression with worsening omental caking and liver mets.\par Although unclear if nivo had an effect as only had one dose 3-4 weeks prior to that imaging, at this point patient's functional status is an ECOG 4 and he is unable to receive more disease modifying therapy, and would unlikely benefit from disease modifying therapy. \par Discussed this with patient and he vocalized understanding. \par Patient re-stated his desire to be DNR/DNI and that he is interested in pursuing hospice options at this point. On further questioning he understands his prognosis is grim and he likely has only weeks to months likely to live. \par Discussed this with the patient and his brother as per patient request. \par Due to severe pain and deterioration of functional status, as well as prognosis under 6 months, patient is appropriate for inpatient hospice. \par Will send to [facility] ER for pain control in the urgent setting and communicate with team that patient is to be transitioned to inpatient hospice. \par \par \par Discussed in clinic with Dr. Sung. Following longitudinally with Dr. Cho. \par \par Bradley Goldberg M.D. \par Hematology/Oncology Fellow\par [facility]\par [address] [place] [address] [address] [address]\par [phone]

## 2018-12-14 NOTE — PHYSICAL EXAM
[Capable of only limited self care, confined to bed or chair more than 50% of waking hours] : Status 3- Capable of only limited self care, confined to bed or chair more than 50% of waking hours [Obese] : obese [Normal] : affect appropriate [Ulcers] : no ulcers [Mucositis] : no mucositis [Thrush] : no thrush [Vesicles] : no vesicles [de-identified] : Very disheveled appearing, diaphoretic.  [de-identified] : R inguinal canal fullness, nontender. [de-identified] : scar on L shoulder with some mild darkening of skin, L axillary subcutaneous nodule with evidence of underlying melanoma which is more prominent then previously.  Decreased skin turgor overall.

## 2018-12-14 NOTE — PROGRESS NOTE ADULT - ATTENDING COMMENTS
metastatic melanoma with large volume ascites, now with SILKE, uremia, and worsening neoplastic pain  pt's wishes remain for comfort measures with no aggressive or heroic measures, is interested in hospice services.  will have IR for therapeutic para and possible pleurex for palliative measures  SILKE is likely multifactorial with pre-renal component due to poor PO and dehydration and possible obstructive and although no hydro on CT might have some post renal obstruction from ascites and increased pressures. will check bladder scan, cw IVF, monitor renal fx, avoid nephrotoxics, therapeutic tap.  worsening lactate wo obvious evidence of infection / SIRS, ischemia, likely tumor burden. CT wo con but no obvious source, check CXR, UA, BCx and monitor off abx unless can identify source.   hx of PE/DVT, Lovenox held due to SILKE, dw pt, he would be agreeable to AC - for now will do heparin gtt and based on renal fx will decide if can resume Lovenox or need to switch to another AC  palliative consult and hospice referral metastatic melanoma with large volume ascites, now with SILKE, uremia, and worsening neoplastic pain  pt's wishes remain for comfort measures with no aggressive or heroic measures, is interested in hospice services.  will have IR for therapeutic para and possible pleurex for palliative measures  SILKE is likely multifactorial with pre-renal component due to poor PO and dehydration and possible obstructive and although no hydro on CT might have some post renal obstruction from ascites and increased pressures. will check bladder scan, cw IVF, monitor renal fx, avoid nephrotoxics, therapeutic tap.  worsening lactate wo obvious evidence of infection / SIRS, ischemia, likely tumor burden. CT wo con but no obvious source, check CXR, UA, BCx, cell count on para r/o SBP and monitor off abx unless can identify source.   hx of PE/DVT, Lovenox held due to SILKE, dw pt, he would be agreeable to AC - for now will do heparin gtt and based on renal fx will decide if can resume Lovenox or need to switch to another AC  palliative consult and hospice referral

## 2018-12-14 NOTE — PATIENT PROFILE ADULT - ABILITY TO HEAR (WITH HEARING AID OR HEARING APPLIANCE IF NORMALLY USED):
left ear deaf/Mildly to Moderately Impaired: difficulty hearing in some environments or speaker may need to increase volume or speak distinctly

## 2018-12-14 NOTE — H&P ADULT - NSHPLABSRESULTS_GEN_ALL_CORE
13.4   8.32  )-----------( 470      ( 13 Dec 2018 19:00 )             44.0       12-13    136  |  98  |  95<H>  ----------------------------<  216<H>  6.1<H>   |  19<L>  |  2.04<H>    Ca    8.9      13 Dec 2018 20:30  Phos  5.5     12-13  Mg     3.3     12-13    TPro  7.6  /  Alb  2.7<L>  /  TBili  0.3  /  DBili  x   /  AST  76<H>  /  ALT  17  /  AlkPhos  143<H>  12-13 12-13    136  |  98  |  95<H>  ----------------------------<  216<H>  6.1<H>   |  19<L>  |  2.04<H>    Ca    8.9      13 Dec 2018 20:30  Phos  5.5     12-13  Mg     3.3     12-13    TPro  7.6  /  Alb  2.7<L>  /  TBili  0.3  /  DBili  x   /  AST  76<H>  /  ALT  17  /  AlkPhos  143<H>  12-13                        13.4   8.32  )-----------( 470      ( 13 Dec 2018 19:00 )             44.0     20:30 - VBG - pH: 7.19  | pCO2: 64    | pO2: < 24  | Lactate: 5.5    19:00 - VBG - pH: 7.21  | pCO2: 57    | pO2: 27    | Lactate: 4.8      Lactate Dehydrogenase, Serum: 701 U/L (12.13.18 @ 20:30) 12-13    136  |  98  |  95<H>  ----------------------------<  216<H>  6.1<H>   |  19<L>  |  2.04<H>    Ca    8.9      13 Dec 2018 20:30  Phos  5.5     12-13  Mg     3.3     12-13    TPro  7.6  /  Alb  2.7<L>  /  TBili  0.3  /  DBili  x   /  AST  76<H>  /  ALT  17  /  AlkPhos  143<H>  12-13                        13.4   8.32  )-----------( 470      ( 13 Dec 2018 19:00 )             44.0     20:30 - VBG - pH: 7.19  | pCO2: 64    | pO2: < 24  | Lactate: 5.5    19:00 - VBG - pH: 7.21  | pCO2: 57    | pO2: 27    | Lactate: 4.8      Lactate Dehydrogenase, Serum: 701 U/L (12.13.18 @ 20:30)    EKG personally reviewed - 92bpm NSR, RBBB, isolated TWI III, QTc 544ms; grossly unchanged from prior

## 2018-12-14 NOTE — H&P ADULT - FAMILY HISTORY
No pertinent family history in first degree relatives Father  Still living? Unknown  Family history of heart disease, Age at diagnosis: Age Unknown

## 2018-12-14 NOTE — H&P ADULT - ATTENDING COMMENTS
Agree with resident H&P and plan as edited above. Patient confirms DNR/DNI, would not want further surgery, would not want HD.  Patient is interested in in-patient hospice at present.  Reports brother was contacted by Dr. Goldberg, is aware of hospitalization.  Reviewed Dr. Goldberg's note.  F/u CT abd/pelvis, c/w gentle IVF.  Rec'd dose of Enoxaparin in ED, will hold further doses for now, await patient weight and further goals of care discussion given worsening renal function/SILKE regarding hep gtt vs d/c watching renal function and continuing enoxaparin - signed out to day resident to discuss with patient. Emotional support provided. Hospice/palliative consult this AM.

## 2018-12-14 NOTE — H&P ADULT - PROBLEM SELECTOR PLAN 5
AG metabolic acidosis on labs, with elevated lactate  Repeat BMP and VBG now   If no improvement will send UA to assess for ketones, given hx possible starvation ketoacidosis AG metabolic acidosis on labs, with elevated lactate  Repeat BMP and VBG now   Willl send UA to assess for ketones, given hx possible starvation ketoacidosis  Will restart HCO3 TID in the setting of MA AG metabolic acidosis on labs, with elevated lactate, increasing on repeat  Willl send UA to assess for ketones, given hx possible starvation ketoacidosis  Will restart HCO3 TID in the setting of MA

## 2018-12-14 NOTE — H&P ADULT - NSHPSOCIALHISTORY_GEN_ALL_CORE
Never smoker, no ETOH use, lives in assisted living facility, has been mostly bedbound for last weeks, AOx 3

## 2018-12-14 NOTE — PROGRESS NOTE ADULT - PROBLEM SELECTOR PLAN 5
AG metabolic acidosis on labs, with elevated lactate, increasing on repeat  Willl send UA to assess for ketones, given hx possible starvation ketoacidosis  Will restart HCO3 TID in the setting of MA

## 2018-12-14 NOTE — PROGRESS NOTE ADULT - PROBLEM SELECTOR PLAN 6
IDDM2 w/hyperglycemia   Lantus 17, humalog 5/5/5 at home  Will hold off on basal insulin setting of SILKE and decreased PO intake, on last admission patient required no basal   cover with ISS for now, FS; add back basal based on FS

## 2018-12-14 NOTE — H&P ADULT - HISTORY OF PRESENT ILLNESS
Mr. 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 He also has metastatic melanoma originally diagnosed in December 2017 after resection of left shoulder tumor. In June, metastases were noted in the left axillary melanoma and he began single agent immunotherapy ipilimumab on 8/10/18. He completed 4 treatments of immunotherapy with no response.     CT C/A/P 10/12/18 - Progressive metastatic disease involving liver, omentum, left axillary lymph nodes, and mesenteric tumor mass herniated into the right internal canal.     10/25/18 - S/p LLQ peritoneal implant US guided FNA- Pathology consistent with metastatic malignant melanoma. Also s/p left axillary US guided biopsy and FNA and pathology was consistent with metastatic malignant melanoma.     He began single agent nivolumab on 11/1/18 with Dr Goldberg.     CT C/A/P 11/29/18 showed no change in metastatic disease. Large volume of ascites unchanged. Findings suspicious for subsegmental PE in LLL and questionable DVT LLE.     He presents for follow-up. Continues on nivolumab with Dr Goldberg, last seen 11/26/18, next apt 12/14/18.     On his previous visit 10 days ago he reports two days of watery diarrhea which has abated but he did feel overall weak and fatigued. His Cr at that time uptrended to around 2 with high magnesium and phosphorus, however when called patient with results he told me he was feeling much better and drinking a lot of gatorade and definitively improving. He came to treatment room that Monday 11/19/18 for repeat lab check and IVFs and receive 500 CC NS and Cr showed improvement and electrolytes were corrected.       History: He is now here for follow up preceding next dose of nivolumab this Wednesday. He reports that 2-3 days after his IVFs he started to feel worse again. He cannot quantify it but he was having multiple watery bowel movements a day and this progressed that in the last few days he has started to have severe nausea and has had vomiting and cannot keep anything down at this point. He feels dehydrated and his urine has once again become darker. He feels extremely weak and fatigued and has trouble getting up at all. No fevers or chills and he denies any dyspnea or chest pain. No swelling in his extremities.       CT scan 10/15/18:    IMPRESSION: Progressive metastatic disease involving liver, omentum, left   axillary lymph nodes, and mesenteric tumor mass herniated into the right   internal canal.      PET Scan 6/6/18:    IMPRESSION: Abnormal whole body FDG-PET/CT scan.    1. Enlarged, hypermetabolic left axillary lymph node is compatible with   metastasis. This is amenable to an ultrasound-guided percutaneous needle   biopsy.    2. Moderate to large amount of abdominal and pelvic ascites with mildly   FDG-avid omental nodularity. Findings are compatible with carcinomatosis.   A primary neoplasm is not delineated on this scan.    3. FDG-avid soft tissue attenuation density adjacent to sigmoid colon,   possibly a lymph node. Contrast-enhanced CT may be obtained for further   evaluation.    4. Large non FDG-avid transosseous meningioma extending from left   supratentorial to infratentorial compartment, as see on MRI dated   3/5/2018.    5. Findings suggestive of thyroiditis.    6. Inflammatory right maxillary sinus disease. Correlate clinically for   acute sinusitis.       59 y/o M with no medical history prior to late 2017, when he was hospitalized for heart failure exacerbation, also diagnosed with T2DM, HTN, KRISTIAN and new CAD s/p PCI at [facility] with BMS and now medically optimized, who was also incidentally found with large "slug like" L shoulder mass which was excised and found to be malignant melanoma, with metastasis found in large L axillary lymph node (biopsy proven), also found with L sided meningioma with hearing loss and s/p XRT, and subsequently found with omental carcinomatosis biopsy proven metastatic melanoma now s/p 4 cycles ipilimumab with progression and now s/p 1 dose nivolumab 480 mg flat dose on 11/1/18 here for follow up c/o nausea/vomiting/diarrhea.     (2) Nausea/Vomiting/Diarhea  Possibly in the setting of immunotherapy.   Likely due to ipilimumab given time course, and he is now off of that medication.   C. diff checked and was negative, stool culture negative x 1.   Given severity of symptoms and persistence, as well as likely recurrent SILKE and declining functional status will contacted EMS and will send to [facility] ER for further management.   Will get abdominal imaging and infectious workup.   If infectious workup negative and symptoms persistent may require IV steroids (solumedrol).   Made triage at [facility] ER aware and will discuss with inpatient oncology team.     (3) Low TSH  Patient with low TSH (0.20) and elevated TPO antibodies on presentation.  Normal free T3/T4. TSH normalized.  Continued endocrinology follow up while on immunotherapy 58M now unable to walk PMH severe diverticulosis, metastatic melanoma s/p resection on immunotherapy with progression of disease recently told that no more options exist and plan for hospice, meningothelial meningioma s/p resection and RT, CAD s/p stent, recent admission w/ elevated lactate c/b PE/DVT who presents from [facility] after having severe abdominal, groin, armpit, and leg pain requiring IV dilaudid. The pt's cancer has been progressing and he has had a 2/10 baseline pain at the site of his metastatic lesions, however today the pain was significantly worse although other characteristics of the pain did not change. The pt also reports his abdomen to be more "taut." The pt was seen by his oncologist, Dr. Bradley Goldberg today who told the pt to come to the hospital for pain control and coordination to set up hospice services. The pt came via ambulette and reports to have increased pain on the ride with bumps in the road. The pt denies chest pain, SOB, dysuria, recent illness, fevers, chills, current N/V/D/C. Pt reports wanting to be DNR/DNI, and that he would not like any surgeries.    Mr. 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 He also has metastatic melanoma originally diagnosed in December 2017 after resection of left shoulder tumor. In June, metastases were noted in the left axillary melanoma and he began single agent immunotherapy ipilimumab on 8/10/18. He completed 4 treatments of immunotherapy with no response.     CT C/A/P 10/12/18 - Progressive metastatic disease involving liver, omentum, left axillary lymph nodes, and mesenteric tumor mass herniated into the right internal canal.     10/25/18 - S/p LLQ peritoneal implant US guided FNA- Pathology consistent with metastatic malignant melanoma. Also s/p left axillary US guided biopsy and FNA and pathology was consistent with metastatic malignant melanoma.     He began single agent nivolumab on 11/1/18 with Dr Goldberg.     CT C/A/P 11/29/18 showed no change in metastatic disease. Large volume of ascites unchanged. Findings suspicious for subsegmental PE in LLL and questionable DVT LLE.     He presents for follow-up. Continues on nivolumab with Dr Goldberg, last seen 11/26/18, next apt 12/14/18.     On his previous visit 10 days ago he reports two days of watery diarrhea which has abated but he did feel overall weak and fatigued. His Cr at that time uptrended to around 2 with high magnesium and phosphorus, however when called patient with results he told me he was feeling much better and drinking a lot of gatorade and definitively improving. He came to treatment room that Monday 11/19/18 for repeat lab check and IVFs and receive 500 CC NS and Cr showed improvement and electrolytes were corrected.       History: He is now here for follow up preceding next dose of nivolumab this Wednesday. He reports that 2-3 days after his IVFs he started to feel worse again. He cannot quantify it but he was having multiple watery bowel movements a day and this progressed that in the last few days he has started to have severe nausea and has had vomiting and cannot keep anything down at this point. He feels dehydrated and his urine has once again become darker. He feels extremely weak and fatigued and has trouble getting up at all. No fevers or chills and he denies any dyspnea or chest pain. No swelling in his extremities.       CT scan 10/15/18:    IMPRESSION: Progressive metastatic disease involving liver, omentum, left   axillary lymph nodes, and mesenteric tumor mass herniated into the right   internal canal.      PET Scan 6/6/18:    IMPRESSION: Abnormal whole body FDG-PET/CT scan.    1. Enlarged, hypermetabolic left axillary lymph node is compatible with   metastasis. This is amenable to an ultrasound-guided percutaneous needle   biopsy.    2. Moderate to large amount of abdominal and pelvic ascites with mildly   FDG-avid omental nodularity. Findings are compatible with carcinomatosis.   A primary neoplasm is not delineated on this scan.    3. FDG-avid soft tissue attenuation density adjacent to sigmoid colon,   possibly a lymph node. Contrast-enhanced CT may be obtained for further   evaluation.    4. Large non FDG-avid transosseous meningioma extending from left   supratentorial to infratentorial compartment, as see on MRI dated   3/5/2018.    5. Findings suggestive of thyroiditis.    6. Inflammatory right maxillary sinus disease. Correlate clinically for   acute sinusitis.       59 y/o M with no medical history prior to late 2017, when he was hospitalized for heart failure exacerbation, also diagnosed with T2DM, HTN, KRISTIAN and new CAD s/p PCI at [facility] with BMS and now medically optimized, who was also incidentally found with large "slug like" L shoulder mass which was excised and found to be malignant melanoma, with metastasis found in large L axillary lymph node (biopsy proven), also found with L sided meningioma with hearing loss and s/p XRT, and subsequently found with omental carcinomatosis biopsy proven metastatic melanoma now s/p 4 cycles ipilimumab with progression and now s/p 1 dose nivolumab 480 mg flat dose on 11/1/18 here for follow up c/o nausea/vomiting/diarrhea.     (2) Nausea/Vomiting/Diarhea  Possibly in the setting of immunotherapy.   Likely due to ipilimumab given time course, and he is now off of that medication.   C. diff checked and was negative, stool culture negative x 1.   Given severity of symptoms and persistence, as well as likely recurrent SILKE and declining functional status will contacted EMS and will send to [facility] ER for further management.   Will get abdominal imaging and infectious workup.   If infectious workup negative and symptoms persistent may require IV steroids (solumedrol).   Made triage at [facility] ER aware and will discuss with inpatient oncology team.     (3) Low TSH  Patient with low TSH (0.20) and elevated TPO antibodies on presentation.  Normal free T3/T4. TSH normalized.  Continued endocrinology follow up while on immunotherapy 58M with PMHX of T2DM, HTN, KRISTIAN and new CAD s/p PCI at [facility] with BMS and now medically optimized, who was also incidentally found with large "slug like" L shoulder mass which was excised and found to be malignant melanoma, with metastasis found in large L axillary lymph node (biopsy proven), also found with L sided meningioma with hearing loss and s/p XRT, and subsequently found with omental carcinomatosis biopsy proven metastatic melanoma now s/p 4 cycles ipilimumab with progression and now s/p 1 dose nivolumab 480 mg flat dose on 11/1/18, DVT/PE (on Lovenox) presenting with abdominal pain x 2 days. Patient reports the pain started two days ago in his abdomen, he has baseline 2/10 pain due to his known metastases. However, notes the pain has worsened He also notes his abdomen to be more "taut", firm and distended. Patient was seen by Dr. Goldberg and [facility] today and was told to come to the hospital for pain control and coordination for hospice services. The paient denies chest pain, SOB, dysuria, recent illness, fevers, chills, however dose endorse chronically diaphoretic current N/V/D/C. However, he reports on his his previous visits to chemotherapy and prior hostpiilzation he has loose BMs, that are intermittent with nausea and vomiting, which he currently denies. He reports at that time his Cr uptrended and with PO hydration the labs improved. Per Dr. Goldberg notes, patient does report to him for the last few days of multiple watery bowel movements a day and this progressed that in the last few days he has started to have severe nausea and has had vomiting and cannot keep anything down at this point. He feels dehydrated and his urine has once again become darker. Patietn reports extreme fatigue and weakness, has not got out of bed in 1 week. Reports 30 lb weight loss in 3 months. Of note, patient's CT in 5/2018 showed evidence of progressive metastatic disease involving liver, omentum, left axillary lymph nodes, and mesenteric tumor mass herniated into the right internal canal. PET scan 6/2018 moderate to large amount of abdominal and pelvic ascites with mildly FDG-avid omental nodularity. Findings are compatible with carcinomatosis. A primary neoplasm is not delineated on this scan. FDG-avid soft tissue attenuation density adjacent to sigmoid colon, possibly a lymph node. Large non FDG-avid transosseous meningioma extending from left supratentorial to infratentorial compartment, as see on MRI dated 3/5/2018. Patient was told incurative treatment. Patient is DNR/DNI, requesting hospice.     In ED: /71 HR90 RR18 97% RA   Patient received NS 1 L IV x 1, Dilaudid 1 mg IV x 1, Albuterol,  Insulin 5 mg IV x 1, Kayexalate 30 g 2x 58M with PMHX of T2DM, HTN, KRISTIAN and new CAD s/p PCI at [facility] with BMS and now medically optimized, who was also incidentally found with large "slug like" L shoulder mass which was excised and found to be malignant melanoma, with metastasis found in large L axillary lymph node (biopsy proven), also found with L sided meningioma with hearing loss and s/p XRT, and subsequently found with omental carcinomatosis biopsy proven metastatic melanoma now s/p 4 cycles ipilimumab with progression and now s/p 1 dose nivolumab 480 mg flat dose on 11/1/18, DVT/PE (on Lovenox) presenting with abdominal pain x 2 days. Patient reports the pain started two days ago in his abdomen, he has baseline 2/10 pain due to his known metastases. However, notes the pain has worsened He also notes his abdomen to be more "taut", firm and distended. Patient reports last BM 2 days ago, passing gas. Patient was seen by Dr. Goldberg and [facility] today and was told to come to the hospital for pain control and coordination for hospice services. The patient denies chest pain, SOB, dysuria, recent illness, fevers, chills, however dose endorse chronically diaphoretic current N/V/D/C. However, he reports on his his previous visits to chemotherapy and prior hospitalization he has loose BMs, that are intermittent with nausea and vomiting, which he currently denies. He reports at that time his Cr uptrended and with PO hydration the labs improved. Per Dr. Goldberg notes, patient does report to him for the last few days of multiple watery bowel movements a day and this progressed that in the last few days he has started to have severe nausea and has had vomiting and cannot keep anything down at this point. He feels dehydrated and his urine has once again become darker. Patient reports extreme fatigue and weakness, has not got out of bed in 1 week. Reports 30 lb weight loss in 3 months. Of note, patient's CT in 5/2018 showed evidence of progressive metastatic disease involving liver, omentum, left axillary lymph nodes, and mesenteric tumor mass herniated into the right internal canal. PET scan 6/2018 moderate to large amount of abdominal and pelvic ascites with mildly FDG-avid omental nodularity. Findings are compatible with carcinomatosis. A primary neoplasm is not delineated on this scan. FDG-avid soft tissue attenuation density adjacent to sigmoid colon, possibly a lymph node. Large non FDG-avid transosseous meningioma extending from left supratentorial to infratentorial compartment, as see on MRI dated 3/5/2018. Patient was told incurative treatment. Patient is DNR/DNI, requesting hospice.     In ED: /71 HR90 RR18 97% RA   Patient received NS 1 L IV x 1, Dilaudid 1 mg IV x 1, Albuterol,  Insulin 5 mg IV x 1, Kayexalate 30 g 2x

## 2018-12-14 NOTE — H&P ADULT - PROBLEM SELECTOR PLAN 2
Hx of diarrhea intermittent, not current   Less likely infectious etiology likely 2/2 immunotherapy   If continues will send stool cx, GI PCR  C. diff in outpatient setting negative   Electrolytes stable Hx of diarrhea intermittent, not current   Less likely infectious etiology likely 2/2 immunotherapy   If continues will send stool cx, GI PCR  C. diff and stool cx in outpatient setting negative   Electrolytes stable

## 2018-12-14 NOTE — H&P ADULT - NSHPPHYSICALEXAM_GEN_ALL_CORE
T(C): 36.7 (12-13-18 @ 21:46), Max: 36.7 (12-13-18 @ 21:46)  T(F): 98 (12-13-18 @ 21:46), Max: 98 (12-13-18 @ 21:46)  HR: 95 (12-13-18 @ 21:46) (90 - 95)  BP: 108/71 (12-13-18 @ 21:46) (102/71 - 108/71)  RR: 18 (12-13-18 @ 21:46) (18 - 18)  SpO2: 99% (12-13-18 @ 21:46) (96% - 99%)    · Constitutional        Lying in bed comfortably. No acute distress  · HEENT	               PERRL; EOMI; conjunctiva clear  · Neck	               Supple; no JVD  · Back	                ROM intact  · Respiratory             good air movement; no rales; no rhonchi; no wheezes  · Cardiovascular       S1 & S2 with RRR; no murmurs, rales or JVD  · Gastrointestinal     soft; no distention; bowel sounds normal; no rigidity  · Extremities             no pedal edema  · Vascular	               Radial Pulse: right normal; left normal  · Neurological           alert and oriented x 3; responds to verbal commands; sensation intact; cranial nerves intact; strength normal  · Skin	               warm and dry  · Musculoskeletal    no calf tenderness; diminished strength  · Psychiatric              normal mood and affect T(C): 36.7 (12-13-18 @ 21:46), Max: 36.7 (12-13-18 @ 21:46)  T(F): 98 (12-13-18 @ 21:46), Max: 98 (12-13-18 @ 21:46)  HR: 95 (12-13-18 @ 21:46) (90 - 95)  BP: 108/71 (12-13-18 @ 21:46) (102/71 - 108/71)  RR: 18 (12-13-18 @ 21:46) (18 - 18)  SpO2: 99% (12-13-18 @ 21:46) (96% - 99%)    · Constitutional       frail, middle aged male lying in bed, intermittently speaks   · HEENT	               PERRL; EOMI; conjunctiva clear  · Neck	               Supple  · Respiratory             good air movement; no rales; no rhonchi; no wheezes  · Cardiovascular       S1 & S2 with RRR; no murmurs, rales or JVD  · Gastrointestinal     soft, ++distended, mild TTP, bowel sounds appreciated   · Extremities             2+ pitting edema to below the knees   · Vascular	               Radial Pulse: right normal; left normal  · Neurological           alert and oriented x 3, responds to verbal commands, nonfocal exam, however very weak 3/5 strength throughout   · Skin	               warm and dry  · Musculoskeletal    no calf tenderness; diminished strength  · Psychiatric              normal mood and affect T(C): 36.7 (12-13-18 @ 21:46), Max: 36.7 (12-13-18 @ 21:46)  T(F): 98 (12-13-18 @ 21:46), Max: 98 (12-13-18 @ 21:46)  HR: 95 (12-13-18 @ 21:46) (90 - 95)  BP: 108/71 (12-13-18 @ 21:46) (102/71 - 108/71)  RR: 18 (12-13-18 @ 21:46) (18 - 18)  SpO2: 99% (12-13-18 @ 21:46) (96% - 99%)    · Constitutional       frail, middle aged male lying in bed, intermittently speaks   · HEENT	               PERRL; EOMI; conjunctiva clear  · Neck	               Supple  · Respiratory             good air movement; no rales; no rhonchi; no wheezes  · Cardiovascular       S1 & S2 with RRR; no murmurs, rales or JVD  · Gastrointestinal     soft, ++distended, mild TTP, bowel sounds appreciated; LLQ and RLQ (under panus) firm masses palpated  · Extremities             2+ pitting edema to below the knees L>R  · Vascular	               Radial Pulse: right normal; left normal  · Neurological           alert and oriented x 3, responds to verbal commands, nonfocal exam, however very weak 3/5 strength throughout   · Skin	               warm and dry; fungating mass under L axilla without drainage   · Musculoskeletal    no calf tenderness; diminished strength  · Psychiatric              normal mood and affect

## 2018-12-14 NOTE — H&P ADULT - PROBLEM SELECTOR PLAN 9
Extensive GOC discussion with patient, patient is DNR/DNI, no aggressive measures as in surgery or HD  However, is agreeable to IVF and IV abx if needed  Hospice referral in the AM   Palliative consult to assist with pain management and comfort

## 2018-12-14 NOTE — RESULTS/DATA
[FreeTextEntry1] : CT C/A/P 11/26/18\par \par IMPRESSION: Limited evaluation due to lack of IV contrast.\par \par Worsening metastatic disease when compared to prior study on 10/12/2018. \par Extensive omental caking significantly progressed from prior study. New \par large volume of abdominopelvic ascites. Interval increase in size of \par tumor implants in the left axilla and right groin.\par \par Multiple bilateral pulmonary nodules measuring up to 3 to 4 mm.\par \par \par \par \par CT scan 10/15/18:\par \par IMPRESSION: Progressive metastatic disease involving liver, omentum, left  \par axillary lymph nodes, and mesenteric tumor mass herniated into the right  \par internal canal.\par \par \par PET Scan 6/6/18:\par \par IMPRESSION:  Abnormal whole body FDG-PET/CT scan. \par  \par 1. Enlarged, hypermetabolic left axillary lymph node is compatible with  \par metastasis. This is amenable to an ultrasound-guided percutaneous needle  \par biopsy. \par  \par 2. Moderate to large amount of abdominal and pelvic ascites with mildly  \par FDG-avid omental nodularity. Findings are compatible with carcinomatosis.  \par A primary neoplasm is not delineated on this scan. \par  \par 3. FDG-avid soft tissue attenuation density adjacent to sigmoid colon,  \par possibly a lymph node. Contrast-enhanced CT may be obtained for further  \par evaluation. \par  \par 4. Large non FDG-avid transosseous meningioma extending from left  \par supratentorial to infratentorial compartment, as see on MRI dated  \par 3/5/2018. \par  \par 5. Findings suggestive of thyroiditis. \par  \par 6. Inflammatory right maxillary sinus disease. Correlate clinically for  \par acute sinusitis.

## 2018-12-14 NOTE — H&P ADULT - ASSESSMENT
58M with PMHX of T2DM, HTN, KRISTIAN and new CAD s/p PCI at [facility] with BMS and now medically optimized, who was also incidentally found with large "slug like" L shoulder mass which was excised and found to be malignant melanoma, with metastasis found in large L axillary lymph node (biopsy proven), also found with L sided meningioma with hearing loss and s/p XRT, and subsequently found with omental carcinomatosis biopsy proven metastatic melanoma now s/p 4 cycles ipilimumab with progression and now s/p 1 dose nivolumab 480 mg flat dose on 11/1/18, DVT/PE (on Lovenox) presenting with abdominal pain x 2 days.

## 2018-12-14 NOTE — CONSULT NOTE ADULT - PROBLEM SELECTOR RECOMMENDATION 9
S/P 4 Cycles of Ipilimumab and 1 dose of nivolumab.   Pt is not a candidate for further disease modifying treatment.   Dr. Goldberg recommended hospice service.   - Will refer to hospice. S/P 4 Cycles of Ipilimumab and 1 dose of nivolumab.   Pt is not a candidate for further disease for further disease modifying treatment.   Dr. Goldberg recommended hospice service. Pt requesting hospice services, pending of further symptom management and discharge plan. Would make referral once known discharge plan is known to determine which hospice agency would be appropriate to take over pts care.

## 2018-12-14 NOTE — PROGRESS NOTE ADULT - PROBLEM SELECTOR PLAN 4
Patient with hyperK to 6.1 in the setting of SILKE   Being medically managed, no peaked T waves seen on EKG   Repeat BMP w/improved K, patient denies chest pain or palpitations Patient with hyperK to 6.1 in the setting of SILKE   Being medically managed, no peaked T waves seen on EKG   Repeat BMP w/improved K, patient denies chest pain or palpitations  Monitor BMP

## 2018-12-14 NOTE — CONSULT NOTE ADULT - PROBLEM SELECTOR RECOMMENDATION 5
Patient have not move his bowel in the last 2 days.   No obstructed.   Will recommend Senna 8.6;  2 tabs HS  Will recommend Miralax 17 mcg HS  Will recommend Dulcolax suppository PRN No signs of obstruction in imaging.   Will recommend Senna 8.6;  2 tabs HS  Will recommend Miralax 17 mcg HS  Will recommend Dulcolax suppository PRN

## 2018-12-14 NOTE — PROGRESS NOTE ADULT - PROBLEM SELECTOR PLAN 3
Patient with SILKE, baseline Scr 1.0, now 2.0  Likely  2/2 dehydration and decreased PO intake  However may also be 2/2 obstruction given severity of ascites  CTAP does not show hydronephrosis  f/u Bladder scan   Will continue IVF Patient with SILKE, baseline Scr 1.0, now 2.0  Likely a combination of pre-renal from dehydration and post-renal from increased intraabdominal pressure  F/u urine studies: FeNa  CTAP does not show hydronephrosis  f/u Bladder scan   Will continue IVF  Monitor crt

## 2018-12-14 NOTE — H&P ADULT - PROBLEM SELECTOR PLAN 3
Patient with SILKE, baseline Scr 1.0, now 2.0  Likely  2/2 dehydration and decreased PO intake  However may also be 2/2 obstruction given severity of ascites  CTAP to assess hydronephrosis   Bladder scan   Will continue IVF

## 2018-12-14 NOTE — H&P ADULT - PROBLEM SELECTOR PLAN 6
Hx of malignant melanoma, noncurative   Plan as below  Currently on immunotherapy Hx of DM2, on insulin   Lantus 17, humalog 5/5/5  Will start with Lantus 8 in the setting of SILKE and decreased PO intake  ISS, FS Hx of DM2, on insulin   Lantus 17, humalog 5/5/5  Will hold off on basal insulin setting of SILKE and decreased PO intake, on last admission patient required no basal   ISS, FS IDDM2 w/hyperglycemia   Lantus 17, humalog 5/5/5  Will hold off on basal insulin setting of SILKE and decreased PO intake, on last admission patient required no basal   cover with ISS for now, FS; add back basal based on FS

## 2018-12-14 NOTE — H&P ADULT - GEN GEN HX ROS MEA POS PC
malaise/anorexia/weight loss/fatigue/weakness malaise/anorexia/weight loss/fatigue/sweating/weakness

## 2018-12-14 NOTE — H&P ADULT - PROBLEM SELECTOR PROBLEM 6
Malignant melanoma, unspecified site Type 2 diabetes mellitus with complication, with long-term current use of insulin

## 2018-12-14 NOTE — PROGRESS NOTE ADULT - PROBLEM SELECTOR PLAN 1
Patient with diffuse generalized abdominal pain   May be 2/2 immunotherapy vs. progression of disease vs. ascites   CT a/p: 'Diffuse bilobar hepatic metastases and peritoneal carcinomatosis. Increased large volume ascites  Patient may benefit from paracentesis vs. pleurex cath placement for comfort measures  Per patient, would not want any further surgery if needed, agreeable to pleurex Patient with diffuse generalized abdominal pain   May be 2/2 immunotherapy vs. progression of disease vs. ascites   CT a/p: 'Diffuse bilobar hepatic metastases and peritoneal carcinomatosis. Increased large volume ascites  IR consulted, awaiting paracentesis, will send ascites fluid for lab studies  Per patient, would not want any further surgery if needed, agreeable to pleurex

## 2018-12-14 NOTE — H&P ADULT - PROBLEM SELECTOR PLAN 1
Patient with diffuse generalized abdominal pain   May be 2/2 immunotherapy vs. progression of disease vs. ascites  Will order CT a/p, assess progression of disease and ascites  Patient may benefit from paracentesis vs. pleurex cath placement for comfort measures Patient with diffuse generalized abdominal pain   May be 2/2 immunotherapy vs. progression of disease vs. ascites  Will order CT a/p, assess progression of disease and ascites  Patient may benefit from paracentesis vs. pleurex cath placement for comfort measures  Per patient, would not want any further surgery if needed

## 2018-12-14 NOTE — CHART NOTE - NSCHARTNOTEFT_GEN_A_CORE
PRE-INTERVENTIONAL RADIOLOGY PROCEDURE NOTE      Patient Age: 58    Patient Gender: Male    Procedure: Therapeutic paracentesis    Diagnosis/Indication: Large volume ascites.     Interventional Radiology Attending Physician:    Ordering Attending Physician: Dr. Donohue    Pertinent Medical History: Melanoma with mets to liver, peritoneal carcinomatosis    Pertinent labs:                      13.4   8.32  )-----------( 470      ( 13 Dec 2018 19:00 )             44.0       12-14    138  |  97<L>  |  91<H>  ----------------------------<  150<H>  5.1   |  16<L>  |  2.13<H>    Ca    8.5      14 Dec 2018 06:41  Phos  5.5     12-13  Mg     3.3     12-13    TPro  7.5  /  Alb  3.1<L>  /  TBili  0.2  /  DBili  x   /  AST  47<H>  /  ALT  11  /  AlkPhos  157<H>  12-14              Patient and Family Aware ? Yes

## 2018-12-14 NOTE — CHART NOTE - NSCHARTNOTEFT_GEN_A_CORE
VASCULAR & INTERVENTIONAL RADIOLOGY    58M uncurable metastatic melanoma with peritoneal carcinomatosis (pt is DNR/DNI and requesting hospice) p/w abdominal pain and distension as well as diarrhea and chronic diaphoresis. Recent CT shows hepatic and peritoneal metastases as well as increased large ascites. Consult is for both diagnostic and therapeutic paracentesis.    Case reviewed with Dr. Hernandez and approved for today. However, pt received tx dose Lovenox at approximately 5 AM on 12/14, thus the procedure will be held until 12 hours post dosage.    Plan:  - diagnostic and therapeutic paracentesis on 12/14; will be done 12 hours post-Lovenox dosing (after 5 PM)  - hold Lovenox; may use heparin gtt but hold 4 hours prior to procedure  - please include appropriate lab orders for ascites fluid testing  - request pre-procedure note, pre-IR checklist and IR procedure order (under Dr. Hernandez)    Please contact us if the clinical situation changes or if you have any questions/comments/concerns.    Christina Gonzales MD  PGY-3  Pager #[phone]

## 2018-12-14 NOTE — PROGRESS NOTE ADULT - PROBLEM SELECTOR PLAN 7
Hx of DVT and PE on previous hospitalization  On Lovenox BID dosing, rec'd one dose in ED  Will need to recalculate weight given patient reports 30 lb weight loss in 3 months to confirm proper dosing; further doses on hold in light of SILKE; monitor Cr, currently eGFR>30, will need to discuss with patient starting on heparin gtt vs continuing lovenox vs coumadin vs discontinuing for comfort

## 2018-12-15 ENCOUNTER — TRANSCRIPTION ENCOUNTER (OUTPATIENT)
Age: 58
End: 2018-12-15

## 2018-12-15 DIAGNOSIS — Z51.5 ENCOUNTER FOR PALLIATIVE CARE: ICD-10-CM

## 2018-12-15 LAB
ALBUMIN SERPL ELPH-MCNC: 2.6 G/DL — LOW (ref 3.3–5)
ALP SERPL-CCNC: 122 U/L — HIGH (ref 40–120)
ALT FLD-CCNC: 12 U/L — SIGNIFICANT CHANGE UP (ref 4–41)
APTT BLD: 131.1 SEC — CRITICAL HIGH (ref 27.5–36.3)
APTT BLD: 145.3 SEC — CRITICAL HIGH (ref 27.5–36.3)
APTT BLD: > 200 SEC — CRITICAL HIGH (ref 27.5–36.3)
AST SERPL-CCNC: 51 U/L — HIGH (ref 4–40)
BASE EXCESS BLDV CALC-SCNC: -6 MMOL/L — SIGNIFICANT CHANGE UP
BASOPHILS # BLD AUTO: 0.03 K/UL — SIGNIFICANT CHANGE UP (ref 0–0.2)
BASOPHILS NFR BLD AUTO: 0.4 % — SIGNIFICANT CHANGE UP (ref 0–2)
BILIRUB SERPL-MCNC: 0.2 MG/DL — SIGNIFICANT CHANGE UP (ref 0.2–1.2)
BUN SERPL-MCNC: 95 MG/DL — HIGH (ref 7–23)
CALCIUM SERPL-MCNC: 7.8 MG/DL — LOW (ref 8.4–10.5)
CHLORIDE SERPL-SCNC: 101 MMOL/L — SIGNIFICANT CHANGE UP (ref 98–107)
CO2 SERPL-SCNC: 19 MMOL/L — LOW (ref 22–31)
CREAT SERPL-MCNC: 2.47 MG/DL — HIGH (ref 0.5–1.3)
EOSINOPHIL # BLD AUTO: 0.07 K/UL — SIGNIFICANT CHANGE UP (ref 0–0.5)
EOSINOPHIL NFR BLD AUTO: 0.9 % — SIGNIFICANT CHANGE UP (ref 0–6)
GAS PNL BLDV: 134 MMOL/L — LOW (ref 136–146)
GLUCOSE BLDC GLUCOMTR-MCNC: 103 MG/DL — HIGH (ref 70–99)
GLUCOSE BLDC GLUCOMTR-MCNC: 135 MG/DL — HIGH (ref 70–99)
GLUCOSE BLDC GLUCOMTR-MCNC: 198 MG/DL — HIGH (ref 70–99)
GLUCOSE BLDC GLUCOMTR-MCNC: 205 MG/DL — HIGH (ref 70–99)
GLUCOSE BLDC GLUCOMTR-MCNC: 98 MG/DL — SIGNIFICANT CHANGE UP (ref 70–99)
GLUCOSE BLDV-MCNC: 101 — HIGH (ref 70–99)
GLUCOSE SERPL-MCNC: 196 MG/DL — HIGH (ref 70–99)
HCO3 BLDV-SCNC: 18 MMOL/L — LOW (ref 20–27)
HCT VFR BLD CALC: 35 % — LOW (ref 39–50)
HCT VFR BLD CALC: 36.2 % — LOW (ref 39–50)
HCT VFR BLDV CALC: 41.1 % — SIGNIFICANT CHANGE UP (ref 39–51)
HGB BLD-MCNC: 10.8 G/DL — LOW (ref 13–17)
HGB BLD-MCNC: 11 G/DL — LOW (ref 13–17)
HGB BLDV-MCNC: 13.4 G/DL — SIGNIFICANT CHANGE UP (ref 13–17)
IMM GRANULOCYTES # BLD AUTO: 0.04 # — SIGNIFICANT CHANGE UP
IMM GRANULOCYTES NFR BLD AUTO: 0.5 % — SIGNIFICANT CHANGE UP (ref 0–1.5)
INR BLD: 1.73 — HIGH (ref 0.88–1.17)
LYMPHOCYTES # BLD AUTO: 0.53 K/UL — LOW (ref 1–3.3)
LYMPHOCYTES # BLD AUTO: 6.6 % — LOW (ref 13–44)
MAGNESIUM SERPL-MCNC: 3 MG/DL — HIGH (ref 1.6–2.6)
MCHC RBC-ENTMCNC: 30.4 % — LOW (ref 32–36)
MCHC RBC-ENTMCNC: 30.9 % — LOW (ref 32–36)
MCHC RBC-ENTMCNC: 31.6 PG — SIGNIFICANT CHANGE UP (ref 27–34)
MCHC RBC-ENTMCNC: 31.9 PG — SIGNIFICANT CHANGE UP (ref 27–34)
MCV RBC AUTO: 102.3 FL — HIGH (ref 80–100)
MCV RBC AUTO: 104.9 FL — HIGH (ref 80–100)
MONOCYTES # BLD AUTO: 0.94 K/UL — HIGH (ref 0–0.9)
MONOCYTES NFR BLD AUTO: 11.7 % — SIGNIFICANT CHANGE UP (ref 2–14)
NEUTROPHILS # BLD AUTO: 6.42 K/UL — SIGNIFICANT CHANGE UP (ref 1.8–7.4)
NEUTROPHILS NFR BLD AUTO: 79.9 % — HIGH (ref 43–77)
NRBC # FLD: 0 — SIGNIFICANT CHANGE UP
NRBC # FLD: 0 — SIGNIFICANT CHANGE UP
OSMOLALITY UR: 433 MOSMO/KG — SIGNIFICANT CHANGE UP (ref 50–1200)
PCO2 BLDV: 46 MMHG — SIGNIFICANT CHANGE UP (ref 41–51)
PH BLDV: 7.26 PH — LOW (ref 7.32–7.43)
PHOSPHATE SERPL-MCNC: 5.6 MG/DL — HIGH (ref 2.5–4.5)
PLATELET # BLD AUTO: 403 K/UL — HIGH (ref 150–400)
PLATELET # BLD AUTO: 444 K/UL — HIGH (ref 150–400)
PMV BLD: 10.7 FL — SIGNIFICANT CHANGE UP (ref 7–13)
PMV BLD: 10.7 FL — SIGNIFICANT CHANGE UP (ref 7–13)
PO2 BLDV: 27 MMHG — LOW (ref 35–40)
POTASSIUM BLDV-SCNC: 4.7 MMOL/L — HIGH (ref 3.4–4.5)
POTASSIUM SERPL-MCNC: 5.1 MMOL/L — SIGNIFICANT CHANGE UP (ref 3.5–5.3)
POTASSIUM SERPL-SCNC: 5.1 MMOL/L — SIGNIFICANT CHANGE UP (ref 3.5–5.3)
PROT SERPL-MCNC: 6.2 G/DL — SIGNIFICANT CHANGE UP (ref 6–8.3)
PROTHROM AB SERPL-ACNC: 20 SEC — HIGH (ref 9.8–13.1)
RBC # BLD: 3.42 M/UL — LOW (ref 4.2–5.8)
RBC # BLD: 3.45 M/UL — LOW (ref 4.2–5.8)
RBC # FLD: 17.6 % — HIGH (ref 10.3–14.5)
RBC # FLD: 17.7 % — HIGH (ref 10.3–14.5)
SAO2 % BLDV: 41.7 % — LOW (ref 60–85)
SODIUM SERPL-SCNC: 139 MMOL/L — SIGNIFICANT CHANGE UP (ref 135–145)
SODIUM UR-SCNC: < 20 MMOL/L — SIGNIFICANT CHANGE UP
SPECIMEN SOURCE: SIGNIFICANT CHANGE UP
WBC # BLD: 8.03 K/UL — SIGNIFICANT CHANGE UP (ref 3.8–10.5)
WBC # BLD: 8.33 K/UL — SIGNIFICANT CHANGE UP (ref 3.8–10.5)
WBC # FLD AUTO: 8.03 K/UL — SIGNIFICANT CHANGE UP (ref 3.8–10.5)
WBC # FLD AUTO: 8.33 K/UL — SIGNIFICANT CHANGE UP (ref 3.8–10.5)

## 2018-12-15 PROCEDURE — 99233 SBSQ HOSP IP/OBS HIGH 50: CPT | Mod: GC

## 2018-12-15 RX ORDER — HYDROMORPHONE HYDROCHLORIDE 2 MG/ML
0.5 INJECTION INTRAMUSCULAR; INTRAVENOUS; SUBCUTANEOUS
Qty: 0 | Refills: 0 | Status: DISCONTINUED | OUTPATIENT
Start: 2018-12-15 | End: 2018-12-17

## 2018-12-15 RX ORDER — INSULIN LISPRO 100/ML
3 VIAL (ML) SUBCUTANEOUS
Qty: 0 | Refills: 0 | Status: DISCONTINUED | OUTPATIENT
Start: 2018-12-15 | End: 2018-12-19

## 2018-12-15 RX ORDER — SODIUM CHLORIDE 9 MG/ML
1000 INJECTION INTRAMUSCULAR; INTRAVENOUS; SUBCUTANEOUS ONCE
Qty: 0 | Refills: 0 | Status: COMPLETED | OUTPATIENT
Start: 2018-12-15 | End: 2018-12-15

## 2018-12-15 RX ORDER — ACETAMINOPHEN 500 MG
650 TABLET ORAL ONCE
Qty: 0 | Refills: 0 | Status: COMPLETED | OUTPATIENT
Start: 2018-12-15 | End: 2018-12-15

## 2018-12-15 RX ORDER — INSULIN GLARGINE 100 [IU]/ML
5 INJECTION, SOLUTION SUBCUTANEOUS ONCE
Qty: 0 | Refills: 0 | Status: COMPLETED | OUTPATIENT
Start: 2018-12-15 | End: 2018-12-15

## 2018-12-15 RX ORDER — MIDODRINE HYDROCHLORIDE 2.5 MG/1
10 TABLET ORAL ONCE
Qty: 0 | Refills: 0 | Status: COMPLETED | OUTPATIENT
Start: 2018-12-15 | End: 2018-12-15

## 2018-12-15 RX ORDER — INSULIN GLARGINE 100 [IU]/ML
5 INJECTION, SOLUTION SUBCUTANEOUS AT BEDTIME
Qty: 0 | Refills: 0 | Status: DISCONTINUED | OUTPATIENT
Start: 2018-12-15 | End: 2018-12-19

## 2018-12-15 RX ORDER — SODIUM CHLORIDE 9 MG/ML
500 INJECTION INTRAMUSCULAR; INTRAVENOUS; SUBCUTANEOUS ONCE
Qty: 0 | Refills: 0 | Status: COMPLETED | OUTPATIENT
Start: 2018-12-15 | End: 2018-12-15

## 2018-12-15 RX ORDER — INSULIN LISPRO 100/ML
VIAL (ML) SUBCUTANEOUS
Qty: 0 | Refills: 0 | Status: DISCONTINUED | OUTPATIENT
Start: 2018-12-15 | End: 2018-12-19

## 2018-12-15 RX ORDER — SODIUM CHLORIDE 9 MG/ML
1000 INJECTION INTRAMUSCULAR; INTRAVENOUS; SUBCUTANEOUS
Qty: 0 | Refills: 0 | Status: DISCONTINUED | OUTPATIENT
Start: 2018-12-15 | End: 2018-12-17

## 2018-12-15 RX ADMIN — Medication 650 MILLIGRAM(S): at 13:07

## 2018-12-15 RX ADMIN — HYDROMORPHONE HYDROCHLORIDE 0.5 MILLIGRAM(S): 2 INJECTION INTRAMUSCULAR; INTRAVENOUS; SUBCUTANEOUS at 01:14

## 2018-12-15 RX ADMIN — HYDROMORPHONE HYDROCHLORIDE 0.5 MILLIGRAM(S): 2 INJECTION INTRAMUSCULAR; INTRAVENOUS; SUBCUTANEOUS at 13:30

## 2018-12-15 RX ADMIN — HEPARIN SODIUM 0 UNIT(S)/HR: 5000 INJECTION INTRAVENOUS; SUBCUTANEOUS at 23:12

## 2018-12-15 RX ADMIN — Medication 650 MILLIGRAM(S): at 22:32

## 2018-12-15 RX ADMIN — MIDODRINE HYDROCHLORIDE 10 MILLIGRAM(S): 2.5 TABLET ORAL at 22:37

## 2018-12-15 RX ADMIN — HEPARIN SODIUM 0 UNIT(S)/HR: 5000 INJECTION INTRAVENOUS; SUBCUTANEOUS at 14:26

## 2018-12-15 RX ADMIN — Medication 81 MILLIGRAM(S): at 11:51

## 2018-12-15 RX ADMIN — Medication 650 MILLIGRAM(S): at 23:30

## 2018-12-15 RX ADMIN — SODIUM CHLORIDE 100 MILLILITER(S): 9 INJECTION INTRAMUSCULAR; INTRAVENOUS; SUBCUTANEOUS at 22:37

## 2018-12-15 RX ADMIN — SODIUM CHLORIDE 1000 MILLILITER(S): 9 INJECTION INTRAMUSCULAR; INTRAVENOUS; SUBCUTANEOUS at 17:45

## 2018-12-15 RX ADMIN — Medication 100 MILLIGRAM(S): at 11:51

## 2018-12-15 RX ADMIN — Medication 650 MILLIGRAM(S): at 22:28

## 2018-12-15 RX ADMIN — INSULIN GLARGINE 5 UNIT(S): 100 INJECTION, SOLUTION SUBCUTANEOUS at 22:28

## 2018-12-15 RX ADMIN — ATORVASTATIN CALCIUM 40 MILLIGRAM(S): 80 TABLET, FILM COATED ORAL at 22:28

## 2018-12-15 RX ADMIN — Medication 3 UNIT(S): at 09:40

## 2018-12-15 RX ADMIN — Medication 650 MILLIGRAM(S): at 10:12

## 2018-12-15 RX ADMIN — Medication 650 MILLIGRAM(S): at 06:47

## 2018-12-15 RX ADMIN — Medication 650 MILLIGRAM(S): at 10:50

## 2018-12-15 RX ADMIN — HYDROMORPHONE HYDROCHLORIDE 0.5 MILLIGRAM(S): 2 INJECTION INTRAMUSCULAR; INTRAVENOUS; SUBCUTANEOUS at 13:06

## 2018-12-15 RX ADMIN — HYDROMORPHONE HYDROCHLORIDE 0.5 MILLIGRAM(S): 2 INJECTION INTRAMUSCULAR; INTRAVENOUS; SUBCUTANEOUS at 01:33

## 2018-12-15 RX ADMIN — HEPARIN SODIUM 1400 UNIT(S)/HR: 5000 INJECTION INTRAVENOUS; SUBCUTANEOUS at 06:42

## 2018-12-15 RX ADMIN — SODIUM CHLORIDE 1000 MILLILITER(S): 9 INJECTION INTRAMUSCULAR; INTRAVENOUS; SUBCUTANEOUS at 13:05

## 2018-12-15 RX ADMIN — Medication 2: at 09:40

## 2018-12-15 RX ADMIN — HEPARIN SODIUM 0 UNIT(S)/HR: 5000 INJECTION INTRAVENOUS; SUBCUTANEOUS at 05:33

## 2018-12-15 RX ADMIN — INSULIN GLARGINE 5 UNIT(S): 100 INJECTION, SOLUTION SUBCUTANEOUS at 01:09

## 2018-12-15 RX ADMIN — SODIUM CHLORIDE 100 MILLILITER(S): 9 INJECTION INTRAMUSCULAR; INTRAVENOUS; SUBCUTANEOUS at 13:05

## 2018-12-15 RX ADMIN — HEPARIN SODIUM 1100 UNIT(S)/HR: 5000 INJECTION INTRAVENOUS; SUBCUTANEOUS at 15:40

## 2018-12-15 NOTE — PROGRESS NOTE ADULT - PROBLEM SELECTOR PLAN 3
Patient with SILKE, baseline Scr 1.0, now 2.0  Likely  2/2 dehydration and decreased PO intake  However may also be 2/2 obstruction given severity of ascites  CTAP to assess hydronephrosis   Bladder scan   Will continue IVF Patient with SILKE, baseline Scr 1.0, now 2.0  Acute onset urinary retention, CT does not show hydronephrosis, straight cathed earlier   Initially thought to be  2/2 dehydration and decreased PO intake, could also be an intrarenal component as Cook draining brownish urine, reduced urine output  Will continue IVF

## 2018-12-15 NOTE — DISCHARGE NOTE ADULT - PATIENT PORTAL LINK FT
You can access the [facility] Patient Portal, offered by [facility], by registering with the following website: [email or web address]

## 2018-12-15 NOTE — DISCHARGE NOTE ADULT - CARE PLAN
Principal Discharge DX:	Malignant ascites  Secondary Diagnosis:	SILKE (acute kidney injury)  Secondary Diagnosis:	Pulmonary thromboembolism  Secondary Diagnosis:	Chronic deep vein thrombosis (DVT) of lower extremity, unspecified laterality, unspecified vein Principal Discharge DX:	Malignant ascites  Goal:	comfort measures  Assessment and plan of treatment:	inpatient hospice, comfort measures  Secondary Diagnosis:	SILKE (acute kidney injury)  Goal:	comfort measures  Assessment and plan of treatment:	inpatient hospice, comfort measures  Secondary Diagnosis:	Pulmonary thromboembolism  Goal:	anticoagulation  Assessment and plan of treatment:	Continue Lovenox  Secondary Diagnosis:	Chronic deep vein thrombosis (DVT) of lower extremity, unspecified laterality, unspecified vein  Goal:	anticoagulation  Assessment and plan of treatment:	Continue Lovenox

## 2018-12-15 NOTE — PROVIDER CONTACT NOTE (OTHER) - ASSESSMENT
Patient is A&Ox 4, denies pain/SOB. Pt has BP 70/49m HR 75, oral temp 97.7, RR 20, O2Sat 98% on RA. No s/s of distress noted. Cook drained 200ml brown clear urine. Last time pt was stright cathed at 6am with 600ml output. Pt received 1L bolus at 1pm and continuous fluids. Abd not distended, no s/s of bleeding noted. Pt is not eating meals d/t lack of appetite.

## 2018-12-15 NOTE — PROGRESS NOTE ADULT - SUBJECTIVE AND OBJECTIVE BOX
Patient is a 58y old  Male who presents with a chief complaint of abdominal pain (14 Dec 2018 13:19)      SUBJECTIVE / OVERNIGHT EVENTS: Was hypotensive to SBP 90s overnight, got a soft bolus.     MEDICATIONS  (STANDING):  aspirin enteric coated 81 milliGRAM(s) Oral daily  atorvastatin 40 milliGRAM(s) Oral at bedtime  dextrose 5%. 1000 milliLiter(s) (50 mL/Hr) IV Continuous <Continuous>  dextrose 50% Injectable 12.5 Gram(s) IV Push once  dextrose 50% Injectable 25 Gram(s) IV Push once  dextrose 50% Injectable 25 Gram(s) IV Push once  docusate sodium 100 milliGRAM(s) Oral daily  heparin  Infusion. 1700 Unit(s)/Hr (17 mL/Hr) IV Continuous <Continuous>  insulin glargine Injectable (LANTUS) 5 Unit(s) SubCutaneous at bedtime  insulin lispro (HumaLOG) corrective regimen sliding scale   SubCutaneous three times a day before meals  insulin lispro Injectable (HumaLOG) 3 Unit(s) SubCutaneous three times a day before meals  polyethylene glycol 3350 17 Gram(s) Oral at bedtime  senna 2 Tablet(s) Oral at bedtime  sodium bicarbonate 650 milliGRAM(s) Oral three times a day  sodium chloride 0.9%. 1000 milliLiter(s) (75 mL/Hr) IV Continuous <Continuous>  sodium chloride 0.9%. 1000 milliLiter(s) (250 mL/Hr) IV Continuous <Continuous>    MEDICATIONS  (PRN):  bisacodyl Suppository 10 milliGRAM(s) Rectal once PRN Constipation  dextrose 40% Gel 15 Gram(s) Oral once PRN Blood Glucose LESS THAN 70 milliGRAM(s)/deciliter  glucagon  Injectable 1 milliGRAM(s) IntraMuscular once PRN Glucose LESS THAN 70 milligrams/deciliter  heparin  Injectable 7500 Unit(s) IV Push every 6 hours PRN For aPTT less than 40  heparin  Injectable 3500 Unit(s) IV Push every 6 hours PRN For aPTT between 40 - 57  HYDROmorphone  Injectable 0.5 milliGRAM(s) IV Push every 2 hours PRN moderate to severe pain      Vital Signs Last 24 Hrs  T(C): 36.7 (15 Dec 2018 01:03), Max: 37.2 (14 Dec 2018 16:15)  T(F): 98 (15 Dec 2018 01:03), Max: 98.9 (14 Dec 2018 16:15)  HR: 82 (15 Dec 2018 01:03) (82 - 103)  BP: 94/56 (15 Dec 2018 01:03) (92/56 - 106/70)  BP(mean): --  RR: 19 (15 Dec 2018 01:03) (14 - 20)  SpO2: 96% (15 Dec 2018 01:03) (96% - 100%)  CAPILLARY BLOOD GLUCOSE      POCT Blood Glucose.: 198 mg/dL (15 Dec 2018 00:58)  POCT Blood Glucose.: 188 mg/dL (14 Dec 2018 21:57)  POCT Blood Glucose.: 185 mg/dL (14 Dec 2018 18:14)  POCT Blood Glucose.: 215 mg/dL (14 Dec 2018 11:23)  POCT Blood Glucose.: 193 mg/dL (14 Dec 2018 09:40)    I&O's Summary    14 Dec 2018 07:01  -  15 Dec 2018 06:44  --------------------------------------------------------  IN: 0 mL / OUT: 850 mL / NET: -850 mL        PHYSICAL EXAM:  GENERAL: NAD, well-developed  HEAD:  Atraumatic, Normocephalic  EYES: EOMI, PERRLA, conjunctiva and sclera clear  NECK: Supple, No JVD  CHEST/LUNG: Clear to auscultation bilaterally; No wheeze  HEART: Regular rate and rhythm; No murmurs, rubs, or gallops  ABDOMEN: Soft, Nontender, Nondistended; Bowel sounds present  EXTREMITIES:  2+ Peripheral Pulses, No clubbing, cyanosis, or edema  PSYCH: AAOx3  NEUROLOGY: non-focal  SKIN: No rashes or lesions    LABS:                        10.8   8.33  )-----------( 403      ( 15 Dec 2018 04:00 )             35.0     12-15    139  |  101  |  95<H>  ----------------------------<  196<H>  5.1   |  19<L>  |  2.47<H>    Ca    7.8<L>      15 Dec 2018 04:00  Phos  5.6     12-15  Mg     3.0     12-15    TPro  6.2  /  Alb  2.6<L>  /  TBili  0.2  /  DBili  x   /  AST  51<H>  /  ALT  12  /  AlkPhos  122<H>  12-15    PT/INR - ( 15 Dec 2018 04:00 )   PT: 20.0 SEC;   INR: 1.73          PTT - ( 15 Dec 2018 04:00 )  PTT:> 200.0 SEC      Urinalysis Basic - ( 14 Dec 2018 20:48 )    Color: BROWN / Appearance: HAZY / S.024 / pH: 5.5  Gluc: NEGATIVE / Ketone: NEGATIVE  / Bili: NEGATIVE / Urobili: NORMAL   Blood: NEGATIVE / Protein: 30 / Nitrite: NEGATIVE   Leuk Esterase: LARGE / RBC: 0-2 / WBC 11-25   Sq Epi: x / Non Sq Epi: FEW / Bacteria: FEW Patient is a 58y old  Male who presents with a chief complaint of abdominal pain (14 Dec 2018 13:19)      SUBJECTIVE / OVERNIGHT EVENTS: Was hypotensive to SBP 90s overnight, got a soft bolus. Abdominal pain is improved after paracentesis, now patient has chronic overall pains that he attributes to his cancer.     MEDICATIONS  (STANDING):  aspirin enteric coated 81 milliGRAM(s) Oral daily  atorvastatin 40 milliGRAM(s) Oral at bedtime  dextrose 5%. 1000 milliLiter(s) (50 mL/Hr) IV Continuous <Continuous>  dextrose 50% Injectable 12.5 Gram(s) IV Push once  dextrose 50% Injectable 25 Gram(s) IV Push once  dextrose 50% Injectable 25 Gram(s) IV Push once  docusate sodium 100 milliGRAM(s) Oral daily  heparin  Infusion. 1700 Unit(s)/Hr (17 mL/Hr) IV Continuous <Continuous>  insulin glargine Injectable (LANTUS) 5 Unit(s) SubCutaneous at bedtime  insulin lispro (HumaLOG) corrective regimen sliding scale   SubCutaneous three times a day before meals  insulin lispro Injectable (HumaLOG) 3 Unit(s) SubCutaneous three times a day before meals  polyethylene glycol 3350 17 Gram(s) Oral at bedtime  senna 2 Tablet(s) Oral at bedtime  sodium bicarbonate 650 milliGRAM(s) Oral three times a day  sodium chloride 0.9%. 1000 milliLiter(s) (75 mL/Hr) IV Continuous <Continuous>  sodium chloride 0.9%. 1000 milliLiter(s) (250 mL/Hr) IV Continuous <Continuous>    MEDICATIONS  (PRN):  bisacodyl Suppository 10 milliGRAM(s) Rectal once PRN Constipation  dextrose 40% Gel 15 Gram(s) Oral once PRN Blood Glucose LESS THAN 70 milliGRAM(s)/deciliter  glucagon  Injectable 1 milliGRAM(s) IntraMuscular once PRN Glucose LESS THAN 70 milligrams/deciliter  heparin  Injectable 7500 Unit(s) IV Push every 6 hours PRN For aPTT less than 40  heparin  Injectable 3500 Unit(s) IV Push every 6 hours PRN For aPTT between 40 - 57  HYDROmorphone  Injectable 0.5 milliGRAM(s) IV Push every 2 hours PRN moderate to severe pain      Vital Signs Last 24 Hrs  T(C): 36.7 (15 Dec 2018 01:03), Max: 37.2 (14 Dec 2018 16:15)  T(F): 98 (15 Dec 2018 01:03), Max: 98.9 (14 Dec 2018 16:15)  HR: 82 (15 Dec 2018 01:03) (82 - 103)  BP: 94/56 (15 Dec 2018 01:03) (92/56 - 106/70)  BP(mean): --  RR: 19 (15 Dec 2018 01:03) (14 - 20)  SpO2: 96% (15 Dec 2018 01:03) (96% - 100%)  CAPILLARY BLOOD GLUCOSE      POCT Blood Glucose.: 198 mg/dL (15 Dec 2018 00:58)  POCT Blood Glucose.: 188 mg/dL (14 Dec 2018 21:57)  POCT Blood Glucose.: 185 mg/dL (14 Dec 2018 18:14)  POCT Blood Glucose.: 215 mg/dL (14 Dec 2018 11:23)  POCT Blood Glucose.: 193 mg/dL (14 Dec 2018 09:40)    I&O's Summary    14 Dec 2018 07:01  -  15 Dec 2018 06:44  --------------------------------------------------------  IN: 0 mL / OUT: 850 mL / NET: -850 mL        PHYSICAL EXAM:  GENERAL: NAD, cachectic appearing  HEAD:  Atraumatic, Normocephalic  EYES: EOMI, PERRLA, conjunctiva and sclera clear  NECK: Supple, No JVD  CHEST/LUNG: Clear to auscultation bilaterally; No wheeze  HEART: Regular rate and rhythm; No murmurs, rubs, or gallops  ABDOMEN: Soft, Nontender, distended; Bowel sounds present  EXTREMITIES:  2+ Peripheral Pulses, No clubbing, cyanosis, left leg appears more edematous today, feels more sensitive to touch, pulses palpable  PSYCH: AAOx3  NEUROLOGY: non-focal  SKIN: No rashes or lesions    LABS:                        10.8   8.33  )-----------( 403      ( 15 Dec 2018 04:00 )             35.0     12-15    139  |  101  |  95<H>  ----------------------------<  196<H>  5.1   |  19<L>  |  2.47<H>    Ca    7.8<L>      15 Dec 2018 04:00  Phos  5.6     12-15  Mg     3.0     12-15    TPro  6.2  /  Alb  2.6<L>  /  TBili  0.2  /  DBili  x   /  AST  51<H>  /  ALT  12  /  AlkPhos  122<H>  12-15    PT/INR - ( 15 Dec 2018 04:00 )   PT: 20.0 SEC;   INR: 1.73          PTT - ( 15 Dec 2018 04:00 )  PTT:> 200.0 SEC      Urinalysis Basic - ( 14 Dec 2018 20:48 )    Color: BROWN / Appearance: HAZY / S.024 / pH: 5.5  Gluc: NEGATIVE / Ketone: NEGATIVE  / Bili: NEGATIVE / Urobili: NORMAL   Blood: NEGATIVE / Protein: 30 / Nitrite: NEGATIVE   Leuk Esterase: LARGE / RBC: 0-2 / WBC 11-25   Sq Epi: x / Non Sq Epi: FEW / Bacteria: FEW

## 2018-12-15 NOTE — PHYSICAL THERAPY INITIAL EVALUATION ADULT - LEVEL OF INDEPENDENCE, REHAB EVAL
Pt. deferring functional mobility this session secondary to reports of fatigue and wanting to rest, will assess when appropriate.

## 2018-12-15 NOTE — CHART NOTE - NSCHARTNOTEFT_GEN_A_CORE
Notified by RN about SBP in 80s. Examined patient at bedside. Patient denies sob, cp, light-headedness. Without any complaints aside from some minor aches around belly. Continued to monitor. SBP now 79. Will trial midodrine 10mg x1 as patient already received 1.5L bolus today. Low BP may be closer to a new baseline

## 2018-12-15 NOTE — PHYSICAL THERAPY INITIAL EVALUATION ADULT - DISCHARGE DISPOSITION, PT EVAL
To Be Determined upon further assessment of functional mobility. Please refer to PT notes for further assessment when feasible.

## 2018-12-15 NOTE — PROGRESS NOTE ADULT - ATTENDING COMMENTS
Patient was seen and examined personally by me. I have discussed the plan and reviewed the Resident's note and agree with the above physical exam findings including assessment and plan except as indicated below. Labs and imagining reviewed.     58M with DM, metastatic melanoma with omental carcinomatosis, DVT presented with abd pain secondary to cancer vs ascites. pain improved s/p paracentesis. worsening SILKE, concern for post-obstructive. monitor PVR, place allen if retention. monitor urine output and renal function. Para study to rule-out SBP. thought no fever or leukocytosis. acute H/H drop, but stable on repeat, no signs of bleed. on Hep gtt for DVT. lactic acidosis secondary to carcinomatosis, pH improving. Patient was seen and examined personally by me. I have discussed the plan and reviewed the Resident's note and agree with the above physical exam findings including assessment and plan except as indicated below. Labs and imagining reviewed.     58M with DM, metastatic melanoma with omental carcinomatosis, DVT presented with abd pain secondary to cancer vs ascites. pain improved s/p paracentesis. worsening SILKE, concern for post-obstructive. allen placed, 200cc UOP. monitor urine output and renal function. c/w IVF. Para study to rule-out SBP. thought no fever or leukocytosis. acute H/H drop, but stable on repeat, no signs of bleed. on Hep gtt for DVT. lactic acidosis secondary to carcinomatosis, pH improving. c/w IVF

## 2018-12-15 NOTE — PROGRESS NOTE ADULT - PROBLEM SELECTOR PLAN 7
Hx of DVT and PE on previous hospitalization  On Lovenox BID dosing, rec'd one dose in ED  Will need to recalculate weight given patient reports 30 lb weight loss in 3 months to confirm proper dosing; further doses on hold in light of SILKE; monitor Cr, currently eGFR>30, will need to discuss with patient starting on heparin gtt vs continuing lovenox and monitoring Cr vs discontinuing for comfort Hx of DVT and PE on previous hospitalization  On Lovenox BID dosing at home, rec'd one dose in ED  Will need to recalculate weight given patient reports 30 lb weight loss in 3 months to confirm proper dosing; further doses on hold in light of SILKE; monitor Cr, currently eGFR>30  On Heparin drip

## 2018-12-15 NOTE — PROGRESS NOTE ADULT - PROBLEM SELECTOR PLAN 6
IDDM2 w/hyperglycemia   Lantus 17, humalog 5/5/5  Will hold off on basal insulin setting of SILKE and decreased PO intake, on last admission patient required no basal   cover with ISS for now, FS; add back basal based on FS IDDM2 w/hyperglycemia   Lantus 17, humalog 5/5/5 at home  On much lower dose of basal bolus in setting of SILKE and reduced PO intake  Monitor sugars

## 2018-12-15 NOTE — DISCHARGE NOTE ADULT - SECONDARY DIAGNOSIS.
SILKE (acute kidney injury) Pulmonary thromboembolism Chronic deep vein thrombosis (DVT) of lower extremity, unspecified laterality, unspecified vein

## 2018-12-15 NOTE — PROVIDER CONTACT NOTE (CRITICAL VALUE NOTIFICATION) - ACTION/TREATMENT ORDERED:
notified dr michelle ,he stated to follow nomogram,will monitor
JANIS WINCHESTER MD made aware, was ordered to follow the full anticoagulation nomogram. Will continue to monitor

## 2018-12-15 NOTE — PROGRESS NOTE ADULT - PROBLEM SELECTOR PLAN 1
Patient with diffuse generalized abdominal pain   May be 2/2 immunotherapy vs. progression of disease vs. ascites  Will order CT a/p, assess progression of disease and ascites  Patient may benefit from paracentesis vs. pleurex cath placement for comfort measures  Per patient, would not want any further surgery if needed Patient with diffuse generalized abdominal pain   May be 2/2 immunotherapy vs. progression of disease vs. ascites   CT a/p with large ascites, s/p therapeutic paracentesis with 6L fluid removed  Per patient, would not want any further surgery if needed

## 2018-12-15 NOTE — PHYSICAL THERAPY INITIAL EVALUATION ADULT - PERTINENT HX OF CURRENT PROBLEM, REHAB EVAL
Pt. is a 58 year old male admitted to [facility] secondary to malignant melanoma of skin. PMH: CAD, chronic deep vein thrombosis, enlarged heart, HTN.

## 2018-12-15 NOTE — DISCHARGE NOTE ADULT - MEDICATION SUMMARY - MEDICATIONS TO STOP TAKING
I will STOP taking the medications listed below when I get home from the hospital:    Doculase 100 mg oral capsule  -- 1 cap(s) by mouth 2 times a day

## 2018-12-15 NOTE — PROVIDER CONTACT NOTE (OTHER) - SITUATION
Pt had 400ml urine output throughout the shift. Last pt was straight cathed at 6am with 600ml UO. Pt received total of 1.5 L of bolus in addition of maintenance fluids for low BP (see flow sheet)

## 2018-12-15 NOTE — PHYSICAL THERAPY INITIAL EVALUATION ADULT - MANUAL MUSCLE TESTING RESULTS, REHAB EVAL
muscle strength not formally assessed at this time however, right UE muscle strength appears to be grossly 3/5 at least throughout. Will assess when feasible

## 2018-12-15 NOTE — DISCHARGE NOTE ADULT - MEDICATION SUMMARY - MEDICATIONS TO TAKE
I will START or STAY ON the medications listed below when I get home from the hospital:    Aspir 81 oral delayed release tablet  -- 1 tab(s) by mouth once a day  -- Indication: For Arterial thrombosis    HYDROmorphone 1 mg/mL injectable solution  -- 0.5 milliliter(s) injectable every hour  (infusion) MDD:12  -- Caution federal law prohibits the transfer of this drug to any person other  than the person for whom it was prescribed.  May cause drowsiness.  Alcohol may intensify this effect.  Use care when operating dangerous machinery.  This prescription cannot be refilled.    -- Indication: For Pain control     sodium bicarbonate 650 mg oral tablet  -- 1 tab(s) by mouth 3 times a day  -- Indication: For Metabolic acidosis with increased anion gap and accumulation of organic acids    enoxaparin  -- 90 milligram(s) subcutaneous 2 times a day  -- Indication: For Acute deep vein thrombosis (DVT) of popliteal vein of left lower extremity    Lantus 100 units/mL subcutaneous solution  -- 5 unit(s) subcutaneous once a day (at bedtime)  -- Indication: For Type 2 diabetes mellitus with complication    Admelog SoloStar 100 units/mL injectable solution  -- 3 milliliter(s) injectable 3 times a day  -- Indication: For Type 2 diabetes mellitus with complication    Lipitor 40 mg oral tablet  -- 1 tab(s) by mouth once a day (at bedtime)  -- Indication: For HLD    zinc oxide 20% topical ointment  -- 1 application on skin 2 times a day  -- Indication: For skin care     melatonin 3 mg oral tablet  -- 1 tab(s) by mouth once a day (at bedtime)  -- Indication: For insomnia

## 2018-12-15 NOTE — PROVIDER CONTACT NOTE (OTHER) - ASSESSMENT
Patient is A&Ox 4, denies pain/SOB. BP 80/48, HR 78, afebrile, . Out of bed, ambulating independently. Pt is tolerating meals well.

## 2018-12-15 NOTE — DISCHARGE NOTE ADULT - HOSPITAL COURSE
Presented with two days abdominal pain, distention, diarrhea prior but constipated for 2 days at presentation. Found to be hyperkalemic in ED, given albuterol, kayelexate     and insulin. Patient had bowel movement, potassium improved. Also given fluids for dehydration, SILKE noted and creatinine monitored. Therapeutic paracentesis removing     6L of ascites fluid was performed with improvement in pt's abdominal pain after CT abd revealed large ascites.   SILKE was intiially thought to be prerenal from dehydration, CT did not reveal hydronephrosis. Patient also found to be acutely retaining urine after paracentesis, straight     cathed twice with > 600ml output each time. Cook was placed, draining darker urine.   Patient was switched from home enoxaparin to heparin drip due to SILKE.  On presentation Patient confirmed DNR/DNI, did not want further surgery, would not want HD. Palliative was consulted for goals of care... Presented with two days abdominal pain, distention, diarrhea prior but constipated for 2 days at presentation. Found to be hyperkalemic in ED, given albuterol, kayelexate     and insulin. Patient had bowel movement, potassium improved. Also given fluids for dehydration, SILKE noted and creatinine monitored. Therapeutic paracentesis removing     6L of ascites fluid was performed with improvement in pt's abdominal pain after CT abd revealed large ascites.   SILKE was intiially thought to be prerenal from dehydration, CT did not reveal hydronephrosis. Patient also found to be acutely retaining urine after paracentesis, straight     cathed twice with > 600ml output each time. Allen was placed, draining darker urine, allen output monitored.   Patient was switched from home enoxaparin to heparin drip due to SILKE. switched back to home enoxaparin.   Patient's left leg edema increased with an increase in tenderness, vascular was consulted, ruled out compartment syndrome, vascular duplex showed arterial occlusions and several DVTs in left leg veins. Patient's left leg pain improved with increased pain regimen.    On presentation Patient confirmed DNR/DNI, did not want further surgery, would not want HD. Palliative was consulted for goals of care...

## 2018-12-15 NOTE — DISCHARGE NOTE ADULT - MEDICATION SUMMARY - MEDICATIONS TO CHANGE
I will SWITCH the dose or number of times a day I take the medications listed below when I get home from the hospital:    Admelog SoloStar 100 units/mL injectable solution  -- 5 milliliter(s) injectable 3 times a day    Lantus 100 units/mL subcutaneous solution  -- 17 unit(s) subcutaneous once a day (at bedtime)

## 2018-12-15 NOTE — PROGRESS NOTE ADULT - PROBLEM SELECTOR PLAN 4
Patient with hyperK to 6.1 in the setting of SILKE   Being medically managed, no peaked T waves seen on EKG   Repeat BMP w/improved K, patient denies chest pain or palpitations  can monitor on tele for now Patient with hyperK to 6.1 in the setting of SILKE   Being medically managed, no peaked T waves seen on EKG   Repeat BMP w/improved K, patient denies chest pain or palpitations

## 2018-12-15 NOTE — PROGRESS NOTE ADULT - PROBLEM SELECTOR PLAN 2
Hx of diarrhea intermittent, not current   Less likely infectious etiology likely 2/2 immunotherapy   If continues will send stool cx, GI PCR  C. diff and stool cx in outpatient setting negative   Electrolytes stable

## 2018-12-16 LAB
ALBUMIN SERPL ELPH-MCNC: 2.2 G/DL — LOW (ref 3.3–5)
ALP SERPL-CCNC: 106 U/L — SIGNIFICANT CHANGE UP (ref 40–120)
ALT FLD-CCNC: 11 U/L — SIGNIFICANT CHANGE UP (ref 4–41)
APTT BLD: 66.9 SEC — HIGH (ref 27.5–36.3)
APTT BLD: 70 SEC — HIGH (ref 27.5–36.3)
AST SERPL-CCNC: 32 U/L — SIGNIFICANT CHANGE UP (ref 4–40)
BASOPHILS # BLD AUTO: 0.03 K/UL — SIGNIFICANT CHANGE UP (ref 0–0.2)
BASOPHILS NFR BLD AUTO: 0.4 % — SIGNIFICANT CHANGE UP (ref 0–2)
BILIRUB SERPL-MCNC: 0.3 MG/DL — SIGNIFICANT CHANGE UP (ref 0.2–1.2)
BUN SERPL-MCNC: 87 MG/DL — HIGH (ref 7–23)
CALCIUM SERPL-MCNC: 7.2 MG/DL — LOW (ref 8.4–10.5)
CHLORIDE SERPL-SCNC: 102 MMOL/L — SIGNIFICANT CHANGE UP (ref 98–107)
CO2 SERPL-SCNC: 17 MMOL/L — LOW (ref 22–31)
CREAT ?TM UR-MCNC: 99.4 MG/DL — SIGNIFICANT CHANGE UP
CREAT SERPL-MCNC: 1.81 MG/DL — HIGH (ref 0.5–1.3)
EOSINOPHIL # BLD AUTO: 0.09 K/UL — SIGNIFICANT CHANGE UP (ref 0–0.5)
EOSINOPHIL NFR BLD AUTO: 1.1 % — SIGNIFICANT CHANGE UP (ref 0–6)
GLUCOSE BLDC GLUCOMTR-MCNC: 110 MG/DL — HIGH (ref 70–99)
GLUCOSE BLDC GLUCOMTR-MCNC: 131 MG/DL — HIGH (ref 70–99)
GLUCOSE BLDC GLUCOMTR-MCNC: 77 MG/DL — SIGNIFICANT CHANGE UP (ref 70–99)
GLUCOSE BLDC GLUCOMTR-MCNC: 78 MG/DL — SIGNIFICANT CHANGE UP (ref 70–99)
GLUCOSE SERPL-MCNC: 83 MG/DL — SIGNIFICANT CHANGE UP (ref 70–99)
HCT VFR BLD CALC: 37.4 % — LOW (ref 39–50)
HGB BLD-MCNC: 11.3 G/DL — LOW (ref 13–17)
IMM GRANULOCYTES # BLD AUTO: 0.03 # — SIGNIFICANT CHANGE UP
IMM GRANULOCYTES NFR BLD AUTO: 0.4 % — SIGNIFICANT CHANGE UP (ref 0–1.5)
LYMPHOCYTES # BLD AUTO: 0.69 K/UL — LOW (ref 1–3.3)
LYMPHOCYTES # BLD AUTO: 8.6 % — LOW (ref 13–44)
MAGNESIUM SERPL-MCNC: 2.7 MG/DL — HIGH (ref 1.6–2.6)
MCHC RBC-ENTMCNC: 30.2 % — LOW (ref 32–36)
MCHC RBC-ENTMCNC: 31.4 PG — SIGNIFICANT CHANGE UP (ref 27–34)
MCV RBC AUTO: 103.9 FL — HIGH (ref 80–100)
MONOCYTES # BLD AUTO: 0.91 K/UL — HIGH (ref 0–0.9)
MONOCYTES NFR BLD AUTO: 11.3 % — SIGNIFICANT CHANGE UP (ref 2–14)
NEUTROPHILS # BLD AUTO: 6.29 K/UL — SIGNIFICANT CHANGE UP (ref 1.8–7.4)
NEUTROPHILS NFR BLD AUTO: 78.2 % — HIGH (ref 43–77)
NRBC # FLD: 0 — SIGNIFICANT CHANGE UP
OSMOLALITY UR: 452 MOSMO/KG — SIGNIFICANT CHANGE UP (ref 50–1200)
PHOSPHATE SERPL-MCNC: 4.1 MG/DL — SIGNIFICANT CHANGE UP (ref 2.5–4.5)
PLATELET # BLD AUTO: 410 K/UL — HIGH (ref 150–400)
PMV BLD: 11 FL — SIGNIFICANT CHANGE UP (ref 7–13)
POTASSIUM SERPL-MCNC: 4.2 MMOL/L — SIGNIFICANT CHANGE UP (ref 3.5–5.3)
POTASSIUM SERPL-SCNC: 4.2 MMOL/L — SIGNIFICANT CHANGE UP (ref 3.5–5.3)
PROT SERPL-MCNC: 5.5 G/DL — LOW (ref 6–8.3)
RBC # BLD: 3.6 M/UL — LOW (ref 4.2–5.8)
RBC # FLD: 17.6 % — HIGH (ref 10.3–14.5)
SODIUM SERPL-SCNC: 137 MMOL/L — SIGNIFICANT CHANGE UP (ref 135–145)
SODIUM UR-SCNC: < 20 MMOL/L — SIGNIFICANT CHANGE UP
WBC # BLD: 8.04 K/UL — SIGNIFICANT CHANGE UP (ref 3.8–10.5)
WBC # FLD AUTO: 8.04 K/UL — SIGNIFICANT CHANGE UP (ref 3.8–10.5)

## 2018-12-16 PROCEDURE — 99233 SBSQ HOSP IP/OBS HIGH 50: CPT | Mod: GC

## 2018-12-16 PROCEDURE — 71045 X-RAY EXAM CHEST 1 VIEW: CPT | Mod: 26

## 2018-12-16 RX ORDER — SODIUM CHLORIDE 9 MG/ML
500 INJECTION INTRAMUSCULAR; INTRAVENOUS; SUBCUTANEOUS ONCE
Qty: 0 | Refills: 0 | Status: COMPLETED | OUTPATIENT
Start: 2018-12-16 | End: 2018-12-16

## 2018-12-16 RX ORDER — ALBUMIN HUMAN 25 %
250 VIAL (ML) INTRAVENOUS EVERY 6 HOURS
Qty: 0 | Refills: 0 | Status: COMPLETED | OUTPATIENT
Start: 2018-12-16 | End: 2018-12-17

## 2018-12-16 RX ORDER — LIDOCAINE 4 G/100G
1 CREAM TOPICAL ONCE
Qty: 0 | Refills: 0 | Status: COMPLETED | OUTPATIENT
Start: 2018-12-16 | End: 2018-12-16

## 2018-12-16 RX ADMIN — Medication 81 MILLIGRAM(S): at 11:19

## 2018-12-16 RX ADMIN — SENNA PLUS 2 TABLET(S): 8.6 TABLET ORAL at 21:22

## 2018-12-16 RX ADMIN — Medication 650 MILLIGRAM(S): at 14:39

## 2018-12-16 RX ADMIN — HEPARIN SODIUM 800 UNIT(S)/HR: 5000 INJECTION INTRAVENOUS; SUBCUTANEOUS at 00:22

## 2018-12-16 RX ADMIN — SODIUM CHLORIDE 100 MILLILITER(S): 9 INJECTION INTRAMUSCULAR; INTRAVENOUS; SUBCUTANEOUS at 01:57

## 2018-12-16 RX ADMIN — Medication 650 MILLIGRAM(S): at 21:23

## 2018-12-16 RX ADMIN — HYDROMORPHONE HYDROCHLORIDE 0.5 MILLIGRAM(S): 2 INJECTION INTRAMUSCULAR; INTRAVENOUS; SUBCUTANEOUS at 23:20

## 2018-12-16 RX ADMIN — HYDROMORPHONE HYDROCHLORIDE 0.5 MILLIGRAM(S): 2 INJECTION INTRAMUSCULAR; INTRAVENOUS; SUBCUTANEOUS at 16:50

## 2018-12-16 RX ADMIN — INSULIN GLARGINE 5 UNIT(S): 100 INJECTION, SOLUTION SUBCUTANEOUS at 22:02

## 2018-12-16 RX ADMIN — Medication 100 MILLIGRAM(S): at 11:19

## 2018-12-16 RX ADMIN — HYDROMORPHONE HYDROCHLORIDE 0.5 MILLIGRAM(S): 2 INJECTION INTRAMUSCULAR; INTRAVENOUS; SUBCUTANEOUS at 17:05

## 2018-12-16 RX ADMIN — Medication 650 MILLIGRAM(S): at 06:00

## 2018-12-16 RX ADMIN — ATORVASTATIN CALCIUM 40 MILLIGRAM(S): 80 TABLET, FILM COATED ORAL at 21:22

## 2018-12-16 RX ADMIN — Medication 125 MILLILITER(S): at 20:52

## 2018-12-16 RX ADMIN — HYDROMORPHONE HYDROCHLORIDE 0.5 MILLIGRAM(S): 2 INJECTION INTRAMUSCULAR; INTRAVENOUS; SUBCUTANEOUS at 23:08

## 2018-12-16 RX ADMIN — HEPARIN SODIUM 800 UNIT(S)/HR: 5000 INJECTION INTRAVENOUS; SUBCUTANEOUS at 14:36

## 2018-12-16 RX ADMIN — HEPARIN SODIUM 800 UNIT(S)/HR: 5000 INJECTION INTRAVENOUS; SUBCUTANEOUS at 07:21

## 2018-12-16 RX ADMIN — Medication 125 MILLILITER(S): at 11:06

## 2018-12-16 RX ADMIN — LIDOCAINE 1 APPLICATION(S): 4 CREAM TOPICAL at 09:37

## 2018-12-16 RX ADMIN — SODIUM CHLORIDE 1000 MILLILITER(S): 9 INJECTION INTRAMUSCULAR; INTRAVENOUS; SUBCUTANEOUS at 01:57

## 2018-12-16 RX ADMIN — HYDROMORPHONE HYDROCHLORIDE 0.5 MILLIGRAM(S): 2 INJECTION INTRAMUSCULAR; INTRAVENOUS; SUBCUTANEOUS at 11:28

## 2018-12-16 RX ADMIN — HYDROMORPHONE HYDROCHLORIDE 0.5 MILLIGRAM(S): 2 INJECTION INTRAMUSCULAR; INTRAVENOUS; SUBCUTANEOUS at 11:13

## 2018-12-16 RX ADMIN — SODIUM CHLORIDE 100 MILLILITER(S): 9 INJECTION INTRAMUSCULAR; INTRAVENOUS; SUBCUTANEOUS at 06:00

## 2018-12-16 NOTE — PROGRESS NOTE ADULT - PROBLEM SELECTOR PLAN 6
IDDM2 w/hyperglycemia   Lantus 17, humalog 5/5/5 at home  On much lower dose of basal bolus in setting of SILKE and reduced PO intake  Monitor sugars

## 2018-12-16 NOTE — PROGRESS NOTE ADULT - ATTENDING COMMENTS
Patient was seen and examined personally by me. I have discussed the plan and reviewed the Resident's note and agree with the above physical exam findings including assessment and plan except as indicated below. Labs and imagining reviewed.     patient with malignant melanoma to the omentum. malignant ascites presented with abd pain. LLE DVT on Hep gtt. BP borderline, sensitive to pain meds. responds to IVF. will do trial of albumin for fluid resuscitation. SILKE, resolving s/p allen. will keep allen for now, c/w hydration.

## 2018-12-16 NOTE — PROGRESS NOTE ADULT - PROBLEM SELECTOR PLAN 3
Patient with SILKE, baseline Scr 1.0, now 2.0  Acute onset urinary retention, CT does not show hydronephrosis, straight cathed earlier   Initially thought to be  2/2 dehydration and decreased PO intake, could also be an intrarenal component as Cook draining brownish urine, reduced urine output  Will continue IVF

## 2018-12-16 NOTE — PROGRESS NOTE ADULT - PROBLEM SELECTOR PLAN 7
Hx of DVT and PE on previous hospitalization  On Lovenox BID dosing at home, rec'd one dose in ED  Will need to recalculate weight given patient reports 30 lb weight loss in 3 months to confirm proper dosing; further doses on hold in light of SILKE; monitor Cr, currently eGFR>30  On Heparin drip

## 2018-12-16 NOTE — PROGRESS NOTE ADULT - PROBLEM SELECTOR PLAN 4
Patient with hyperK to 6.1 in the setting of SILKE   Being medically managed, no peaked T waves seen on EKG   Repeat BMP w/improved K, patient denies chest pain or palpitations

## 2018-12-16 NOTE — PROGRESS NOTE ADULT - PROBLEM SELECTOR PLAN 1
Patient with diffuse generalized abdominal pain   May be 2/2 immunotherapy vs. progression of disease vs. ascites   CT a/p with large ascites, s/p therapeutic paracentesis with 6L fluid removed  Per patient, would not want any further surgery if needed

## 2018-12-16 NOTE — PROGRESS NOTE ADULT - SUBJECTIVE AND OBJECTIVE BOX
Patient is a 58y old  Male who presents with a chief complaint of abdominal pain (14 Dec 2018 13:19)      SUBJECTIVE / OVERNIGHT EVENTS: Was hypotensive to SBP 78 overnight, improved with IVF     MEDICATIONS  (STANDING):  aspirin enteric coated 81 milliGRAM(s) Oral daily  atorvastatin 40 milliGRAM(s) Oral at bedtime  dextrose 5%. 1000 milliLiter(s) (50 mL/Hr) IV Continuous <Continuous>  dextrose 50% Injectable 12.5 Gram(s) IV Push once  dextrose 50% Injectable 25 Gram(s) IV Push once  dextrose 50% Injectable 25 Gram(s) IV Push once  docusate sodium 100 milliGRAM(s) Oral daily  heparin  Infusion. 1700 Unit(s)/Hr (17 mL/Hr) IV Continuous <Continuous>  insulin glargine Injectable (LANTUS) 5 Unit(s) SubCutaneous at bedtime  insulin lispro (HumaLOG) corrective regimen sliding scale   SubCutaneous three times a day before meals  insulin lispro Injectable (HumaLOG) 3 Unit(s) SubCutaneous three times a day before meals  polyethylene glycol 3350 17 Gram(s) Oral at bedtime  senna 2 Tablet(s) Oral at bedtime  sodium bicarbonate 650 milliGRAM(s) Oral three times a day  sodium chloride 0.9%. 1000 milliLiter(s) (75 mL/Hr) IV Continuous <Continuous>  sodium chloride 0.9%. 1000 milliLiter(s) (250 mL/Hr) IV Continuous <Continuous>    MEDICATIONS  (PRN):  bisacodyl Suppository 10 milliGRAM(s) Rectal once PRN Constipation  dextrose 40% Gel 15 Gram(s) Oral once PRN Blood Glucose LESS THAN 70 milliGRAM(s)/deciliter  glucagon  Injectable 1 milliGRAM(s) IntraMuscular once PRN Glucose LESS THAN 70 milligrams/deciliter  heparin  Injectable 7500 Unit(s) IV Push every 6 hours PRN For aPTT less than 40  heparin  Injectable 3500 Unit(s) IV Push every 6 hours PRN For aPTT between 40 - 57  HYDROmorphone  Injectable 0.5 milliGRAM(s) IV Push every 2 hours PRN moderate to severe pain      Vital Signs Last 24 Hrs  T(C): 36.7 (15 Dec 2018 01:03), Max: 37.2 (14 Dec 2018 16:15)  T(F): 98 (15 Dec 2018 01:03), Max: 98.9 (14 Dec 2018 16:15)  HR: 82 (15 Dec 2018 01:03) (82 - 103)  BP: 94/56 (15 Dec 2018 01:03) (92/56 - 106/70)  BP(mean): --  RR: 19 (15 Dec 2018 01:03) (14 - 20)  SpO2: 96% (15 Dec 2018 01:03) (96% - 100%)  CAPILLARY BLOOD GLUCOSE      POCT Blood Glucose.: 198 mg/dL (15 Dec 2018 00:58)  POCT Blood Glucose.: 188 mg/dL (14 Dec 2018 21:57)  POCT Blood Glucose.: 185 mg/dL (14 Dec 2018 18:14)  POCT Blood Glucose.: 215 mg/dL (14 Dec 2018 11:23)  POCT Blood Glucose.: 193 mg/dL (14 Dec 2018 09:40)    I&O's Summary    14 Dec 2018 07:01  -  15 Dec 2018 06:44  --------------------------------------------------------  IN: 0 mL / OUT: 850 mL / NET: -850 mL        PHYSICAL EXAM:  GENERAL: NAD, cachectic appearing  HEAD:  Atraumatic, Normocephalic  EYES: EOMI, PERRLA, conjunctiva and sclera clear  NECK: Supple, No JVD  CHEST/LUNG: Clear to auscultation bilaterally; No wheeze  HEART: Regular rate and rhythm; No murmurs, rubs, or gallops  ABDOMEN: Soft, Nontender, distended; Bowel sounds present  EXTREMITIES:  2+ Peripheral Pulses, No clubbing, cyanosis, left leg appears more edematous today, feels more sensitive to touch, pulses palpable  PSYCH: AAOx3  NEUROLOGY: non-focal  SKIN: No rashes or lesions    LABS:                        10.8   8.33  )-----------( 403      ( 15 Dec 2018 04:00 )             35.0     12-15    139  |  101  |  95<H>  ----------------------------<  196<H>  5.1   |  19<L>  |  2.47<H>    Ca    7.8<L>      15 Dec 2018 04:00  Phos  5.6     12-15  Mg     3.0     12-15    TPro  6.2  /  Alb  2.6<L>  /  TBili  0.2  /  DBili  x   /  AST  51<H>  /  ALT  12  /  AlkPhos  122<H>  12-15    PT/INR - ( 15 Dec 2018 04:00 )   PT: 20.0 SEC;   INR: 1.73          PTT - ( 15 Dec 2018 04:00 )  PTT:> 200.0 SEC      Urinalysis Basic - ( 14 Dec 2018 20:48 )    Color: BROWN / Appearance: HAZY / S.024 / pH: 5.5  Gluc: NEGATIVE / Ketone: NEGATIVE  / Bili: NEGATIVE / Urobili: NORMAL   Blood: NEGATIVE / Protein: 30 / Nitrite: NEGATIVE   Leuk Esterase: LARGE / RBC: 0-2 / WBC 11-25   Sq Epi: x / Non Sq Epi: FEW / Bacteria: FEW

## 2018-12-17 DIAGNOSIS — I82.412 ACUTE EMBOLISM AND THROMBOSIS OF LEFT FEMORAL VEIN: ICD-10-CM

## 2018-12-17 DIAGNOSIS — I74.9 EMBOLISM AND THROMBOSIS OF UNSPECIFIED ARTERY: ICD-10-CM

## 2018-12-17 DIAGNOSIS — I82.432 ACUTE EMBOLISM AND THROMBOSIS OF LEFT POPLITEAL VEIN: ICD-10-CM

## 2018-12-17 DIAGNOSIS — I82.5Y2 CHRONIC EMBOLISM AND THROMBOSIS OF UNSPECIFIED DEEP VEINS OF LEFT PROXIMAL LOWER EXTREMITY: ICD-10-CM

## 2018-12-17 LAB
ALBUMIN SERPL ELPH-MCNC: 2.5 G/DL — LOW (ref 3.3–5)
ALP SERPL-CCNC: 106 U/L — SIGNIFICANT CHANGE UP (ref 40–120)
ALT FLD-CCNC: 8 U/L — SIGNIFICANT CHANGE UP (ref 4–41)
APTT BLD: 60.7 SEC — HIGH (ref 27.5–36.3)
AST SERPL-CCNC: 21 U/L — SIGNIFICANT CHANGE UP (ref 4–40)
BASOPHILS # BLD AUTO: 0.05 K/UL — SIGNIFICANT CHANGE UP (ref 0–0.2)
BASOPHILS NFR BLD AUTO: 0.6 % — SIGNIFICANT CHANGE UP (ref 0–2)
BILIRUB SERPL-MCNC: 0.5 MG/DL — SIGNIFICANT CHANGE UP (ref 0.2–1.2)
BUN SERPL-MCNC: 77 MG/DL — HIGH (ref 7–23)
CALCIUM SERPL-MCNC: 7.7 MG/DL — LOW (ref 8.4–10.5)
CHLORIDE SERPL-SCNC: 103 MMOL/L — SIGNIFICANT CHANGE UP (ref 98–107)
CO2 SERPL-SCNC: 18 MMOL/L — LOW (ref 22–31)
CREAT SERPL-MCNC: 1.37 MG/DL — HIGH (ref 0.5–1.3)
EOSINOPHIL # BLD AUTO: 0.05 K/UL — SIGNIFICANT CHANGE UP (ref 0–0.5)
EOSINOPHIL NFR BLD AUTO: 0.6 % — SIGNIFICANT CHANGE UP (ref 0–6)
GLUCOSE BLDC GLUCOMTR-MCNC: 143 MG/DL — HIGH (ref 70–99)
GLUCOSE BLDC GLUCOMTR-MCNC: 150 MG/DL — HIGH (ref 70–99)
GLUCOSE BLDC GLUCOMTR-MCNC: 184 MG/DL — HIGH (ref 70–99)
GLUCOSE BLDC GLUCOMTR-MCNC: 191 MG/DL — HIGH (ref 70–99)
GLUCOSE SERPL-MCNC: 203 MG/DL — HIGH (ref 70–99)
HCT VFR BLD CALC: 36.8 % — LOW (ref 39–50)
HGB BLD-MCNC: 11.5 G/DL — LOW (ref 13–17)
IMM GRANULOCYTES # BLD AUTO: 0.03 # — SIGNIFICANT CHANGE UP
IMM GRANULOCYTES NFR BLD AUTO: 0.4 % — SIGNIFICANT CHANGE UP (ref 0–1.5)
LACTATE SERPL-SCNC: 2.6 MMOL/L — HIGH (ref 0.5–2)
LYMPHOCYTES # BLD AUTO: 0.55 K/UL — LOW (ref 1–3.3)
LYMPHOCYTES # BLD AUTO: 6.5 % — LOW (ref 13–44)
MAGNESIUM SERPL-MCNC: 2.6 MG/DL — SIGNIFICANT CHANGE UP (ref 1.6–2.6)
MCHC RBC-ENTMCNC: 31.3 % — LOW (ref 32–36)
MCHC RBC-ENTMCNC: 31.4 PG — SIGNIFICANT CHANGE UP (ref 27–34)
MCV RBC AUTO: 100.5 FL — HIGH (ref 80–100)
MONOCYTES # BLD AUTO: 0.76 K/UL — SIGNIFICANT CHANGE UP (ref 0–0.9)
MONOCYTES NFR BLD AUTO: 9 % — SIGNIFICANT CHANGE UP (ref 2–14)
NEUTROPHILS # BLD AUTO: 6.97 K/UL — SIGNIFICANT CHANGE UP (ref 1.8–7.4)
NEUTROPHILS NFR BLD AUTO: 82.9 % — HIGH (ref 43–77)
NRBC # FLD: 0 — SIGNIFICANT CHANGE UP
PHOSPHATE SERPL-MCNC: 3.7 MG/DL — SIGNIFICANT CHANGE UP (ref 2.5–4.5)
PLATELET # BLD AUTO: 399 K/UL — SIGNIFICANT CHANGE UP (ref 150–400)
PMV BLD: 11 FL — SIGNIFICANT CHANGE UP (ref 7–13)
POTASSIUM SERPL-MCNC: 4.3 MMOL/L — SIGNIFICANT CHANGE UP (ref 3.5–5.3)
POTASSIUM SERPL-SCNC: 4.3 MMOL/L — SIGNIFICANT CHANGE UP (ref 3.5–5.3)
PROT SERPL-MCNC: 5.6 G/DL — LOW (ref 6–8.3)
RBC # BLD: 3.66 M/UL — LOW (ref 4.2–5.8)
RBC # FLD: 17.8 % — HIGH (ref 10.3–14.5)
SODIUM SERPL-SCNC: 140 MMOL/L — SIGNIFICANT CHANGE UP (ref 135–145)
WBC # BLD: 8.41 K/UL — SIGNIFICANT CHANGE UP (ref 3.8–10.5)
WBC # FLD AUTO: 8.41 K/UL — SIGNIFICANT CHANGE UP (ref 3.8–10.5)

## 2018-12-17 PROCEDURE — 93926 LOWER EXTREMITY STUDY: CPT | Mod: 26,50,LT

## 2018-12-17 PROCEDURE — 93971 EXTREMITY STUDY: CPT | Mod: 26

## 2018-12-17 PROCEDURE — 93010 ELECTROCARDIOGRAM REPORT: CPT

## 2018-12-17 PROCEDURE — 99233 SBSQ HOSP IP/OBS HIGH 50: CPT | Mod: GC

## 2018-12-17 PROCEDURE — 99223 1ST HOSP IP/OBS HIGH 75: CPT | Mod: GC

## 2018-12-17 PROCEDURE — 99233 SBSQ HOSP IP/OBS HIGH 50: CPT

## 2018-12-17 RX ORDER — HYDROMORPHONE HYDROCHLORIDE 2 MG/ML
1 INJECTION INTRAMUSCULAR; INTRAVENOUS; SUBCUTANEOUS
Qty: 0 | Refills: 0 | Status: DISCONTINUED | OUTPATIENT
Start: 2018-12-17 | End: 2018-12-19

## 2018-12-17 RX ORDER — ENOXAPARIN SODIUM 100 MG/ML
90 INJECTION SUBCUTANEOUS
Qty: 0 | Refills: 0 | Status: DISCONTINUED | OUTPATIENT
Start: 2018-12-17 | End: 2018-12-19

## 2018-12-17 RX ORDER — HYDROMORPHONE HYDROCHLORIDE 2 MG/ML
0.5 INJECTION INTRAMUSCULAR; INTRAVENOUS; SUBCUTANEOUS EVERY 4 HOURS
Qty: 0 | Refills: 0 | Status: DISCONTINUED | OUTPATIENT
Start: 2018-12-17 | End: 2018-12-18

## 2018-12-17 RX ADMIN — HYDROMORPHONE HYDROCHLORIDE 0.5 MILLIGRAM(S): 2 INJECTION INTRAMUSCULAR; INTRAVENOUS; SUBCUTANEOUS at 15:50

## 2018-12-17 RX ADMIN — Medication 100 MILLIGRAM(S): at 12:43

## 2018-12-17 RX ADMIN — HYDROMORPHONE HYDROCHLORIDE 0.5 MILLIGRAM(S): 2 INJECTION INTRAMUSCULAR; INTRAVENOUS; SUBCUTANEOUS at 19:44

## 2018-12-17 RX ADMIN — ENOXAPARIN SODIUM 90 MILLIGRAM(S): 100 INJECTION SUBCUTANEOUS at 20:05

## 2018-12-17 RX ADMIN — HYDROMORPHONE HYDROCHLORIDE 1 MILLIGRAM(S): 2 INJECTION INTRAMUSCULAR; INTRAVENOUS; SUBCUTANEOUS at 22:50

## 2018-12-17 RX ADMIN — Medication 125 MILLILITER(S): at 11:02

## 2018-12-17 RX ADMIN — Medication 125 MILLILITER(S): at 04:07

## 2018-12-17 RX ADMIN — Medication 0.5 MILLIGRAM(S): at 09:33

## 2018-12-17 RX ADMIN — Medication 1: at 18:10

## 2018-12-17 RX ADMIN — HYDROMORPHONE HYDROCHLORIDE 0.5 MILLIGRAM(S): 2 INJECTION INTRAMUSCULAR; INTRAVENOUS; SUBCUTANEOUS at 04:20

## 2018-12-17 RX ADMIN — HYDROMORPHONE HYDROCHLORIDE 0.5 MILLIGRAM(S): 2 INJECTION INTRAMUSCULAR; INTRAVENOUS; SUBCUTANEOUS at 10:00

## 2018-12-17 RX ADMIN — HEPARIN SODIUM 800 UNIT(S)/HR: 5000 INJECTION INTRAVENOUS; SUBCUTANEOUS at 08:24

## 2018-12-17 RX ADMIN — HYDROMORPHONE HYDROCHLORIDE 1 MILLIGRAM(S): 2 INJECTION INTRAMUSCULAR; INTRAVENOUS; SUBCUTANEOUS at 13:00

## 2018-12-17 RX ADMIN — HYDROMORPHONE HYDROCHLORIDE 1 MILLIGRAM(S): 2 INJECTION INTRAMUSCULAR; INTRAVENOUS; SUBCUTANEOUS at 13:15

## 2018-12-17 RX ADMIN — HYDROMORPHONE HYDROCHLORIDE 0.5 MILLIGRAM(S): 2 INJECTION INTRAMUSCULAR; INTRAVENOUS; SUBCUTANEOUS at 19:29

## 2018-12-17 RX ADMIN — Medication 1: at 09:32

## 2018-12-17 RX ADMIN — Medication 650 MILLIGRAM(S): at 07:14

## 2018-12-17 RX ADMIN — HYDROMORPHONE HYDROCHLORIDE 0.5 MILLIGRAM(S): 2 INJECTION INTRAMUSCULAR; INTRAVENOUS; SUBCUTANEOUS at 09:46

## 2018-12-17 RX ADMIN — HYDROMORPHONE HYDROCHLORIDE 0.5 MILLIGRAM(S): 2 INJECTION INTRAMUSCULAR; INTRAVENOUS; SUBCUTANEOUS at 16:05

## 2018-12-17 RX ADMIN — Medication 81 MILLIGRAM(S): at 12:43

## 2018-12-17 RX ADMIN — HYDROMORPHONE HYDROCHLORIDE 1 MILLIGRAM(S): 2 INJECTION INTRAMUSCULAR; INTRAVENOUS; SUBCUTANEOUS at 22:39

## 2018-12-17 RX ADMIN — HYDROMORPHONE HYDROCHLORIDE 0.5 MILLIGRAM(S): 2 INJECTION INTRAMUSCULAR; INTRAVENOUS; SUBCUTANEOUS at 04:08

## 2018-12-17 NOTE — PROGRESS NOTE ADULT - SUBJECTIVE AND OBJECTIVE BOX
INTERVAL HPI/OVERNIGHT EVENTS:    Palliative Care following for symptom management and goals of care in the setting of metastatic malignancy.     Allergies    No Known Allergies    Intolerances      ADVANCE DIRECTIVES:    DNR Yes    MOLST [ ] YES [x ] NO     MEDICATIONS  (STANDING):  aspirin enteric coated 81 milliGRAM(s) Oral daily  atorvastatin 40 milliGRAM(s) Oral at bedtime  dextrose 5%. 1000 milliLiter(s) (50 mL/Hr) IV Continuous <Continuous>  dextrose 50% Injectable 12.5 Gram(s) IV Push once  dextrose 50% Injectable 25 Gram(s) IV Push once  dextrose 50% Injectable 25 Gram(s) IV Push once  docusate sodium 100 milliGRAM(s) Oral daily  enoxaparin Injectable 90 milliGRAM(s) SubCutaneous two times a day  HYDROmorphone  Injectable 0.5 milliGRAM(s) IV Push every 4 hours  insulin glargine Injectable (LANTUS) 5 Unit(s) SubCutaneous at bedtime  insulin lispro (HumaLOG) corrective regimen sliding scale   SubCutaneous three times a day before meals  insulin lispro Injectable (HumaLOG) 3 Unit(s) SubCutaneous three times a day before meals  polyethylene glycol 3350 17 Gram(s) Oral at bedtime  senna 2 Tablet(s) Oral at bedtime  sodium bicarbonate 650 milliGRAM(s) Oral three times a day    MEDICATIONS  (PRN):  bisacodyl Suppository 10 milliGRAM(s) Rectal once PRN Constipation  dextrose 40% Gel 15 Gram(s) Oral once PRN Blood Glucose LESS THAN 70 milliGRAM(s)/deciliter  glucagon  Injectable 1 milliGRAM(s) IntraMuscular once PRN Glucose LESS THAN 70 milligrams/deciliter  HYDROmorphone  Injectable 1 milliGRAM(s) IV Push every 2 hours PRN moderate to severe pain  LORazepam   Injectable 0.25 milliGRAM(s) IV Push every 4 hours PRN Nausea and/or Vomiting    PRESENT SYMPTOMS:  Source: [x ] Patient   [ ] Family   [ ] Team     Pain:                        [ ] No [ x] Yes             [ ] Mild [ ] Moderate [ ] Severe    Onset - days  Location - abdomen and worsening LLE pain.   Duration - days  Character - aching  Alleviating/Aggravating - improved with dilaudid.   Radiation - none  Timing -    Dyspnea:                [ ] No [x ] Yes             [ ] Mild [x ] Moderate [ ] Severe    Anxiety:                  [x ] No [ ] Yes             [ ] Mild [ ] Moderate [ ] Severe    Fatigue:                  [x ] No [ ] Yes             [ ] Mild [ ] Moderate [ ] Severe    Nausea:                  [ ] No [x ] Yes             [ ] Mild [ ] Moderate [ x] Severe    Loss of appetite:   [ ] No [x ] Yes             [ ] Mild [ ] Moderate [x ] Severe    Constipation:        [x ] No [ ] Yes             [ ] Mild [ ] Moderate [ ] Severe    Other Symptoms:  [x ] All other review of systems negative   [ ] Unable to obtain due to poor mentation     Karnofsky Performance Score/Palliative Performance Status Version 2: 40 %    PHYSICAL EXAM:  Vital Signs Last 24 Hrs  T(C): 36.6 (17 Dec 2018 19:27), Max: 36.9 (17 Dec 2018 15:49)  T(F): 97.8 (17 Dec 2018 19:27), Max: 98.4 (17 Dec 2018 15:49)  HR: 97 (17 Dec 2018 19:27) (65 - 97)  BP: 107/62 (17 Dec 2018 19:27) (101/58 - 115/54)  BP(mean): --  RR: 17 (17 Dec 2018 19:27) (17 - 20)  SpO2: 96% (17 Dec 2018 19:27) (94% - 98%) I&O's Summary    16 Dec 2018 07:01  -  17 Dec 2018 07:00  --------------------------------------------------------  IN: 1674 mL / OUT: 1050 mL / NET: 624 mL    17 Dec 2018 07:01  -  17 Dec 2018 20:30  --------------------------------------------------------  IN: 330 mL / OUT: 400 mL / NET: -70 mL        General:  [x ] Alert  [ ] Oriented x      [ ] Lethargic  [ ] Agitated   [ ] Cachexia   [ ] Unarousable  [ x] Verbal  [ ] Non-Verbal    HEENT:  [ ] Normal   [ x] Dry mouth   [ ] ET Tube    [ ] Trach  [ ] Oral lesions    Lungs:   [ ] Clear [x ] Tachypnea  [ ] Audible excessive secretions   [ ] Rhonchi        [ ] Right [ ] Left [ ] Bilateral  [ ] Crackles        [ ] Right [ ] Left [ ] Bilateral  [ ] Wheezing     [ ] Right [ ] Left [ ] Bilateral    Cardiovascular:  [ x] Regular [ ] Irregular [ ] Tachycardia   [ ] Bradycardia  [ ] Murmur [ ] Other    Abdomen: [ ] Soft  [x ] Distended   [x ] +BS  [ ] Non tender [x ] Tender  [ ]PEG   [ ]OGT/ NGT   Last BM:       Genitourinary: [ ] Normal [ ] Incontinent   [ ] Oliguria/Anuria   [ ] Cook    Musculoskeletal:  [ ] Normal   [ ] Weakness  [ ] Bedbound/Wheelchair bound [x ] Edema    Neurological: [ x] No focal deficits  [ ] Cognitive impairment  [ ] Dysphagia [ ] Dysarthria [ ] Paresis [ ] Other     Skin: [ ] Normal   [ ] Pressure ulcer(s)                  [ ] Rash  LLE with swelling, pitting edema and discoloration of digits. Painful to touch.    LABS:                        11.5   8.41  )-----------( 399      ( 17 Dec 2018 07:16 )             36.8     12-17    140  |  103  |  77<H>  ----------------------------<  203<H>  4.3   |  18<L>  |  1.37<H>    Ca    7.7<L>      17 Dec 2018 07:10  Phos  3.7     12-17  Mg     2.6     12-17    TPro  5.6<L>  /  Alb  2.5<L>  /  TBili  0.5  /  DBili  x   /  AST  21  /  ALT  8   /  AlkPhos  106  12-17    PTT - ( 17 Dec 2018 07:10 )  PTT:60.7 SEC      Shock: [ ] Septic [ ] Cardiogenic [ ] Neurologic [ ] Hypovolemic  Vasopressors x   Inotrophs x     Oral Intake: [ ] Unable/mouth care only [ ] Minimal [ ] Moderate [x ] Full Capability    Diet: [ ] NPO [ ] Tube feeds [ ] TPN [ x] Other     RADIOLOGY & ADDITIONAL STUDIES: reviewed.     REFERRALS:   [ ] Chaplaincy  [ ] Hospice  [ ] Child Life  [ ] Social Work  [ ] Case management [ ] Holistic Therapy

## 2018-12-17 NOTE — PROGRESS NOTE ADULT - SUBJECTIVE AND OBJECTIVE BOX
Patient is a 58y old  Male who presents with a chief complaint of abdominal pain (16 Dec 2018 07:38)      SUBJECTIVE / OVERNIGHT EVENTS:    MEDICATIONS  (STANDING):  albumin human  5% IVPB 250 milliLiter(s) IV Intermittent every 6 hours  aspirin enteric coated 81 milliGRAM(s) Oral daily  atorvastatin 40 milliGRAM(s) Oral at bedtime  dextrose 5%. 1000 milliLiter(s) (50 mL/Hr) IV Continuous <Continuous>  dextrose 50% Injectable 12.5 Gram(s) IV Push once  dextrose 50% Injectable 25 Gram(s) IV Push once  dextrose 50% Injectable 25 Gram(s) IV Push once  docusate sodium 100 milliGRAM(s) Oral daily  heparin  Infusion. 1700 Unit(s)/Hr (17 mL/Hr) IV Continuous <Continuous>  insulin glargine Injectable (LANTUS) 5 Unit(s) SubCutaneous at bedtime  insulin lispro (HumaLOG) corrective regimen sliding scale   SubCutaneous three times a day before meals  insulin lispro Injectable (HumaLOG) 3 Unit(s) SubCutaneous three times a day before meals  polyethylene glycol 3350 17 Gram(s) Oral at bedtime  senna 2 Tablet(s) Oral at bedtime  sodium bicarbonate 650 milliGRAM(s) Oral three times a day  sodium chloride 0.9%. 1000 milliLiter(s) (100 mL/Hr) IV Continuous <Continuous>    MEDICATIONS  (PRN):  bisacodyl Suppository 10 milliGRAM(s) Rectal once PRN Constipation  dextrose 40% Gel 15 Gram(s) Oral once PRN Blood Glucose LESS THAN 70 milliGRAM(s)/deciliter  glucagon  Injectable 1 milliGRAM(s) IntraMuscular once PRN Glucose LESS THAN 70 milligrams/deciliter  heparin  Injectable 7500 Unit(s) IV Push every 6 hours PRN For aPTT less than 40  heparin  Injectable 3500 Unit(s) IV Push every 6 hours PRN For aPTT between 40 - 57  HYDROmorphone  Injectable 0.5 milliGRAM(s) IV Push every 2 hours PRN moderate to severe pain      Vital Signs Last 24 Hrs  T(C): 36.7 (17 Dec 2018 02:20), Max: 36.7 (16 Dec 2018 16:44)  T(F): 98 (17 Dec 2018 02:20), Max: 98.1 (16 Dec 2018 21:26)  HR: 65 (17 Dec 2018 02:20) (65 - 85)  BP: 115/54 (17 Dec 2018 02:20) (99/55 - 115/54)  BP(mean): --  RR: 18 (17 Dec 2018 02:20) (18 - 20)  SpO2: 96% (17 Dec 2018 02:20) (95% - 100%)  CAPILLARY BLOOD GLUCOSE      POCT Blood Glucose.: 131 mg/dL (16 Dec 2018 21:50)  POCT Blood Glucose.: 110 mg/dL (16 Dec 2018 17:46)  POCT Blood Glucose.: 78 mg/dL (16 Dec 2018 13:25)  POCT Blood Glucose.: 77 mg/dL (16 Dec 2018 09:09)    I&O's Summary    15 Dec 2018 07:01  -  16 Dec 2018 07:00  --------------------------------------------------------  IN: 200 mL / OUT: 900 mL / NET: -700 mL    16 Dec 2018 07:01  -  17 Dec 2018 06:48  --------------------------------------------------------  IN: 1674 mL / OUT: 850 mL / NET: 824 mL        PHYSICAL EXAM:  GENERAL: NAD, well-developed  HEAD:  Atraumatic, Normocephalic  EYES: EOMI, PERRLA, conjunctiva and sclera clear  NECK: Supple, No JVD  CHEST/LUNG: Clear to auscultation bilaterally; No wheeze  HEART: Regular rate and rhythm; No murmurs, rubs, or gallops  ABDOMEN: Soft, Nontender, Nondistended; Bowel sounds present  EXTREMITIES:  2+ Peripheral Pulses, No clubbing, cyanosis, or edema  PSYCH: AAOx3  NEUROLOGY: non-focal  SKIN: No rashes or lesions    LABS:                        11.3   8.04  )-----------( 410      ( 16 Dec 2018 05:52 )             37.4     12-16    137  |  102  |  87<H>  ----------------------------<  83  4.2   |  17<L>  |  1.81<H>    Ca    7.2<L>      16 Dec 2018 05:52  Phos  4.1     12-16  Mg     2.7     12-16    TPro  5.5<L>  /  Alb  2.2<L>  /  TBili  0.3  /  DBili  x   /  AST  32  /  ALT  11  /  AlkPhos  106  12-16    PTT - ( 16 Dec 2018 13:13 )  PTT:66.9 SEC Patient is a 58y old  Male who presents with a chief complaint of abdominal pain (16 Dec 2018 07:38)      SUBJECTIVE / OVERNIGHT EVENTS: Patient complains of worsening pain in his left leg which he feels is bigger and heavier. His 'cancer pains' are more intense, has occasional nausea when changing position. He does not endorse chest pain, shortness of breath, or focal weakness or numbness.     MEDICATIONS  (STANDING):  albumin human  5% IVPB 250 milliLiter(s) IV Intermittent every 6 hours  aspirin enteric coated 81 milliGRAM(s) Oral daily  atorvastatin 40 milliGRAM(s) Oral at bedtime  dextrose 5%. 1000 milliLiter(s) (50 mL/Hr) IV Continuous <Continuous>  dextrose 50% Injectable 12.5 Gram(s) IV Push once  dextrose 50% Injectable 25 Gram(s) IV Push once  dextrose 50% Injectable 25 Gram(s) IV Push once  docusate sodium 100 milliGRAM(s) Oral daily  heparin  Infusion. 1700 Unit(s)/Hr (17 mL/Hr) IV Continuous <Continuous>  insulin glargine Injectable (LANTUS) 5 Unit(s) SubCutaneous at bedtime  insulin lispro (HumaLOG) corrective regimen sliding scale   SubCutaneous three times a day before meals  insulin lispro Injectable (HumaLOG) 3 Unit(s) SubCutaneous three times a day before meals  polyethylene glycol 3350 17 Gram(s) Oral at bedtime  senna 2 Tablet(s) Oral at bedtime  sodium bicarbonate 650 milliGRAM(s) Oral three times a day  sodium chloride 0.9%. 1000 milliLiter(s) (100 mL/Hr) IV Continuous <Continuous>    MEDICATIONS  (PRN):  bisacodyl Suppository 10 milliGRAM(s) Rectal once PRN Constipation  dextrose 40% Gel 15 Gram(s) Oral once PRN Blood Glucose LESS THAN 70 milliGRAM(s)/deciliter  glucagon  Injectable 1 milliGRAM(s) IntraMuscular once PRN Glucose LESS THAN 70 milligrams/deciliter  heparin  Injectable 7500 Unit(s) IV Push every 6 hours PRN For aPTT less than 40  heparin  Injectable 3500 Unit(s) IV Push every 6 hours PRN For aPTT between 40 - 57  HYDROmorphone  Injectable 0.5 milliGRAM(s) IV Push every 2 hours PRN moderate to severe pain      Vital Signs Last 24 Hrs  T(C): 36.7 (17 Dec 2018 02:20), Max: 36.7 (16 Dec 2018 16:44)  T(F): 98 (17 Dec 2018 02:20), Max: 98.1 (16 Dec 2018 21:26)  HR: 65 (17 Dec 2018 02:20) (65 - 85)  BP: 115/54 (17 Dec 2018 02:20) (99/55 - 115/54)  BP(mean): --  RR: 18 (17 Dec 2018 02:20) (18 - 20)  SpO2: 96% (17 Dec 2018 02:20) (95% - 100%)  CAPILLARY BLOOD GLUCOSE      POCT Blood Glucose.: 131 mg/dL (16 Dec 2018 21:50)  POCT Blood Glucose.: 110 mg/dL (16 Dec 2018 17:46)  POCT Blood Glucose.: 78 mg/dL (16 Dec 2018 13:25)  POCT Blood Glucose.: 77 mg/dL (16 Dec 2018 09:09)    I&O's Summary    15 Dec 2018 07:01  -  16 Dec 2018 07:00  --------------------------------------------------------  IN: 200 mL / OUT: 900 mL / NET: -700 mL    16 Dec 2018 07:01  -  17 Dec 2018 06:48  --------------------------------------------------------  IN: 1674 mL / OUT: 850 mL / NET: 824 mL      PHYSICAL EXAM:  GENERAL: NAD, cachectic appearing  HEAD:  Atraumatic, Normocephalic  EYES: EOMI, PERRLA, conjunctiva and sclera clear  NECK: Supple, No JVD  CHEST/LUNG: Clear to auscultation bilaterally; No wheeze  HEART: Regular rate and rhythm; No murmurs, rubs, or gallops  ABDOMEN: Soft, Nontender, Distended; Bowel sounds present  EXTREMITIES:  DP pulse not palpable in left lower extremity, No clubbing, cyanosis, left leg appears more edematous and taut today, is significantly more   sensitive to touch  PSYCH: AAOx3  NEUROLOGY: non-focal  SKIN: No rashes or lesions        LABS:                        11.3   8.04  )-----------( 410      ( 16 Dec 2018 05:52 )             37.4     12-16    137  |  102  |  87<H>  ----------------------------<  83  4.2   |  17<L>  |  1.81<H>    Ca    7.2<L>      16 Dec 2018 05:52  Phos  4.1     12-16  Mg     2.7     12-16    TPro  5.5<L>  /  Alb  2.2<L>  /  TBili  0.3  /  DBili  x   /  AST  32  /  ALT  11  /  AlkPhos  106  12-16    PTT - ( 16 Dec 2018 13:13 )  PTT:66.9 SEC

## 2018-12-17 NOTE — PROGRESS NOTE ADULT - PROBLEM SELECTOR PLAN 1
Patient with diffuse generalized abdominal pain   May be 2/2 immunotherapy vs. progression of disease vs. ascites   CT a/p with large ascites, s/p therapeutic paracentesis with 6L fluid removed  Per patient, would not want any further surgery if needed Patient presented with diffuse generalized abdominal pain   May be 2/2 immunotherapy vs. progression of disease vs. ascites   CT a/p with large ascites, s/p therapeutic paracentesis with 6L fluid removed  Per patient, would not want any further surgery if needed

## 2018-12-17 NOTE — CONSULT NOTE ADULT - PROBLEM SELECTOR PROBLEM 1
Malignant melanoma, unspecified site
Acute deep vein thrombosis (DVT) of femoral vein of left lower extremity

## 2018-12-17 NOTE — PROGRESS NOTE ADULT - PROBLEM SELECTOR PLAN 3
Uncontrolled, indicates that dilaudid 0.5mg lasts for 3-4hrs once it wears out pain is 10/10. Given his 24hr usage would recommend to start 0.5mg q4hrs atc and would increase PRNs to 1mg q2-3hrs PRN. Would continue IV regimen until pts nausea/vomiting is controlled.   Please page for uncontrolled symptoms [id number].

## 2018-12-17 NOTE — PROGRESS NOTE ADULT - PROBLEM SELECTOR PLAN 1
S/P 4 Cycles of Ipilimumab and 1 dose of nivolumab. Pt is not a candidate for further disease for further disease modifying treatment. Dr. Goldberg recommended hospice service. Pt requesting hospice services, pending of further symptom management and discharge plan. Would make referral once known discharge plan is known to determine which hospice agency would be appropriate to take over pts care.

## 2018-12-17 NOTE — PROGRESS NOTE ADULT - PROBLEM SELECTOR PLAN 5
AG metabolic acidosis on labs, with elevated lactate, increasing on repeat  Willl send UA to assess for ketones, given hx possible starvation ketoacidosis  Will restart HCO3 TID in the setting of MA AG metabolic acidosis on initial labs, with elevated lactate, was increasing on repeat  UA neg for ketones, unlikely to be starvation ketoacidosis  Likely due to tumor burden  Restarted HCO3 TID in the setting of MA, bicarb has improved On Heparin drip  Increased edema and pain in left leg, vascular consulted to r/o compartment syndrome  Will change back to Lovenox as SILKE resolving, depending on whether surgical intervention needed for increasing left leg edema and pain

## 2018-12-17 NOTE — PROGRESS NOTE ADULT - PROBLEM SELECTOR PROBLEM 7
Other chronic pulmonary embolism without acute cor pulmonale Metabolic acidosis with increased anion gap and accumulation of organic acids

## 2018-12-17 NOTE — PROGRESS NOTE ADULT - PROBLEM SELECTOR PLAN 5
Pt is DNR/DNI. HCP is brother David. Depending on route used to control pts symptoms would recommend either going back to NH with hospice services layered on top vs inpatient hospice.

## 2018-12-17 NOTE — PROGRESS NOTE ADULT - PROBLEM SELECTOR PLAN 4
Persistent, given pts prolonged QTc unable to utilize reglan/zofran. Would recommend adding PRN low dose ativan 0.25mg q4hrs prn for nausea.

## 2018-12-17 NOTE — PROGRESS NOTE ADULT - PROBLEM SELECTOR PLAN 9
Extensive GOC discussion with patient, patient is DNR/DNI, no aggressive measures as in surgery or HD  However, is agreeable to IVF and IV abx if needed  Hospice referral in the AM   Palliative consult to assist with pain management and comfort Patient with hyperK to 6.1 in the setting of SILKE, on presentation  Being medically managed, no peaked T waves seen on EKG   Repeat BMP w/improved K, patient denies chest pain or palpitations

## 2018-12-17 NOTE — PROVIDER CONTACT NOTE (OTHER) - ACTION/TREATMENT ORDERED:
notified dr michelle,he ordered 500cc bolus,order carried out,will recheck BP
notified dr michelle,he stated will order midodrine po,and no bolus needed,will carry out order
JANIS WINCHESTER MD made aware, no further interventions at this time, ordered to recheck BP later. Will continue to monitor
1/2 L bolus is ordered. VS to be re-assessed. Safety maintained. Will cont monitor
Give 1mg Dilaudid.
Pt is asymptomatic. No new orders at this time. Safety maintained. Will cont monitor

## 2018-12-17 NOTE — PROGRESS NOTE ADULT - PROBLEM SELECTOR PROBLEM 5
Metabolic acidosis with increased anion gap and accumulation of organic acids Chronic deep vein thrombosis (DVT) of proximal vein of left lower extremity

## 2018-12-17 NOTE — CHART NOTE - NSCHARTNOTEFT_GEN_A_CORE
10:30PM: Patient asking for pain medication.  His scheduled 0.5mg Dilaudid is not due for another hour.  Last PRN breakthrough dilaudid given at 1PM today.  Will give 1mg PRN now given that his vitals are not suppressed.  And will skip the next dose of scheduled 0.5mg.  continue to monitor patient.    Berny Person  PGY-1

## 2018-12-17 NOTE — PROGRESS NOTE ADULT - PROBLEM SELECTOR PLAN 3
Patient with SILKE, baseline Scr 1.0, now 2.0  Acute onset urinary retention, CT does not show hydronephrosis, straight cathed earlier   Initially thought to be  2/2 dehydration and decreased PO intake, could also be an intrarenal component as Cook draining brownish urine, reduced urine output  Will continue IVF Patient with SILKE, baseline Scr 1.0, earlier uptrending, now improving after Cook placement  Acute onset urinary retention, CT does not show hydronephrosis, straight cathed earlier   Initially thought to be  2/2 dehydration and decreased PO intake, could also be an intrarenal component as Cook draining brownish urine, reduced urine output  Monitor Cook output, consider removing Cook for trial of void if urine appearance improves  Patient received albumin 12/16 with some improvement in blood pressure and urine output  Currently not on IV fluids due to possible third spacing into left leg

## 2018-12-17 NOTE — PROGRESS NOTE ADULT - PROBLEM SELECTOR PLAN 7
Hx of DVT and PE on previous hospitalization  On Lovenox BID dosing at home, rec'd one dose in ED  Will need to recalculate weight given patient reports 30 lb weight loss in 3 months to confirm proper dosing; further doses on hold in light of SILKE; monitor Cr, currently eGFR>30  On Heparin drip Hx of DVT and PE on previous hospitalization  On Lovenox BID dosing at home, rec'd one dose in ED  Will need to recalculate weight given patient reports 30 lb weight loss in 3 months to confirm proper dosing; further doses on hold in light of SILKE; monitor Cr, currently eGFR>30  On Heparin drip  Will change back to Lovenox as SILKE resolving, depending on whether surgical intervention needed for increasing left leg edema and pain AG metabolic acidosis on initial labs, with elevated lactate, was increasing on repeat  UA neg for ketones, unlikely to be starvation ketoacidosis  Likely due to tumor burden  Restarted HCO3 TID in the setting of MA, bicarb has improved

## 2018-12-17 NOTE — PROGRESS NOTE ADULT - PROBLEM SELECTOR PLAN 4
Patient with hyperK to 6.1 in the setting of SILKE   Being medically managed, no peaked T waves seen on EKG   Repeat BMP w/improved K, patient denies chest pain or palpitations Patient with hyperK to 6.1 in the setting of SILKE, on presentation  Being medically managed, no peaked T waves seen on EKG   Repeat BMP w/improved K, patient denies chest pain or palpitations Hx of DVT and PE on previous hospitalization  On Lovenox BID dosing at home, rec'd one dose in ED  Will need to recalculate weight given patient reports 30 lb weight loss in 3 months to confirm proper dosing; further doses on hold in light of SILKE; monitor Cr, currently eGFR>30  On Heparin drip  Will change back to Lovenox as SILKE resolving, depending on whether surgical intervention needed for increasing left leg edema and pain

## 2018-12-17 NOTE — PROGRESS NOTE ADULT - PROBLEM SELECTOR PLAN 2
Diffuse bilobar hepatic metastases and peritoneal carcinomatosis w/Increased large volume ascites. S/p paracentesis and removal of 6lt.  No signs of obstruction on CT abdomen.

## 2018-12-17 NOTE — CONSULT NOTE ADULT - ASSESSMENT
Mr. Garibay is a 58 year old male with metastatic melanoma and bilateral lower extremity swelling and pain. Radiologic work-up including duplex (arterial and venous) is pending. At this time patient clinically does not demonstrate signs or overt symptoms of compartment syndrome of the left lower extremity. Recommend reviewing final duplex interpretations and patient to remain on systemic anticoagulation for history of extensive LLE DVT. On going hospice arrangements and discussions appreciated.     Discussed with Vascular Surgery Consultation Resident Jayy Lemus MD Mr. Garibay is a 58 year old male with metastatic melanoma and bilateral lower extremity swelling and pain. Radiologic work-up including duplex (arterial and venous) is pending. At this time patient clinically does not demonstrate signs or overt symptoms of compartment syndrome of the left lower extremity. Recommend reviewing final duplex interpretations and patient to remain on systemic anticoagulation for history of extensive LLE DVT. On going hospice arrangements and discussions appreciated.     Discussed with Vascular Surgery Consultation Dr. Sony Castro Mr. Garibay is a very pleasant 58 year old male with metastatic melanoma and bilateral lower extremity swelling and pain. Radiologic work-up including duplex (arterial and venous) is revealed complete occlusion noted at the distal left superficial femoral and proximal popliteal arteries, however the flow reconstitutes at the below-knee popliteal artery. Venous duplex revealed acute, occlusive deep vein thrombosis visualized in the left common femoral, femoral, popliteal, and posterior tibial veins.  At this time, Mr. Garibay clinically does not demonstrate signs or overt symptoms of compartment syndrome of the left lower extremity. His pain is chronic (2 months ago). We recommend continuing systemic anticoagulation for history of extensive LLE DVT. On going hospice arrangements and discussions appreciated.     Discussed with Vascular Surgery Consultation Dr. Sony Castro

## 2018-12-17 NOTE — CONSULT NOTE ADULT - SUBJECTIVE AND OBJECTIVE BOX
[facility]  Vascular Surgery  Consultation of STEPHANIE Castro MD    Mr. Garibay in brief is a 58 year old male with metastatic melanoma presenting for admission for inpatient hospice. Patient complains on 12/17 of worsening bilateral lower extremity pain, located at the level of the ankle and swelling the the left lower extremity. In October 2018 he was diagnosed with extensive LLE DVT and started on a therapeutic dose of Enoxaparin sodium. For the last week he has been bed bound with multiple complaints including nausea, vomiting, anorexia, weight loss, abdominal pain and now lower extremity pain. He denies numbness, tingling, or weakeness.     MEDICAL & SURGICAL HISTORY:  Other chronic pulmonary embolism without acute cor pulmonale  Chronic deep vein thrombosis (DVT) of lower extremity, unspecified laterality, unspecified vein  CAD (coronary artery disease)  Meningioma  Malignant melanoma, unspecified site  Heart failure, unspecified heart failure chronicity, unspecified heart failure type  Type 2 diabetes mellitus with complication, without long-term current use of insulin  Enlarged heart  HTN (hypertension)  Sleep apnea  Eye symptoms: ser eye surgery, left eye 6/25/2018 (bleeding in eye)  Stented coronary artery: pLAD 1/18/2018      REVIEW OF SYSTEMS: 10 pt ROS OTW negative.     aspirin enteric coated 81 milliGRAM(s) Oral daily  atorvastatin 40 milliGRAM(s) Oral at bedtime  dextrose 5%. 1000 milliLiter(s) (50 mL/Hr) IV Continuous <Continuous>  dextrose 50% Injectable 12.5 Gram(s) IV Push once  dextrose 50% Injectable 25 Gram(s) IV Push once  dextrose 50% Injectable 25 Gram(s) IV Push once  docusate sodium 100 milliGRAM(s) Oral daily  heparin  Infusion. 1700 Unit(s)/Hr (17 mL/Hr) IV Continuous <Continuous>  HYDROmorphone  Injectable 0.5 milliGRAM(s) IV Push every 4 hours  insulin glargine Injectable (LANTUS) 5 Unit(s) SubCutaneous at bedtime  insulin lispro (HumaLOG) corrective regimen sliding scale   SubCutaneous three times a day before meals  insulin lispro Injectable (HumaLOG) 3 Unit(s) SubCutaneous three times a day before meals  polyethylene glycol 3350 17 Gram(s) Oral at bedtime  senna 2 Tablet(s) Oral at bedtime  sodium bicarbonate 650 milliGRAM(s) Oral three times a day    SOCIAL HISTORY: Noncontributory    FAMILY HISTORY: Family history of heart disease (Father)    Vital Signs Last 24 Hrs  T(C): 36.7 (17 Dec 2018 12:58), Max: 36.7 (16 Dec 2018 16:44)  T(F): 98 (17 Dec 2018 12:58), Max: 98.1 (16 Dec 2018 21:26)  HR: 93 (17 Dec 2018 12:58) (65 - 95)  BP: 108/58 (17 Dec 2018 12:58) (102/58 - 115/54)  RR: 17 (17 Dec 2018 12:58) (17 - 20)  SpO2: 95% (17 Dec 2018 12:58) (95% - 98%)    PHYSICAL EXAM:  Constitutional: lethargic Distress 2/2 pain.   Respiratory: Non labored   Cardiovascular: RRR  Extremities: BL LE non palpable pulses 2/2 edema, warm, tenderness of left foot dorsum,   L DF 4/5 no passive pain with flexion  R DF 4/5 no passive pain with flexion  Compartments soft without obvious evidence of compartment syndrome.     LABS:                        11.5   8.41  )-----------( 399      ( 17 Dec 2018 07:16 )             36.8     12-17    140  |  103  |  77<H>  ----------------------------<  203<H>  4.3   |  18<L>  |  1.37<H>    Ca    7.7<L>      17 Dec 2018 07:10  Phos  3.7     12-17  Mg     2.6     12-17    TPro  5.6<L>  /  Alb  2.5<L>  /  TBili  0.5  /  DBili  x   /  AST  21  /  ALT  8   /  AlkPhos  106  12-17    PTT - ( 17 Dec 2018 07:10 )  PTT:60.7 SEC      RADIOLOGY & ADDITIONAL STUDIES [facility]  Vascular Surgery  Consultation of STEPHANIE Castro MD    Mr. Garibay in brief is a 58 year old male with metastatic melanoma presenting for admission for inpatient hospice. Patient complains on 12/17 of worsening bilateral lower extremity pain, located at the level of the ankle and swelling the the left lower extremity. In October 2018 he was diagnosed with extensive LLE DVT and started on a therapeutic dose of Enoxaparin sodium. For the last week he has been bed bound with multiple complaints including nausea, vomiting, anorexia, weight loss, abdominal pain and now lower extremity pain. He denies numbness, tingling, or weakeness.     MEDICAL & SURGICAL HISTORY:  Other chronic pulmonary embolism without acute cor pulmonale  Chronic deep vein thrombosis (DVT) of lower extremity, unspecified laterality, unspecified vein  CAD (coronary artery disease)  Meningioma  Malignant melanoma, unspecified site  Heart failure, unspecified heart failure chronicity, unspecified heart failure type  Type 2 diabetes mellitus with complication, without long-term current use of insulin  Enlarged heart  HTN (hypertension)  Sleep apnea  Eye symptoms: ser eye surgery, left eye 6/25/2018 (bleeding in eye)  Stented coronary artery: pLAD 1/18/2018      REVIEW OF SYSTEMS: 10 pt ROS OTW negative.     aspirin enteric coated 81 milliGRAM(s) Oral daily  atorvastatin 40 milliGRAM(s) Oral at bedtime  dextrose 5%. 1000 milliLiter(s) (50 mL/Hr) IV Continuous <Continuous>  dextrose 50% Injectable 12.5 Gram(s) IV Push once  dextrose 50% Injectable 25 Gram(s) IV Push once  dextrose 50% Injectable 25 Gram(s) IV Push once  docusate sodium 100 milliGRAM(s) Oral daily  heparin  Infusion. 1700 Unit(s)/Hr (17 mL/Hr) IV Continuous <Continuous>  HYDROmorphone  Injectable 0.5 milliGRAM(s) IV Push every 4 hours  insulin glargine Injectable (LANTUS) 5 Unit(s) SubCutaneous at bedtime  insulin lispro (HumaLOG) corrective regimen sliding scale   SubCutaneous three times a day before meals  insulin lispro Injectable (HumaLOG) 3 Unit(s) SubCutaneous three times a day before meals  polyethylene glycol 3350 17 Gram(s) Oral at bedtime  senna 2 Tablet(s) Oral at bedtime  sodium bicarbonate 650 milliGRAM(s) Oral three times a day    SOCIAL HISTORY: Noncontributory    FAMILY HISTORY: Family history of heart disease (Father)    Vital Signs Last 24 Hrs  T(C): 36.7 (17 Dec 2018 12:58), Max: 36.7 (16 Dec 2018 16:44)  T(F): 98 (17 Dec 2018 12:58), Max: 98.1 (16 Dec 2018 21:26)  HR: 93 (17 Dec 2018 12:58) (65 - 95)  BP: 108/58 (17 Dec 2018 12:58) (102/58 - 115/54)  RR: 17 (17 Dec 2018 12:58) (17 - 20)  SpO2: 95% (17 Dec 2018 12:58) (95% - 98%)    PHYSICAL EXAM:  Constitutional: lethargic Distress 2/2 pain.   Respiratory: Non labored   Cardiovascular: RRR  Extremities: BL LE non palpable pulses 2/2 edema, warm, tenderness of left foot dorsum,   L DF 4/5 no passive pain with flexion  R DF 4/5 no passive pain with flexion  Compartments soft without obvious evidence of compartment syndrome.     LABS:                        11.5   8.41  )-----------( 399      ( 17 Dec 2018 07:16 )             36.8     12-17    140  |  103  |  77<H>  ----------------------------<  203<H>  4.3   |  18<L>  |  1.37<H>    Ca    7.7<L>      17 Dec 2018 07:10  Phos  3.7     12-17  Mg     2.6     12-17    TPro  5.6<L>  /  Alb  2.5<L>  /  TBili  0.5  /  DBili  x   /  AST  21  /  ALT  8   /  AlkPhos  106  12-17    PTT - ( 17 Dec 2018 07:10 )  PTT:60.7 SEC      RADIOLOGY & ADDITIONAL STUDIES  Arterial Duplex LLE:   Technically difficult study.Complete occlusion noted at the distal left superficialfemoral and proximal popliteal arteries.  Flowreconstitutes at the below-knee popliteal artery.  No flow noted in the left JORGE ALBERTO and proximal PTA. Flowreconstitutes at the distal PTA, likely via collaterals. Left peroneal artery not visualized.    Venous Duplex LLE:   LEFT:  Deep venous thrombosis: Yes  Superficial venous thrombosis: No  Deep venous insufficiency: No  Superficial venous insufficiency: No  ------------------------------------------------------------------------  Left Findings: Acute, occlusive thrombosis visualized in  the left common femoral, femoral, popliteal, and posterior  tibial veins.  The left peroneal vein was well visualized.  ------------------------------------------------------------------------  Summary/Impressions:  Technically difficult study. Acute, occlusive deep vein thrombosis visualized in theleft common femoral, femoral, popliteal, and posterior tibial veins.

## 2018-12-17 NOTE — CONSULT NOTE ADULT - PROBLEM SELECTOR RECOMMENDATION 3
I performed a history and physical exam of the patient and discussed  the findings and plan with the house officer. I reviewed the resident note and agree with the findings and plan

## 2018-12-17 NOTE — PROGRESS NOTE ADULT - ATTENDING COMMENTS
saw pt in vascular lab  s/p therapeutic paracentesis with improving abd discomfort, n/v. lactate down trending wo abx, no obvious source identified, now with worsening LLE pain and swelling with prelim images are impressive for acute LLE femoral DVT and arterial insufficiency. dw pt, he would decide on surgical options depending on how invasive. will follow up official duplex results, vascular on board, cw AC for now, pain control  SILKE now resolving likely hemodynamically mediated due to poor oncotic pressures, s/p albumin, and ? retention with Cook now. TOV in 2-3 days, avoid IVF due to third spacing. if no surgical intervention will switch to Lovenox.  palliative follow up on GOC and hospice referral - DNR/DNI

## 2018-12-17 NOTE — CONSULT NOTE ADULT - ATTENDING COMMENTS
Pt seen with fellow. Agree with plan as above. Please page for uncontrolled symptoms.
I performed a history and physical exam of the patient and discussed  the findings and plan with the house officer. I reviewed the resident note and agree with the findings and plan

## 2018-12-17 NOTE — CONSULT NOTE ADULT - PROBLEM SELECTOR PROBLEM 2
Peritoneal carcinomatosis
Acute deep vein thrombosis (DVT) of popliteal vein of left lower extremity

## 2018-12-18 LAB
APTT BLD: 55.8 SEC — HIGH (ref 27.5–36.3)
GLUCOSE BLDC GLUCOMTR-MCNC: 231 MG/DL — HIGH (ref 70–99)
OB PNL STL: POSITIVE — SIGNIFICANT CHANGE UP

## 2018-12-18 PROCEDURE — 99233 SBSQ HOSP IP/OBS HIGH 50: CPT

## 2018-12-18 PROCEDURE — 99232 SBSQ HOSP IP/OBS MODERATE 35: CPT

## 2018-12-18 RX ORDER — HYDROMORPHONE HYDROCHLORIDE 2 MG/ML
0.2 INJECTION INTRAMUSCULAR; INTRAVENOUS; SUBCUTANEOUS
Qty: 100 | Refills: 0 | Status: DISCONTINUED | OUTPATIENT
Start: 2018-12-18 | End: 2018-12-19

## 2018-12-18 RX ORDER — SODIUM CHLORIDE 9 MG/ML
500 INJECTION INTRAMUSCULAR; INTRAVENOUS; SUBCUTANEOUS ONCE
Qty: 0 | Refills: 0 | Status: COMPLETED | OUTPATIENT
Start: 2018-12-18 | End: 2018-12-18

## 2018-12-18 RX ADMIN — ENOXAPARIN SODIUM 90 MILLIGRAM(S): 100 INJECTION SUBCUTANEOUS at 18:30

## 2018-12-18 RX ADMIN — POLYETHYLENE GLYCOL 3350 17 GRAM(S): 17 POWDER, FOR SOLUTION ORAL at 02:36

## 2018-12-18 RX ADMIN — HYDROMORPHONE HYDROCHLORIDE 1 MILLIGRAM(S): 2 INJECTION INTRAMUSCULAR; INTRAVENOUS; SUBCUTANEOUS at 05:48

## 2018-12-18 RX ADMIN — ENOXAPARIN SODIUM 90 MILLIGRAM(S): 100 INJECTION SUBCUTANEOUS at 07:30

## 2018-12-18 RX ADMIN — Medication 650 MILLIGRAM(S): at 22:24

## 2018-12-18 RX ADMIN — Medication 2: at 18:31

## 2018-12-18 RX ADMIN — ATORVASTATIN CALCIUM 40 MILLIGRAM(S): 80 TABLET, FILM COATED ORAL at 22:24

## 2018-12-18 RX ADMIN — SENNA PLUS 2 TABLET(S): 8.6 TABLET ORAL at 22:24

## 2018-12-18 RX ADMIN — HYDROMORPHONE HYDROCHLORIDE 0.5 MILLIGRAM(S): 2 INJECTION INTRAMUSCULAR; INTRAVENOUS; SUBCUTANEOUS at 15:27

## 2018-12-18 RX ADMIN — INSULIN GLARGINE 5 UNIT(S): 100 INJECTION, SOLUTION SUBCUTANEOUS at 22:24

## 2018-12-18 RX ADMIN — HYDROMORPHONE HYDROCHLORIDE 0.5 MILLIGRAM(S): 2 INJECTION INTRAMUSCULAR; INTRAVENOUS; SUBCUTANEOUS at 11:03

## 2018-12-18 RX ADMIN — ATORVASTATIN CALCIUM 40 MILLIGRAM(S): 80 TABLET, FILM COATED ORAL at 01:05

## 2018-12-18 RX ADMIN — Medication 2: at 09:31

## 2018-12-18 RX ADMIN — Medication 650 MILLIGRAM(S): at 05:48

## 2018-12-18 RX ADMIN — HYDROMORPHONE HYDROCHLORIDE 0.5 MILLIGRAM(S): 2 INJECTION INTRAMUSCULAR; INTRAVENOUS; SUBCUTANEOUS at 11:20

## 2018-12-18 RX ADMIN — Medication 100 MILLIGRAM(S): at 11:39

## 2018-12-18 RX ADMIN — HYDROMORPHONE HYDROCHLORIDE 1 MILLIGRAM(S): 2 INJECTION INTRAMUSCULAR; INTRAVENOUS; SUBCUTANEOUS at 09:36

## 2018-12-18 RX ADMIN — HYDROMORPHONE HYDROCHLORIDE 1 MILLIGRAM(S): 2 INJECTION INTRAMUSCULAR; INTRAVENOUS; SUBCUTANEOUS at 09:55

## 2018-12-18 RX ADMIN — Medication 650 MILLIGRAM(S): at 13:33

## 2018-12-18 RX ADMIN — HYDROMORPHONE HYDROCHLORIDE 0.2 MG/HR: 2 INJECTION INTRAMUSCULAR; INTRAVENOUS; SUBCUTANEOUS at 17:21

## 2018-12-18 RX ADMIN — Medication 1: at 13:33

## 2018-12-18 RX ADMIN — HYDROMORPHONE HYDROCHLORIDE 1 MILLIGRAM(S): 2 INJECTION INTRAMUSCULAR; INTRAVENOUS; SUBCUTANEOUS at 13:33

## 2018-12-18 RX ADMIN — Medication 0.25 MILLIGRAM(S): at 11:43

## 2018-12-18 RX ADMIN — HYDROMORPHONE HYDROCHLORIDE 1 MILLIGRAM(S): 2 INJECTION INTRAMUSCULAR; INTRAVENOUS; SUBCUTANEOUS at 02:54

## 2018-12-18 RX ADMIN — HYDROMORPHONE HYDROCHLORIDE 1 MILLIGRAM(S): 2 INJECTION INTRAMUSCULAR; INTRAVENOUS; SUBCUTANEOUS at 13:50

## 2018-12-18 RX ADMIN — HYDROMORPHONE HYDROCHLORIDE 1 MILLIGRAM(S): 2 INJECTION INTRAMUSCULAR; INTRAVENOUS; SUBCUTANEOUS at 02:36

## 2018-12-18 RX ADMIN — SODIUM CHLORIDE 500 MILLILITER(S): 9 INJECTION INTRAMUSCULAR; INTRAVENOUS; SUBCUTANEOUS at 22:23

## 2018-12-18 RX ADMIN — Medication 81 MILLIGRAM(S): at 11:39

## 2018-12-18 RX ADMIN — HYDROMORPHONE HYDROCHLORIDE 1 MILLIGRAM(S): 2 INJECTION INTRAMUSCULAR; INTRAVENOUS; SUBCUTANEOUS at 06:10

## 2018-12-18 RX ADMIN — HYDROMORPHONE HYDROCHLORIDE 0.5 MILLIGRAM(S): 2 INJECTION INTRAMUSCULAR; INTRAVENOUS; SUBCUTANEOUS at 15:40

## 2018-12-18 RX ADMIN — Medication 650 MILLIGRAM(S): at 01:05

## 2018-12-18 NOTE — PROGRESS NOTE ADULT - ATTENDING COMMENTS
I have personally  seen and examined the patient today and have noted the findings and formulated the plan of care.

## 2018-12-18 NOTE — PROGRESS NOTE ADULT - SUBJECTIVE AND OBJECTIVE BOX
CHIEF COMPLAINT: Patient is a 58y old  male who presents with a chief complaint of abdominal pain (18 Dec 2018 15:41)      SUBJECTIVE / OVERNIGHT EVENTS:    Denies abdominal pain. Pain is well controlled per patient. No other complaints today.    MEDICATIONS  (STANDING):  aspirin enteric coated 81 milliGRAM(s) Oral daily  atorvastatin 40 milliGRAM(s) Oral at bedtime  dextrose 5%. 1000 milliLiter(s) (50 mL/Hr) IV Continuous <Continuous>  dextrose 50% Injectable 12.5 Gram(s) IV Push once  dextrose 50% Injectable 25 Gram(s) IV Push once  dextrose 50% Injectable 25 Gram(s) IV Push once  docusate sodium 100 milliGRAM(s) Oral daily  enoxaparin Injectable 90 milliGRAM(s) SubCutaneous two times a day  HYDROmorphone Infusion 0.2 mG/Hr (0.2 mL/Hr) IV Continuous <Continuous>  insulin glargine Injectable (LANTUS) 5 Unit(s) SubCutaneous at bedtime  insulin lispro (HumaLOG) corrective regimen sliding scale   SubCutaneous three times a day before meals  insulin lispro Injectable (HumaLOG) 3 Unit(s) SubCutaneous three times a day before meals  polyethylene glycol 3350 17 Gram(s) Oral at bedtime  senna 2 Tablet(s) Oral at bedtime  sodium bicarbonate 650 milliGRAM(s) Oral three times a day    MEDICATIONS  (PRN):  bisacodyl Suppository 10 milliGRAM(s) Rectal once PRN Constipation  dextrose 40% Gel 15 Gram(s) Oral once PRN Blood Glucose LESS THAN 70 milliGRAM(s)/deciliter  glucagon  Injectable 1 milliGRAM(s) IntraMuscular once PRN Glucose LESS THAN 70 milligrams/deciliter  HYDROmorphone  Injectable 1 milliGRAM(s) IV Push every 2 hours PRN moderate to severe pain  LORazepam   Injectable 0.25 milliGRAM(s) IV Push every 4 hours PRN Nausea and/or Vomiting      VITALS:  T(F): 97.1 (12-18-18 @ 16:56), Max: 98.8 (12-18-18 @ 02:13)  HR: 107 (12-18-18 @ 16:56) (94 - 108)  BP: 133/81 (12-18-18 @ 16:56) (101/65 - 133/81)  RR: 18 (12-18-18 @ 16:56) (17 - 20)  SpO2: 100% (12-18-18 @ 16:56)      CAPILLARY BLOOD GLUCOSE    Output     I&O's Summary  T(F): 97.1 (12-18-18 @ 16:56), Max: 98.8 (12-18-18 @ 02:13)  HR: 107 (12-18-18 @ 16:56) (94 - 108)  BP: 133/81 (12-18-18 @ 16:56) (101/65 - 133/81)  RR: 18 (12-18-18 @ 16:56) (17 - 20)  SpO2: 100% (12-18-18 @ 16:56)    PHYSICAL EXAM:  GENERAL: ill appearing middle-aged man lying in bed, weak, anasarcic  HEAD:  Atraumatic, Normocephalic  EYES: EOMI  NECK: Supple, No JVD  CHEST/LUNG: nonlabored breathing  HEART: nl S1/S2  ABDOMEN: mildly distended, soft, nontender to palpation  EXTREMITIES:  no LE edema  PSYCH: alert, answering questions appropriately  NEUROLOGY: non-focal  SKIN: No rashes noted    LABS:              11.5                 x    | x    | x            8.41  >-----------< 399     ------------------------< x                     36.8                 x    | x    | x                                            Ca x     Mg x     Ph x           TPro  5.6  /  Alb  2.5      TBili  0.5  /  DBili  x         AST  21  /  ALT  8             AlkPhos  106      INR: x    ;    PT: x    ;    PTT: 55.8 SEC<H>                MICROBIOLOGY:        RADIOLOGY & ADDITIONAL TESTS:    Imaging Personally Reviewed:    < from: Xray Chest 1 View AP/PA (12.16.18 @ 10:22) >  IMPRESSION:   Clear lungs.    < end of copied text >        [x] Care Discussed with Consultants/Other Providers:      Haider Montalvo M.D.  Hospitalist  Pager [phone]

## 2018-12-18 NOTE — PROGRESS NOTE ADULT - PROBLEM SELECTOR PLAN 1
Patient presented with diffuse generalized abdominal pain   May be 2/2 immunotherapy vs. progression of disease vs. ascites   CT a/p with large ascites, s/p therapeutic paracentesis with 6L fluid removed  Per patient, would not want any further surgery if needed

## 2018-12-18 NOTE — PROGRESS NOTE ADULT - SUBJECTIVE AND OBJECTIVE BOX
Patient is a 58y old  Male who presents with a chief complaint of abdominal pain (18 Dec 2018 17:04)      Vascular Surgery Attending Progress Note    Interval HPI: pt c/o ongoing left leg pain and swelling     Medications:  aspirin enteric coated 81 milliGRAM(s) Oral daily  atorvastatin 40 milliGRAM(s) Oral at bedtime  bisacodyl Suppository 10 milliGRAM(s) Rectal once PRN  dextrose 40% Gel 15 Gram(s) Oral once PRN  dextrose 5%. 1000 milliLiter(s) IV Continuous <Continuous>  dextrose 50% Injectable 12.5 Gram(s) IV Push once  dextrose 50% Injectable 25 Gram(s) IV Push once  dextrose 50% Injectable 25 Gram(s) IV Push once  docusate sodium 100 milliGRAM(s) Oral daily  enoxaparin Injectable 90 milliGRAM(s) SubCutaneous two times a day  glucagon  Injectable 1 milliGRAM(s) IntraMuscular once PRN  HYDROmorphone  Injectable 1 milliGRAM(s) IV Push every 2 hours PRN  HYDROmorphone Infusion 0.2 mG/Hr IV Continuous <Continuous>  insulin glargine Injectable (LANTUS) 5 Unit(s) SubCutaneous at bedtime  insulin lispro (HumaLOG) corrective regimen sliding scale   SubCutaneous three times a day before meals  insulin lispro Injectable (HumaLOG) 3 Unit(s) SubCutaneous three times a day before meals  LORazepam   Injectable 0.25 milliGRAM(s) IV Push every 4 hours PRN  polyethylene glycol 3350 17 Gram(s) Oral at bedtime  senna 2 Tablet(s) Oral at bedtime  sodium bicarbonate 650 milliGRAM(s) Oral three times a day      Vital Signs Last 24 Hrs  T(C): 36.6 (18 Dec 2018 21:11), Max: 37.1 (18 Dec 2018 02:13)  T(F): 97.8 (18 Dec 2018 21:11), Max: 98.8 (18 Dec 2018 02:13)  HR: 95 (18 Dec 2018 21:11) (94 - 108)  BP: 89/55 (18 Dec 2018 21:11) (89/55 - 133/81)  BP(mean): --  RR: 18 (18 Dec 2018 21:11) (18 - 20)  SpO2: 97% (18 Dec 2018 21:11) (95% - 100%)  I&O's Summary    17 Dec 2018 07:01  -  18 Dec 2018 07:00  --------------------------------------------------------  IN: 630 mL / OUT: 900 mL / NET: -270 mL    18 Dec 2018 07:01  -  18 Dec 2018 21:50  --------------------------------------------------------  IN: 0 mL / OUT: 400 mL / NET: -400 mL        Physical Exam:  Neuro  A&Ox3 VSS  Vascular:  lle edema remains no ischemic changes     LABS:                        11.5   8.41  )-----------( 399      ( 17 Dec 2018 07:16 )             36.8     12-17    140  |  103  |  77<H>  ----------------------------<  203<H>  4.3   |  18<L>  |  1.37<H>    Ca    7.7<L>      17 Dec 2018 07:10  Phos  3.7     12-17  Mg     2.6     12-17    TPro  5.6<L>  /  Alb  2.5<L>  /  TBili  0.5  /  DBili  x   /  AST  21  /  ALT  8   /  AlkPhos  106  12-17    PTT - ( 18 Dec 2018 06:35 )  PTT:55.8 SEC    DONALD ALLAN MD  [phone]

## 2018-12-18 NOTE — PROGRESS NOTE ADULT - PROBLEM SELECTOR PROBLEM 8
Malignant melanoma, unspecified site

## 2018-12-18 NOTE — PROGRESS NOTE ADULT - SUBJECTIVE AND OBJECTIVE BOX
Patient is a 58y old  Male who presents with a chief complaint of abdominal pain (17 Dec 2018 20:30)      SUBJECTIVE / OVERNIGHT EVENTS:    MEDICATIONS  (STANDING):  aspirin enteric coated 81 milliGRAM(s) Oral daily  atorvastatin 40 milliGRAM(s) Oral at bedtime  dextrose 5%. 1000 milliLiter(s) (50 mL/Hr) IV Continuous <Continuous>  dextrose 50% Injectable 12.5 Gram(s) IV Push once  dextrose 50% Injectable 25 Gram(s) IV Push once  dextrose 50% Injectable 25 Gram(s) IV Push once  docusate sodium 100 milliGRAM(s) Oral daily  enoxaparin Injectable 90 milliGRAM(s) SubCutaneous two times a day  HYDROmorphone  Injectable 0.5 milliGRAM(s) IV Push every 4 hours  insulin glargine Injectable (LANTUS) 5 Unit(s) SubCutaneous at bedtime  insulin lispro (HumaLOG) corrective regimen sliding scale   SubCutaneous three times a day before meals  insulin lispro Injectable (HumaLOG) 3 Unit(s) SubCutaneous three times a day before meals  polyethylene glycol 3350 17 Gram(s) Oral at bedtime  senna 2 Tablet(s) Oral at bedtime  sodium bicarbonate 650 milliGRAM(s) Oral three times a day    MEDICATIONS  (PRN):  bisacodyl Suppository 10 milliGRAM(s) Rectal once PRN Constipation  dextrose 40% Gel 15 Gram(s) Oral once PRN Blood Glucose LESS THAN 70 milliGRAM(s)/deciliter  glucagon  Injectable 1 milliGRAM(s) IntraMuscular once PRN Glucose LESS THAN 70 milligrams/deciliter  HYDROmorphone  Injectable 1 milliGRAM(s) IV Push every 2 hours PRN moderate to severe pain  LORazepam   Injectable 0.25 milliGRAM(s) IV Push every 4 hours PRN Nausea and/or Vomiting      Vital Signs Last 24 Hrs  T(C): 36.9 (18 Dec 2018 05:45), Max: 37.1 (18 Dec 2018 02:13)  T(F): 98.4 (18 Dec 2018 05:45), Max: 98.8 (18 Dec 2018 02:13)  HR: 99 (18 Dec 2018 05:45) (88 - 108)  BP: 110/72 (18 Dec 2018 05:45) (101/58 - 120/74)  BP(mean): --  RR: 18 (18 Dec 2018 05:45) (17 - 20)  SpO2: 100% (18 Dec 2018 05:45) (94% - 100%)  CAPILLARY BLOOD GLUCOSE      POCT Blood Glucose.: 143 mg/dL (17 Dec 2018 21:58)  POCT Blood Glucose.: 184 mg/dL (17 Dec 2018 17:57)  POCT Blood Glucose.: 150 mg/dL (17 Dec 2018 12:42)  POCT Blood Glucose.: 191 mg/dL (17 Dec 2018 08:48)    I&O's Summary    16 Dec 2018 07:01  -  17 Dec 2018 07:00  --------------------------------------------------------  IN: 1674 mL / OUT: 1050 mL / NET: 624 mL    17 Dec 2018 07:01  -  18 Dec 2018 06:52  --------------------------------------------------------  IN: 630 mL / OUT: 900 mL / NET: -270 mL        PHYSICAL EXAM:  GENERAL: NAD, well-developed  HEAD:  Atraumatic, Normocephalic  EYES: EOMI, PERRLA, conjunctiva and sclera clear  NECK: Supple, No JVD  CHEST/LUNG: Clear to auscultation bilaterally; No wheeze  HEART: Regular rate and rhythm; No murmurs, rubs, or gallops  ABDOMEN: Soft, Nontender, Nondistended; Bowel sounds present  EXTREMITIES:  2+ Peripheral Pulses, No clubbing, cyanosis, or edema  PSYCH: AAOx3  NEUROLOGY: non-focal  SKIN: No rashes or lesions    LABS:                        11.5   8.41  )-----------( 399      ( 17 Dec 2018 07:16 )             36.8     12-17    140  |  103  |  77<H>  ----------------------------<  203<H>  4.3   |  18<L>  |  1.37<H>    Ca    7.7<L>      17 Dec 2018 07:10  Phos  3.7     12-17  Mg     2.6     12-17    TPro  5.6<L>  /  Alb  2.5<L>  /  TBili  0.5  /  DBili  x   /  AST  21  /  ALT  8   /  AlkPhos  106  12-17    PTT - ( 17 Dec 2018 07:10 )  PTT:60.7 SEC

## 2018-12-18 NOTE — PROGRESS NOTE ADULT - PROBLEM SELECTOR PROBLEM 6
Type 2 diabetes mellitus with complication, with long-term current use of insulin

## 2018-12-18 NOTE — PROGRESS NOTE ADULT - PROBLEM SELECTOR PLAN 10
Extensive GOC discussion with patient, patient is DNR/DNI, no aggressive measures as in surgery or HD  However, is agreeable to IVF and IV abx if needed  Hospice referral in the AM   Palliative consult to assist with pain management and comfort
Extensive GOC discussion with patient, patient is DNR/DNI, no aggressive measures as in surgery or HD  However, is agreeable to IVF and IV abx if needed  Hospice referral in the AM   Palliative consult to assist with pain management and comfort

## 2018-12-18 NOTE — PROGRESS NOTE ADULT - SUBJECTIVE AND OBJECTIVE BOX
INTERVAL HPI/OVERNIGHT EVENTS:  Palliative Care following for symptom management and goals of care in the setting of metastatic malignancy.     Allergies    No Known Allergies    Intolerances      ADVANCE DIRECTIVES:    DNR Yes    MOLST [ ] YES [x ] NO     MEDICATIONS  (STANDING):  aspirin enteric coated 81 milliGRAM(s) Oral daily  atorvastatin 40 milliGRAM(s) Oral at bedtime  dextrose 5%. 1000 milliLiter(s) (50 mL/Hr) IV Continuous <Continuous>  dextrose 50% Injectable 12.5 Gram(s) IV Push once  dextrose 50% Injectable 25 Gram(s) IV Push once  dextrose 50% Injectable 25 Gram(s) IV Push once  docusate sodium 100 milliGRAM(s) Oral daily  enoxaparin Injectable 90 milliGRAM(s) SubCutaneous two times a day  HYDROmorphone  Injectable 0.5 milliGRAM(s) IV Push every 4 hours  insulin glargine Injectable (LANTUS) 5 Unit(s) SubCutaneous at bedtime  insulin lispro (HumaLOG) corrective regimen sliding scale   SubCutaneous three times a day before meals  insulin lispro Injectable (HumaLOG) 3 Unit(s) SubCutaneous three times a day before meals  polyethylene glycol 3350 17 Gram(s) Oral at bedtime  senna 2 Tablet(s) Oral at bedtime  sodium bicarbonate 650 milliGRAM(s) Oral three times a day    MEDICATIONS  (PRN):  bisacodyl Suppository 10 milliGRAM(s) Rectal once PRN Constipation  dextrose 40% Gel 15 Gram(s) Oral once PRN Blood Glucose LESS THAN 70 milliGRAM(s)/deciliter  glucagon  Injectable 1 milliGRAM(s) IntraMuscular once PRN Glucose LESS THAN 70 milligrams/deciliter  HYDROmorphone  Injectable 1 milliGRAM(s) IV Push every 2 hours PRN moderate to severe pain  LORazepam   Injectable 0.25 milliGRAM(s) IV Push every 4 hours PRN Nausea and/or Vomiting    PRESENT SYMPTOMS:  Source: [x ] Patient   [ ] Family   [ ] Team     Pain:                        [ ] No [ x] Yes             [ ] Mild [ ] Moderate [ ] Severe    Onset - days  Location - abdomen and worsening LLE pain.   Duration - days  Character - aching  Alleviating/Aggravating - improved with dilaudid.   Radiation - none  Timing -    Dyspnea:                [ ] No [x ] Yes             [ ] Mild [x ] Moderate [ ] Severe    Anxiety:                  [x ] No [ ] Yes             [ ] Mild [ ] Moderate [ ] Severe    Fatigue:                  [x ] No [ ] Yes             [ ] Mild [ ] Moderate [ ] Severe    Nausea:                  [ ] No [x ] Yes             [ ] Mild [ ] Moderate [ x] Severe    Loss of appetite:   [ ] No [x ] Yes             [ ] Mild [ ] Moderate [x ] Severe    Constipation:        [x ] No [ ] Yes             [ ] Mild [ ] Moderate [ ] Severe    Other Symptoms:  [x ] All other review of systems negative   [ ] Unable to obtain due to poor mentation     Karnofsky Performance Score/Palliative Performance Status Version 2: 40 %    PHYSICAL EXAM:  Vital Signs Last 24 Hrs  T(C): 36.5 (18 Dec 2018 13:25), Max: 37.1 (18 Dec 2018 02:13)  T(F): 97.7 (18 Dec 2018 13:25), Max: 98.8 (18 Dec 2018 02:13)  HR: 102 (18 Dec 2018 15:20) (94 - 108)  BP: 107/69 (18 Dec 2018 15:20) (101/58 - 120/74)  BP(mean): --  RR: 18 (18 Dec 2018 13:25) (17 - 20)  SpO2: 98% (18 Dec 2018 13:25) (94% - 100%) I&O's Summary    17 Dec 2018 07:01  -  18 Dec 2018 07:00  --------------------------------------------------------  IN: 630 mL / OUT: 900 mL / NET: -270 mL    18 Dec 2018 07:01  -  18 Dec 2018 15:45  --------------------------------------------------------  IN: 0 mL / OUT: 350 mL / NET: -350 mL    General:  [x ] Alert  [ ] Oriented x      [ ] Lethargic  [ ] Agitated   [ ] Cachexia   [ ] Unarousable  [ x] Verbal  [ ] Non-Verbal    HEENT:  [ ] Normal   [ x] Dry mouth   [ ] ET Tube    [ ] Trach  [ ] Oral lesions    Lungs:   [ ] Clear [x ] Tachypnea  [ ] Audible excessive secretions   [ ] Rhonchi        [ ] Right [ ] Left [ ] Bilateral  [ ] Crackles        [ ] Right [ ] Left [ ] Bilateral  [ ] Wheezing     [ ] Right [ ] Left [ ] Bilateral    Cardiovascular:  [ x] Regular [ ] Irregular [ ] Tachycardia   [ ] Bradycardia  [ ] Murmur [ ] Other    Abdomen: [ ] Soft  [x ] Distended   [x ] +BS  [ ] Non tender [x ] Tender  [ ]PEG   [ ]OGT/ NGT   Last BM: no BM documented since admission.       Genitourinary: [ ] Normal [ ] Incontinent   [ ] Oliguria/Anuria   [ ] Cook    Musculoskeletal:  [ ] Normal   [ ] Weakness  [ ] Bedbound/Wheelchair bound [x ] Edema    Neurological: [ x] No focal deficits  [ ] Cognitive impairment  [ ] Dysphagia [ ] Dysarthria [ ] Paresis [ ] Other     Skin: [ ] Normal   [ ] Pressure ulcer(s)                  [ ] Rash  LLE with swelling, pitting edema and discoloration of digits. Painful to touch.    LABS:                        11.5   8.41  )-----------( 399      ( 17 Dec 2018 07:16 )             36.8     12-17    140  |  103  |  77<H>  ----------------------------<  203<H>  4.3   |  18<L>  |  1.37<H>    Ca    7.7<L>      17 Dec 2018 07:10  Phos  3.7     12-17  Mg     2.6     12-17    TPro  5.6<L>  /  Alb  2.5<L>  /  TBili  0.5  /  DBili  x   /  AST  21  /  ALT  8   /  AlkPhos  106  12-17    PTT - ( 17 Dec 2018 07:10 )  PTT:60.7 SEC      Shock: [ ] Septic [ ] Cardiogenic [ ] Neurologic [ ] Hypovolemic  Vasopressors x   Inotrophs x     Oral Intake: [ ] Unable/mouth care only [ ] Minimal [ ] Moderate [x ] Full Capability    Diet: [ ] NPO [ ] Tube feeds [ ] TPN [ x] Other     RADIOLOGY & ADDITIONAL STUDIES: reviewed.     REFERRALS:   [ ] Chaplaincy  [ x] Hospice  [ ] Child Life  [ ] Social Work  [ ] Case management [ ] Holistic Therapy

## 2018-12-18 NOTE — PROGRESS NOTE ADULT - PROBLEM SELECTOR PLAN 7
AG metabolic acidosis on initial labs, with elevated lactate, was increasing on repeat  UA neg for ketones, unlikely to be starvation ketoacidosis  Likely due to tumor burden  Restarted HCO3 TID in the setting of MA, bicarb has improved

## 2018-12-18 NOTE — PROGRESS NOTE ADULT - PROBLEM SELECTOR PLAN 3
Patient with SILKE, baseline Scr 1.0, earlier uptrending, now improving after Cook placement  Acute onset urinary retention, CT does not show hydronephrosis, straight cathed earlier   Initially thought to be  2/2 dehydration and decreased PO intake, could also be an intrarenal component as Cook draining brownish urine, reduced urine output  Monitor Cook output, consider removing Cook for trial of void if urine appearance improves  Patient received albumin 12/16 with some improvement in blood pressure and urine output  Currently not on IV fluids due to possible third spacing into left leg

## 2018-12-18 NOTE — PROGRESS NOTE ADULT - PROBLEM SELECTOR PLAN 8
Hx of malignant melanoma, noncurative   Plan as below  Currently on immunotherapy
Hx of malignant melanoma, noncurative   Plan as below  Currently on immunotherapy  Follows with Dr. Bradley Goldberg outpatient

## 2018-12-18 NOTE — PROGRESS NOTE ADULT - PROBLEM SELECTOR PLAN 4
Hx of DVT and PE on previous hospitalization  On Lovenox BID dosing at home, rec'd one dose in ED  Will need to recalculate weight given patient reports 30 lb weight loss in 3 months to confirm proper dosing; further doses on hold in light of SILKE; monitor Cr, currently eGFR>30  On Heparin drip  Will change back to Lovenox as SILKE resolving, depending on whether surgical intervention needed for increasing left leg edema and pain

## 2018-12-18 NOTE — PROGRESS NOTE ADULT - PROBLEM SELECTOR PLAN 5
On Heparin drip  Increased edema and pain in left leg, vascular consulted to r/o compartment syndrome  Will change back to Lovenox as SLIKE resolving, depending on whether surgical intervention needed for increasing left leg edema and pain

## 2018-12-18 NOTE — PROGRESS NOTE ADULT - PROBLEM SELECTOR PLAN 9
Patient with hyperK to 6.1 in the setting of SILKE, on presentation  Being medically managed, no peaked T waves seen on EKG   Repeat BMP w/improved K, patient denies chest pain or palpitations

## 2018-12-18 NOTE — CHART NOTE - NSCHARTNOTEFT_GEN_A_CORE
Discussed case with patient's outpatient oncologist, Dr. Goldberg. Please arrange hospice services. We will sign off, please call us with any questions or concerns and if we can be of further help.    Emma Alejandra DO  Hematology/Oncology Fellow  Pager [phone]

## 2018-12-18 NOTE — PROGRESS NOTE ADULT - PROBLEM SELECTOR PLAN 4
Pt is DNR/DNI. HCP is brother David. Currently symptoms are being managed via IV route, would recommend inpatient hospice services.

## 2018-12-18 NOTE — PROGRESS NOTE ADULT - PROBLEM SELECTOR PLAN 2
Has used 9mg of IV dilaudid in the past 24hrs, pain slightly improved, would recommend to start a drip at 0.2mg/hr and continue with the 1mg q4hrs PRN for breakthrough pain.   Please page for uncontrolled symptoms [phone].

## 2018-12-19 VITALS
RESPIRATION RATE: 18 BRPM | DIASTOLIC BLOOD PRESSURE: 63 MMHG | TEMPERATURE: 98 F | HEART RATE: 99 BPM | SYSTOLIC BLOOD PRESSURE: 111 MMHG | OXYGEN SATURATION: 99 %

## 2018-12-19 PROBLEM — I82.509 CHRONIC EMBOLISM AND THROMBOSIS OF UNSPECIFIED DEEP VEINS OF UNSPECIFIED LOWER EXTREMITY: Chronic | Status: ACTIVE | Noted: 2018-12-14

## 2018-12-19 PROBLEM — I27.82 CHRONIC PULMONARY EMBOLISM: Chronic | Status: ACTIVE | Noted: 2018-12-14

## 2018-12-19 LAB — BACTERIA BLD CULT: SIGNIFICANT CHANGE UP

## 2018-12-19 PROCEDURE — 99239 HOSP IP/OBS DSCHRG MGMT >30: CPT

## 2018-12-19 PROCEDURE — 99233 SBSQ HOSP IP/OBS HIGH 50: CPT

## 2018-12-19 PROCEDURE — 99232 SBSQ HOSP IP/OBS MODERATE 35: CPT

## 2018-12-19 RX ORDER — ZINC OXIDE 200 MG/G
1 OINTMENT TOPICAL
Qty: 0 | Refills: 0 | COMMUNITY
Start: 2018-12-19

## 2018-12-19 RX ORDER — LOPERAMIDE HCL 2 MG
2 TABLET ORAL EVERY 12 HOURS
Qty: 0 | Refills: 0 | Status: DISCONTINUED | OUTPATIENT
Start: 2018-12-19 | End: 2018-12-19

## 2018-12-19 RX ORDER — INSULIN GLARGINE 100 [IU]/ML
17 INJECTION, SOLUTION SUBCUTANEOUS
Qty: 0 | Refills: 0 | COMMUNITY

## 2018-12-19 RX ORDER — ZINC OXIDE 200 MG/G
1 OINTMENT TOPICAL
Qty: 0 | Refills: 0 | Status: DISCONTINUED | OUTPATIENT
Start: 2018-12-19 | End: 2018-12-19

## 2018-12-19 RX ORDER — INSULIN GLARGINE 100 [IU]/ML
5 INJECTION, SOLUTION SUBCUTANEOUS
Qty: 0 | Refills: 0 | COMMUNITY

## 2018-12-19 RX ORDER — INSULIN LISPRO 100/ML
3 VIAL (ML) SUBCUTANEOUS
Qty: 0 | Refills: 0 | COMMUNITY

## 2018-12-19 RX ORDER — LOPERAMIDE HCL 2 MG
2 TABLET ORAL ONCE
Qty: 0 | Refills: 0 | Status: DISCONTINUED | OUTPATIENT
Start: 2018-12-19 | End: 2018-12-19

## 2018-12-19 RX ORDER — SODIUM CHLORIDE 9 MG/ML
1000 INJECTION INTRAMUSCULAR; INTRAVENOUS; SUBCUTANEOUS
Qty: 0 | Refills: 0 | Status: DISCONTINUED | OUTPATIENT
Start: 2018-12-19 | End: 2018-12-19

## 2018-12-19 RX ORDER — HYDROMORPHONE HYDROCHLORIDE 2 MG/ML
0.5 INJECTION INTRAMUSCULAR; INTRAVENOUS; SUBCUTANEOUS
Qty: 100 | Refills: 0 | Status: DISCONTINUED | OUTPATIENT
Start: 2018-12-19 | End: 2018-12-19

## 2018-12-19 RX ORDER — HYDROMORPHONE HYDROCHLORIDE 2 MG/ML
0.5 INJECTION INTRAMUSCULAR; INTRAVENOUS; SUBCUTANEOUS
Qty: 10 | Refills: 0 | OUTPATIENT
Start: 2018-12-19

## 2018-12-19 RX ADMIN — HYDROMORPHONE HYDROCHLORIDE 1 MILLIGRAM(S): 2 INJECTION INTRAMUSCULAR; INTRAVENOUS; SUBCUTANEOUS at 14:00

## 2018-12-19 RX ADMIN — ENOXAPARIN SODIUM 90 MILLIGRAM(S): 100 INJECTION SUBCUTANEOUS at 06:43

## 2018-12-19 RX ADMIN — HYDROMORPHONE HYDROCHLORIDE 0.5 MG/HR: 2 INJECTION INTRAMUSCULAR; INTRAVENOUS; SUBCUTANEOUS at 11:15

## 2018-12-19 RX ADMIN — Medication 3 UNIT(S): at 09:27

## 2018-12-19 RX ADMIN — HYDROMORPHONE HYDROCHLORIDE 1 MILLIGRAM(S): 2 INJECTION INTRAMUSCULAR; INTRAVENOUS; SUBCUTANEOUS at 17:08

## 2018-12-19 RX ADMIN — Medication 2: at 09:27

## 2018-12-19 RX ADMIN — HYDROMORPHONE HYDROCHLORIDE 1 MILLIGRAM(S): 2 INJECTION INTRAMUSCULAR; INTRAVENOUS; SUBCUTANEOUS at 13:25

## 2018-12-19 RX ADMIN — HYDROMORPHONE HYDROCHLORIDE 1 MILLIGRAM(S): 2 INJECTION INTRAMUSCULAR; INTRAVENOUS; SUBCUTANEOUS at 11:55

## 2018-12-19 RX ADMIN — HYDROMORPHONE HYDROCHLORIDE 0.5 MG/HR: 2 INJECTION INTRAMUSCULAR; INTRAVENOUS; SUBCUTANEOUS at 11:55

## 2018-12-19 RX ADMIN — HYDROMORPHONE HYDROCHLORIDE 1 MILLIGRAM(S): 2 INJECTION INTRAMUSCULAR; INTRAVENOUS; SUBCUTANEOUS at 11:11

## 2018-12-19 RX ADMIN — Medication 650 MILLIGRAM(S): at 06:43

## 2018-12-19 RX ADMIN — Medication 200 MILLIGRAM(S): at 06:43

## 2018-12-19 NOTE — PROGRESS NOTE ADULT - PROBLEM SELECTOR PROBLEM 2
Acute deep vein thrombosis (DVT) of popliteal vein of left lower extremity
Generalized abdominal pain
Acute deep vein thrombosis (DVT) of popliteal vein of left lower extremity
Diarrhea, unspecified type
Generalized abdominal pain
Peritoneal carcinomatosis
Diarrhea, unspecified type

## 2018-12-19 NOTE — PROGRESS NOTE ADULT - PROBLEM SELECTOR PROBLEM 1
Acute deep vein thrombosis (DVT) of femoral vein of left lower extremity
Malignant melanoma, unspecified site
Acute deep vein thrombosis (DVT) of femoral vein of left lower extremity
Generalized abdominal pain
Malignant melanoma, unspecified site
Malignant melanoma, unspecified site
Generalized abdominal pain

## 2018-12-19 NOTE — PROGRESS NOTE ADULT - PROBLEM SELECTOR PLAN 4
Pt is DNR/DNI. HCP is brother David. Currently symptoms are being managed via IV route, would recommend inpatient hospice services. Patient in agreement.

## 2018-12-19 NOTE — PROGRESS NOTE ADULT - PROBLEM SELECTOR PLAN 1
S/P 4 Cycles of Ipilimumab and 1 dose of nivolumab. Pt is not a candidate for further disease for further disease modifying treatment. Dr. Goldberg recommended hospice service. Pt requesting hospice services, at this point pt not tolerating oral medications consistently, vomiting/nausea. Would recommend inpatient hospice, discussed with hospice team.

## 2018-12-19 NOTE — PROGRESS NOTE ADULT - PROVIDER SPECIALTY LIST ADULT
Hospitalist
Hospitalist
Internal Medicine
Palliative Care
Palliative Care
Vascular Surgery
Vascular Surgery
Palliative Care

## 2018-12-19 NOTE — PROGRESS NOTE ADULT - SUBJECTIVE AND OBJECTIVE BOX
INTERVAL HPI/OVERNIGHT EVENTS:  Palliative Care following for symptom management and goals of care in the setting of metastatic malignancy.     Allergies    No Known Allergies    Intolerances      ADVANCE DIRECTIVES:    DNR Yes    MOLST [ ] YES [x ] NO     MEDICATIONS  (STANDING):  aspirin enteric coated 81 milliGRAM(s) Oral daily  atorvastatin 40 milliGRAM(s) Oral at bedtime  dextrose 5%. 1000 milliLiter(s) (50 mL/Hr) IV Continuous <Continuous>  dextrose 50% Injectable 12.5 Gram(s) IV Push once  dextrose 50% Injectable 25 Gram(s) IV Push once  dextrose 50% Injectable 25 Gram(s) IV Push once  enoxaparin Injectable 90 milliGRAM(s) SubCutaneous two times a day  HYDROmorphone Infusion 0.5 mG/Hr (0.5 mL/Hr) IV Continuous <Continuous>  insulin glargine Injectable (LANTUS) 5 Unit(s) SubCutaneous at bedtime  insulin lispro (HumaLOG) corrective regimen sliding scale   SubCutaneous three times a day before meals  insulin lispro Injectable (HumaLOG) 3 Unit(s) SubCutaneous three times a day before meals  sodium bicarbonate 650 milliGRAM(s) Oral three times a day  zinc oxide 20% Ointment 1 Application(s) Topical two times a day    MEDICATIONS  (PRN):  bisacodyl Suppository 10 milliGRAM(s) Rectal once PRN Constipation  dextrose 40% Gel 15 Gram(s) Oral once PRN Blood Glucose LESS THAN 70 milliGRAM(s)/deciliter  glucagon  Injectable 1 milliGRAM(s) IntraMuscular once PRN Glucose LESS THAN 70 milligrams/deciliter  guaiFENesin   Syrup  (Sugar-Free) 200 milliGRAM(s) Oral every 6 hours PRN Cough  HYDROmorphone  Injectable 1 milliGRAM(s) IV Push every 2 hours PRN moderate to severe pain  LORazepam   Injectable 0.25 milliGRAM(s) IV Push every 4 hours PRN Nausea and/or Vomiting    PRESENT SYMPTOMS:  Source: [x ] Patient   [ ] Family   [ ] Team     Pain:                        [ ] No [ x] Yes             [ ] Mild [ ] Moderate [x ] Severe    Onset - days  Location - abdomen and worsening LLE pain.   Duration - days  Character - aching  Alleviating/Aggravating - improved with dilaudid.   Radiation - none  Timing -    Dyspnea:                [ ] No [x ] Yes             [ ] Mild [x ] Moderate [ ] Severe    Anxiety:                  [x ] No [ ] Yes             [ ] Mild [ ] Moderate [ ] Severe    Fatigue:                  [x ] No [ ] Yes             [ ] Mild [ ] Moderate [ ] Severe    Nausea:                  [ ] No [x ] Yes             [ ] Mild [ ] Moderate [ x] Severe    Loss of appetite:   [ ] No [x ] Yes             [ ] Mild [ ] Moderate [x ] Severe    Constipation:        [x ] No [ ] Yes             [ ] Mild [ ] Moderate [ ] Severe    Other Symptoms:  [x ] All other review of systems negative   [ ] Unable to obtain due to poor mentation     Karnofsky Performance Score/Palliative Performance Status Version 2: 30 %    PHYSICAL EXAM:  Vital Signs Last 24 Hrs  T(C): 36.9 (19 Dec 2018 10:13), Max: 36.9 (19 Dec 2018 10:13)  T(F): 98.4 (19 Dec 2018 10:13), Max: 98.4 (19 Dec 2018 10:13)  HR: 99 (19 Dec 2018 10:13) (94 - 107)  BP: 111/63 (19 Dec 2018 10:13) (87/54 - 133/81)  BP(mean): --  RR: 18 (19 Dec 2018 10:13) (17 - 18)  SpO2: 99% (19 Dec 2018 10:13) (97% - 100%) I&O's Summary    18 Dec 2018 07:01  -  19 Dec 2018 07:00  --------------------------------------------------------  IN: 0 mL / OUT: 600 mL / NET: -600 mL    General:  [x ] Alert  [ ] Oriented x      [ ] Lethargic  [ ] Agitated   [ ] Cachexia   [ ] Unarousable  [ x] Verbal  [ ] Non-Verbal    HEENT:  [ ] Normal   [ x] Dry mouth   [ ] ET Tube    [ ] Trach  [ ] Oral lesions    Lungs:   [ ] Clear [x ] Tachypnea  [ ] Audible excessive secretions   [ ] Rhonchi        [ ] Right [ ] Left [ ] Bilateral  [ ] Crackles        [ ] Right [ ] Left [ ] Bilateral  [ ] Wheezing     [ ] Right [ ] Left [ ] Bilateral    Cardiovascular:  [ x] Regular [ ] Irregular [ ] Tachycardia   [ ] Bradycardia  [ ] Murmur [ ] Other    Abdomen: [ ] Soft  [x ] Distended   [x ] +BS  [ ] Non tender [x ] Tender  [ ]PEG   [ ]OGT/ NGT   Last BM: 12/19 - diarrhea now.       Genitourinary: [ ] Normal [ ] Incontinent   [ ] Oliguria/Anuria   [ ] Cook    Musculoskeletal:  [ ] Normal   [ x] Weakness  [ ] Bedbound/Wheelchair bound [x ] Edema    Neurological: [ x] No focal deficits  [ ] Cognitive impairment  [ ] Dysphagia [ ] Dysarthria [ ] Paresis [ ] Other     Skin: [ ] Normal   [ ] Pressure ulcer(s)                  [ ] Rash  LLE with swelling, pitting edema and discoloration of digits. Painful to touch.    LABS:    PTT - ( 18 Dec 2018 06:35 )  PTT:55.8 SEC    Shock: [ ] Septic [ ] Cardiogenic [ ] Neurologic [ ] Hypovolemic  Vasopressors x   Inotrophs x     Oral Intake: [ ] Unable/mouth care only [ ] Minimal [ ] Moderate [x ] Full Capability    Diet: [ ] NPO [ ] Tube feeds [ ] TPN [ x] Other     RADIOLOGY & ADDITIONAL STUDIES: reviewed.     REFERRALS:   [ ] Chaplaincy  [ x] Hospice  [ ] Child Life  [ ] Social Work  [ ] Case management [ ] Holistic Therapy

## 2018-12-19 NOTE — PROGRESS NOTE ADULT - PROBLEM SELECTOR PLAN 2
Not controlled. No PRNs given since drip was started, pain uncontrolled with only drip. Staff encouraged to give PRNs to manage breakthrough pain. Would recommend to increase drip to 0.5mg/hr and continue with the 1mg q4hrs PRN for breakthrough pain.   Please page for uncontrolled symptoms [phone]. Not controlled. No PRNs given since drip was started, pain uncontrolled with only drip. Staff encouraged to give PRNs to manage breakthrough pain. Would recommend to increase drip to 0.5mg/hr and continue with the 1mg q4hrs PRN for breakthrough pain.   Having diarrhea now, would cut back on bowel regimen, goal is BM every other day.   Please page for uncontrolled symptoms [phone].

## 2018-12-19 NOTE — PROGRESS NOTE ADULT - SUBJECTIVE AND OBJECTIVE BOX
Patient is a 58y old  Male who presents with a chief complaint of abdominal pain (19 Dec 2018 12:03)      Vascular Surgery Attending Progress Note pt was seen and examined by me at bedside today at 8 34am     Interval HPI: Pt c/o ongoing left leg discomfort modertae    Medications:  aspirin enteric coated 81 milliGRAM(s) Oral daily  atorvastatin 40 milliGRAM(s) Oral at bedtime  bisacodyl Suppository 10 milliGRAM(s) Rectal once PRN  dextrose 40% Gel 15 Gram(s) Oral once PRN  dextrose 5%. 1000 milliLiter(s) IV Continuous <Continuous>  dextrose 50% Injectable 12.5 Gram(s) IV Push once  dextrose 50% Injectable 25 Gram(s) IV Push once  dextrose 50% Injectable 25 Gram(s) IV Push once  enoxaparin Injectable 90 milliGRAM(s) SubCutaneous two times a day  glucagon  Injectable 1 milliGRAM(s) IntraMuscular once PRN  guaiFENesin   Syrup  (Sugar-Free) 200 milliGRAM(s) Oral every 6 hours PRN  HYDROmorphone  Injectable 1 milliGRAM(s) IV Push every 2 hours PRN  HYDROmorphone Infusion 0.5 mG/Hr IV Continuous <Continuous>  insulin glargine Injectable (LANTUS) 5 Unit(s) SubCutaneous at bedtime  insulin lispro (HumaLOG) corrective regimen sliding scale   SubCutaneous three times a day before meals  insulin lispro Injectable (HumaLOG) 3 Unit(s) SubCutaneous three times a day before meals  LORazepam   Injectable 0.25 milliGRAM(s) IV Push every 4 hours PRN  sodium bicarbonate 650 milliGRAM(s) Oral three times a day  zinc oxide 20% Ointment 1 Application(s) Topical two times a day      Vital Signs Last 24 Hrs  T(C): 36.9 (19 Dec 2018 10:13), Max: 36.9 (19 Dec 2018 10:13)  T(F): 98.4 (19 Dec 2018 10:13), Max: 98.4 (19 Dec 2018 10:13)  HR: 99 (19 Dec 2018 10:13) (95 - 102)  BP: 111/63 (19 Dec 2018 10:13) (87/54 - 111/63)  BP(mean): --  RR: 18 (19 Dec 2018 10:13) (17 - 18)  SpO2: 99% (19 Dec 2018 10:13) (97% - 99%)  I&O's Summary    18 Dec 2018 07:01  -  19 Dec 2018 07:00  --------------------------------------------------------  IN: 0 mL / OUT: 600 mL / NET: -600 mL    19 Dec 2018 07:01  -  19 Dec 2018 19:56  --------------------------------------------------------  IN: 0 mL / OUT: 325 mL / NET: -325 mL    Physical Exam:  Neuro  A&Ox3 VSS  Vascular:  mod edema stable     LABS:      PTT - ( 18 Dec 2018 06:35 )  PTT:55.8 SEC    DONALD ALLAN MD  [phone]

## 2018-12-19 NOTE — PROGRESS NOTE ADULT - PROBLEM SELECTOR PROBLEM 3
Arterial thrombosis
Nausea and vomiting, intractability of vomiting not specified, unspecified vomiting type
Arterial thrombosis
Generalized abdominal pain
Nausea and vomiting, intractability of vomiting not specified, unspecified vomiting type
SILKE (acute kidney injury)

## 2018-12-19 NOTE — PROGRESS NOTE ADULT - ASSESSMENT
59 yo M patient with h/o DMT2,  HTN, KRISTIAN, CAD, malignant melanoma with mets to liver and peritoneal carcinomatosis  who is complaining of worsening abdominal pain who does not qualify for further disease modifying therapy and is requesting symptoms management.
Plan   stable from vasc standpoint  continue anticoag rx  as per primary service  will follow
57 yo M patient with h/o DMT2,  HTN, KRISTIAN, CAD, malignant melanoma with mets to liver and peritoneal carcinomatosis  who is complaining of worsening abdominal pain who does not qualify for further disease modifying therapy and is requesting symptoms management.
58M with PMHX of T2DM, HTN, KRISTIAN and new CAD s/p PCI at [facility] with BMS and now medically optimized, who was also incidentally found with large "slug like" L shoulder mass which was excised and found to be malignant melanoma, with metastasis found in large L axillary lymph node (biopsy proven), also found with L sided meningioma with hearing loss and s/p XRT, and subsequently found with omental carcinomatosis biopsy proven metastatic melanoma now s/p 4 cycles ipilimumab with progression and now s/p 1 dose nivolumab 480 mg flat dose on 11/1/18, DVT/PE (on Lovenox) presenting with abdominal pain x 2 days, now s/p therapeutic paracentesis    Problem/Plan - 1:  ·  Problem: Generalized abdominal pain.  Plan: Patient presented with diffuse generalized abdominal pain   May be 2/2 immunotherapy vs. progression of disease vs. ascites   CT a/p with large ascites, s/p therapeutic paracentesis with 6L fluid removed  Per patient, would not want any further surgery if needed.   Abdomen soft, not tense. Denies abdominal pain today. Will hold off on additional paracentesis.  On Dilaudid gtt with PRN IV Dilaudid, pain well controlled  Nausea: add Zofran PRN    Problem/Plan - 2:  ·  Problem: Diarrhea, unspecified type.  Plan: Hx of diarrhea intermittent, not current   Less likely infectious etiology likely 2/2 immunotherapy   If continues will send stool cx, GI PCR  C. diff and stool cx in outpatient setting negative   Electrolytes stable.     Problem/Plan - 3:  ·  Problem: SILKE (acute kidney injury).  Plan: Patient with SILKE, baseline Scr 1.0, earlier uptrending, now improving after Cook placement  Acute onset urinary retention, CT does not show hydronephrosis, straight cathed earlier   Initially thought to be  2/2 dehydration and decreased PO intake, could also be an intrarenal component as Cook draining brownish urine, reduced urine output  Monitor Cook output, consider removing Cook for trial of void if urine appearance improves  Patient received albumin 12/16 with some improvement in blood pressure and urine output  Currently not on IV fluids due to possible third spacing into left leg.     Problem/Plan - 4:  ·  Problem: Other chronic pulmonary embolism without acute cor pulmonale.  Plan: Hx of DVT and PE on previous hospitalization  On Lovenox BID dosing at home, rec'd one dose in ED  Will need to recalculate weight given patient reports 30 lb weight loss in 3 months to confirm proper dosing; further doses on hold in light of SILKE; monitor Cr, currently eGFR>30  On Heparin drip  Will change back to Lovenox as SILKE resolving, depending on whether surgical intervention needed for increasing left leg edema and pain.     Problem/Plan - 5:  ·  Problem: Chronic deep vein thrombosis (DVT) of proximal vein of left lower extremity  Increased edema and pain in left leg, vascular consulted to r/o compartment syndrome  Back on SC lovenox    Problem/Plan - 6:  Problem: Type 2 diabetes mellitus with complication, with long-term current use of insulin. Plan: IDDM2 w/hyperglycemia   Lantus 17, humalog 5/5/5 at home  On much lower dose of basal bolus in setting of SILKE and reduced PO intake  Monitor sugars.    Problem/Plan - 7:  ·  Problem: Metabolic acidosis with increased anion gap and accumulation of organic acids.  Plan: AG metabolic acidosis on initial labs, with elevated lactate, was increasing on repeat  UA neg for ketones, unlikely to be starvation ketoacidosis  Likely due to tumor burden  Restarted HCO3 TID in the setting of MA, bicarb has improved.     Problem/Plan - 8:  ·  Problem: Malignant melanoma, unspecified site.  Plan: Hx of malignant melanoma, noncurative   Plan as below  Currently on immunotherapy.     Problem/Plan - 9:  ·  Problem: Hyperkalemia.  Plan: Patient with hyperK to 6.1 in the setting of SILKE, on presentation  Being medically managed, no peaked T waves seen on EKG   Repeat BMP w/improved K, patient denies chest pain or palpitations.     Problem/Plan - 10:  Problem: Goals of care, counseling/discussion. Plan; Extensive GOC discussion with patient, patient is DNR/DNI, no aggressive measures as in surgery or HD  However, is agreeable to IVF and IV abx if needed    Plan for discharge to inpatient hospice once bed available.
58M with PMHX of T2DM, HTN, KRISTIAN and new CAD s/p PCI at [facility] with BMS and now medically optimized, who was also incidentally found with large "slug like" L shoulder mass which was excised and found to be malignant melanoma, with metastasis found in large L axillary lymph node (biopsy proven), also found with L sided meningioma with hearing loss and s/p XRT, and subsequently found with omental carcinomatosis biopsy proven metastatic melanoma now s/p 4 cycles ipilimumab with progression and now s/p 1 dose nivolumab 480 mg flat dose on 11/1/18, DVT/PE (on Lovenox) presenting with abdominal pain x 2 days, now s/p therapeutic paracentesis    Problem/Plan - 1:  ·  Problem: Generalized abdominal pain.  Plan: Patient presented with diffuse generalized abdominal pain   May be 2/2 immunotherapy vs. progression of disease vs. ascites   CT a/p with large ascites, s/p therapeutic paracentesis with 6L fluid removed  Per patient, would not want any further surgery if needed.   Abdomen soft, not tense. Denies abdominal pain today. Will hold off on additional paracentesis.  On Dilaudid gtt with PRN IV Dilaudid, pain well controlled    Problem/Plan - 2:  ·  Problem: Diarrhea, unspecified type.  Plan: Hx of diarrhea intermittent, not current   Less likely infectious etiology likely 2/2 immunotherapy   If continues will send stool cx, GI PCR  C. diff and stool cx in outpatient setting negative   Electrolytes stable.     Problem/Plan - 3:  ·  Problem: SILKE (acute kidney injury).  Plan: Patient with SILKE, baseline Scr 1.0, earlier uptrending, now improving after Cook placement  Acute onset urinary retention, CT does not show hydronephrosis, straight cathed earlier   Initially thought to be  2/2 dehydration and decreased PO intake, could also be an intrarenal component as Cook draining brownish urine, reduced urine output  Monitor Cook output, consider removing Cook for trial of void if urine appearance improves  Patient received albumin 12/16 with some improvement in blood pressure and urine output  Currently not on IV fluids due to possible third spacing into left leg.     Problem/Plan - 4:  ·  Problem: Other chronic pulmonary embolism without acute cor pulmonale.  Plan: Hx of DVT and PE on previous hospitalization  On Lovenox BID dosing at home, rec'd one dose in ED  Will need to recalculate weight given patient reports 30 lb weight loss in 3 months to confirm proper dosing; further doses on hold in light of SILKE; monitor Cr, currently eGFR>30  On Heparin drip  Will change back to Lovenox as SILKE resolving, depending on whether surgical intervention needed for increasing left leg edema and pain.     Problem/Plan - 5:  ·  Problem: Chronic deep vein thrombosis (DVT) of proximal vein of left lower extremity  Increased edema and pain in left leg, vascular consulted to r/o compartment syndrome  Back on SC lovenox    Problem/Plan - 6:  Problem: Type 2 diabetes mellitus with complication, with long-term current use of insulin. Plan: IDDM2 w/hyperglycemia   Lantus 17, humalog 5/5/5 at home  On much lower dose of basal bolus in setting of SILKE and reduced PO intake  Monitor sugars.    Problem/Plan - 7:  ·  Problem: Metabolic acidosis with increased anion gap and accumulation of organic acids.  Plan: AG metabolic acidosis on initial labs, with elevated lactate, was increasing on repeat  UA neg for ketones, unlikely to be starvation ketoacidosis  Likely due to tumor burden  Restarted HCO3 TID in the setting of MA, bicarb has improved.     Problem/Plan - 8:  ·  Problem: Malignant melanoma, unspecified site.  Plan: Hx of malignant melanoma, noncurative   Plan as below  Currently on immunotherapy.     Problem/Plan - 9:  ·  Problem: Hyperkalemia.  Plan: Patient with hyperK to 6.1 in the setting of SILKE, on presentation  Being medically managed, no peaked T waves seen on EKG   Repeat BMP w/improved K, patient denies chest pain or palpitations.     Problem/Plan - 10:  Problem: Goals of care, counseling/discussion. Plan; Extensive GOC discussion with patient, patient is DNR/DNI, no aggressive measures as in surgery or HD  However, is agreeable to IVF and IV abx if needed    Plan for discharge to inpatient hospice.
58M with PMHX of T2DM, HTN, KRISTIAN and new CAD s/p PCI at [facility] with BMS and now medically optimized, who was also incidentally found with large "slug like" L shoulder mass which was excised and found to be malignant melanoma, with metastasis found in large L axillary lymph node (biopsy proven), also found with L sided meningioma with hearing loss and s/p XRT, and subsequently found with omental carcinomatosis biopsy proven metastatic melanoma now s/p 4 cycles ipilimumab with progression and now s/p 1 dose nivolumab 480 mg flat dose on 11/1/18, DVT/PE (on Lovenox) presenting with abdominal pain x 2 days, now s/p therapeutic paracentesis
58M with PMHX of T2DM, HTN, KRISTIAN and new CAD s/p PCI at [facility] with BMS and now medically optimized, who was also incidentally found with large "slug like" L shoulder mass which was excised and found to be malignant melanoma, with metastasis found in large L axillary lymph node (biopsy proven), also found with L sided meningioma with hearing loss and s/p XRT, and subsequently found with omental carcinomatosis biopsy proven metastatic melanoma now s/p 4 cycles ipilimumab with progression and now s/p 1 dose nivolumab 480 mg flat dose on 11/1/18, DVT/PE (on Lovenox) presenting with abdominal pain x 2 days, now s/p therapeutic paracentesis
58M with PMHX of T2DM, HTN, KRISTIAN and new CAD s/p PCI at [facility] with BMS and now medically optimized, who was also incidentally found with large "slug like" L shoulder mass which was excised and found to be malignant melanoma, with metastasis found in large L axillary lymph node (biopsy proven), also found with L sided meningioma with hearing loss and s/p XRT, and subsequently found with omental carcinomatosis biopsy proven metastatic melanoma now s/p 4 cycles ipilimumab with progression and now s/p 1 dose nivolumab 480 mg flat dose on 11/1/18, DVT/PE (on Lovenox) presenting with abdominal pain x 2 days, now s/p therapeutic paracentesis
58M with PMHX of T2DM, HTN, KRISTIAN and new CAD s/p PCI at [facility] with BMS and now medically optimized, who was also incidentally found with large "slug like" L shoulder mass which was excised and found to be malignant melanoma, with metastasis found in large L axillary lymph node (biopsy proven), also found with L sided meningioma with hearing loss and s/p XRT, and subsequently found with omental carcinomatosis biopsy proven metastatic melanoma now s/p 4 cycles ipilimumab with progression and now s/p 1 dose nivolumab 480 mg flat dose on 11/1/18, DVT/PE (on Lovenox) presenting with abdominal pain x 2 days, now s/p therapeutic paracentesis
59 yo M patient with h/o DMT2,  HTN, KRISTIAN, CAD, malignant melanoma with mets to liver and peritoneal carcinomatosis  who is complaining of worsening abdominal pain who does not qualify for further disease modifying therapy and is requesting symptoms management.
Plan   stable from vasc standpoint  continue anticoag rx  as per primary service  reconsult prn
58M with PMHX of T2DM, HTN, KRISTIAN and new CAD s/p PCI at [facility] with BMS and now medically optimized, who was also incidentally found with large "slug like" L shoulder mass which was excised and found to be malignant melanoma, with metastasis found in large L axillary lymph node (biopsy proven), also found with L sided meningioma with hearing loss and s/p XRT, and subsequently found with omental carcinomatosis biopsy proven metastatic melanoma now s/p 4 cycles ipilimumab with progression and now s/p 1 dose nivolumab 480 mg flat dose on 11/1/18, DVT/PE (on Lovenox) presenting with abdominal pain x 2 days.

## 2018-12-19 NOTE — PROGRESS NOTE ADULT - SUBJECTIVE AND OBJECTIVE BOX
CHIEF COMPLAINT: Patient is a 58y old  male who presents with a chief complaint of abdominal pain (19 Dec 2018 10:50)      SUBJECTIVE / OVERNIGHT EVENTS:    Complains of pain, also nausea. Denies other complaints. Abdominal distention stable from prior day.    MEDICATIONS  (STANDING):  aspirin enteric coated 81 milliGRAM(s) Oral daily  atorvastatin 40 milliGRAM(s) Oral at bedtime  dextrose 5%. 1000 milliLiter(s) (50 mL/Hr) IV Continuous <Continuous>  dextrose 50% Injectable 12.5 Gram(s) IV Push once  dextrose 50% Injectable 25 Gram(s) IV Push once  dextrose 50% Injectable 25 Gram(s) IV Push once  enoxaparin Injectable 90 milliGRAM(s) SubCutaneous two times a day  HYDROmorphone Infusion 0.5 mG/Hr (0.5 mL/Hr) IV Continuous <Continuous>  insulin glargine Injectable (LANTUS) 5 Unit(s) SubCutaneous at bedtime  insulin lispro (HumaLOG) corrective regimen sliding scale   SubCutaneous three times a day before meals  insulin lispro Injectable (HumaLOG) 3 Unit(s) SubCutaneous three times a day before meals  sodium bicarbonate 650 milliGRAM(s) Oral three times a day  zinc oxide 20% Ointment 1 Application(s) Topical two times a day    MEDICATIONS  (PRN):  bisacodyl Suppository 10 milliGRAM(s) Rectal once PRN Constipation  dextrose 40% Gel 15 Gram(s) Oral once PRN Blood Glucose LESS THAN 70 milliGRAM(s)/deciliter  glucagon  Injectable 1 milliGRAM(s) IntraMuscular once PRN Glucose LESS THAN 70 milligrams/deciliter  guaiFENesin   Syrup  (Sugar-Free) 200 milliGRAM(s) Oral every 6 hours PRN Cough  HYDROmorphone  Injectable 1 milliGRAM(s) IV Push every 2 hours PRN moderate to severe pain  LORazepam   Injectable 0.25 milliGRAM(s) IV Push every 4 hours PRN Nausea and/or Vomiting      VITALS:  T(F): 98.4 (12-19-18 @ 10:13), Max: 98.4 (12-19-18 @ 10:13)  HR: 99 (12-19-18 @ 10:13) (94 - 107)  BP: 111/63 (12-19-18 @ 10:13) (87/54 - 133/81)  RR: 18 (12-19-18 @ 10:13) (17 - 18)  SpO2: 99% (12-19-18 @ 10:13)      CAPILLARY BLOOD GLUCOSE    Output     I&O's Summary  T(F): 98.4 (12-19-18 @ 10:13), Max: 98.4 (12-19-18 @ 10:13)  HR: 99 (12-19-18 @ 10:13) (94 - 107)  BP: 111/63 (12-19-18 @ 10:13) (87/54 - 133/81)  RR: 18 (12-19-18 @ 10:13) (17 - 18)  SpO2: 99% (12-19-18 @ 10:13)    PHYSICAL EXAM:  GENERAL: NAD, well-developed  HEAD:  Atraumatic, Normocephalic  EYES: EOMI  NECK: Supple, No JVD  CHEST/LUNG: nonlabored breathing  HEART: nl S1/S2  ABDOMEN: nondistended, soft  EXTREMITIES:  no LE edema  PSYCH: alert, answering questions appropriately  NEUROLOGY: non-focal  SKIN: No rashes noted    LABS:          INR: x    ;    PT: x    ;    PTT: 55.8 SEC<H>                MICROBIOLOGY:        RADIOLOGY & ADDITIONAL TESTS:    Imaging Personally Reviewed:    < from: Xray Chest 1 View AP/PA (12.16.18 @ 10:22) >    IMPRESSION:   Clear lungs.    < end of copied text >        [x] Care Discussed with Consultants/Other Providers:      Haider Montavlo M.D.  Hospitalist  Pager [phone]

## 2018-12-26 ENCOUNTER — APPOINTMENT (OUTPATIENT)
Dept: INFUSION THERAPY | Facility: HOSPITAL | Age: 58
End: 2018-12-26

## 2018-12-31 ENCOUNTER — INBOUND DOCUMENT (OUTPATIENT)
Age: 58
End: 2018-12-31

## 2019-03-12 ENCOUNTER — APPOINTMENT (OUTPATIENT)
Dept: OTOLARYNGOLOGY | Facility: CLINIC | Age: 59
End: 2019-03-12

## 2019-05-09 ENCOUNTER — APPOINTMENT (OUTPATIENT)
Dept: DERMATOLOGY | Facility: CLINIC | Age: 59
End: 2019-05-09

## 2019-07-24 NOTE — CONSULT NOTE ADULT - SUBJECTIVE AND OBJECTIVE BOX
CC: left ear fullness and decrease in hearing    HPI: Patient is a 57y old  Male who we are asked to evaluate the patient for left ear fullness and decrease in hearing. Modifying factors. Fevers/chills. Dysphagia/odynophagia/hemoptysis/unintentional weight loss. Any other relevant history/symptoms.    PAST MEDICAL & SURGICAL HISTORY:  Meningioma  Malignant melanoma, unspecified site  Heart failure, unspecified heart failure chronicity, unspecified heart failure type  Type 2 diabetes mellitus with complication, without long-term current use of insulin  Enlarged heart  HTN (hypertension)  Sleep apnea  No significant past surgical history    Allergies    No Known Allergies    Intolerances      MEDICATIONS  (STANDING):  aspirin enteric coated 81 milliGRAM(s) Oral daily  atorvastatin 80 milliGRAM(s) Oral at bedtime  carvedilol 25 milliGRAM(s) Oral every 12 hours  clopidogrel Tablet 75 milliGRAM(s) Oral daily  dextrose 5%. 1000 milliLiter(s) (50 mL/Hr) IV Continuous <Continuous>  dextrose 50% Injectable 12.5 Gram(s) IV Push once  dextrose 50% Injectable 25 Gram(s) IV Push once  dextrose 50% Injectable 25 Gram(s) IV Push once  docusate sodium 100 milliGRAM(s) Oral two times a day  furosemide   Injectable 60 milliGRAM(s) IV Push every 12 hours  heparin  Injectable 5000 Unit(s) SubCutaneous every 12 hours  hydrALAZINE 100 milliGRAM(s) Oral three times a day  insulin lispro (HumaLOG) corrective regimen sliding scale   SubCutaneous three times a day before meals  insulin lispro (HumaLOG) corrective regimen sliding scale   SubCutaneous at bedtime  isosorbide   mononitrate ER Tablet (IMDUR) 60 milliGRAM(s) Oral daily  melatonin 3 milliGRAM(s) Oral at bedtime  pantoprazole    Tablet 40 milliGRAM(s) Oral before breakfast  potassium chloride    Tablet ER 20 milliEquivalent(s) Oral once  simethicone 80 milliGRAM(s) Chew three times a day    MEDICATIONS  (PRN):  acetaminophen   Tablet. 650 milliGRAM(s) Oral every 6 hours PRN Moderate Pain (4 - 6)  dextrose Gel 1 Dose(s) Oral once PRN Blood Glucose LESS THAN 70 milliGRAM(s)/deciliter  glucagon  Injectable 1 milliGRAM(s) IntraMuscular once PRN Glucose LESS THAN 70 milligrams/deciliter  ondansetron Injectable 4 milliGRAM(s) IV Push every 6 hours PRN Nausea and/or Vomiting      Social History: **??**    ROS: ENT, GI, , CV, Pulm, Neuro, Psych, MS, Heme, Endo, Constitutional; all negative except as noted in HPI    Vital Signs Last 24 Hrs  T(C): 37.1 (26 Jan 2018 12:13), Max: 37.3 (25 Jan 2018 20:52)  T(F): 98.8 (26 Jan 2018 12:13), Max: 99.1 (25 Jan 2018 20:52)  HR: 70 (26 Jan 2018 12:13) (63 - 70)  BP: 137/68 (26 Jan 2018 12:13) (119/61 - 144/69)  BP(mean): --  RR: 16 (26 Jan 2018 12:13) (16 - 18)  SpO2: 96% (26 Jan 2018 12:13) (93% - 96%)                          11.2   8.1   )-----------( 326      ( 26 Jan 2018 06:52 )             32.8    01-26    140  |  100  |  32<H>  ----------------------------<  156<H>  3.4<L>   |  28  |  1.48<H>    Ca    8.8      26 Jan 2018 06:52         PHYSICAL EXAM:  Gen: NAD, well-developed  Head: Normocephalic, Atraumatic  Face: no edema/erythema/fluctuance, parotid glands soft without mass  Eyes: PERRL, EOMI, no scleral injection  Ears: Right - ear canal clear, TM intact without effusion / erythema.  No evidence of any fluid drainage.  No Mastoid tenderness / erythema/ ear bulging            Left - ear canal clear, TM intact without effusion / erythema.  No evidence of any fluid drainage.  No Mastoid tenderness / erythema/ ear bulging  Nose: Nares bilaterally patent, no discharge  Mouth: No Stridor / Drooling / Trismus.  Mucosa moist, tongue/uvula midline, oropharynx clear  Neck: Flat, supple, no lymphadenopathy, trachea midline, no masses  Resp: breathing easily, no stridor  CV: no peripheral edema/cyanosis    Fiberoptic Indirect laryngoscopy:  (With Scope #2) REF: NICU #[id number]  - CLINICAL UPDATE FOR 7/22/19 FAXED ON 7/23/19, FAXING CLINICAL UPDATE FOR 7/23/19- REQUESTING ADDITIONAL DAYS    7/23/19  NICU Progress Note               Boy 1 Ashley Hawley (Beka) Patient Status: Jennifer Richard 5/29/2017 MRN [id number] 6/6. Weaned to microflow on 6/25.    On pulmicort until stopped 7/23.    3 day lasix burst on 6/21.  6/29 RA trial.        Plan:      Trial off pulmicort 7/23  Monitor WOB.         Discharge Planning  Assessment & Plan CC: left ear fullness and decrease in hearing    HPI: Patient is a 57y old  Male who we are asked to evaluate the patient for left ear fullness and decrease in hearing. PT has history of brain tumor that was bx in mid december (awaiting results), and noticed a progressive hearing loss for a month. Pt states decrease in hearing loss could be due to brain tumor compression on ear canal. Pt denies any tinnitus, vertigo, d/c, pain, or congestion.     PAST MEDICAL & SURGICAL HISTORY:  Meningioma  Malignant melanoma, unspecified site  Heart failure, unspecified heart failure chronicity, unspecified heart failure type  Type 2 diabetes mellitus with complication, without long-term current use of insulin  Enlarged heart  HTN (hypertension)  Sleep apnea  No significant past surgical history    Allergies    No Known Allergies    Intolerances      MEDICATIONS  (STANDING):  aspirin enteric coated 81 milliGRAM(s) Oral daily  atorvastatin 80 milliGRAM(s) Oral at bedtime  carvedilol 25 milliGRAM(s) Oral every 12 hours  clopidogrel Tablet 75 milliGRAM(s) Oral daily  dextrose 5%. 1000 milliLiter(s) (50 mL/Hr) IV Continuous <Continuous>  dextrose 50% Injectable 12.5 Gram(s) IV Push once  dextrose 50% Injectable 25 Gram(s) IV Push once  dextrose 50% Injectable 25 Gram(s) IV Push once  docusate sodium 100 milliGRAM(s) Oral two times a day  furosemide   Injectable 60 milliGRAM(s) IV Push every 12 hours  heparin  Injectable 5000 Unit(s) SubCutaneous every 12 hours  hydrALAZINE 100 milliGRAM(s) Oral three times a day  insulin lispro (HumaLOG) corrective regimen sliding scale   SubCutaneous three times a day before meals  insulin lispro (HumaLOG) corrective regimen sliding scale   SubCutaneous at bedtime  isosorbide   mononitrate ER Tablet (IMDUR) 60 milliGRAM(s) Oral daily  melatonin 3 milliGRAM(s) Oral at bedtime  pantoprazole    Tablet 40 milliGRAM(s) Oral before breakfast  potassium chloride    Tablet ER 20 milliEquivalent(s) Oral once  simethicone 80 milliGRAM(s) Chew three times a day    MEDICATIONS  (PRN):  acetaminophen   Tablet. 650 milliGRAM(s) Oral every 6 hours PRN Moderate Pain (4 - 6)  dextrose Gel 1 Dose(s) Oral once PRN Blood Glucose LESS THAN 70 milliGRAM(s)/deciliter  glucagon  Injectable 1 milliGRAM(s) IntraMuscular once PRN Glucose LESS THAN 70 milligrams/deciliter  ondansetron Injectable 4 milliGRAM(s) IV Push every 6 hours PRN Nausea and/or Vomiting      Social History: no etoh, no tobacco, no idu     ROS: ENT, GI, , CV, Pulm, Neuro, Psych, MS, Heme, Endo, Constitutional; all negative except as noted in HPI    Vital Signs Last 24 Hrs  T(C): 37.1 (26 Jan 2018 12:13), Max: 37.3 (25 Jan 2018 20:52)  T(F): 98.8 (26 Jan 2018 12:13), Max: 99.1 (25 Jan 2018 20:52)  HR: 70 (26 Jan 2018 12:13) (63 - 70)  BP: 137/68 (26 Jan 2018 12:13) (119/61 - 144/69)  BP(mean): --  RR: 16 (26 Jan 2018 12:13) (16 - 18)  SpO2: 96% (26 Jan 2018 12:13) (93% - 96%)                          11.2   8.1   )-----------( 326      ( 26 Jan 2018 06:52 )             32.8    01-26    140  |  100  |  32<H>  ----------------------------<  156<H>  3.4<L>   |  28  |  1.48<H>    Ca    8.8      26 Jan 2018 06:52         PHYSICAL EXAM:  Gen: NAD, well-developed  Head: Normocephalic, Atraumatic  Face: no edema/erythema/fluctuance, parotid glands soft without mass  Eyes: PERRL, EOMI, no scleral injection  Ears: Right - ear canal clear, TM intact without effusion / erythema.  No evidence of any fluid drainage.  Mastoid tenderness / erythema/ ear bulging            Left - ear canal very narrow likely due to cord compression with cerumen impaction, removed 70 % will need debrox to help remove the rest without pain.No Mastoid tenderness / erythema/ ear bulging  Nose: Nares bilaterally patent, no discharge  Mouth: No Stridor / Drooling / Trismus.  Mucosa moist, tongue/uvula midline, oropharynx clear  Neck: Flat, supple, no lymphadenopathy, trachea midline, no masses  Resp: breathing easily, no stridor  CV: no peripheral edema/cyanosis CC: left ear fullness and decrease in hearing    HPI: Patient is a 57y old  Male who we are asked to evaluate the patient for left ear fullness and decrease in hearing. PT has history of brain tumor that was bx in mid december (awaiting results), and noticed a progressive hearing loss for a month. Pt states decrease in hearing loss could be due to brain tumor compression on ear canal. Pt denies any tinnitus, vertigo, d/c, pain, or congestion.     PAST MEDICAL & SURGICAL HISTORY:  Meningioma  Malignant melanoma, unspecified site  Heart failure, unspecified heart failure chronicity, unspecified heart failure type  Type 2 diabetes mellitus with complication, without long-term current use of insulin  Enlarged heart  HTN (hypertension)  Sleep apnea  No significant past surgical history    Allergies    No Known Allergies    Intolerances      MEDICATIONS  (STANDING):  aspirin enteric coated 81 milliGRAM(s) Oral daily  atorvastatin 80 milliGRAM(s) Oral at bedtime  carvedilol 25 milliGRAM(s) Oral every 12 hours  clopidogrel Tablet 75 milliGRAM(s) Oral daily  dextrose 5%. 1000 milliLiter(s) (50 mL/Hr) IV Continuous <Continuous>  dextrose 50% Injectable 12.5 Gram(s) IV Push once  dextrose 50% Injectable 25 Gram(s) IV Push once  dextrose 50% Injectable 25 Gram(s) IV Push once  docusate sodium 100 milliGRAM(s) Oral two times a day  furosemide   Injectable 60 milliGRAM(s) IV Push every 12 hours  heparin  Injectable 5000 Unit(s) SubCutaneous every 12 hours  hydrALAZINE 100 milliGRAM(s) Oral three times a day  insulin lispro (HumaLOG) corrective regimen sliding scale   SubCutaneous three times a day before meals  insulin lispro (HumaLOG) corrective regimen sliding scale   SubCutaneous at bedtime  isosorbide   mononitrate ER Tablet (IMDUR) 60 milliGRAM(s) Oral daily  melatonin 3 milliGRAM(s) Oral at bedtime  pantoprazole    Tablet 40 milliGRAM(s) Oral before breakfast  potassium chloride    Tablet ER 20 milliEquivalent(s) Oral once  simethicone 80 milliGRAM(s) Chew three times a day    MEDICATIONS  (PRN):  acetaminophen   Tablet. 650 milliGRAM(s) Oral every 6 hours PRN Moderate Pain (4 - 6)  dextrose Gel 1 Dose(s) Oral once PRN Blood Glucose LESS THAN 70 milliGRAM(s)/deciliter  glucagon  Injectable 1 milliGRAM(s) IntraMuscular once PRN Glucose LESS THAN 70 milligrams/deciliter  ondansetron Injectable 4 milliGRAM(s) IV Push every 6 hours PRN Nausea and/or Vomiting      Social History: no etoh, no tobacco, no idu     ROS: ENT, GI, , CV, Pulm, Neuro, Psych, MS, Heme, Endo, Constitutional; all negative except as noted in HPI    Vital Signs Last 24 Hrs  T(C): 37.1 (26 Jan 2018 12:13), Max: 37.3 (25 Jan 2018 20:52)  T(F): 98.8 (26 Jan 2018 12:13), Max: 99.1 (25 Jan 2018 20:52)  HR: 70 (26 Jan 2018 12:13) (63 - 70)  BP: 137/68 (26 Jan 2018 12:13) (119/61 - 144/69)  BP(mean): --  RR: 16 (26 Jan 2018 12:13) (16 - 18)  SpO2: 96% (26 Jan 2018 12:13) (93% - 96%)                          11.2   8.1   )-----------( 326      ( 26 Jan 2018 06:52 )             32.8    01-26    140  |  100  |  32<H>  ----------------------------<  156<H>  3.4<L>   |  28  |  1.48<H>    Ca    8.8      26 Jan 2018 06:52         PHYSICAL EXAM:  Gen: NAD, well-developed  Head: Normocephalic, Atraumatic  Face: no edema/erythema/fluctuance, parotid glands soft without mass  Eyes: PERRL, EOMI, no scleral injection  Ears: Right - ear canal clear, TM intact without effusion / erythema.  No evidence of any fluid drainage.  Mastoid tenderness / erythema/ ear bulging            Left - ear canal very narrow likely due to canal compression with cerumen impaction, removed 70 % with curette will need debrox to help remove the rest without pain. No Mastoid tenderness / erythema/ ear bulging  Nose: Nares bilaterally patent, no discharge  Mouth: No Stridor / Drooling / Trismus.  Mucosa moist, tongue/uvula midline, oropharynx clear  Neck: Flat, supple, no lymphadenopathy, trachea midline, no masses  Resp: breathing easily, no stridor  CV: no peripheral edema/cyanosis

## 2019-10-19 NOTE — DISCHARGE NOTE ADULT - NS AS ACTIVITY OBS
Walking-Indoors allowed/Walking-Outdoors allowed/No Heavy lifting/straining/Showering allowed 19-Oct-2019 22:44

## 2019-11-06 NOTE — PROGRESS NOTE ADULT - ASSESSMENT
GBBS negative.  Blood pressure slightly elevated today and 1+ protein.  Patient denies headache or visual changes.  Will send urine for PC ratio and blood work today.  Patient cervix is ripe if induction is necessary.  Preeclampsia precautions discussed with patient and .   58 m with severe diverticulosis, CAD s/p stent, metastatic melanoma s/p resection on immunotherapy with progression of disease, meningothelial meningioma s/p resection and RT, was started on a new immunotherapy 4 weeks ago and now admitted for hypotension, SLIKE, dehydration, nausea and vomiting,  metabolic acidosis  afebrile here, WBC 12, no urinary symptoms and u/s appears contaminated  chest/abd/pelvis CT with worsening metastatic disease but no source of infection  blood cx: coag neg staph   urine cx MSSA  TTE negative  lactate went up to 10 but chest/abd CT with contrast no source of infection    dehydration and SILKE, FTT due to metastatic melanoma vs immunotherapy  CT with significantly worsening metastatic disease and omental caking  the coag neg staph (hemolyticus) bacteremia likely contaminant but the urine cx with staph aureus  blood cx negative  and TTE negative  elevated lactate, but pt not toxic and exam benign s/p paracentesis 11/30, cx negative   hypermagnesemia , metabolic acidosis        * s/p 6 days of vanco  * monitor off antibiotics  * if fever or any signs of infection, pan culture and start vanco, zosyn

## 2020-03-24 NOTE — PROGRESS NOTE ADULT - NSHPATTENDINGPLANDISCUSS_GEN_ALL_CORE
the primary team
Pt, RN, SW/CM, resident.
pt and house staff
Pt, RN, SW, Resident.
pt and house staff
pt and house staff
Pt, RN, SW/CM, resident.
pt and house staff
no

## 2020-10-17 NOTE — PROGRESS NOTE ADULT - PROBLEM SELECTOR PLAN 10
Darshan -Patient waiting for results of extent of melanoma on left upper back.   -Outpatient follow up.  -Outpatient f/u with rad onc for head tumor     11. Prophylactic measure: -Heparin SQ twice daily for DVT PPx. Ambulate as tolerated.     12. Dispo: -PT leslie RODAS. Plan to DC to [facility] once able.

## 2020-12-16 PROBLEM — Z87.440 HISTORY OF URINARY TRACT INFECTION: Status: RESOLVED | Noted: 2018-10-01 | Resolved: 2020-12-16

## 2021-02-11 NOTE — PROVIDER CONTACT NOTE (OTHER) - REASON
Pt had 400ml urine output throughout the shift Today is POD 6.  Still having a lot of incisional pain.    On exam, there is a lot of induration superior to the incision, nearly across the entire length.  No fluctuance.   Patient needs a few more oxycodone. Will give 5 more.  Separate ibuprofen and Tylenol dosing.    Use a heating pad.   Follow up in 1.5 weeks.

## 2022-02-14 NOTE — DISCHARGE NOTE ADULT - SECONDARY DIAGNOSIS.
Metabolic acidosis Venous thromboembolism (VTE) SILKE (acute kidney injury) Prolonged QT interval HTN (hypertension) 14605 Exp Problem Focused - Mod. Complex

## 2022-02-16 NOTE — PROVIDER CONTACT NOTE (OTHER) - BACKGROUND
History and Physical - [facility]  Brad Mackey 78 y o  female MRN: [id number]    HPI: Brad Mackey is a 78y [facility] [address] )     Hetal Hager MD;  Location: [facility] GI LAB; Service:     EPIDURAL BLOCK INJECTION N/A 2019    Procedure: C6 C7 Cervical Epidural Steroid Injection (99032); Surgeon: Shelley Cornell MD;  Location: [facility] MAIN OR;  Service: Pain Management     ESOPHAGOGASTRODUODENOSCOPY N/A 2016    Procedure: ESOPHAGOGASTRODUODENOSCOPY (EGD); Surgeon: Hetal Hager MD;  Location: [facility] Alverto Salmeron MD;  Location: [address] [place] [address];  Service: [facility] TRIAL W/ LAMINOTOMY N/A 3/31/2016    Procedure: INSERTION SPINAL CORD STIMULATOR TRIAL;  Surgeon: Shelley Cornell MD;  Location: [facility];  Service:    [place] [id number] Patient admitted with Atherosclerosis of native coronary artery without angina pectoris

## 2022-03-22 NOTE — PROGRESS NOTE ADULT - PROBLEM SELECTOR PLAN 7
impairments found/functional limitations in following categories A1c: 5.7  - ISS - Echo in January with EF 55-60%, grade II diastolic dysfunction  - on aldactone, furosemide, carvedilol, hydralazine, isosorbide  - consider restarting BP meds prn as BP remains stable.

## 2022-04-05 NOTE — PROGRESS NOTE ADULT - PROBLEM SELECTOR PLAN 6
- Echo in January with EF 55-60%, grade II diastolic dysfunction  - on aldactone, furosemide, carvedilol, hydralazine, isosorbide  - consider restarting BP meds prn as BP remains stable. stretcher Patient with stage IV metastatic melanoma with progression of disease despite therapy. Progression of disease seen on CT chest/abdomen/pelvis including new ascites (asymptomatic at this time)  - s/p 4 doses of ipilumumab, s/p 1 dose of nivolumab on 11/1  - [place] [facility] [place]

## 2022-05-09 NOTE — H&P ADULT - MINUTES
[facility]  Department of Emergency Medicine     Written by: Suzan Mata DO  Patient Name: Lyna Mohs  Attending Provider: Gilbert Cristina MD  Admit Date: 2022 10:38 AM  MRN: [id number]                   : 1992        Chief Complaint   Patient presents with    Hyperglycemia     non compliant with meds,     - Chief complaint    Ms. Diane Arceo is a 32 yo female who presents to the ED due to hyperglycemia and an abscess. Patient states that she has had an abscess developing in the right upper thigh since Friday. She states it has been progressively worsening since onset. She notes that the pain is now severe and affecting her ambulation. She states that she has had prior abscesses in the same location. She notes that she has required surgery once before and it had been drained in the emergency department on a separate occasion. She also notes her blood sugar was registering higher. She states that she has been fatigued all day with moderate weakness, headaches and lightheadedness. Symptoms have been constant with onset. She states they are aggravated with any palpation of the region and ambulation and minimally relieved with rest.  She denies any fevers, chills, nausea, vomiting, chest pain, shortness of breath, abdominal pain, bowel or urinary changes, lower extremity edema or vision changes. Review of Systems   Constitutional: Positive for fatigue. Negative for chills and fever. HENT: Negative for congestion, sinus pressure, sinus pain and sore throat. Eyes: Negative for pain and redness. Respiratory: Negative for cough, chest tightness and shortness of breath. Cardiovascular: Negative for chest pain, palpitations and leg swelling. Gastrointestinal: Negative for abdominal pain, constipation, diarrhea, nausea and vomiting. Genitourinary: Negative for dysuria, flank pain, frequency, hematuria and urgency.    Musculoskeletal: Positive for gait problem (Due to pain) and myalgias. Negative for arthralgias, back pain and joint swelling. Skin: Positive for wound (Right leg abscess). Negative for rash. Neurological: Positive for light-headedness and headaches. Negative for dizziness, syncope, weakness and numbness. Physical Exam  Constitutional:       General: She is in acute distress. Appearance: Normal appearance. She is obese. She is not ill-appearing. HENT:      Head: Normocephalic and atraumatic. Right Ear: External ear normal.      Left Ear: External ear normal.      Mouth/Throat:      Mouth: Mucous membranes are moist.      Pharynx: Oropharynx is clear. Eyes:      Extraocular Movements: Extraocular movements intact. Conjunctiva/sclera: Conjunctivae normal.   Cardiovascular:      Rate and Rhythm: Regular rhythm. Tachycardia present. Pulses: Normal pulses. Heart sounds: Normal heart sounds. Pulmonary:      Effort: Pulmonary effort is normal. No respiratory distress. Breath sounds: Normal breath sounds. No wheezing. Abdominal:      General: Abdomen is flat. Bowel sounds are normal.      Palpations: Abdomen is soft. Tenderness: There is no abdominal tenderness. There is no guarding or rebound. Musculoskeletal:         General: Swelling and tenderness (Right proximal medial thigh abscess) present. No deformity. Normal range of motion. Cervical back: Normal range of motion and neck supple. No rigidity or tenderness. Skin:     General: Skin is warm and dry. Findings: Lesion (Indurated right thigh abscess) present. Neurological:      General: No focal deficit present. Mental Status: She is alert and oriented to person, place, and time. Mental status is at baseline. Cranial Nerves: No cranial nerve deficit. Sensory: No sensory deficit. Motor: No weakness.    Psychiatric:         Mood and Affect: Mood normal.         Behavior: Behavior normal.          Procedures MDM  Number of Diagnoses or Management Options  Abscess of right thigh  Hyperglycemia  Diagnosis management comments: This is a 32 yo female who presents to the ED due to hyperglycemia and an abscess. Patient was initially given 1 L normal saline bolus and injection insulin along with fentanyl for his pain. Initial glucose was reading high. CBC revealed mild leukocytosis with a red cell count of 11.9. CMP revealed hyperglycemia with glucose of 588. She had normal venous pH and beta hydroxybutyrate. Lactate was mildly elevated at 2.7. Patient's pregnancy test was negative and urinalysis revealed trace leukocytes and moderate bacteria. CT abdomen pelvis revealed local cellulitis identified in the right medial upper thigh with reactive adenopathy in the inguinal region without abscess or air identified in the soft tissues. Patient was started on vancomycin and Zosyn and given additional dose of fentanyl to help manage her pain. Patient will be admitted by Dr. Wil Espinoza for further work-up and treatment and abscess drainage by the surgery team.                --------------------------------------------- PAST HISTORY ---------------------------------------------  Past Medical History:  has a past medical history of Anemia, Asthma, Diabetes mellitus ([facility] [address] and [facility] Past Surgical History:  has no past surgical history on file. Social History:  reports that she has been smoking cigarettes. She has never used smokeless tobacco. She reports current alcohol use. She reports that she does not use drugs. Family History: family history is not on file. The patients home medications have been reviewed. Allergies: Patient has no known allergies.     -------------------------------------------------- RESULTS -------------------------------------------------    LABS:  Results for orders placed or performed during the hospital encounter of 05/09/22   Urinalysis with Microscopic   Result Value Ref Range    Color, UA Yellow Straw/Yellow    Clarity, UA SL CLOUDY Clear    Glucose, Ur >=1000 (A) Negative mg/dL    Bilirubin Urine Negative Negative    Ketones, Urine Negative Negative mg/dL    Specific Gravity, UA 1.010 1.005 - 1.030    Blood, Urine SMALL (A) Negative    pH, UA 6.0 5.0 - 9.0    Protein, UA Negative Negative mg/dL    Urobilinogen, Urine 0.2 <2.0 E.U./dL    Nitrite, Urine Negative Negative    Leukocyte Esterase, Urine TRACE (A) Negative    WBC, UA 2-5 0 - 5 /HPF    RBC, UA 1-3 0 - 2 /HPF    Epithelial Cells, UA FEW /HPF    Bacteria, UA MODERATE (A) None Seen /HPF   Lactate, Sepsis   Result Value Ref Range    Lactic Acid, Sepsis 2.7 (H) 0.5 - 1.9 mmol/L   CBC with Auto Differential   Result Value Ref Range    WBC 11.9 (H) 4.5 - 11.5 E9/L    RBC 5.05 3.50 - 5.50 E12/L    Hemoglobin 11.9 11.5 - 15.5 g/dL    Hematocrit 37.9 34.0 - 48.0 %    MCV 75.0 (L) 80.0 - 99.9 fL    MCH 23.6 (L) 26.0 - 35.0 pg    MCHC 31.4 (L) 32.0 - 34.5 %    RDW 16.0 (H) 11.5 - 15.0 fL    Platelets 164 556 - 205 E9/L    MPV 11.5 7.0 - 12.0 fL    Neutrophils % 78.7 43.0 - 80.0 %    Immature Granulocytes % 0.6 0.0 - 5.0 %    Lymphocytes % 11.6 (L) 20.0 - 42.0 %    Monocytes % 8.6 2.0 - 12.0 %    Eosinophils % 0.2 0.0 - 6.0 %    Basophils % 0.3 0.0 - 2.0 %    Neutrophils Absolute 9.39 (H) 1.80 - 7.30 E9/L    Immature Granulocytes # 0.07 E9/L    Lymphocytes Absolute 1.38 (L) 1.50 - 4.00 E9/L    Monocytes Absolute 1.02 (H) 0.10 - 0.95 E9/L    Eosinophils Absolute 0.02 (L) 0.05 - 0.50 E9/L    Basophils Absolute 0.03 0.00 - 0.20 E9/L   Beta-Hydroxybutyrate   Result Value Ref Range    Beta-Hydroxybutyrate 0.20 0.02 - 0.27 mmol/L   Phosphorus   Result Value Ref Range    Phosphorus 3.9 2.5 - 4.5 mg/dL   Lipase   Result Value Ref Range    Lipase 28 13 - 60 U/L   Comprehensive Metabolic Panel   Result Value Ref Range    Sodium 128 (L) 132 - 146 mmol/L    Potassium 4.1 3.5 - 5.0 mmol/L    Chloride 93 (L) 98 - 107 mmol/L    CO2 23 22 - 29 mmol/L    Anion Gap 12 7 - 16 mmol/L    Glucose 588 (HH) 74 - 99 mg/dL    BUN 7 6 - 20 mg/dL    CREATININE 0.8 0.5 - 1.0 mg/dL    GFR Non-African American >60 >=60 mL/min/1.73    GFR African American >60     Calcium 8.6 8.6 - 10.2 mg/dL    Total Protein 7.1 6.4 - 8.3 g/dL    Albumin 3.5 3.5 - 5.2 g/dL    Total Bilirubin 0.8 0.0 - 1.2 mg/dL    Alkaline Phosphatase 89 35 - 104 U/L    ALT 17 0 - 32 U/L    AST 17 0 - 31 U/L   Magnesium   Result Value Ref Range    Magnesium 2.1 1.6 - 2.6 mg/dL   pH, venous   Result Value Ref Range    pH, Teddy 7.38 7.35 - 7.45   hCG, quantitative, pregnancy   Result Value Ref Range    hCG Quant <0.1 <10 mIU/mL   POCT Glucose   Result Value Ref Range    Glucose  mg/dL    QC OK? ok    POCT Glucose   Result Value Ref Range    Meter Glucose >500 (H) 74 - 99 mg/dL   POC Pregnancy Urine Qual   Result Value Ref Range    HCG, Urine, POC Negative Negative    Lot Number [id number]     Positive QC Pass/Fail Pass     Negative QC Pass/Fail Pass    POCT Glucose   Result Value Ref Range    Meter Glucose 346 (H) 74 - 99 mg/dL   EKG 12 Lead   Result Value Ref Range    Ventricular Rate 112 BPM    Atrial Rate 112 BPM    P-R Interval 152 ms    QRS Duration 80 ms    Q-T Interval 332 ms    QTc Calculation (Bazett) 453 ms    P Axis 48 degrees    R Axis 13 degrees    T Axis 9 degrees       RADIOLOGY:  CT ABDOMEN PELVIS W IV CONTRAST Additional Contrast? None   Final Result   Local cellulitis identified involving the right medial upper thigh with   reactive adenopathy in the inguinal region. No abscess or air identified in   the soft tissues. No CT evidence of acute intra-or pelvic abnormality. EKG:  This EKG is signed and interpreted by me.     Rate: 112  Rhythm: Sinus  Interpretation: sinus tachycardia  Comparison: stable as compared to patient's most recent EKG      ------------------------- NURSING NOTES AND VITALS REVIEWED ---------------------------  Date / Time Roomed:  5/9/2022 10:38 AM  ED Bed Assignment:  [id number]    The nursing notes within the ED encounter and vital signs as below have been reviewed. Patient Vitals for the past 24 hrs:   BP Temp Temp src Pulse Resp SpO2 Weight   05/09/22 1930 (!) 158/117 -- -- 124 20 -- --   05/09/22 1830 (!) 107/58 -- -- 129 20 98 % --   05/09/22 1815 (!) 139/108 -- -- 126 24 97 % --   05/09/22 1630 (!) 143/81 -- -- 119 23 94 % --   05/09/22 1600 (!) 147/83 -- -- 123 17 99 % --   05/09/22 1530 137/83 -- -- 123 16 99 % --   05/09/22 1440 (!) 141/79 -- -- 114 17 98 % --   05/09/22 1040 (!) 161/101 99.8 °F (37.7 °C) Oral 120 18 98 % (!) 340 lb (154.2 kg)       Oxygen Saturation Interpretation: Normal    ------------------------------------------ PROGRESS NOTES ------------------------------------------  Re-evaluation(s):  Time: 1730  Patients symptoms show no change  Repeat physical examination is not changed    Counseling:  I have spoken with the patient and discussed todays results, in addition to providing specific details for the plan of care and counseling regarding the diagnosis and prognosis. Their questions are answered at this time and they are agreeable with the plan of admission.    --------------------------------- ADDITIONAL PROVIDER NOTES ---------------------------------  Consultations:  Time: 1776. Spoke with Dr. Evelio Benedict. Discussed case. They will admit the patient. This patient's ED course included: a personal history and physicial examination, re-evaluation prior to disposition, multiple bedside re-evaluations and IV medications    This patient has remained hemodynamically stable during their ED course. Diagnosis:  1. Abscess of right thigh    2. Hyperglycemia        Disposition:  Patient's disposition: Admit to med/surg floor  Patient's condition is stable.         Patient was seen and evaluated by myself and my attending Candice Hinkle MD. Assessment and Plan discussed with attending provider, please see attestation for final plan of care. DO Raphael Duenas DO  Resident  05/09/22 2101        ATTENDING PROVIDER ATTESTATION:     Ezio Burden presented to the emergency department for evaluation of Hyperglycemia (non compliant with meds, )   and was initially evaluated by the Medical Resident. See Original ED Note for H&P and ED course above. I have reviewed and discussed the case, including pertinent history (medical, surgical, family and social) and exam findings with the Medical Resident assigned to Sonnyia Jenise. I have personally performed and/or participated in the history, exam, medical decision making, and procedures and agree with all pertinent clinical information. I have reviewed my findings and recommendations with the assigned Medical Resident, Ezio Burden and members of family present at the time of disposition. My findings/plan: The primary encounter diagnosis was Abscess of right thigh. A diagnosis of Hyperglycemia was also pertinent to this visit.   Current Discharge Medication List        MD Martinez Hawkins MD  05/10/22 [id number]

## 2022-06-07 NOTE — PROGRESS NOTE ADULT - PROBLEM SELECTOR PROBLEM 7
Metabolic acidosis with increased anion gap and accumulation of organic acids No Residual Tumor Seen Histology Text: There were no malignant cells seen in the sections examined.

## 2022-06-30 NOTE — PHYSICAL THERAPY INITIAL EVALUATION ADULT - IMPAIRMENTS FOUND, PT EVAL
aerobic capacity/endurance/muscle strength/gait, locomotion, and balance Cimzia Pregnancy And Lactation Text: This medication crosses the placenta but can be considered safe in certain situations. Cimzia may be excreted in breast milk.

## 2022-07-22 NOTE — H&P CARDIOLOGY - VASCULAR
Chief Complaint   Patient presents with    Physical Exam     Blood work for cancer markers         Patient ID: Lennie Crenshaw is a 54 y o  female  HPI  Pt is seeing for CPE -  Recently found out that water in the house was contaminated with Brain products  -  Pt is asymptomatic     The following portions of the patient's history were reviewed and updated as appropriate: allergies, current medications, past family history, past medical history, past social history, past surgical history and problem list     Review of Systems   Constitutional: Negative for fatigue, fever and unexpected weight change  HENT: Negative for congestion, ear discharge, ear pain, hearing loss, rhinorrhea, sinus pressure, sore throat and trouble swallowing  Eyes: Negative  Respiratory: Negative  Cardiovascular: Negative  Gastrointestinal: Negative  Endocrine: Negative  Genitourinary: Negative  Musculoskeletal: Negative  Skin: Negative  Neurological: Negative for dizziness, weakness, light-headedness and numbness  Hematological: Negative  Psychiatric/Behavioral: Negative  Current Outpatient Medications   Medication Sig Dispense Refill    SHAROBEL 0 35 MG tablet 1 tablet daily at bedtime   1     No current facility-administered medications for this visit  Objective:    /80 (BP Location: Left arm, Patient Position: Sitting, Cuff Size: Large)   Pulse 84   Temp 98 9 °F (37 2 °C)   Resp 16   Ht 5' 10" (1 778 m)   Wt 105 kg (231 lb)   SpO2 97%   BMI 33 15 kg/m²        Physical Exam  Constitutional:       General: She is not in acute distress  Appearance: She is well-developed  She is obese  She is not ill-appearing  HENT:      Head: Normocephalic and atraumatic  Right Ear: Hearing, tympanic membrane, ear canal and external ear normal       Left Ear: Hearing, tympanic membrane, ear canal and external ear normal       Nose: No congestion or rhinorrhea        Mouth/Throat: Pharynx: No oropharyngeal exudate or posterior oropharyngeal erythema  Eyes:      Extraocular Movements: Extraocular movements intact  Conjunctiva/sclera: Conjunctivae normal    Neck:      Thyroid: No thyroid mass or thyromegaly  Vascular: No JVD  Cardiovascular:      Rate and Rhythm: Normal rate and regular rhythm  Heart sounds: Normal heart sounds  No murmur heard  No gallop  Pulmonary:      Effort: No respiratory distress  Breath sounds: Normal breath sounds  No wheezing, rhonchi or rales  Abdominal:      General: Bowel sounds are normal       Palpations: Abdomen is soft  Tenderness: There is no abdominal tenderness  Musculoskeletal:      Cervical back: Neck supple  Right lower leg: No edema  Left lower leg: No edema  Lymphadenopathy:      Cervical: No cervical adenopathy  Neurological:      General: No focal deficit present  Mental Status: She is alert and oriented to person, place, and time  Cranial Nerves: No cranial nerve deficit  Psychiatric:         Mood and Affect: Mood normal          Behavior: Behavior normal          Thought Content: Thought content normal          Judgment: Judgment normal                  Assessment/Plan:         Diagnoses and all orders for this visit:    Annual physical exam  -     TSH, 3rd generation; Future  -     CBC; Future  -     Comprehensive metabolic panel; Future  -     Lipid panel; Future  -     Hemoglobin A1C; Future    Exposure to potentially hazardous chemical  -     Cancer antigen 19-9; Future  -     Lipase; Future  -     Amylase; Future  -     ; Future      will obtain mammogram and PAP records    Declined Adacel vaccine       BMI Counseling: Body mass index is 33 15 kg/m²  Discussed the patient's BMI with her   The BMI is above normal  Nutrition recommendations include reducing portion sizes, decreasing overall calorie intake, 3-5 servings of fruits/vegetables daily, reducing fast food intake, consuming healthier snacks, decreasing soda and/or juice intake and moderation in carbohydrate intake  Exercise recommendations include exercising 3-5 times per week         rto in 1 y        Jennifer Earl MD detailed exam

## 2022-09-14 NOTE — PROGRESS NOTE ADULT - SUBJECTIVE AND OBJECTIVE BOX
Patient is a 58y old  Male who presents with a chief complaint of abdominal pain (14 Dec 2018 13:19)      SUBJECTIVE / OVERNIGHT EVENTS: Patient's primary source of discomfort earlier in the day was his left leg, in which he has a chronic DVT. The discomfort improved with re-positioning of leg. Patient's abdominal pain is much better 2/10 with medication but is at 7-8/10 without pain meds. He does endorse recent distention, has occasional nausea that is positional, did have bowel movements after kayexelate that were formed, and continues to have a reduced amount of urine output. He does not endorse headaches, changes in mental status, dizziness, nuchal rigidity, chest pain, shortness of breath, diarrhea, dysuria, hematuria or skin changes.     MEDICATIONS  (STANDING):  aspirin enteric coated 81 milliGRAM(s) Oral daily  dextrose 5%. 1000 milliLiter(s) (50 mL/Hr) IV Continuous <Continuous>  dextrose 50% Injectable 12.5 Gram(s) IV Push once  dextrose 50% Injectable 25 Gram(s) IV Push once  dextrose 50% Injectable 25 Gram(s) IV Push once  docusate sodium 100 milliGRAM(s) Oral daily  insulin lispro (HumaLOG) corrective regimen sliding scale   SubCutaneous every 6 hours  polyethylene glycol 3350 17 Gram(s) Oral at bedtime  senna 2 Tablet(s) Oral at bedtime  sodium bicarbonate 650 milliGRAM(s) Oral three times a day  sodium chloride 0.9%. 1000 milliLiter(s) (75 mL/Hr) IV Continuous <Continuous>    MEDICATIONS  (PRN):  bisacodyl Suppository 10 milliGRAM(s) Rectal once PRN Constipation  dextrose 40% Gel 15 Gram(s) Oral once PRN Blood Glucose LESS THAN 70 milliGRAM(s)/deciliter  glucagon  Injectable 1 milliGRAM(s) IntraMuscular once PRN Glucose LESS THAN 70 milligrams/deciliter  HYDROmorphone  Injectable 0.5 milliGRAM(s) IV Push every 2 hours PRN moderate to severe pain      Vital Signs Last 24 Hrs  T(C): 37 (14 Dec 2018 09:20), Max: 37 (14 Dec 2018 09:20)  T(F): 98.6 (14 Dec 2018 09:20), Max: 98.6 (14 Dec 2018 09:20)  HR: 103 (14 Dec 2018 09:20) (90 - 104)  BP: 105/69 (14 Dec 2018 09:20) (102/71 - 110/70)  BP(mean): --  RR: 14 (14 Dec 2018 09:20) (12 - 18)  SpO2: 100% (14 Dec 2018 09:20) (96% - 100%)  CAPILLARY BLOOD GLUCOSE      POCT Blood Glucose.: 215 mg/dL (14 Dec 2018 11:23)  POCT Blood Glucose.: 193 mg/dL (14 Dec 2018 09:40)  POCT Blood Glucose.: 224 mg/dL (14 Dec 2018 01:21)    I&O's Summary      PHYSICAL EXAM:  GENERAL: NAD, well-developed  HEAD:  Atraumatic, Normocephalic  EYES: EOMI, PERRLA, conjunctiva and sclera clear  NECK: Supple, No JVD  CHEST/LUNG: Clear to auscultation bilaterally; No wheeze  HEART: Regular rate and rhythm; No murmurs, rubs, or gallops  ABDOMEN: Soft, Nontender, Nondistended; Bowel sounds present  EXTREMITIES:  2+ Peripheral Pulses, No clubbing, cyanosis, or edema  PSYCH: AAOx3  NEUROLOGY: non-focal  SKIN: No rashes or lesions    LABS:                        13.4   8.32  )-----------( 470      ( 13 Dec 2018 19:00 )             44.0     12-14    138  |  97<L>  |  91<H>  ----------------------------<  150<H>  5.1   |  16<L>  |  2.13<H>    Ca    8.5      14 Dec 2018 06:41  Phos  5.5     12-13  Mg     3.3     12-13    TPro  7.5  /  Alb  3.1<L>  /  TBili  0.2  /  DBili  x   /  AST  47<H>  /  ALT  11  /  AlkPhos  157<H>  12-14 No Patient is a 58y old  Male who presents with a chief complaint of abdominal pain (14 Dec 2018 13:19)      SUBJECTIVE / OVERNIGHT EVENTS: Patient's primary source of discomfort earlier in the day was his left leg, in which he has a chronic DVT. The discomfort improved with re-positioning of leg. Patient's abdominal pain is much better 2/10 with medication but is at 7-8/10 without pain meds, continues to be generalized achy and non radiating. He does endorse recent distention, has occasional nausea that is positional, did have bowel movements after kayexelate that were formed, and continues to have a reduced amount of urine output. He does not endorse headaches, changes in mental status, dizziness, nuchal rigidity, chest pain, shortness of breath, diarrhea, dysuria, hematuria or skin changes.     MEDICATIONS  (STANDING):  aspirin enteric coated 81 milliGRAM(s) Oral daily  dextrose 5%. 1000 milliLiter(s) (50 mL/Hr) IV Continuous <Continuous>  dextrose 50% Injectable 12.5 Gram(s) IV Push once  dextrose 50% Injectable 25 Gram(s) IV Push once  dextrose 50% Injectable 25 Gram(s) IV Push once  docusate sodium 100 milliGRAM(s) Oral daily  insulin lispro (HumaLOG) corrective regimen sliding scale   SubCutaneous every 6 hours  polyethylene glycol 3350 17 Gram(s) Oral at bedtime  senna 2 Tablet(s) Oral at bedtime  sodium bicarbonate 650 milliGRAM(s) Oral three times a day  sodium chloride 0.9%. 1000 milliLiter(s) (75 mL/Hr) IV Continuous <Continuous>    MEDICATIONS  (PRN):  bisacodyl Suppository 10 milliGRAM(s) Rectal once PRN Constipation  dextrose 40% Gel 15 Gram(s) Oral once PRN Blood Glucose LESS THAN 70 milliGRAM(s)/deciliter  glucagon  Injectable 1 milliGRAM(s) IntraMuscular once PRN Glucose LESS THAN 70 milligrams/deciliter  HYDROmorphone  Injectable 0.5 milliGRAM(s) IV Push every 2 hours PRN moderate to severe pain      Vital Signs Last 24 Hrs  T(C): 37 (14 Dec 2018 09:20), Max: 37 (14 Dec 2018 09:20)  T(F): 98.6 (14 Dec 2018 09:20), Max: 98.6 (14 Dec 2018 09:20)  HR: 103 (14 Dec 2018 09:20) (90 - 104)  BP: 105/69 (14 Dec 2018 09:20) (102/71 - 110/70)  BP(mean): --  RR: 14 (14 Dec 2018 09:20) (12 - 18)  SpO2: 100% (14 Dec 2018 09:20) (96% - 100%)  CAPILLARY BLOOD GLUCOSE      POCT Blood Glucose.: 215 mg/dL (14 Dec 2018 11:23)  POCT Blood Glucose.: 193 mg/dL (14 Dec 2018 09:40)  POCT Blood Glucose.: 224 mg/dL (14 Dec 2018 01:21)    I&O's Summary      PHYSICAL EXAM:  GENERAL: NAD, well-cachectic appearing  HEAD:  Atraumatic, Normocephalic  EYES: EOMI, PERRLA, conjunctiva and sclera clear  NECK: Supple, No JVD  CHEST/LUNG: Clear to auscultation bilaterally; No wheeze  HEART: Regular rate and rhythm; No murmurs, rubs, or gallops  ABDOMEN: Hard but compressible, distended, non tender in all four quadrants, normoactive bowel sounds  EXTREMITIES:  2+ Peripheral Pulses, No clubbing, cyanosis. Marked left leg pitting edema from chronic DVT  PSYCH: AAOx3  NEUROLOGY: non-focal  SKIN: No rashes or lesions    LABS:                        13.4   8.32  )-----------( 470      ( 13 Dec 2018 19:00 )             44.0     12-14    138  |  97<L>  |  91<H>  ----------------------------<  150<H>  5.1   |  16<L>  |  2.13<H>    Ca    8.5      14 Dec 2018 06:41  Phos  5.5     12-13  Mg     3.3     12-13    TPro  7.5  /  Alb  3.1<L>  /  TBili  0.2  /  DBili  x   /  AST  47<H>  /  ALT  11  /  AlkPhos  157<H>  12-14

## 2022-10-31 NOTE — PATIENT PROFILE ADULT - BRADEN NUTRITION
[FreeTextEntry1] : Documented by Kerry Trevizo acting as a scribe for Dr. Surjit Jarrett on 10/31/2022 (2) probably inadequate

## 2022-11-21 NOTE — PROGRESS NOTE ADULT - I WAS PHYSICALLY PRESENT FOR THE KEY PORTIONS OF THE EVALUATION AND MANAGEMENT (E/M) SERVICE PROVIDED.  I AGREE WITH THE ABOVE HISTORY, PHYSICAL, AND PLAN WHICH I HAVE REVIEWED AND EDITED WHERE APPROPRIATE
Statement Selected
General
Statement Selected

## 2022-11-29 NOTE — PATIENT PROFILE ADULT - LIVES WITH
Pt at cancer center today for radiation. Pt  requested to speak to Annabelle however Annabelle was with patients and unavailable. Writer spoke to pt  Brayan regarding his concerns. Brayan stated that pt got two Mitomycin shots yesterday. Per  first 17 1/2 mg went in fine however second shot had some leakage. Pt  is worried about the rate the the mitomycin was given, looked up research last night that stated it should be pushed over 15-30 min. Stated that writer does not work in infusion so unsure of the rate that medication should be given. Pt  is also worried that pt only got 9 mg of the second shot. Stated that Annabelle was updated on situation yesterday but he wants to discuss with Annabelle again. Wondering if pt can get the rest of her missed dose on Friday when her pump is disconnected. Stated that writer would send message to Annabelle regarding above. Pt  agreeable and denied any additional concerns.    Pt. lives in extended care facility at this time.

## 2023-03-29 NOTE — PROGRESS NOTE ADULT - ASSESSMENT
Call to patient. LM to return call.     Call Center:  Please assist patient with getting scheduled to establish care with a new PCP in Family Medicine or Internal Medicine.     59 y/o Male with w/ severe diverticulosis, metastatic melanoma s/p resection on immunotherapy with progression of disease, meningothelial meningioma s/p resection and RT, CAD s/p stent admitted for sepsis secondary GPC bacteremia likely 2/2 UTI c/b L LE DVT, SILKE with metabolic acidosis in the setting of worsening metastatic disease 59 y/o Male with w/ severe diverticulosis, metastatic melanoma s/p resection on immunotherapy with progression of disease, meningothelial meningioma s/p resection and RT, CAD s/p stent admitted for sepsis secondary GPC bacteremia likely 2/2 Staph, Aureus UTI c/b L LE DVT, SILKE with metabolic acidosis in the setting of worsening metastatic disease

## 2023-05-25 NOTE — DISCHARGE NOTE ADULT - NS MD DC PLAN IMMU FLU REF OTH
Refused Griseofulvin Pregnancy And Lactation Text: This medication is Pregnancy Category X and is known to cause serious birth defects. It is unknown if this medication is excreted in breast milk but breast feeding should be avoided.

## 2023-09-22 NOTE — DIETITIAN INITIAL EVALUATION ADULT. - PROBLEM SELECTOR PLAN 5
141 - Echo in January with EF 55-60%, grade II diastolic dysfunction  - on aldactone, furosemide, carvedilol, hydralazine, isosorbide  - holding all BP meds at this time given hypotension upon presentation to the ED

## 2023-10-09 NOTE — PHYSICAL THERAPY INITIAL EVALUATION ADULT - GAIT DEVIATIONS NOTED, PT EVAL
General: + fever, +decreased appetite, no chills, weight gain or weight loss  HEENT: + nasal congestion, cough, rhinorrhea  Cardio: no palpitations, pallor, chest pain or discomfort  Pulm: + shortness of breath  GI: no vomiting, diarrhea, abdominal pain, constipation   /Renal: no dysuria, foul smelling urine, increased frequency, flank pain  MSK: no back or extremity pain, no edema, joint pain or swelling, gait changes  Heme: no bruising or abnormal bleeding  Skin: no rash decreased lala

## 2024-08-19 NOTE — ED PROVIDER NOTE - HEME/LYMPH NEGATIVE STATEMENT, MLM
normal gait and station , no tenderness or deformities present no anemia, no easy bruising, no jaundice, no swollen lymph nodes.

## 2025-06-27 NOTE — H&P PST ADULT - RESPIRATORY
Assessment completed  per [facility] guidelines. Alert and oriented x 4. Skin warm and dry, respirations regular and nonlabored abdomen is soft,     detailed exam